# Patient Record
Sex: MALE | Race: WHITE | NOT HISPANIC OR LATINO
[De-identification: names, ages, dates, MRNs, and addresses within clinical notes are randomized per-mention and may not be internally consistent; named-entity substitution may affect disease eponyms.]

---

## 2018-01-01 ENCOUNTER — APPOINTMENT (OUTPATIENT)
Dept: INTERNAL MEDICINE | Facility: CLINIC | Age: 68
End: 2018-01-01
Payer: MEDICARE

## 2018-01-01 VITALS
SYSTOLIC BLOOD PRESSURE: 100 MMHG | HEIGHT: 76 IN | BODY MASS INDEX: 24.36 KG/M2 | DIASTOLIC BLOOD PRESSURE: 68 MMHG | WEIGHT: 200 LBS

## 2018-01-01 DIAGNOSIS — Z86.14 PERSONAL HISTORY OF METHICILLIN RESISTANT STAPHYLOCOCCUS AUREUS INFECTION: ICD-10-CM

## 2018-01-01 PROCEDURE — 96365 THER/PROPH/DIAG IV INF INIT: CPT

## 2018-01-01 PROCEDURE — 93005 ELECTROCARDIOGRAM TRACING: CPT

## 2018-01-01 PROCEDURE — 93325 DOPPLER ECHO COLOR FLOW MAPG: CPT

## 2018-01-01 PROCEDURE — 87150 DNA/RNA AMPLIFIED PROBE: CPT

## 2018-01-01 PROCEDURE — 81001 URINALYSIS AUTO W/SCOPE: CPT

## 2018-01-01 PROCEDURE — 73660 X-RAY EXAM OF TOE(S): CPT

## 2018-01-01 PROCEDURE — 84100 ASSAY OF PHOSPHORUS: CPT

## 2018-01-01 PROCEDURE — 80048 BASIC METABOLIC PNL TOTAL CA: CPT

## 2018-01-01 PROCEDURE — 85730 THROMBOPLASTIN TIME PARTIAL: CPT

## 2018-01-01 PROCEDURE — 99213 OFFICE O/P EST LOW 20 MIN: CPT

## 2018-01-01 PROCEDURE — 87633 RESP VIRUS 12-25 TARGETS: CPT

## 2018-01-01 PROCEDURE — 86850 RBC ANTIBODY SCREEN: CPT

## 2018-01-01 PROCEDURE — 74176 CT ABD & PELVIS W/O CONTRAST: CPT

## 2018-01-01 PROCEDURE — 82010 KETONE BODYS QUAN: CPT

## 2018-01-01 PROCEDURE — 93320 DOPPLER ECHO COMPLETE: CPT

## 2018-01-01 PROCEDURE — 87040 BLOOD CULTURE FOR BACTERIA: CPT

## 2018-01-01 PROCEDURE — 87070 CULTURE OTHR SPECIMN AEROBIC: CPT

## 2018-01-01 PROCEDURE — 99285 EMERGENCY DEPT VISIT HI MDM: CPT | Mod: 25

## 2018-01-01 PROCEDURE — 83605 ASSAY OF LACTIC ACID: CPT

## 2018-01-01 PROCEDURE — 96361 HYDRATE IV INFUSION ADD-ON: CPT

## 2018-01-01 PROCEDURE — 86901 BLOOD TYPING SEROLOGIC RH(D): CPT

## 2018-01-01 PROCEDURE — 92526 ORAL FUNCTION THERAPY: CPT

## 2018-01-01 PROCEDURE — 83735 ASSAY OF MAGNESIUM: CPT

## 2018-01-01 PROCEDURE — 80202 ASSAY OF VANCOMYCIN: CPT

## 2018-01-01 PROCEDURE — 87581 M.PNEUMON DNA AMP PROBE: CPT

## 2018-01-01 PROCEDURE — 73630 X-RAY EXAM OF FOOT: CPT

## 2018-01-01 PROCEDURE — 71045 X-RAY EXAM CHEST 1 VIEW: CPT

## 2018-01-01 PROCEDURE — 97116 GAIT TRAINING THERAPY: CPT

## 2018-01-01 PROCEDURE — 87798 DETECT AGENT NOS DNA AMP: CPT

## 2018-01-01 PROCEDURE — 85027 COMPLETE CBC AUTOMATED: CPT

## 2018-01-01 PROCEDURE — 85610 PROTHROMBIN TIME: CPT

## 2018-01-01 PROCEDURE — 82550 ASSAY OF CK (CPK): CPT

## 2018-01-01 PROCEDURE — 36415 COLL VENOUS BLD VENIPUNCTURE: CPT

## 2018-01-01 PROCEDURE — 92610 EVALUATE SWALLOWING FUNCTION: CPT

## 2018-01-01 PROCEDURE — 96367 TX/PROPH/DG ADDL SEQ IV INF: CPT

## 2018-01-01 PROCEDURE — 82962 GLUCOSE BLOOD TEST: CPT

## 2018-01-01 PROCEDURE — 87186 SC STD MICRODIL/AGAR DIL: CPT

## 2018-01-01 PROCEDURE — 80053 COMPREHEN METABOLIC PANEL: CPT

## 2018-01-01 PROCEDURE — 87641 MR-STAPH DNA AMP PROBE: CPT

## 2018-01-01 PROCEDURE — 70450 CT HEAD/BRAIN W/O DYE: CPT

## 2018-01-01 PROCEDURE — 87486 CHLMYD PNEUM DNA AMP PROBE: CPT

## 2018-01-01 PROCEDURE — 87640 STAPH A DNA AMP PROBE: CPT

## 2018-01-01 PROCEDURE — 93312 ECHO TRANSESOPHAGEAL: CPT

## 2018-01-01 PROCEDURE — 82803 BLOOD GASES ANY COMBINATION: CPT

## 2018-01-01 PROCEDURE — 86900 BLOOD TYPING SEROLOGIC ABO: CPT

## 2018-01-01 PROCEDURE — 87086 URINE CULTURE/COLONY COUNT: CPT

## 2018-01-20 ENCOUNTER — INPATIENT (INPATIENT)
Facility: HOSPITAL | Age: 68
LOS: 3 days | Discharge: HOME | DRG: 64 | End: 2018-01-24
Attending: INTERNAL MEDICINE | Admitting: INTERNAL MEDICINE
Payer: MEDICARE

## 2018-01-20 VITALS
TEMPERATURE: 98 F | SYSTOLIC BLOOD PRESSURE: 153 MMHG | RESPIRATION RATE: 18 BRPM | DIASTOLIC BLOOD PRESSURE: 82 MMHG | OXYGEN SATURATION: 99 % | HEIGHT: 75 IN | HEART RATE: 93 BPM | WEIGHT: 199.96 LBS

## 2018-01-20 DIAGNOSIS — E86.0 DEHYDRATION: ICD-10-CM

## 2018-01-20 DIAGNOSIS — Z95.0 PRESENCE OF CARDIAC PACEMAKER: Chronic | ICD-10-CM

## 2018-01-20 LAB
APPEARANCE UR: CLEAR — SIGNIFICANT CHANGE UP
BACTERIA # UR AUTO: ABNORMAL
BASOPHILS # BLD AUTO: 0 K/UL — SIGNIFICANT CHANGE UP (ref 0–0.2)
BASOPHILS NFR BLD AUTO: 0.2 % — SIGNIFICANT CHANGE UP (ref 0–2)
BILIRUB UR-MCNC: NEGATIVE — SIGNIFICANT CHANGE UP
COLOR SPEC: YELLOW — SIGNIFICANT CHANGE UP
DIFF PNL FLD: ABNORMAL
EOSINOPHIL # BLD AUTO: 0 K/UL — SIGNIFICANT CHANGE UP (ref 0–0.5)
EOSINOPHIL NFR BLD AUTO: 0.2 % — SIGNIFICANT CHANGE UP (ref 0–5)
EPI CELLS # UR: SIGNIFICANT CHANGE UP
GLUCOSE BLDC GLUCOMTR-MCNC: 253 MG/DL — HIGH (ref 70–99)
GLUCOSE BLDC GLUCOMTR-MCNC: 336 MG/DL — HIGH (ref 70–99)
GLUCOSE UR QL: 1000 MG/DL
HCT VFR BLD CALC: 41.2 % — LOW (ref 42–52)
HGB BLD-MCNC: 13.7 G/DL — LOW (ref 14–18)
KETONES UR-MCNC: ABNORMAL
LEUKOCYTE ESTERASE UR-ACNC: NEGATIVE — SIGNIFICANT CHANGE UP
LIDOCAIN IGE QN: 48 U/L — SIGNIFICANT CHANGE UP (ref 22–51)
LYMPHOCYTES # BLD AUTO: 1.2 K/UL — SIGNIFICANT CHANGE UP (ref 1–4.8)
LYMPHOCYTES # BLD AUTO: 9 % — LOW (ref 20–55)
MCHC RBC-ENTMCNC: 29.1 PG — SIGNIFICANT CHANGE UP (ref 27–31)
MCHC RBC-ENTMCNC: 33.3 G/DL — SIGNIFICANT CHANGE UP (ref 32–36)
MCV RBC AUTO: 87.7 FL — SIGNIFICANT CHANGE UP (ref 80–94)
MONOCYTES # BLD AUTO: 0.8 K/UL — SIGNIFICANT CHANGE UP (ref 0–0.8)
MONOCYTES NFR BLD AUTO: 6.1 % — SIGNIFICANT CHANGE UP (ref 3–10)
NEUTROPHILS # BLD AUTO: 11 K/UL — HIGH (ref 1.8–8)
NEUTROPHILS NFR BLD AUTO: 84.3 % — HIGH (ref 37–73)
NITRITE UR-MCNC: NEGATIVE — SIGNIFICANT CHANGE UP
PH UR: 6 — SIGNIFICANT CHANGE UP (ref 5–8)
PLATELET # BLD AUTO: 247 K/UL — SIGNIFICANT CHANGE UP (ref 150–400)
PROT UR-MCNC: 100 MG/DL
RBC # BLD: 4.7 M/UL — SIGNIFICANT CHANGE UP (ref 4.6–6.2)
RBC # FLD: 12.7 % — SIGNIFICANT CHANGE UP (ref 11–15.6)
RBC CASTS # UR COMP ASSIST: SIGNIFICANT CHANGE UP /HPF (ref 0–4)
SP GR SPEC: 1.01 — SIGNIFICANT CHANGE UP (ref 1.01–1.02)
UROBILINOGEN FLD QL: NEGATIVE MG/DL — SIGNIFICANT CHANGE UP
WBC # BLD: 13.1 K/UL — HIGH (ref 4.8–10.8)
WBC # FLD AUTO: 13.1 K/UL — HIGH (ref 4.8–10.8)
WBC UR QL: SIGNIFICANT CHANGE UP

## 2018-01-20 PROCEDURE — 71046 X-RAY EXAM CHEST 2 VIEWS: CPT | Mod: 26

## 2018-01-20 PROCEDURE — 99223 1ST HOSP IP/OBS HIGH 75: CPT | Mod: AI

## 2018-01-20 PROCEDURE — 70450 CT HEAD/BRAIN W/O DYE: CPT | Mod: 26

## 2018-01-20 PROCEDURE — 72110 X-RAY EXAM L-2 SPINE 4/>VWS: CPT | Mod: 26

## 2018-01-20 PROCEDURE — 99285 EMERGENCY DEPT VISIT HI MDM: CPT

## 2018-01-20 PROCEDURE — 72070 X-RAY EXAM THORAC SPINE 2VWS: CPT | Mod: 26

## 2018-01-20 PROCEDURE — 72170 X-RAY EXAM OF PELVIS: CPT | Mod: 26

## 2018-01-20 RX ORDER — INSULIN GLARGINE 100 [IU]/ML
40 INJECTION, SOLUTION SUBCUTANEOUS AT BEDTIME
Qty: 0 | Refills: 0 | Status: DISCONTINUED | OUTPATIENT
Start: 2018-01-20 | End: 2018-01-23

## 2018-01-20 RX ORDER — DEXTROSE 50 % IN WATER 50 %
25 SYRINGE (ML) INTRAVENOUS ONCE
Qty: 0 | Refills: 0 | Status: DISCONTINUED | OUTPATIENT
Start: 2018-01-20 | End: 2018-01-24

## 2018-01-20 RX ORDER — DEXTROSE 50 % IN WATER 50 %
12.5 SYRINGE (ML) INTRAVENOUS ONCE
Qty: 0 | Refills: 0 | Status: DISCONTINUED | OUTPATIENT
Start: 2018-01-20 | End: 2018-01-24

## 2018-01-20 RX ORDER — ATORVASTATIN CALCIUM 80 MG/1
20 TABLET, FILM COATED ORAL AT BEDTIME
Qty: 0 | Refills: 0 | Status: DISCONTINUED | OUTPATIENT
Start: 2018-01-20 | End: 2018-01-24

## 2018-01-20 RX ORDER — SODIUM CHLORIDE 9 MG/ML
1000 INJECTION INTRAMUSCULAR; INTRAVENOUS; SUBCUTANEOUS ONCE
Qty: 0 | Refills: 0 | Status: COMPLETED | OUTPATIENT
Start: 2018-01-20 | End: 2018-01-20

## 2018-01-20 RX ORDER — TETANUS TOXOID, REDUCED DIPHTHERIA TOXOID AND ACELLULAR PERTUSSIS VACCINE, ADSORBED 5; 2.5; 8; 8; 2.5 [IU]/.5ML; [IU]/.5ML; UG/.5ML; UG/.5ML; UG/.5ML
0.5 SUSPENSION INTRAMUSCULAR ONCE
Qty: 0 | Refills: 0 | Status: COMPLETED | OUTPATIENT
Start: 2018-01-20 | End: 2018-01-20

## 2018-01-20 RX ORDER — SODIUM CHLORIDE 9 MG/ML
1000 INJECTION, SOLUTION INTRAVENOUS
Qty: 0 | Refills: 0 | Status: DISCONTINUED | OUTPATIENT
Start: 2018-01-20 | End: 2018-01-24

## 2018-01-20 RX ORDER — AMLODIPINE BESYLATE 2.5 MG/1
1 TABLET ORAL
Qty: 0 | Refills: 0 | COMMUNITY

## 2018-01-20 RX ORDER — ASPIRIN/CALCIUM CARB/MAGNESIUM 324 MG
81 TABLET ORAL DAILY
Qty: 0 | Refills: 0 | Status: DISCONTINUED | OUTPATIENT
Start: 2018-01-20 | End: 2018-01-24

## 2018-01-20 RX ORDER — SODIUM CHLORIDE 9 MG/ML
500 INJECTION INTRAMUSCULAR; INTRAVENOUS; SUBCUTANEOUS ONCE
Qty: 0 | Refills: 0 | Status: COMPLETED | OUTPATIENT
Start: 2018-01-20 | End: 2018-01-20

## 2018-01-20 RX ORDER — HEPARIN SODIUM 5000 [USP'U]/ML
5000 INJECTION INTRAVENOUS; SUBCUTANEOUS EVERY 12 HOURS
Qty: 0 | Refills: 0 | Status: DISCONTINUED | OUTPATIENT
Start: 2018-01-20 | End: 2018-01-24

## 2018-01-20 RX ORDER — DEXTROSE 50 % IN WATER 50 %
1 SYRINGE (ML) INTRAVENOUS ONCE
Qty: 0 | Refills: 0 | Status: DISCONTINUED | OUTPATIENT
Start: 2018-01-20 | End: 2018-01-24

## 2018-01-20 RX ORDER — INSULIN HUMAN 100 [IU]/ML
5 INJECTION, SOLUTION SUBCUTANEOUS ONCE
Qty: 0 | Refills: 0 | Status: COMPLETED | OUTPATIENT
Start: 2018-01-20 | End: 2018-01-20

## 2018-01-20 RX ORDER — AMLODIPINE BESYLATE 2.5 MG/1
5 TABLET ORAL DAILY
Qty: 0 | Refills: 0 | Status: DISCONTINUED | OUTPATIENT
Start: 2018-01-20 | End: 2018-01-23

## 2018-01-20 RX ORDER — GLUCAGON INJECTION, SOLUTION 0.5 MG/.1ML
1 INJECTION, SOLUTION SUBCUTANEOUS ONCE
Qty: 0 | Refills: 0 | Status: DISCONTINUED | OUTPATIENT
Start: 2018-01-20 | End: 2018-01-24

## 2018-01-20 RX ORDER — INSULIN LISPRO 100/ML
VIAL (ML) SUBCUTANEOUS
Qty: 0 | Refills: 0 | Status: DISCONTINUED | OUTPATIENT
Start: 2018-01-20 | End: 2018-01-24

## 2018-01-20 RX ORDER — SERTRALINE 25 MG/1
100 TABLET, FILM COATED ORAL DAILY
Qty: 0 | Refills: 0 | Status: DISCONTINUED | OUTPATIENT
Start: 2018-01-20 | End: 2018-01-24

## 2018-01-20 RX ORDER — CLOPIDOGREL BISULFATE 75 MG/1
75 TABLET, FILM COATED ORAL DAILY
Qty: 0 | Refills: 0 | Status: DISCONTINUED | OUTPATIENT
Start: 2018-01-20 | End: 2018-01-24

## 2018-01-20 RX ADMIN — SODIUM CHLORIDE 500 MILLILITER(S): 9 INJECTION INTRAMUSCULAR; INTRAVENOUS; SUBCUTANEOUS at 12:02

## 2018-01-20 RX ADMIN — ATORVASTATIN CALCIUM 20 MILLIGRAM(S): 80 TABLET, FILM COATED ORAL at 21:31

## 2018-01-20 RX ADMIN — INSULIN HUMAN 5 UNIT(S): 100 INJECTION, SOLUTION SUBCUTANEOUS at 13:01

## 2018-01-20 RX ADMIN — Medication 3: at 19:52

## 2018-01-20 RX ADMIN — HEPARIN SODIUM 5000 UNIT(S): 5000 INJECTION INTRAVENOUS; SUBCUTANEOUS at 19:50

## 2018-01-20 RX ADMIN — AMLODIPINE BESYLATE 5 MILLIGRAM(S): 2.5 TABLET ORAL at 21:31

## 2018-01-20 RX ADMIN — INSULIN GLARGINE 40 UNIT(S): 100 INJECTION, SOLUTION SUBCUTANEOUS at 21:31

## 2018-01-20 RX ADMIN — TETANUS TOXOID, REDUCED DIPHTHERIA TOXOID AND ACELLULAR PERTUSSIS VACCINE, ADSORBED 0.5 MILLILITER(S): 5; 2.5; 8; 8; 2.5 SUSPENSION INTRAMUSCULAR at 12:50

## 2018-01-20 RX ADMIN — SODIUM CHLORIDE 1000 MILLILITER(S): 9 INJECTION INTRAMUSCULAR; INTRAVENOUS; SUBCUTANEOUS at 10:09

## 2018-01-20 RX ADMIN — SODIUM CHLORIDE 500 MILLILITER(S): 9 INJECTION INTRAMUSCULAR; INTRAVENOUS; SUBCUTANEOUS at 10:59

## 2018-01-20 NOTE — PROVIDER CONTACT NOTE (OTHER) - SITUATION
PT orders received. Chart reviewed, contents noted. Pt seen for PT evaluation in ED, see consult for details. Pt denies pain pre/post session. Spouse present t/o session. Pt left on stretcher,

## 2018-01-20 NOTE — PHYSICAL THERAPY INITIAL EVALUATION ADULT - GENERAL OBSERVATIONS, REHAB EVAL
Pt received lying on stretcher in ED, (+) IV lock right UE, NAD. Agreeable to PT. Spouse present t/o session.

## 2018-01-20 NOTE — PHYSICAL THERAPY INITIAL EVALUATION ADULT - ADDITIONAL COMMENTS
Pt/spouse report living in a private house with 3 steps to enter (+) rail. None indoors. Independent at baseline without device in home and use of rollator when going to appointments. Spouse reports that she is available to assist as needed t/o the day, also appears overwhelmed.

## 2018-01-20 NOTE — ED ADULT NURSE NOTE - OBJECTIVE STATEMENT
patient brought in by wife for fall 11pm in the bathroom and was unable to get patient up. patient stay on floor until this morning. patient had diarrhea x 3 day, vomiting for the past 3 days which stop yesterday as per patient wife. patient denies nausea and abdominal pain. patient is noted with dry abrasion to left lower shin and right side of lower back.

## 2018-01-20 NOTE — ED PROVIDER NOTE - CARE PLAN
Principal Discharge DX:	Dehydration  Secondary Diagnosis:	Diarrhea, unspecified type  Secondary Diagnosis:	Back contusion, unspecified laterality, initial encounter

## 2018-01-20 NOTE — ED ADULT TRIAGE NOTE - CHIEF COMPLAINT QUOTE
Patient reports diarrhea x 3 days. Patient fell to knees in bathroom. no loc no blood thinners. Patient reports general malaise. PMH DM. EMS reports hyperglycemia. Patient reports diarrhea x 3 days. Patient fell to knees in bathroom. no loc no blood thinners. Patient reports general malaise. PMH DM.  in triage

## 2018-01-20 NOTE — PROVIDER CONTACT NOTE (OTHER) - RECOMMENDATIONS
Goals: Within 1 week, pt will be:  Independent with bed mobility.  modified independent with transfers and gait with least restrictive device  modified independent negotiating 3 steps with rail.

## 2018-01-20 NOTE — H&P ADULT - HISTORY OF PRESENT ILLNESS
The patient is a 67 year old male with a history of multiple CVAs in the past with vascular dementia, hypertension, cad and diabetes mellitus type 2 who was brought to the ER by EMS after a fall. The patient is confused at baseline therefore history was obtained by his wife at the bedside. According to her, for the past 3 days he has had multiple episodes of loose stool. He normally ambulates with a walker but at home he does not use it. He managed to get to the bathroom last night on his own and she suddenly heard a loud sound. She found him on the bathroom floor awake and conscious a few seconds later. She was unable to get him off the floor and therefore let him sleep on the floor. In the morning he had dragged himself to the front door and thats when she called EMS. In the ER he is awake, xrays of the t and l spine were negative for a fracture. Patient appears comfortable and denies pain.

## 2018-01-20 NOTE — ED BEHAVIORAL HEALTH NOTE - BEHAVIORAL HEALTH NOTE
GILBERT note: Spoke with pt. and pt. wife who is HCP.  Pt. lives with wife who stated that she can no longer care for him on her own.  Pt. has dementia and is unable to care for him self and ambulate independently.  SW informed pt. and wife that a PT eval needs to be done to asses the pt's needs.  Wife was accepting of the idea of a MIKE placement and was given list.  She was informed that pt. will need a 3 day stay for MIKE.  DR. Skaggs was informed of the plan and put in for PT consult.  SW to follow.

## 2018-01-20 NOTE — PHYSICAL THERAPY INITIAL EVALUATION ADULT - PERTINENT HX OF CURRENT PROBLEM, REHAB EVAL
66 y/o male presents to ED with 3 day h/o diarrhea and s/p fall in bathroom. Spouse reports an additional fall within past 3 days, down 3 steps. Pt slept on floor the evening prior to admission due to inability to get up after fall.

## 2018-01-20 NOTE — ED ADULT NURSE NOTE - CHIEF COMPLAINT QUOTE
Patient reports diarrhea x 3 days. Patient fell to knees in bathroom. no loc no blood thinners. Patient reports general malaise. PMH DM.  in triage

## 2018-01-20 NOTE — PROVIDER CONTACT NOTE (OTHER) - ACTION/TREATMENT ORDERED:
Pt/spouse educated regarding: Role of Physical Therapist, plan of care, and discharge plan. Verbalized good understanding.

## 2018-01-20 NOTE — H&P ADULT - ASSESSMENT
The patient is a 67 year old male with a history of multiple CVAs in the past with vascular dementia, hypertension, cad and diabetes mellitus type 2 who was brought to the ER by EMS after a fall.     Assessment/Plan:    1. Fall with gait instability: Rule out new/subacute CVA, CT head ordered, Fall precuations  PT evaluation- social work on consult as wife in unable to take care of patient at home any longer    2. CKD stage 4 with tabitha? IV hydration, monitor bmp; hold acei for now    3. History of cad with ICM status post AICD: continue aspirin/plavix/statin therapy    4. History of multiple cvas in the past    5. DM type 2: Continue lantus and hss monitor bsl    6. Vascular dementia?    VTE_ heparin subcut

## 2018-01-20 NOTE — ED PROVIDER NOTE - PMH
AICD (automatic cardioverter/defibrillator) present    CVA (cerebral vascular accident)    Diabetic neuropathy    DM (diabetes mellitus)    HTN (hypertension)    Hyperlipidemia    Pacemaker    Stented coronary artery

## 2018-01-20 NOTE — ED PROVIDER NOTE - OBJECTIVE STATEMENT
67 year old male with multiple medical problems presents with diarrhea. pt denies any abdominal pain and states that today he tripped and almost fell

## 2018-01-20 NOTE — ED PROVIDER NOTE - PROGRESS NOTE DETAILS
pt's wife came to the ed and states that pt fell down yesterday and hurt his back and was unable to  get up so he slept on the floor all night pt's wife states that she can no longer take care of him and social work was called. he will be seen by pt and was accepted for subacute rehab and will be admitted to medicine

## 2018-01-21 DIAGNOSIS — I63.9 CEREBRAL INFARCTION, UNSPECIFIED: ICD-10-CM

## 2018-01-21 LAB
ANION GAP SERPL CALC-SCNC: 13 MMOL/L — SIGNIFICANT CHANGE UP (ref 5–17)
BUN SERPL-MCNC: 35 MG/DL — HIGH (ref 8–20)
CALCIUM SERPL-MCNC: 9.2 MG/DL — SIGNIFICANT CHANGE UP (ref 8.6–10.2)
CHLORIDE SERPL-SCNC: 106 MMOL/L — SIGNIFICANT CHANGE UP (ref 98–107)
CO2 SERPL-SCNC: 24 MMOL/L — SIGNIFICANT CHANGE UP (ref 22–29)
CREAT SERPL-MCNC: 2.25 MG/DL — HIGH (ref 0.5–1.3)
CULTURE RESULTS: SIGNIFICANT CHANGE UP
GLUCOSE BLDC GLUCOMTR-MCNC: 101 MG/DL — HIGH (ref 70–99)
GLUCOSE BLDC GLUCOMTR-MCNC: 157 MG/DL — HIGH (ref 70–99)
GLUCOSE BLDC GLUCOMTR-MCNC: 220 MG/DL — HIGH (ref 70–99)
GLUCOSE BLDC GLUCOMTR-MCNC: 236 MG/DL — HIGH (ref 70–99)
GLUCOSE SERPL-MCNC: 188 MG/DL — HIGH (ref 70–115)
HBA1C BLD-MCNC: 8.6 % — HIGH (ref 4–5.6)
HCT VFR BLD CALC: 37.6 % — LOW (ref 42–52)
HGB BLD-MCNC: 12.5 G/DL — LOW (ref 14–18)
MCHC RBC-ENTMCNC: 29.1 PG — SIGNIFICANT CHANGE UP (ref 27–31)
MCHC RBC-ENTMCNC: 33.2 G/DL — SIGNIFICANT CHANGE UP (ref 32–36)
MCV RBC AUTO: 87.6 FL — SIGNIFICANT CHANGE UP (ref 80–94)
PLATELET # BLD AUTO: 240 K/UL — SIGNIFICANT CHANGE UP (ref 150–400)
POTASSIUM SERPL-MCNC: 4.8 MMOL/L — SIGNIFICANT CHANGE UP (ref 3.5–5.3)
POTASSIUM SERPL-SCNC: 4.8 MMOL/L — SIGNIFICANT CHANGE UP (ref 3.5–5.3)
RBC # BLD: 4.29 M/UL — LOW (ref 4.6–6.2)
RBC # FLD: 12.9 % — SIGNIFICANT CHANGE UP (ref 11–15.6)
SODIUM SERPL-SCNC: 143 MMOL/L — SIGNIFICANT CHANGE UP (ref 135–145)
SPECIMEN SOURCE: SIGNIFICANT CHANGE UP
WBC # BLD: 9.3 K/UL — SIGNIFICANT CHANGE UP (ref 4.8–10.8)
WBC # FLD AUTO: 9.3 K/UL — SIGNIFICANT CHANGE UP (ref 4.8–10.8)

## 2018-01-21 PROCEDURE — 99233 SBSQ HOSP IP/OBS HIGH 50: CPT

## 2018-01-21 PROCEDURE — 93880 EXTRACRANIAL BILAT STUDY: CPT | Mod: 26

## 2018-01-21 RX ORDER — HYDRALAZINE HCL 50 MG
10 TABLET ORAL ONCE
Qty: 0 | Refills: 0 | Status: COMPLETED | OUTPATIENT
Start: 2018-01-21 | End: 2018-01-21

## 2018-01-21 RX ORDER — LISINOPRIL 2.5 MG/1
20 TABLET ORAL DAILY
Qty: 0 | Refills: 0 | Status: DISCONTINUED | OUTPATIENT
Start: 2018-01-21 | End: 2018-01-24

## 2018-01-21 RX ADMIN — SERTRALINE 100 MILLIGRAM(S): 25 TABLET, FILM COATED ORAL at 12:26

## 2018-01-21 RX ADMIN — Medication 10 MILLIGRAM(S): at 00:32

## 2018-01-21 RX ADMIN — HEPARIN SODIUM 5000 UNIT(S): 5000 INJECTION INTRAVENOUS; SUBCUTANEOUS at 05:06

## 2018-01-21 RX ADMIN — CLOPIDOGREL BISULFATE 75 MILLIGRAM(S): 75 TABLET, FILM COATED ORAL at 12:26

## 2018-01-21 RX ADMIN — Medication 2: at 17:43

## 2018-01-21 RX ADMIN — INSULIN GLARGINE 40 UNIT(S): 100 INJECTION, SOLUTION SUBCUTANEOUS at 21:48

## 2018-01-21 RX ADMIN — ATORVASTATIN CALCIUM 20 MILLIGRAM(S): 80 TABLET, FILM COATED ORAL at 21:48

## 2018-01-21 RX ADMIN — LISINOPRIL 20 MILLIGRAM(S): 2.5 TABLET ORAL at 12:26

## 2018-01-21 RX ADMIN — Medication 81 MILLIGRAM(S): at 12:25

## 2018-01-21 RX ADMIN — HEPARIN SODIUM 5000 UNIT(S): 5000 INJECTION INTRAVENOUS; SUBCUTANEOUS at 17:44

## 2018-01-21 RX ADMIN — Medication 1: at 08:54

## 2018-01-21 RX ADMIN — AMLODIPINE BESYLATE 5 MILLIGRAM(S): 2.5 TABLET ORAL at 05:06

## 2018-01-21 NOTE — PROGRESS NOTE ADULT - SUBJECTIVE AND OBJECTIVE BOX
CC: Fall    INTERVAL HPI/OVERNIGHT EVENTS: Patient seen and examined, responds yes or no to questions with limited history given mentation which appears to be his baseline.       Vital Signs Last 24 Hrs  T(C): 36.3 (2018 07:18), Max: 37 (2018 21:50)  T(F): 97.3 (2018 07:18), Max: 98.6 (2018 21:50)  HR: 115 (2018 07:18) (77 - 115)  BP: 148/82 (2018 07:18) (141/71 - 188/88)  BP(mean): --  RR: 18 (2018 07:18) (18 - 18)  SpO2: 98% (2018 07:18) (96% - 99%)    PHYSICAL EXAM:    GENERAL: NAD  ENMT: Moist mucous membranes  NECK: Supple, No JVD  NERVOUS SYSTEM:  Alert & Oriented X1, Motor Strength 4-5/5 B/L upper and lower extremities  CHEST/LUNG: Clear to auscultation bilaterally; No rales, rhonchi, wheezing, or rubs  HEART: Regular rate and rhythm; No murmurs, rubs, or gallops  ABDOMEN: Soft, Nontender, Nondistended; Bowel sounds present  EXTREMITIES:  2+ Peripheral Pulses, No clubbing, cyanosis, or edema        MEDICATIONS  (STANDING):  amLODIPine   Tablet 5 milliGRAM(s) Oral daily  aspirin enteric coated 81 milliGRAM(s) Oral daily  atorvastatin 20 milliGRAM(s) Oral at bedtime  clopidogrel Tablet 75 milliGRAM(s) Oral daily  dextrose 5%. 1000 milliLiter(s) (50 mL/Hr) IV Continuous <Continuous>  dextrose 50% Injectable 12.5 Gram(s) IV Push once  dextrose 50% Injectable 25 Gram(s) IV Push once  dextrose 50% Injectable 25 Gram(s) IV Push once  heparin  Injectable 5000 Unit(s) SubCutaneous every 12 hours  insulin glargine Injectable (LANTUS) 40 Unit(s) SubCutaneous at bedtime  insulin lispro (HumaLOG) corrective regimen sliding scale   SubCutaneous three times a day before meals  sertraline 100 milliGRAM(s) Oral daily    MEDICATIONS  (PRN):  dextrose Gel 1 Dose(s) Oral once PRN Blood Glucose LESS THAN 70 milliGRAM(s)/deciliter  glucagon  Injectable 1 milliGRAM(s) IntraMuscular once PRN Glucose LESS THAN 70 milligrams/deciliter      Allergies    No Known Allergies    Intolerances          LABS:                          12.5   9.3   )-----------( 240      ( 2018 05:47 )             37.6     -    143  |  106  |  35.0<H>  ----------------------------<  188<H>  4.8   |  24.0  |  2.25<H>    Ca    9.2      2018 05:47    TPro  6.9  /  Alb  3.7  /  TBili  1.0  /  DBili  x   /  AST  38  /  ALT  24  /  AlkPhos  92        Urinalysis Basic - ( 2018 12:12 )    Color: Yellow / Appearance: Clear / S.015 / pH: x  Gluc: x / Ketone: Trace  / Bili: Negative / Urobili: Negative mg/dL   Blood: x / Protein: 100 mg/dL / Nitrite: Negative   Leuk Esterase: Negative / RBC: 0-2 /HPF / WBC 0-2   Sq Epi: x / Non Sq Epi: Occasional / Bacteria: Few        RADIOLOGY & ADDITIONAL TESTS:

## 2018-01-21 NOTE — CONSULT NOTE ADULT - ASSESSMENT
67 year old man with a history of multiple strokes, likely vascular dementia with recent worsening walking and a possible acute/subacute left cerebellar infarct.

## 2018-01-21 NOTE — PROGRESS NOTE ADULT - ASSESSMENT
The patient is a 67 year old male with a history of multiple CVAs in the past with vascular dementia, hypertension, cad and diabetes mellitus type 2 who was brought to the ER by EMS after a fall.  Xray of the lumbar/thoracic spines were negative for acute fracture. CT head consistent with acute/subacute cerebellar infarct.     Assessment/Plan:    1. Fall with gait instability likely secondary to cerebellar infarct: Patient's symptoms began a few weeks ago according to his wife and it appears to be more of a subacute stroke- on aspirin/plavix and statin therapy. Evaluated by PT- recommend MIKE. PMNR consulted for possible acute rehab. Neuro consult. Lipid panel/hba1c. ordered.     2. CKD stage 4 ( creatinine in 2016 2-2.2) : stable. monitor bmp. Resume Acei.     3. History of cad with ICM status post AICD: continue aspirin/plavix/statin therapy    4. History of multiple cvas in the past    5. DM type 2: Continue lantus and hss monitor bsl    6. Vascular dementia?    VTE_ heparin subcut

## 2018-01-21 NOTE — CONSULT NOTE ADULT - PROBLEM SELECTOR RECOMMENDATION 9
Possible new stroke seen on CT may be reason for worsening of gait with multiple falls. However, imaging shows multiple old strokes which may also be cause.  Continue aspirin and plavix.  The patient cannot have MRI/MRA due to pacemaker/AICD.  Cannot due CTA of neck at this time due to renal insufficiency.  Will get echocardiogram and carotid ultrasound to look for any other treatable conditions leading to repeated strokes.  Physical therapy. Patient will likely need subacute rehab.

## 2018-01-21 NOTE — CONSULT NOTE ADULT - SUBJECTIVE AND OBJECTIVE BOX
CHIEF COMPLAINT: falls    HPI: 67y man with a history of multiple strokes and dementia who fell at night on 1/19/18. His wife was unable to lift him from the floor so she let him sleep on the floor. When she still could not get him up the next day she called the paramedics.  He follows with neurology at [place] and had a visit last week.  He has been having increased difficulty walking over the last few weeks. He has also had diarrhea over the last few days.    REVIEW OF SYSTEMS: Pertinent symptoms negative other than listed in HPI and PMH.    PAST MEDICAL & SURGICAL HISTORY:  CVA (cerebral vascular accident)  Diabetic neuropathy  DM (diabetes mellitus)  Hyperlipidemia  AICD (automatic cardioverter/defibrillator) present  Pacemaker  Stented coronary artery  HTN (hypertension)  Cardiac pacemaker    MEDICATIONS  (STANDING):  amLODIPine   Tablet 5 milliGRAM(s) Oral daily  aspirin enteric coated 81 milliGRAM(s) Oral daily  atorvastatin 20 milliGRAM(s) Oral at bedtime  clopidogrel Tablet 75 milliGRAM(s) Oral daily  dextrose 5%. 1000 milliLiter(s) (50 mL/Hr) IV Continuous <Continuous>  dextrose 50% Injectable 12.5 Gram(s) IV Push once  dextrose 50% Injectable 25 Gram(s) IV Push once  dextrose 50% Injectable 25 Gram(s) IV Push once  heparin  Injectable 5000 Unit(s) SubCutaneous every 12 hours  insulin glargine Injectable (LANTUS) 40 Unit(s) SubCutaneous at bedtime  insulin lispro (HumaLOG) corrective regimen sliding scale   SubCutaneous three times a day before meals  lisinopril 20 milliGRAM(s) Oral daily  sertraline 100 milliGRAM(s) Oral daily    MEDICATIONS  (PRN):  dextrose Gel 1 Dose(s) Oral once PRN Blood Glucose LESS THAN 70 milliGRAM(s)/deciliter  glucagon  Injectable 1 milliGRAM(s) IntraMuscular once PRN Glucose LESS THAN 70 milligrams/deciliter    Allergies    No Known Allergies    Intolerances        FAMILY HISTORY:  No pertinent family history in first degree relatives          SOCIAL HISTORY:    He lives with his wife.  He denies any history of tobacco, alcohol or illicit drug use.        VITAL SIGNS:  Vital Signs Last 24 Hrs  T(C): 36.3 (21 Jan 2018 07:18), Max: 37 (20 Jan 2018 21:50)  T(F): 97.3 (21 Jan 2018 07:18), Max: 98.6 (20 Jan 2018 21:50)  HR: 78 (21 Jan 2018 12:27) (77 - 115)  BP: 161/85 (21 Jan 2018 12:27) (141/71 - 188/88)  BP(mean): --  RR: 18 (21 Jan 2018 07:18) (18 - 18)  SpO2: 98% (21 Jan 2018 07:18) (96% - 98%)    PHYSICAL EXAMINATION:  General: Well-developed, well nourished, in no acute distress.  Eyes: Conjunctiva and sclera clear.  Neurologic:  - Mental Status:  Alert, awake, oriented to person, place, and time   Speech is fluent with intact naming, repetition, and comprehension  Cranial Nerves II-XII:    Pupils are equally round and reactive to light. Extraocular movements are intact. No facial weakness, tongue protrudes in the midline  - Motor:  Strength is 5/5 throughout.  There is no pronator drift.  Normal muscle bulk and tone throughout.  - Reflexes:  Symetric,  Plantar responses flexor.  - Sensory:  Intact to light touch,  - Coordination:  Finger-nose-finger without dysmetria.   - Gait:   deferred    LABS:                          12.5   9.3   )-----------( 240      ( 21 Jan 2018 05:47 )             37.6     21 Jan 2018 05:47    143    |  106    |  35.0   ----------------------------<  188    4.8     |  24.0   |  2.25     Ca    9.2        21 Jan 2018 05:47    TPro  6.9    /  Alb  3.7    /  TBili  1.0    /  DBili  x      /  AST  38     /  ALT  24     /  AlkPhos  92     20 Jan 2018 10:21    LIVER FUNCTIONS - ( 20 Jan 2018 10:21 )  Alb: 3.7 g/dL / Pro: 6.9 g/dL / ALK PHOS: 92 U/L / ALT: 24 U/L / AST: 38 U/L / GGT: x                 RADIOLOGY & ADDITIONAL STUDIES:    CT brain 1/20/18: cerebral volume loss. Multiple chronic infarcts including in the cerebellum.  Age indeterminant infarct in the superior left cerebellar hemisphere could be acute or subacute.

## 2018-01-22 ENCOUNTER — TRANSCRIPTION ENCOUNTER (OUTPATIENT)
Age: 68
End: 2018-01-22

## 2018-01-22 LAB
ANION GAP SERPL CALC-SCNC: 13 MMOL/L — SIGNIFICANT CHANGE UP (ref 5–17)
BUN SERPL-MCNC: 28 MG/DL — HIGH (ref 8–20)
CALCIUM SERPL-MCNC: 9 MG/DL — SIGNIFICANT CHANGE UP (ref 8.6–10.2)
CHLORIDE SERPL-SCNC: 109 MMOL/L — HIGH (ref 98–107)
CHOLEST SERPL-MCNC: 149 MG/DL — SIGNIFICANT CHANGE UP (ref 110–199)
CK SERPL-CCNC: 338 U/L — HIGH (ref 30–200)
CO2 SERPL-SCNC: 23 MMOL/L — SIGNIFICANT CHANGE UP (ref 22–29)
CREAT SERPL-MCNC: 1.91 MG/DL — HIGH (ref 0.5–1.3)
GLUCOSE BLDC GLUCOMTR-MCNC: 150 MG/DL — HIGH (ref 70–99)
GLUCOSE BLDC GLUCOMTR-MCNC: 151 MG/DL — HIGH (ref 70–99)
GLUCOSE BLDC GLUCOMTR-MCNC: 158 MG/DL — HIGH (ref 70–99)
GLUCOSE BLDC GLUCOMTR-MCNC: 91 MG/DL — SIGNIFICANT CHANGE UP (ref 70–99)
GLUCOSE SERPL-MCNC: 115 MG/DL — SIGNIFICANT CHANGE UP (ref 70–115)
HDLC SERPL-MCNC: 36 MG/DL — LOW
LIPID PNL WITH DIRECT LDL SERPL: 77 MG/DL — SIGNIFICANT CHANGE UP
POTASSIUM SERPL-MCNC: 3.6 MMOL/L — SIGNIFICANT CHANGE UP (ref 3.5–5.3)
POTASSIUM SERPL-SCNC: 3.6 MMOL/L — SIGNIFICANT CHANGE UP (ref 3.5–5.3)
SODIUM SERPL-SCNC: 145 MMOL/L — SIGNIFICANT CHANGE UP (ref 135–145)
TOTAL CHOLESTEROL/HDL RATIO MEASUREMENT: 4 RATIO — SIGNIFICANT CHANGE UP (ref 3.4–9.6)
TRIGL SERPL-MCNC: 180 MG/DL — SIGNIFICANT CHANGE UP (ref 10–200)

## 2018-01-22 PROCEDURE — 99233 SBSQ HOSP IP/OBS HIGH 50: CPT

## 2018-01-22 PROCEDURE — 99222 1ST HOSP IP/OBS MODERATE 55: CPT

## 2018-01-22 RX ORDER — SODIUM CHLORIDE 9 MG/ML
1000 INJECTION INTRAMUSCULAR; INTRAVENOUS; SUBCUTANEOUS
Qty: 0 | Refills: 0 | Status: COMPLETED | OUTPATIENT
Start: 2018-01-22 | End: 2018-01-22

## 2018-01-22 RX ORDER — LOPERAMIDE HCL 2 MG
2 TABLET ORAL EVERY 4 HOURS
Qty: 0 | Refills: 0 | Status: DISCONTINUED | OUTPATIENT
Start: 2018-01-22 | End: 2018-01-24

## 2018-01-22 RX ORDER — LOPERAMIDE HCL 2 MG
1 TABLET ORAL
Qty: 0 | Refills: 0 | COMMUNITY
Start: 2018-01-22

## 2018-01-22 RX ORDER — AMANTADINE HCL 100 MG
100 CAPSULE ORAL DAILY
Qty: 0 | Refills: 0 | Status: DISCONTINUED | OUTPATIENT
Start: 2018-01-22 | End: 2018-01-24

## 2018-01-22 RX ORDER — AMANTADINE HCL 100 MG
1 CAPSULE ORAL
Qty: 0 | Refills: 0 | COMMUNITY
Start: 2018-01-22

## 2018-01-22 RX ADMIN — SODIUM CHLORIDE 60 MILLILITER(S): 9 INJECTION INTRAMUSCULAR; INTRAVENOUS; SUBCUTANEOUS at 12:35

## 2018-01-22 RX ADMIN — INSULIN GLARGINE 40 UNIT(S): 100 INJECTION, SOLUTION SUBCUTANEOUS at 21:10

## 2018-01-22 RX ADMIN — SERTRALINE 100 MILLIGRAM(S): 25 TABLET, FILM COATED ORAL at 12:31

## 2018-01-22 RX ADMIN — Medication 81 MILLIGRAM(S): at 12:31

## 2018-01-22 RX ADMIN — LISINOPRIL 20 MILLIGRAM(S): 2.5 TABLET ORAL at 05:13

## 2018-01-22 RX ADMIN — Medication: at 12:29

## 2018-01-22 RX ADMIN — HEPARIN SODIUM 5000 UNIT(S): 5000 INJECTION INTRAVENOUS; SUBCUTANEOUS at 17:00

## 2018-01-22 RX ADMIN — HEPARIN SODIUM 5000 UNIT(S): 5000 INJECTION INTRAVENOUS; SUBCUTANEOUS at 05:13

## 2018-01-22 RX ADMIN — ATORVASTATIN CALCIUM 20 MILLIGRAM(S): 80 TABLET, FILM COATED ORAL at 21:10

## 2018-01-22 RX ADMIN — Medication 100 MILLIGRAM(S): at 12:30

## 2018-01-22 RX ADMIN — Medication 2 MILLIGRAM(S): at 12:39

## 2018-01-22 RX ADMIN — AMLODIPINE BESYLATE 5 MILLIGRAM(S): 2.5 TABLET ORAL at 05:13

## 2018-01-22 RX ADMIN — CLOPIDOGREL BISULFATE 75 MILLIGRAM(S): 75 TABLET, FILM COATED ORAL at 12:31

## 2018-01-22 NOTE — DISCHARGE NOTE ADULT - SECONDARY DIAGNOSIS.
Diarrhea, unspecified type Type 2 diabetes mellitus with other circulatory complication, unspecified long term insulin use status Essential hypertension Dysphagia, unspecified type

## 2018-01-22 NOTE — PROGRESS NOTE ADULT - SUBJECTIVE AND OBJECTIVE BOX
INTERVAL HISTORY:  =inteval changes. Family in attendence      VITAL SIGNS:  Vital Signs Last 24 Hrs  T(C): 36.2 (22 Jan 2018 05:09), Max: 36.8 (21 Jan 2018 19:59)  T(F): 97.2 (22 Jan 2018 05:09), Max: 98.2 (21 Jan 2018 19:59)  HR: 82 (22 Jan 2018 05:09) (78 - 88)  BP: 165/94 (22 Jan 2018 05:09) (161/85 - 167/91)  BP(mean): --  RR: 18 (22 Jan 2018 05:09) (17 - 18)  SpO2: 95% (22 Jan 2018 05:09) (95% - 96%)    PHYSICAL EXAMINATION:    Mentation:  awake and cooperatibve. oriented to name and knows he is in [facility]  Language/Speech: nl  CN: nl  Visual Fields: full  Motor: no gross weaknes.. Kiersten intact FtoN- nl, gait- not assessed  Sensory:  DTR:  Babinski:      MEDS:  MEDICATIONS  (STANDING):  amantadine 100 milliGRAM(s) Oral daily  amLODIPine   Tablet 5 milliGRAM(s) Oral daily  aspirin enteric coated 81 milliGRAM(s) Oral daily  atorvastatin 20 milliGRAM(s) Oral at bedtime  clopidogrel Tablet 75 milliGRAM(s) Oral daily  dextrose 5%. 1000 milliLiter(s) (50 mL/Hr) IV Continuous <Continuous>  dextrose 50% Injectable 12.5 Gram(s) IV Push once  dextrose 50% Injectable 25 Gram(s) IV Push once  dextrose 50% Injectable 25 Gram(s) IV Push once  heparin  Injectable 5000 Unit(s) SubCutaneous every 12 hours  insulin glargine Injectable (LANTUS) 40 Unit(s) SubCutaneous at bedtime  insulin lispro (HumaLOG) corrective regimen sliding scale   SubCutaneous three times a day before meals  lisinopril 20 milliGRAM(s) Oral daily  sertraline 100 milliGRAM(s) Oral daily  sodium chloride 0.9%. 1000 milliLiter(s) (60 mL/Hr) IV Continuous <Continuous>    MEDICATIONS  (PRN):  dextrose Gel 1 Dose(s) Oral once PRN Blood Glucose LESS THAN 70 milliGRAM(s)/deciliter  glucagon  Injectable 1 milliGRAM(s) IntraMuscular once PRN Glucose LESS THAN 70 milligrams/deciliter  loperamide 2 milliGRAM(s) Oral every 4 hours PRN Diarrhea      LABS:                          12.5   9.3   )-----------( 240      ( 21 Jan 2018 05:47 )             37.6     01-22    145  |  109<H>  |  28.0<H>  ----------------------------<  115  3.6   |  23.0  |  1.91<H>    Ca    9.0      22 Jan 2018 07:17            RADIOLOGY & ADDITIONAL STUDIES:      < from: US Duplex Carotid Arteries Complete, Bilateral (01.21.18 @ 18:43) >  IMPRESSION: Moderate to severe plaque without a hemodynamic abnormality   involving the common carotid arteries or internal carotid arteries.   Elevated velocity in the right vertebral artery.     < end of copied text >    IMPRESSION & PLAN:    1. Vascular dementia  2. Suspected cerebellar infarct  3. Metabolic encephalopathy    Rec:  Conservative vascular management  Antiplatelet therapy as prescribed.  Cerebrovascular risk factor assessment and management  Medical and Cardiac evaluation and treatment as indicated  Will not actively follow.   Neurologically cleared for discharge/disposition.  Please recontact as needed. INTERVAL HISTORY:  =inteval changes. Family in attendence      VITAL SIGNS:  Vital Signs Last 24 Hrs  T(C): 36.2 (22 Jan 2018 05:09), Max: 36.8 (21 Jan 2018 19:59)  T(F): 97.2 (22 Jan 2018 05:09), Max: 98.2 (21 Jan 2018 19:59)  HR: 82 (22 Jan 2018 05:09) (78 - 88)  BP: 165/94 (22 Jan 2018 05:09) (161/85 - 167/91)  BP(mean): --  RR: 18 (22 Jan 2018 05:09) (17 - 18)  SpO2: 95% (22 Jan 2018 05:09) (95% - 96%)    PHYSICAL EXAMINATION:    Mentation:  awake and cooperatibve. oriented to name and knows he is in [facility]  Language/Speech: nl  CN: nl  Visual Fields: full  Motor: no gross weaknes.. Kiersten intact FtoN- nl, gait- not assessed  Sensory:  DTR:  Babinski:      MEDS:  MEDICATIONS  (STANDING):  amantadine 100 milliGRAM(s) Oral daily  amLODIPine   Tablet 5 milliGRAM(s) Oral daily  aspirin enteric coated 81 milliGRAM(s) Oral daily  atorvastatin 20 milliGRAM(s) Oral at bedtime  clopidogrel Tablet 75 milliGRAM(s) Oral daily  dextrose 5%. 1000 milliLiter(s) (50 mL/Hr) IV Continuous <Continuous>  dextrose 50% Injectable 12.5 Gram(s) IV Push once  dextrose 50% Injectable 25 Gram(s) IV Push once  dextrose 50% Injectable 25 Gram(s) IV Push once  heparin  Injectable 5000 Unit(s) SubCutaneous every 12 hours  insulin glargine Injectable (LANTUS) 40 Unit(s) SubCutaneous at bedtime  insulin lispro (HumaLOG) corrective regimen sliding scale   SubCutaneous three times a day before meals  lisinopril 20 milliGRAM(s) Oral daily  sertraline 100 milliGRAM(s) Oral daily  sodium chloride 0.9%. 1000 milliLiter(s) (60 mL/Hr) IV Continuous <Continuous>    MEDICATIONS  (PRN):  dextrose Gel 1 Dose(s) Oral once PRN Blood Glucose LESS THAN 70 milliGRAM(s)/deciliter  glucagon  Injectable 1 milliGRAM(s) IntraMuscular once PRN Glucose LESS THAN 70 milligrams/deciliter  loperamide 2 milliGRAM(s) Oral every 4 hours PRN Diarrhea      LABS:                          12.5   9.3   )-----------( 240      ( 21 Jan 2018 05:47 )             37.6     01-22    145  |  109<H>  |  28.0<H>  ----------------------------<  115  3.6   |  23.0  |  1.91<H>    Ca    9.0      22 Jan 2018 07:17            RADIOLOGY & ADDITIONAL STUDIES:      < from: US Duplex Carotid Arteries Complete, Bilateral (01.21.18 @ 18:43) >  IMPRESSION: Moderate to severe plaque without a hemodynamic abnormality   involving the common carotid arteries or internal carotid arteries.   Elevated velocity in the right vertebral artery.     < end of copied text >    IMPRESSION & PLAN:    1. Vascular dementia  2. Suspected cerebellar infarct  3. Metabolic encephalopathy    Rec:  Conservative vascular management  Antiplatelet therapy as prescribed.  Cerebrovascular risk factor assessment and management  Medical and Cardiac evaluation and treatment as indicated. TTE pending  Will not actively follow.   Neurologically cleared for discharge/disposition.  Please recontact as needed.

## 2018-01-22 NOTE — SWALLOW BEDSIDE ASSESSMENT ADULT - COMMENTS
67 year old male with a history of multiple CVAs in the past with vascular dementia, hypertension, cad and diabetes mellitus type 2 who was brought to the ER by EMS after a fall.  Xray of the lumbar/thoracic spines were negative for acute fracture. CT head consistent with acute/subacute cerebellar infarct.

## 2018-01-22 NOTE — PROGRESS NOTE ADULT - SUBJECTIVE AND OBJECTIVE BOX
CC: Diarrhea     INTERVAL HPI/OVERNIGHT EVENTS: Patient seen and examined, still having multiple episodes of loose stool. Had some nausea and vomiting prior to admission but now tolerating diet. Afebrile. Denies abdominal pain. Per his wife at the bedside at home he is sleepy and in bed all the time.       Vital Signs Last 24 Hrs  T(C): 36.2 (2018 05:09), Max: 36.8 (2018 19:59)  T(F): 97.2 (2018 05:09), Max: 98.2 (2018 19:59)  HR: 82 (2018 05:09) (78 - 88)  BP: 165/94 (2018 05:09) (161/85 - 167/91)  BP(mean): --  RR: 18 (2018 05:09) (17 - 18)  SpO2: 95% (2018 05:09) (95% - 96%)    PHYSICAL EXAM:    GENERAL: NAD, AOX2  ENMT: Moist mucous membranes  CHEST/LUNG: Clear to auscultation bilaterally; No rales, rhonchi, wheezing, or rubs  HEART: Regular rate and rhythm; No murmurs, rubs, or gallops  ABDOMEN: Soft, Nontender, Nondistended; Bowel sounds present  EXTREMITIES:  2+ Peripheral Pulses, No clubbing, cyanosis, or edema        MEDICATIONS  (STANDING):  amantadine 100 milliGRAM(s) Oral daily  amLODIPine   Tablet 5 milliGRAM(s) Oral daily  aspirin enteric coated 81 milliGRAM(s) Oral daily  atorvastatin 20 milliGRAM(s) Oral at bedtime  clopidogrel Tablet 75 milliGRAM(s) Oral daily  dextrose 5%. 1000 milliLiter(s) (50 mL/Hr) IV Continuous <Continuous>  dextrose 50% Injectable 12.5 Gram(s) IV Push once  dextrose 50% Injectable 25 Gram(s) IV Push once  dextrose 50% Injectable 25 Gram(s) IV Push once  heparin  Injectable 5000 Unit(s) SubCutaneous every 12 hours  insulin glargine Injectable (LANTUS) 40 Unit(s) SubCutaneous at bedtime  insulin lispro (HumaLOG) corrective regimen sliding scale   SubCutaneous three times a day before meals  lisinopril 20 milliGRAM(s) Oral daily  sertraline 100 milliGRAM(s) Oral daily  sodium chloride 0.9%. 1000 milliLiter(s) (60 mL/Hr) IV Continuous <Continuous>    MEDICATIONS  (PRN):  dextrose Gel 1 Dose(s) Oral once PRN Blood Glucose LESS THAN 70 milliGRAM(s)/deciliter  glucagon  Injectable 1 milliGRAM(s) IntraMuscular once PRN Glucose LESS THAN 70 milligrams/deciliter  loperamide 2 milliGRAM(s) Oral every 4 hours PRN Diarrhea      Allergies    No Known Allergies    Intolerances          LABS:                          12.5   9.3   )-----------( 240      ( 2018 05:47 )             37.6     01-22    145  |  109<H>  |  28.0<H>  ----------------------------<  115  3.6   |  23.0  |  1.91<H>    Ca    9.0      2018 07:17        Urinalysis Basic - ( 2018 12:12 )    Color: Yellow / Appearance: Clear / S.015 / pH: x  Gluc: x / Ketone: Trace  / Bili: Negative / Urobili: Negative mg/dL   Blood: x / Protein: 100 mg/dL / Nitrite: Negative   Leuk Esterase: Negative / RBC: 0-2 /HPF / WBC 0-2   Sq Epi: x / Non Sq Epi: Occasional / Bacteria: Few        RADIOLOGY & ADDITIONAL TESTS:

## 2018-01-22 NOTE — DISCHARGE NOTE ADULT - HOSPITAL COURSE
The patient is a 67 year old male with a history of multiple CVAs in the past with vascular dementia, hypertension, cad and diabetes mellitus type 2 who was brought to the ER by EMS after a fall.  Xray of the lumbar/thoracic spines were negative for acute fracture. CT head consistent with acute/subacute cerebellar infarct.   Fall with gait instability likely secondary to cerebellar infarct on CT head;  Likely subacute infarct given patient's onset of symptoms.   Spoke with neurology- To continue aspirin/plavix/statin therapy  Spoke with PMNR patient suitable for [facility] at this time  Carotid duplex shows Moderate to severe plaque without a hemodynamic abnormality involving the common carotid arteries or internal carotid arteries.   Elevated velocity in the right vertebral artery however CTA or MRA cannot be done given renal insufficiency and AICD.   Vascular dementia- start amantadine in the morning    Diarrhea: Likely viral gastroenteritis: start imodium po; no history of antibiotic use in the past 3 months per the wife or hospitalizations'    Mild Rhabdo likely traumatic from recent fall The patient is a 67 year old male with a history of multiple CVAs in the past with vascular dementia, hypertension, cad and diabetes mellitus type 2 who was brought to the ER by EMS after a fall.  Xray of the lumbar/thoracic spines were negative for acute fracture. CT head consistent with acute/subacute cerebellar infarct.   Fall with gait instability likely secondary to cerebellar infarct on CT head;  Likely subacute infarct given patient's onset of symptoms.   Spoke with neurology- To continue aspirin/plavix/statin therapy  Spoke with PMNR patient suitable for [facility] at this time  Carotid duplex shows Moderate to severe plaque without a hemodynamic abnormality involving the common carotid arteries or internal carotid arteries.   Elevated velocity in the right vertebral artery however CTA or MRA cannot be done given renal insufficiency and AICD. Echocardiogram reviewed.   Vascular dementia- start amantadine in the morning    Diarrhea: Likely viral gastroenteritis: start imodium po; no history of antibiotic use in the past 3 months per the wife or hospitalizations'    Mild Rhabdo likely traumatic from recent fall improved with iv hydration    Seen by speech therapy, recommended Soft diet with honey thick fluids.     Discharge to [facility] in stable condition; 40 mins spent coordinating care and discharge. The patient is a 67 year old male with a history of multiple CVAs in the past with vascular dementia, hypertension, cad and diabetes mellitus type 2 who was brought to the ER by EMS after a fall.  Xray of the lumbar/thoracic spines were negative for acute fracture. CT head consistent with acute/subacute cerebellar infarct.   Fall with gait instability likely secondary to cerebellar infarct on CT head;  Likely subacute infarct given patient's onset of symptoms.   Spoke with neurology- To continue aspirin/plavix/statin therapy  Spoke with PMNR patient suitable for [facility] at this time  Carotid duplex shows Moderate to severe plaque without a hemodynamic abnormality involving the common carotid arteries or internal carotid arteries.   Elevated velocity in the right vertebral artery however CTA or MRA cannot be done given renal insufficiency and AICD. Echocardiogram reviewed.   Vascular dementia- start amantadine in the morning    Diarrhea: Likely viral gastroenteritis: start imodium po; no history of antibiotic use in the past 3 months per the wife or hospitalizations'    Mild Rhabdo likely traumatic from recent fall improved with iv hydration    Seen by speech therapy, recommended Soft diet with honey thick fluids.       Noted to have episodes of hypoglycemia. Lantus dose was reduced from 50 to 25 units at bedtime.   To conitnue humalog sliding scale for now. Doses of insulin to be adjusted for blood glucose levels.     Discharge to [facility] in stable condition; 40 mins spent coordinating care and discharge.

## 2018-01-22 NOTE — DISCHARGE NOTE ADULT - MEDICATION SUMMARY - MEDICATIONS TO CHANGE
I will SWITCH the dose or number of times a day I take the medications listed below when I get home from the hospital:  None I will SWITCH the dose or number of times a day I take the medications listed below when I get home from the hospital:    amLODIPine 5 mg oral tablet  -- 1 tab(s) by mouth once a day I will SWITCH the dose or number of times a day I take the medications listed below when I get home from the hospital:    amLODIPine 5 mg oral tablet  -- 1 tab(s) by mouth once a day    HumaLOG KwikPen 100 units/mL subcutaneous solution  -- 6 unit(s) subcutaneous 3 times a day    Levemir 100 units/mL subcutaneous solution  -- 50 unit(s) subcutaneous once a day (at bedtime)

## 2018-01-22 NOTE — DISCHARGE NOTE ADULT - CARE PLAN
Principal Discharge DX:	Cerebrovascular accident (CVA), unspecified mechanism  Goal:	Medical management  Assessment and plan of treatment:	Continue aspirin/plavix and statin therapy  Pt/ot/St  FOllow up with neurology as outpatient  Secondary Diagnosis:	Diarrhea, unspecified type  Assessment and plan of treatment:	Imodium as needed  Secondary Diagnosis:	Type 2 diabetes mellitus with other circulatory complication, unspecified long term insulin use status Principal Discharge DX:	Cerebrovascular accident (CVA), unspecified mechanism  Goal:	Medical management  Assessment and plan of treatment:	Continue aspirin/plavix and statin therapy  Pt/ot/St  FOllow up with neurology as outpatient  Secondary Diagnosis:	Diarrhea, unspecified type  Assessment and plan of treatment:	Imodium as needed  Secondary Diagnosis:	Type 2 diabetes mellitus with other circulatory complication, unspecified long term insulin use status  Secondary Diagnosis:	Essential hypertension  Assessment and plan of treatment:	Increase norvasc to 10 mg PO OD   Monitor bp  Secondary Diagnosis:	Dysphagia, unspecified type  Assessment and plan of treatment:	Soft diet with honey thick fluids Principal Discharge DX:	Cerebrovascular accident (CVA), unspecified mechanism  Goal:	Medical management  Assessment and plan of treatment:	Continue aspirin/plavix and statin therapy  Pt/ot/St  FOllow up with neurology as outpatient  Secondary Diagnosis:	Diarrhea, unspecified type  Assessment and plan of treatment:	Imodium as needed  Secondary Diagnosis:	Type 2 diabetes mellitus with other circulatory complication, unspecified long term insulin use status  Assessment and plan of treatment:	Noted to have episodes of hypoglycemia. Lantus dose was reduced from 50 to 25 units at bedtime.   To conitnue humalog sliding scale for now. Doses of insulin to be adjusted for blood glucose levels.  Secondary Diagnosis:	Essential hypertension  Assessment and plan of treatment:	Increase norvasc to 10 mg PO OD   Monitor bp  Secondary Diagnosis:	Dysphagia, unspecified type  Assessment and plan of treatment:	Soft diet with honey thick fluids

## 2018-01-22 NOTE — SWALLOW BEDSIDE ASSESSMENT ADULT - ORAL PHASE
Delayed oral transit time/oral holding due to reduced cognition oral holding due to reduced cognition/Delayed oral transit time Delayed oral transit time

## 2018-01-22 NOTE — DISCHARGE NOTE ADULT - PLAN OF CARE
Medical management Continue aspirin/plavix and statin therapy  Pt/ot/St  FOllow up with neurology as outpatient Imodium as needed Increase norvasc to 10 mg PO OD   Monitor bp Soft diet with honey thick fluids Noted to have episodes of hypoglycemia. Lantus dose was reduced from 50 to 25 units at bedtime.   To conitnue humalog sliding scale for now. Doses of insulin to be adjusted for blood glucose levels.

## 2018-01-22 NOTE — PROGRESS NOTE ADULT - ASSESSMENT
The patient is a 67 year old male with a history of multiple CVAs in the past with vascular dementia, hypertension, cad and diabetes mellitus type 2 who was brought to the ER by EMS after a fall.  Xray of the lumbar/thoracic spines were negative for acute fracture. CT head consistent with acute/subacute cerebellar infarct.     Assessment/Plan:    1. Fall with gait instability likely secondary to cerebellar infarct:  Likely subacute infarct given patient's onset of symptoms.   Spoke with neurology- To continue aspirin/plavix/statin therapy  Spoke with PMNR patient suitable for [facility] at this time  Carotid duplex shows Moderate to severe plaque without a hemodynamic abnormality involving the common carotid arteries or internal carotid arteries.   Elevated velocity in the right vertebral artery however CTA or MRA cannot be done given renal insufficiency and AICD.     Echocardiogram pending    2. CKD stage 4 ( creatinine in 2016 2-2.2) : stable. monitor bmp. On ACEi    3. History of cad with ICM status post AICD: continue aspirin/plavix/statin therapy    4. History of multiple cvas in the past    5. DM type 2: Continue lantus and hss monitor bsl    6. Vascular dementia- start amantadine in the morning    7. Diarrhea: Likely viral gastroenteritis: start imodium po; no history of antibiotic use in the past 3 months per the wife or hospitalizations    .8 Mild Rhabdo likely traumatic from recent fall: Gentle iv hydration today. Follow up CK in am         VTE_ heparin subcut

## 2018-01-22 NOTE — SWALLOW BEDSIDE ASSESSMENT ADULT - SWALLOW EVAL: DIAGNOSIS
Orpharyngeal dysphagia impacted by cognition, with +cough post intake for thin & nectar thick fluids

## 2018-01-22 NOTE — SWALLOW BEDSIDE ASSESSMENT ADULT - ASR SWALLOW ASPIRATION MONITOR
cough/oral hygiene/pneumonia/gurgly voice/upper respiratory infection/change of breathing pattern/position upright (90Y)/fever/throat clearing

## 2018-01-22 NOTE — CONSULT NOTE ADULT - SUBJECTIVE AND OBJECTIVE BOX
Rehab evaluation :Patient is a 67y old  Male who presents with a chief complaint of Fall    HPI:  The patient is a 67 year old male with a history of multiple CVAs in the past with vascular dementia, hypertension, cad and diabetes mellitus type 2 who was brought to the ER by EMS after a fall. The patient is confused at baseline.  Per wife, for the past 3 days he has had multiple episodes of loose stool.. He managed to get to the bathroom the previous night  on his own and she suddenly heard a loud sound. She found him on the bathroom floor awake and conscious a few seconds later. She was unable to get him off the floor and therefore let him sleep on the floor. In the morning he had dragged himself to the front door and she called EMS.  xrays of the t and l spine were negative for a fracture. w/u showed that he had acute/subacute cerebellar stroke. Unable to get MRI due to AICD.       PAST MEDICAL & SURGICAL HISTORY:  CVA (cerebral vascular accident)  Diabetic neuropathy  DM (diabetes mellitus)  Hyperlipidemia  AICD (automatic cardioverter/defibrillator) present  Pacemaker  Stented coronary artery  HTN (hypertension)  Cardiac pacemaker      FAMILY HISTORY:  No pertinent family history in first degree relatives      SOCIAL HISTORY:  TOBACCO: denies history  ALCOHOL: denies abuse  IVDA: denies history    FUNCTIONAL, ENVIRONMENTAL HISTORY:  Lives with spouse in a private house 3 steps to enter    FUNCTIONAL HISTORY: independent at baseline and ambulates without AD. Has rollator    Allergies    No Known Allergies    Intolerances      MEDICATIONS  (STANDING):  amLODIPine   Tablet 5 milliGRAM(s) Oral daily  aspirin enteric coated 81 milliGRAM(s) Oral daily  atorvastatin 20 milliGRAM(s) Oral at bedtime  clopidogrel Tablet 75 milliGRAM(s) Oral daily  dextrose 5%. 1000 milliLiter(s) (50 mL/Hr) IV Continuous <Continuous>  dextrose 50% Injectable 12.5 Gram(s) IV Push once  dextrose 50% Injectable 25 Gram(s) IV Push once  dextrose 50% Injectable 25 Gram(s) IV Push once  heparin  Injectable 5000 Unit(s) SubCutaneous every 12 hours  insulin glargine Injectable (LANTUS) 40 Unit(s) SubCutaneous at bedtime  insulin lispro (HumaLOG) corrective regimen sliding scale   SubCutaneous three times a day before meals  lisinopril 20 milliGRAM(s) Oral daily  sertraline 100 milliGRAM(s) Oral daily    MEDICATIONS  (PRN):  dextrose Gel 1 Dose(s) Oral once PRN Blood Glucose LESS THAN 70 milliGRAM(s)/deciliter  glucagon  Injectable 1 milliGRAM(s) IntraMuscular once PRN Glucose LESS THAN 70 milligrams/deciliter      REVIEW OF SYSTEMS:    CONSTITUTIONAL: No fever,   EYES: No eye pain,   RESPIRATORY: No cough,   CARDIOVASCULAR: No chest pain,   GASTROINTESTINAL: No abdominal or epigastric pain.  GENITOURINARY: No dysuria,   NEUROLOGICAL: No headaches,  SKIN:No rashes  ENDOCRINE: No heat or cold intolerance; No hair loss  MUSCULOSKELETAL: No joint pain   PSYCHIATRIC: No depression, anxiety, mood swings, or difficulty sleeping    Vital Signs Last 24 Hrs  T(C): 36.2 (2018 05:09), Max: 36.8 (2018 19:59)  T(F): 97.2 (2018 05:09), Max: 98.2 (2018 19:59)  HR: 82 (2018 05:09) (78 - 115)  BP: 165/94 (2018 05:09) (148/82 - 167/91)  BP(mean): --  RR: 18 (2018 05:09) (17 - 18)  SpO2: 95% (2018 05:09) (95% - 98%)    PHYSICAL EXAM:    GENERAL: NAD,   HEAD:  Atraumatic, Normocephalic  HEART: S1S2+  CHEST/LUNG: Clear   ABDOMEN: Soft,   EXTREMITIES:   NERVOUS SYSTEM:  Alert & Oriented X3, Good concentration    FUNCTIONAL EXAM: min assist with PT for transfers and gait    LABS:                        12.5   9.3   )-----------( 240      ( 2018 05:47 )             37.6     -    143  |  106  |  35.0<H>  ----------------------------<  188<H>  4.8   |  24.0  |  2.25<H>    Ca    9.2      2018 05:47    TPro  6.9  /  Alb  3.7  /  TBili  1.0  /  DBili  x   /  AST  38  /  ALT  24  /  AlkPhos  92  -20      Urinalysis Basic - ( 2018 12:12 )    Color: Yellow / Appearance: Clear / S.015 / pH: x  Gluc: x / Ketone: Trace  / Bili: Negative / Urobili: Negative mg/dL   Blood: x / Protein: 100 mg/dL / Nitrite: Negative   Leuk Esterase: Negative / RBC: 0-2 /HPF / WBC 0-2   Sq Epi: x / Non Sq Epi: Occasional / Bacteria: Few      RADIOLOGY & ADDITIONAL STUDIES:    < from: CT Head No Cont (18 @ 19:02) >  NTERPRETATION:  EXAM: CT HEAD WITHOUT INTRAVENOUS CONTRAST    CLINICAL INFORMATION: Status post fall.  Rule out cerebral loss or   accident.    TECHNIQUE:  Routine, noncontrast CT scan of the head. Axial images were   acquired from the skullbase through the vertex.  Two-dimensional sagittal   and coronal reformats were generated.     COMPARISON STUDY: 2016.    FINDINGS:   There is no CT evidence of acute intracranial hemorrhage, extra-axial   collection, vasogenic edema, mass effect, midline shift, central   herniation, hydrocephalus or acute territorial infarct.  There is a small   to moderate age-indeterminate infarct in the superior left cerebellar   hemisphere.    There is moderate generalized cerebral volume loss and mild   periventricular white matter hypoattenuation compatible chronic   microvascular ischemic disease.  There are multiple small chronic   infarcts involving the basal ganglia bilaterally, anterior limbs of the   internal capsules bilaterally, thalami bilaterally and corona radiata   bilaterally and cerebellum.    There is mild mucosal thickening in the left maxillary sinus and small   mucous retention cysts in the sphenoid sinuses bilaterally.    The mastoid air cells and middle ear cavities are grossly clear.    The calvarium, skull base, visualized facial bones and regional soft   tissues are grossly intact.    IMPRESSION:   No CT evidence of acute intracranial hemorrhage, acute territorial   infarct or intracranial trauma.    Age indeterminant infarct in the superior left cerebellar hemisphere   could be acute or subacute.  MRI characterization can be performed as   clinically warranted.    Cerebral volume loss and chronic ischemic changes as discussed above.    < from: Xray Pelvis AP only (18 @ 13:27) >  AM:  PELVIS                          PROCEDURE DATE:  2018          INTERPRETATION:  Radiograph of the pelvis         CLINICAL INFORMATION: Injury with Pain.    TECHNIQUE:  A single frontal view of the pelvis was obtained.    FINDINGS:   No prior similar studies are available for review.    Pelvic bones appear intact.  No fracture is recognized.  The hips are   located.  The sacroiliac joints and pubic symphysis remain intact.  No   pathologic calcifications are seen.  Soft tissues appear intact.    Bowel overlies and obscures portions of the sacrum and iliac bone.    IMPRESSION:   No acute radiographic osseous pathology..           If pain persist despite conservative therapy and patient is unable to   walk a follow-up CT/MRI scanrecommended to exclude occult fractures or   soft tissue injuries not evident on plain film radiography.    < end of copied text >    < from: Xray Lumbosacral Spine (18 @ 13:26) >  XAM:  LUMBO-SACRAL SPINE                          PROCEDURE DATE:  2018          INTERPRETATION:  Radiographs of the lumbar spine        CLINICAL INFORMATION:  Injury with  Pain.    TECHNIQUE:  Frontal, lateral  and lateral coned-down view of the   lumbosacral junction were obtained.    FINDINGS:  No previous examinations are available for review.    Lumbar vertebral alignment is maintained.  Lumbar vertebral body heights   are preserved.  Pedicles are intact.  No fracture or destructive bone   lesion is seen.    There is degenerative narrowing of the L5-S1 disc space with anterior   bridging spur formation between L4-5 and 5 S1 the vertebra..       The sacroiliac joints appear intact.    IMPRESSION:   Degenerative narrowing of the L5-S1 disc space with L4-S1   degenerative endplate osteophytes. A  If pain persist despite conservative therapy and occult fracture or disc   herniation causing central canal or foraminal nerve root compression   clinically suspected further assessment with CT or MRI recommended.           < end of copied text >      A/P:    67 year old male with prior h/o CVA, dementia admitted  after fall with w/u showing acute/subacute cerebellar stroke  Patient with gait impairment and Rehab evaluation :Patient is a 67y old  Male who presents with a chief complaint of Fall.     HPI:  The patient is a 67 year old male with a history of multiple CVAs in the past with vascular dementia, hypertension, cad and diabetes mellitus type 2 who was brought to the ER by EMS after a fall. The patient is confused at baseline.  Per wife, for the past 3 days he has had multiple episodes of loose stool.. He managed to get to the bathroom the previous night  on his own and she suddenly heard a loud sound. She found him on the bathroom floor awake and conscious a few seconds later. She was unable to get him off the floor and therefore let him sleep on the floor. In the morning he had dragged himself to the front door and she called EMS.  xrays of the thoracic  and lumbar spine were negative for a fracture. w/u showed that he had acute/subacute cerebellar stroke. Unable to get MRI due to AICD. w/u ongoing. Rehab evaluation requested due to finding of stroke    Patient seen at bedside. Able to tell me that he is in the hospital after a fall, but unable to state past history except with cues.       PAST MEDICAL & SURGICAL HISTORY:  CVA (cerebral vascular accident)  Diabetic neuropathy  DM (diabetes mellitus)  Hyperlipidemia  AICD (automatic cardioverter/defibrillator) present  Pacemaker  Stented coronary artery  HTN (hypertension)  Cardiac pacemaker      FAMILY HISTORY:  No pertinent family history in first degree relatives      SOCIAL HISTORY:  TOBACCO: denies history  ALCOHOL: denies abuse  IVDA: denies history    FUNCTIONAL, ENVIRONMENTAL HISTORY:  Lives with spouse in a private house 3 steps to enter    FUNCTIONAL HISTORY:states that he independent at baseline and does not need a cane or walker . Has rollator    Allergies    No Known Allergies    Intolerances      MEDICATIONS  (STANDING):  amLODIPine   Tablet 5 milliGRAM(s) Oral daily  aspirin enteric coated 81 milliGRAM(s) Oral daily  atorvastatin 20 milliGRAM(s) Oral at bedtime  clopidogrel Tablet 75 milliGRAM(s) Oral daily  dextrose 5%. 1000 milliLiter(s) (50 mL/Hr) IV Continuous <Continuous>  dextrose 50% Injectable 12.5 Gram(s) IV Push once  dextrose 50% Injectable 25 Gram(s) IV Push once  dextrose 50% Injectable 25 Gram(s) IV Push once  heparin  Injectable 5000 Unit(s) SubCutaneous every 12 hours  insulin glargine Injectable (LANTUS) 40 Unit(s) SubCutaneous at bedtime  insulin lispro (HumaLOG) corrective regimen sliding scale   SubCutaneous three times a day before meals  lisinopril 20 milliGRAM(s) Oral daily  sertraline 100 milliGRAM(s) Oral daily    MEDICATIONS  (PRN):  dextrose Gel 1 Dose(s) Oral once PRN Blood Glucose LESS THAN 70 milliGRAM(s)/deciliter  glucagon  Injectable 1 milliGRAM(s) IntraMuscular once PRN Glucose LESS THAN 70 milligrams/deciliter      REVIEW OF SYSTEMS:    CONSTITUTIONAL: No fever,   EYES: No eye pain,   RESPIRATORY: No cough,   CARDIOVASCULAR: No chest pain,   GASTROINTESTINAL: No abdominal or epigastric pain.  GENITOURINARY: No dysuria,   NEUROLOGICAL: No headaches,  SKIN:No rashes  MUSCULOSKELETAL: No joint pain   PSYCHIATRIC: No depression,      Vital Signs Last 24 Hrs  T(C): 36.2 (2018 05:09), Max: 36.8 (2018 19:59)  T(F): 97.2 (2018 05:09), Max: 98.2 (2018 19:59)  HR: 82 (2018 05:09) (78 - 115)  BP: 165/94 (2018 05:09) (148/82 - 167/91)  BP(mean): --  RR: 18 (2018 05:09) (17 - 18)  SpO2: 95% (2018 05:09) (95% - 98%)    PHYSICAL EXAM:    GENERAL: NAD, Patient is sleeping, but easily arousable  HEAD:  Atraumatic, Normocephalic  HEART: S1S2+  CHEST/LUNG: Clear   ABDOMEN: Soft,   EXTREMITIES:   NERVOUS SYSTEM:  Alert & Oriented X3, needed minimal cues for year. Able to name President. Follows commands.   Immediate recall 3/3, in 3 min 3/3. No aphasia or dysarthria  No facial palsy, tongue midline  Motor 5/5. Sensory intact to light touch. No dysmetria on FNF     FUNCTIONAL EXAM: min assist with PT for transfers and gait    LABS:                        12.5   9.3   )-----------( 240      ( 2018 05:47 )             37.6     01-21    143  |  106  |  35.0<H>  ----------------------------<  188<H>  4.8   |  24.0  |  2.25<H>    Ca    9.2      2018 05:47    TPro  6.9  /  Alb  3.7  /  TBili  1.0  /  DBili  x   /  AST  38  /  ALT  24  /  AlkPhos  92        Urinalysis Basic - ( 2018 12:12 )    Color: Yellow / Appearance: Clear / S.015 / pH: x  Gluc: x / Ketone: Trace  / Bili: Negative / Urobili: Negative mg/dL   Blood: x / Protein: 100 mg/dL / Nitrite: Negative   Leuk Esterase: Negative / RBC: 0-2 /HPF / WBC 0-2   Sq Epi: x / Non Sq Epi: Occasional / Bacteria: Few      RADIOLOGY & ADDITIONAL STUDIES:    < from: CT Head No Cont (18 @ 19:02) >  NTERPRETATION:  EXAM: CT HEAD WITHOUT INTRAVENOUS CONTRAST    CLINICAL INFORMATION: Status post fall.  Rule out cerebral loss or   accident.    TECHNIQUE:  Routine, noncontrast CT scan of the head. Axial images were   acquired from the skullbase through the vertex.  Two-dimensional sagittal   and coronal reformats were generated.     COMPARISON STUDY: 2016.    FINDINGS:   There is no CT evidence of acute intracranial hemorrhage, extra-axial   collection, vasogenic edema, mass effect, midline shift, central   herniation, hydrocephalus or acute territorial infarct.  There is a small   to moderate age-indeterminate infarct in the superior left cerebellar   hemisphere.    There is moderate generalized cerebral volume loss and mild   periventricular white matter hypoattenuation compatible chronic   microvascular ischemic disease.  There are multiple small chronic   infarcts involving the basal ganglia bilaterally, anterior limbs of the   internal capsules bilaterally, thalami bilaterally and corona radiata   bilaterally and cerebellum.    There is mild mucosal thickening in the left maxillary sinus and small   mucous retention cysts in the sphenoid sinuses bilaterally.    The mastoid air cells and middle ear cavities are grossly clear.    The calvarium, skull base, visualized facial bones and regional soft   tissues are grossly intact.    IMPRESSION:   No CT evidence of acute intracranial hemorrhage, acute territorial   infarct or intracranial trauma.    Age indeterminant infarct in the superior left cerebellar hemisphere   could be acute or subacute.  MRI characterization can be performed as   clinically warranted.    Cerebral volume loss and chronic ischemic changes as discussed above.    < from: Xray Pelvis AP only (18 @ 13:27) >  AM:  PELVIS                          PROCEDURE DATE:  2018          INTERPRETATION:  Radiograph of the pelvis         CLINICAL INFORMATION: Injury with Pain.    TECHNIQUE:  A single frontal view of the pelvis was obtained.    FINDINGS:   No prior similar studies are available for review.    Pelvic bones appear intact.  No fracture is recognized.  The hips are   located.  The sacroiliac joints and pubic symphysis remain intact.  No   pathologic calcifications are seen.  Soft tissues appear intact.    Bowel overlies and obscures portions of the sacrum and iliac bone.    IMPRESSION:   No acute radiographic osseous pathology..           If pain persist despite conservative therapy and patient is unable to   walk a follow-up CT/MRI scan recommended to exclude occult fractures or   soft tissue injuries not evident on plain film radiography.    < end of copied text >    < from: Xray Lumbosacral Spine (18 @ 13:26) >  XAM:  LUMBO-SACRAL SPINE                          PROCEDURE DATE:  2018          INTERPRETATION:  Radiographs of the lumbar spine        CLINICAL INFORMATION:  Injury with  Pain.    TECHNIQUE:  Frontal, lateral  and lateral coned-down view of the   lumbosacral junction were obtained.    FINDINGS:  No previous examinations are available for review.    Lumbar vertebral alignment is maintained.  Lumbar vertebral body heights   are preserved.  Pedicles are intact.  No fracture or destructive bone   lesion is seen.    There is degenerative narrowing of the L5-S1 disc space with anterior   bridging spur formation between L4-5 and 5 S1 the vertebra..       The sacroiliac joints appear intact.    IMPRESSION:   Degenerative narrowing of the L5-S1 disc space with L4-S1   degenerative endplate osteophytes. A  If pain persist despite conservative therapy and occult fracture or disc   herniation causing central canal or foraminal nerve root compression   clinically suspected further assessment with CT or MRI recommended.           < end of copied text >      A/P:    67 year old male with prior h/o CVA, dementia admitted  after fall with w/u showing age indeterminate acute/subacute cerebellar stroke  Patient with gait impairment.  Per chart review, patient's wife unable to take care of him at home  Recommend subacute rehab at this time    Continue bedside therapy at this time as tolerated.     Thank you

## 2018-01-22 NOTE — SWALLOW BEDSIDE ASSESSMENT ADULT - SLP PERTINENT HISTORY OF CURRENT PROBLEM
As per RN Rosa: coughing observed with thin fluids when taking meds. Family subsequently reported to RN history of coughing with thin fluid intake PTA

## 2018-01-22 NOTE — DISCHARGE NOTE ADULT - NS AS DC STROKE ED MATERIALS
Risk Factors for Stroke/Prescribed Medications/Call 911 for Stroke/Stroke Education Booklet/Stroke Warning Signs and Symptoms/Need for Followup After Discharge

## 2018-01-22 NOTE — DISCHARGE NOTE ADULT - MEDICATION SUMMARY - MEDICATIONS TO TAKE
I will START or STAY ON the medications listed below when I get home from the hospital:    aspirin 81 mg oral tablet  -- 1 tab(s) by mouth once a day  -- Indication: For Cad     lisinopril 20 mg oral tablet  -- 1 tab(s) by mouth once a day  -- Indication: For hypertension    sertraline 100 mg oral tablet  -- 1 tab(s) by mouth once a day  -- Indication: For Depression    Levemir 100 units/mL subcutaneous solution  -- 50 unit(s) subcutaneous once a day (at bedtime)  -- Indication: For Diabetes    HumaLOG KwikPen 100 units/mL subcutaneous solution  -- 6 unit(s) subcutaneous 3 times a day  -- Indication: For Diabetes    loperamide 2 mg oral capsule  -- 1 cap(s) by mouth every 4 hours, As needed, Diarrhea  -- Indication: For Dirrhea    atorvastatin 20 mg oral tablet  -- 1 tab(s) by mouth once a day (at bedtime)  -- Indication: For Cva    Plavix 75 mg oral tablet  -- 1 tab(s) by mouth once a day  -- Indication: For Cad    amantadine 100 mg oral capsule  -- 1 cap(s) by mouth once a day  -- Indication: For Cva    amLODIPine 5 mg oral tablet  -- 1 tab(s) by mouth once a day  -- Indication: For hypertension I will START or STAY ON the medications listed below when I get home from the hospital:    aspirin 81 mg oral tablet  -- 1 tab(s) by mouth once a day  -- Indication: For Cad     lisinopril 20 mg oral tablet  -- 1 tab(s) by mouth once a day  -- Indication: For hypertension    sertraline 100 mg oral tablet  -- 1 tab(s) by mouth once a day  -- Indication: For Depression    Levemir 100 units/mL subcutaneous solution  -- 50 unit(s) subcutaneous once a day (at bedtime)  -- Indication: For Diabetes    HumaLOG KwikPen 100 units/mL subcutaneous solution  -- 6 unit(s) subcutaneous 3 times a day  -- Indication: For Diabetes    loperamide 2 mg oral capsule  -- 1 cap(s) by mouth every 4 hours, As needed, Diarrhea  -- Indication: For Dirrhea    atorvastatin 20 mg oral tablet  -- 1 tab(s) by mouth once a day (at bedtime)  -- Indication: For Cva    Plavix 75 mg oral tablet  -- 1 tab(s) by mouth once a day  -- Indication: For Cad    amantadine 100 mg oral capsule  -- 1 cap(s) by mouth once a day  -- Indication: For Cva    amLODIPine 10 mg oral tablet  -- 1 tab(s) by mouth once a day  -- Indication: For hypertension I will START or STAY ON the medications listed below when I get home from the hospital:    aspirin 81 mg oral tablet  -- 1 tab(s) by mouth once a day  -- Indication: For Cad     lisinopril 20 mg oral tablet  -- 1 tab(s) by mouth once a day  -- Indication: For hypertension    sertraline 100 mg oral tablet  -- 1 tab(s) by mouth once a day  -- Indication: For Depression    Levemir 100 units/mL subcutaneous solution  -- 25 unit(s) subcutaneous once a day (at bedtime)  -- Indication: For Diabetes    insulin lispro 100 units/mL subcutaneous solution  --  subcutaneous 3 times a day (before meals); 1 Unit(s) if Glucose 151 - 200  2 Unit(s) if Glucose 201 - 250  3 Unit(s) if Glucose 251 - 300  4 Unit(s) if Glucose 301 - 350  5 Unit(s) if Glucose 351 - 400  6 Unit(s) if Glucose Greater Than 400  -- Indication: For Diabetes    loperamide 2 mg oral capsule  -- 1 cap(s) by mouth every 4 hours, As needed, Diarrhea  -- Indication: For Dirrhea    atorvastatin 20 mg oral tablet  -- 1 tab(s) by mouth once a day (at bedtime)  -- Indication: For Cva    Plavix 75 mg oral tablet  -- 1 tab(s) by mouth once a day  -- Indication: For Cad    amantadine 100 mg oral capsule  -- 1 cap(s) by mouth once a day  -- Indication: For Cva    amLODIPine 10 mg oral tablet  -- 1 tab(s) by mouth once a day  -- Indication: For hypertension

## 2018-01-23 LAB
ANION GAP SERPL CALC-SCNC: 15 MMOL/L — SIGNIFICANT CHANGE UP (ref 5–17)
BUN SERPL-MCNC: 26 MG/DL — HIGH (ref 8–20)
CALCIUM SERPL-MCNC: 8.9 MG/DL — SIGNIFICANT CHANGE UP (ref 8.6–10.2)
CHLORIDE SERPL-SCNC: 107 MMOL/L — SIGNIFICANT CHANGE UP (ref 98–107)
CK SERPL-CCNC: 229 U/L — HIGH (ref 30–200)
CO2 SERPL-SCNC: 21 MMOL/L — LOW (ref 22–29)
CREAT SERPL-MCNC: 1.91 MG/DL — HIGH (ref 0.5–1.3)
GLUCOSE BLDC GLUCOMTR-MCNC: 105 MG/DL — HIGH (ref 70–99)
GLUCOSE BLDC GLUCOMTR-MCNC: 147 MG/DL — HIGH (ref 70–99)
GLUCOSE BLDC GLUCOMTR-MCNC: 194 MG/DL — HIGH (ref 70–99)
GLUCOSE BLDC GLUCOMTR-MCNC: 45 MG/DL — CRITICAL LOW (ref 70–99)
GLUCOSE BLDC GLUCOMTR-MCNC: 51 MG/DL — LOW (ref 70–99)
GLUCOSE BLDC GLUCOMTR-MCNC: 98 MG/DL — SIGNIFICANT CHANGE UP (ref 70–99)
GLUCOSE SERPL-MCNC: 43 MG/DL — CRITICAL LOW (ref 70–115)
POTASSIUM SERPL-MCNC: 3.5 MMOL/L — SIGNIFICANT CHANGE UP (ref 3.5–5.3)
POTASSIUM SERPL-SCNC: 3.5 MMOL/L — SIGNIFICANT CHANGE UP (ref 3.5–5.3)
SODIUM SERPL-SCNC: 143 MMOL/L — SIGNIFICANT CHANGE UP (ref 135–145)

## 2018-01-23 PROCEDURE — 99239 HOSP IP/OBS DSCHRG MGMT >30: CPT

## 2018-01-23 PROCEDURE — 99232 SBSQ HOSP IP/OBS MODERATE 35: CPT

## 2018-01-23 RX ORDER — AMLODIPINE BESYLATE 2.5 MG/1
10 TABLET ORAL DAILY
Qty: 0 | Refills: 0 | Status: DISCONTINUED | OUTPATIENT
Start: 2018-01-23 | End: 2018-01-24

## 2018-01-23 RX ORDER — INSULIN GLARGINE 100 [IU]/ML
25 INJECTION, SOLUTION SUBCUTANEOUS AT BEDTIME
Qty: 0 | Refills: 0 | Status: DISCONTINUED | OUTPATIENT
Start: 2018-01-23 | End: 2018-01-24

## 2018-01-23 RX ORDER — DEXTROSE 50 % IN WATER 50 %
1 SYRINGE (ML) INTRAVENOUS ONCE
Qty: 0 | Refills: 0 | Status: DISCONTINUED | OUTPATIENT
Start: 2018-01-23 | End: 2018-01-24

## 2018-01-23 RX ORDER — AMLODIPINE BESYLATE 2.5 MG/1
1 TABLET ORAL
Qty: 0 | Refills: 0 | COMMUNITY
Start: 2018-01-23

## 2018-01-23 RX ORDER — INSULIN LISPRO 100/ML
0 VIAL (ML) SUBCUTANEOUS
Qty: 0 | Refills: 0 | COMMUNITY
Start: 2018-01-23

## 2018-01-23 RX ORDER — AMLODIPINE BESYLATE 2.5 MG/1
10 TABLET ORAL ONCE
Qty: 0 | Refills: 0 | Status: COMPLETED | OUTPATIENT
Start: 2018-01-23 | End: 2018-01-23

## 2018-01-23 RX ADMIN — HEPARIN SODIUM 5000 UNIT(S): 5000 INJECTION INTRAVENOUS; SUBCUTANEOUS at 05:18

## 2018-01-23 RX ADMIN — ATORVASTATIN CALCIUM 20 MILLIGRAM(S): 80 TABLET, FILM COATED ORAL at 21:33

## 2018-01-23 RX ADMIN — HEPARIN SODIUM 5000 UNIT(S): 5000 INJECTION INTRAVENOUS; SUBCUTANEOUS at 17:43

## 2018-01-23 RX ADMIN — Medication 100 MILLIGRAM(S): at 12:45

## 2018-01-23 RX ADMIN — Medication 1: at 16:50

## 2018-01-23 RX ADMIN — Medication 81 MILLIGRAM(S): at 12:45

## 2018-01-23 RX ADMIN — AMLODIPINE BESYLATE 5 MILLIGRAM(S): 2.5 TABLET ORAL at 05:18

## 2018-01-23 RX ADMIN — LISINOPRIL 20 MILLIGRAM(S): 2.5 TABLET ORAL at 05:18

## 2018-01-23 RX ADMIN — AMLODIPINE BESYLATE 10 MILLIGRAM(S): 2.5 TABLET ORAL at 17:43

## 2018-01-23 RX ADMIN — CLOPIDOGREL BISULFATE 75 MILLIGRAM(S): 75 TABLET, FILM COATED ORAL at 12:45

## 2018-01-23 RX ADMIN — SERTRALINE 100 MILLIGRAM(S): 25 TABLET, FILM COATED ORAL at 12:45

## 2018-01-23 RX ADMIN — INSULIN GLARGINE 25 UNIT(S): 100 INJECTION, SOLUTION SUBCUTANEOUS at 21:33

## 2018-01-23 NOTE — PROGRESS NOTE ADULT - SUBJECTIVE AND OBJECTIVE BOX
CC: Fall     INTERVAL HPI/OVERNIGHT EVENTS: Patient seen and examined, denies chest pain, palpitations or shortness of breath. Tolerating dysphagia diet.   Diarrhea improving.       Vital Signs Last 24 Hrs  T(C): 36.4 (23 Jan 2018 07:51), Max: 36.9 (22 Jan 2018 18:48)  T(F): 97.6 (23 Jan 2018 07:51), Max: 98.5 (22 Jan 2018 18:48)  HR: 60 (23 Jan 2018 07:51) (60 - 82)  BP: 177/91 (23 Jan 2018 07:51) (150/78 - 177/91)  BP(mean): --  RR: 18 (23 Jan 2018 07:51) (16 - 18)  SpO2: 96% (23 Jan 2018 07:51) (95% - 97%)    PHYSICAL EXAM:    GENERAL: NAD, AOX2  HEAD:  Atraumatic, Normocephalic  EYES: EOMI, PERRLA, conjunctiva and sclera clear  CHEST/LUNG: Clear to auscultation bilaterally; No rales, rhonchi, wheezing, or rubs  HEART: Regular rate and rhythm; No murmurs, rubs, or gallops  ABDOMEN: Soft, Nontender, Nondistended; Bowel sounds present  EXTREMITIES:  2+ Peripheral Pulses, No clubbing, cyanosis, or edema        MEDICATIONS  (STANDING):  amantadine 100 milliGRAM(s) Oral daily  amLODIPine   Tablet 10 milliGRAM(s) Oral daily  aspirin enteric coated 81 milliGRAM(s) Oral daily  atorvastatin 20 milliGRAM(s) Oral at bedtime  clopidogrel Tablet 75 milliGRAM(s) Oral daily  dextrose 5%. 1000 milliLiter(s) (50 mL/Hr) IV Continuous <Continuous>  dextrose 50% Injectable 12.5 Gram(s) IV Push once  dextrose 50% Injectable 25 Gram(s) IV Push once  dextrose 50% Injectable 25 Gram(s) IV Push once  heparin  Injectable 5000 Unit(s) SubCutaneous every 12 hours  insulin glargine Injectable (LANTUS) 40 Unit(s) SubCutaneous at bedtime  insulin lispro (HumaLOG) corrective regimen sliding scale   SubCutaneous three times a day before meals  lisinopril 20 milliGRAM(s) Oral daily  sertraline 100 milliGRAM(s) Oral daily    MEDICATIONS  (PRN):  dextrose Gel 1 Dose(s) Oral once PRN Blood Glucose LESS THAN 70 milliGRAM(s)/deciliter  dextrose Gel 1 Dose(s) Oral once PRN Blood Glucose LESS THAN 70 milliGRAM(s)/deciliter  glucagon  Injectable 1 milliGRAM(s) IntraMuscular once PRN Glucose LESS THAN 70 milligrams/deciliter  loperamide 2 milliGRAM(s) Oral every 4 hours PRN Diarrhea      Allergies    No Known Allergies    Intolerances          LABS:      01-22    145  |  109<H>  |  28.0<H>  ----------------------------<  115  3.6   |  23.0  |  1.91<H>    Ca    9.0      22 Jan 2018 07:17            RADIOLOGY & ADDITIONAL TESTS:

## 2018-01-23 NOTE — OCCUPATIONAL THERAPY INITIAL EVALUATION ADULT - ADDITIONAL COMMENTS
Pt has a shower stall with doors  Pt is right handed  Pt owns a rollator, grab bars, and raised toilet seat

## 2018-01-23 NOTE — OCCUPATIONAL THERAPY INITIAL EVALUATION ADULT - PLANNED THERAPY INTERVENTIONS, OT EVAL
balance training/neuromuscular re-education/strengthening/ADL retraining/motor coordination training/transfer training/bed mobility training/cognitive, visual perceptual

## 2018-01-23 NOTE — PROGRESS NOTE ADULT - ASSESSMENT
The patient is a 67 year old male with a history of multiple CVAs in the past with vascular dementia, hypertension, cad and diabetes mellitus type 2 who was brought to the ER by EMS after a fall.  Xray of the lumbar/thoracic spines were negative for acute fracture. CT head consistent with acute/subacute cerebellar infarct.     Assessment/Plan:    1. Fall with gait instability likely secondary to cerebellar infarct:  Likely subacute infarct given patient's onset of symptoms.   Spoke with neurology- To continue aspirin/plavix/statin therapy  Spoke with PMNR patient suitable for [facility] at this time  Carotid duplex shows Moderate to severe plaque without a hemodynamic abnormality involving the common carotid arteries or internal carotid arteries.   Elevated velocity in the right vertebral artery however CTA or MRA cannot be done given renal insufficiency and AICD.   Echocardiogram reviewed    2. CKD stage 4 ( creatinine in 2016 2-2.2) : stable. monitor bmp. On ACEi    3. History of cad with ICM status post AICD: continue aspirin/plavix/statin therapy    4. History of multiple cvas in the past    5. DM type 2: Continue lantus and hss monitor bsl    6. Vascular dementia- start amantadine in the morning    7. Diarrhea: Likely viral gastroenteritis: start imodium po; no history of antibiotic use in the past 3 months per the wife or hospitalizations    .8 Mild Rhabdo likely traumatic from recent fall: Labs pending    9. Dysphagia: Soft diet with honey liquids    Discharge to [facility] pending repeat labs. Discussed with wife at the bedside and social work.          VTE_ heparin subcut The patient is a 67 year old male with a history of multiple CVAs in the past with vascular dementia, hypertension, cad and diabetes mellitus type 2 who was brought to the ER by EMS after a fall.  Xray of the lumbar/thoracic spines were negative for acute fracture. CT head consistent with acute/subacute cerebellar infarct.     Assessment/Plan:    1. Fall with gait instability likely secondary to cerebellar infarct:  Likely subacute infarct given patient's onset of symptoms.   Spoke with neurology- To continue aspirin/plavix/statin therapy  Spoke with PMNR patient suitable for [facility] at this time  Carotid duplex shows Moderate to severe plaque without a hemodynamic abnormality involving the common carotid arteries or internal carotid arteries.   Elevated velocity in the right vertebral artery however CTA or MRA cannot be done given renal insufficiency and AICD.   Echocardiogram reviewed    2. CKD stage 4 ( creatinine in 2016 2-2.2) : stable. monitor bmp. On ACEi    3. History of cad with ICM status post AICD: continue aspirin/plavix/statin therapy    4. History of multiple cvas in the past    5. DM type 2: Episodes of hypoglycemia this morning, improved with breakfast and orange juice. Patient was not symptomatic per wife at the bedside.   Will reduce dose of lantus form 50 units at bedtime at home to 25 units at bedtime to continue humalog sliding scale for now and blood glucose levels to be monitored closely.     6. Vascular dementia- start amantadine in the morning    7. Diarrhea: Likely viral gastroenteritis: start imodium po; no history of antibiotic use in the past 3 months per the wife or hospitalizations    .8 Mild Rhabdo likely traumatic from recent fall: CK improving with iv hydration. Encourage oral fluid intake. Now drinking more fluids per his wife.     9. Dysphagia: Soft diet with honey liquids    Discharge to [facility]. Discussed with wife at the bedside and social work.        VTE_ heparin subcut

## 2018-01-23 NOTE — PROGRESS NOTE ADULT - SUBJECTIVE AND OBJECTIVE BOX
INTERVAL SUBJECTIVE & REVIEW OF SYMPTOMS: Chart reviewed. Patient seen at bedside. Wife present. Patient also being seen by OT.    ROS: Denies any pain/ HA/nausea  Poor appetite    VITAL SIGNS  Vital Signs Last 24 Hrs  T(C): 36.4 (23 Jan 2018 07:51), Max: 36.9 (22 Jan 2018 18:48)  T(F): 97.6 (23 Jan 2018 07:51), Max: 98.5 (22 Jan 2018 18:48)  HR: 60 (23 Jan 2018 07:51) (60 - 82)  BP: 177/91 (23 Jan 2018 07:51) (150/78 - 177/91)  BP(mean): --  RR: 18 (23 Jan 2018 07:51) (16 - 18)  SpO2: 96% (23 Jan 2018 07:51) (95% - 97%)    PHYSICAL EXAM  General: NAD  Cardio: s1s2+  Resp: clear  Neuro: aaox3, but unaware of date, follows commands. Strength symmetrical   Extrem: no edema    Functional Exam: min assist with transfers      MEDICATIONS  (STANDING):  amantadine 100 milliGRAM(s) Oral daily  amLODIPine   Tablet 10 milliGRAM(s) Oral daily  aspirin enteric coated 81 milliGRAM(s) Oral daily  atorvastatin 20 milliGRAM(s) Oral at bedtime  clopidogrel Tablet 75 milliGRAM(s) Oral daily  dextrose 5%. 1000 milliLiter(s) (50 mL/Hr) IV Continuous <Continuous>  dextrose 50% Injectable 12.5 Gram(s) IV Push once  dextrose 50% Injectable 25 Gram(s) IV Push once  dextrose 50% Injectable 25 Gram(s) IV Push once  heparin  Injectable 5000 Unit(s) SubCutaneous every 12 hours  insulin lispro (HumaLOG) corrective regimen sliding scale   SubCutaneous three times a day before meals  lisinopril 20 milliGRAM(s) Oral daily  sertraline 100 milliGRAM(s) Oral daily    MEDICATIONS  (PRN):  dextrose Gel 1 Dose(s) Oral once PRN Blood Glucose LESS THAN 70 milliGRAM(s)/deciliter  dextrose Gel 1 Dose(s) Oral once PRN Blood Glucose LESS THAN 70 milliGRAM(s)/deciliter  glucagon  Injectable 1 milliGRAM(s) IntraMuscular once PRN Glucose LESS THAN 70 milligrams/deciliter  loperamide 2 milliGRAM(s) Oral every 4 hours PRN Diarrhea      RECENT LABS:    01-23    143  |  107  |  26.0<H>  ----------------------------<  43<LL>  3.5   |  21.0<L>  |  1.91<H>    Ca    8.9      23 Jan 2018 08:38    RADIOLOGY/OTHER RESULTS:    A/P:    67 year old male with multiple medical comorbidities, admitted after a fall and found to have age indeterminate acute/subacute cerebellar infarct    Had an extensive discussion with wife at bedside. Patient being seen by [facility] at [facility] for dementia, DM.  Patient has had trials of PT/ST as outpatient without any significant improvement. Patient does not like to use a walker and also has poor appetite  Per wife PCP aware of weight loss and poor appetite.     Continue to recommend subacute rehab at this time. d/w wife and SW  Thank you

## 2018-01-24 VITALS
TEMPERATURE: 98 F | DIASTOLIC BLOOD PRESSURE: 74 MMHG | OXYGEN SATURATION: 98 % | SYSTOLIC BLOOD PRESSURE: 124 MMHG | HEART RATE: 83 BPM | RESPIRATION RATE: 18 BRPM

## 2018-01-24 LAB
GLUCOSE BLDC GLUCOMTR-MCNC: 117 MG/DL — HIGH (ref 70–99)
GLUCOSE BLDC GLUCOMTR-MCNC: 131 MG/DL — HIGH (ref 70–99)
GLUCOSE BLDC GLUCOMTR-MCNC: 68 MG/DL — LOW (ref 70–99)
GLUCOSE BLDC GLUCOMTR-MCNC: 70 MG/DL — SIGNIFICANT CHANGE UP (ref 70–99)

## 2018-01-24 PROCEDURE — 80061 LIPID PANEL: CPT

## 2018-01-24 PROCEDURE — 85027 COMPLETE CBC AUTOMATED: CPT

## 2018-01-24 PROCEDURE — 90715 TDAP VACCINE 7 YRS/> IM: CPT

## 2018-01-24 PROCEDURE — 83690 ASSAY OF LIPASE: CPT

## 2018-01-24 PROCEDURE — 90471 IMMUNIZATION ADMIN: CPT

## 2018-01-24 PROCEDURE — 36415 COLL VENOUS BLD VENIPUNCTURE: CPT

## 2018-01-24 PROCEDURE — 70450 CT HEAD/BRAIN W/O DYE: CPT

## 2018-01-24 PROCEDURE — 82553 CREATINE MB FRACTION: CPT

## 2018-01-24 PROCEDURE — 97116 GAIT TRAINING THERAPY: CPT

## 2018-01-24 PROCEDURE — 99232 SBSQ HOSP IP/OBS MODERATE 35: CPT

## 2018-01-24 PROCEDURE — 87086 URINE CULTURE/COLONY COUNT: CPT

## 2018-01-24 PROCEDURE — 80048 BASIC METABOLIC PNL TOTAL CA: CPT

## 2018-01-24 PROCEDURE — 82962 GLUCOSE BLOOD TEST: CPT

## 2018-01-24 PROCEDURE — 93306 TTE W/DOPPLER COMPLETE: CPT

## 2018-01-24 PROCEDURE — 82550 ASSAY OF CK (CPK): CPT

## 2018-01-24 PROCEDURE — 72170 X-RAY EXAM OF PELVIS: CPT

## 2018-01-24 PROCEDURE — 97530 THERAPEUTIC ACTIVITIES: CPT

## 2018-01-24 PROCEDURE — 83036 HEMOGLOBIN GLYCOSYLATED A1C: CPT

## 2018-01-24 PROCEDURE — 93880 EXTRACRANIAL BILAT STUDY: CPT

## 2018-01-24 PROCEDURE — 80053 COMPREHEN METABOLIC PANEL: CPT

## 2018-01-24 PROCEDURE — 81001 URINALYSIS AUTO W/SCOPE: CPT

## 2018-01-24 PROCEDURE — 92610 EVALUATE SWALLOWING FUNCTION: CPT

## 2018-01-24 PROCEDURE — 72110 X-RAY EXAM L-2 SPINE 4/>VWS: CPT

## 2018-01-24 PROCEDURE — 71046 X-RAY EXAM CHEST 2 VIEWS: CPT

## 2018-01-24 PROCEDURE — 72070 X-RAY EXAM THORAC SPINE 2VWS: CPT

## 2018-01-24 PROCEDURE — 99285 EMERGENCY DEPT VISIT HI MDM: CPT | Mod: 25

## 2018-01-24 PROCEDURE — 96374 THER/PROPH/DIAG INJ IV PUSH: CPT

## 2018-01-24 RX ADMIN — Medication 81 MILLIGRAM(S): at 11:35

## 2018-01-24 RX ADMIN — LISINOPRIL 20 MILLIGRAM(S): 2.5 TABLET ORAL at 05:17

## 2018-01-24 RX ADMIN — Medication 100 MILLIGRAM(S): at 11:35

## 2018-01-24 RX ADMIN — SERTRALINE 100 MILLIGRAM(S): 25 TABLET, FILM COATED ORAL at 11:35

## 2018-01-24 RX ADMIN — CLOPIDOGREL BISULFATE 75 MILLIGRAM(S): 75 TABLET, FILM COATED ORAL at 11:35

## 2018-01-24 RX ADMIN — AMLODIPINE BESYLATE 10 MILLIGRAM(S): 2.5 TABLET ORAL at 05:17

## 2018-01-24 RX ADMIN — HEPARIN SODIUM 5000 UNIT(S): 5000 INJECTION INTRAVENOUS; SUBCUTANEOUS at 05:17

## 2018-01-24 NOTE — PROGRESS NOTE ADULT - ASSESSMENT
The patient is a 67 year old male with a history of multiple CVAs in the past with vascular dementia, hypertension, cad and diabetes mellitus type 2 who was brought to the ER by EMS after a fall.  Xray of the lumbar/thoracic spines were negative for acute fracture. CT head consistent with acute/subacute cerebellar infarct.     Assessment/Plan:    1. Fall with gait instability likely secondary to cerebellar infarct:  Likely subacute infarct   Stable for dischare to [facility]    2. CKD stage 4 ( creatinine in 2016 2-2.2) : stable. monitor bmp. On ACEi    3. History of cad with ICM status post AICD: continue aspirin/plavix/statin therapy    4. Hypertension: Now improved on increased dose of norvasc    5. DM type 2:    6. Vascular dementia-    7. Diarrhea: Likely viral gastroenteritis: improved    .8 Mild Rhabdo likely traumatic from recent fall    9. Dysphagia: Soft diet with honey liquids    Discharge to [facility]. Discussed with social work     VTE_ heparin subcut

## 2018-01-24 NOTE — PROGRESS NOTE ADULT - SUBJECTIVE AND OBJECTIVE BOX
CC: FAll    INTERVAL HPI/OVERNIGHT EVENTS: Patient seen and examined, last night discharge was held by RN for sbp of 170 then improved to 15 with oral medications. No acute issues this morning.       Vital Signs Last 24 Hrs  T(C): 36.2 (24 Jan 2018 10:14), Max: 36.9 (23 Jan 2018 19:49)  T(F): 97.2 (24 Jan 2018 10:14), Max: 98.5 (23 Jan 2018 19:49)  HR: 81 (24 Jan 2018 10:14) (76 - 82)  BP: 164/84 (24 Jan 2018 10:14) (107/71 - 187/97)  BP(mean): --  RR: 19 (24 Jan 2018 10:14) (18 - 19)  SpO2: 97% (24 Jan 2018 10:14) (95% - 97%)    PHYSICAL EXAM:    GENERAL: NAD AOx2  CHEST/LUNG: Clear to auscultation bilaterally; No rales, rhonchi, wheezing, or rubs  HEART: Regular rate and rhythm; No murmurs, rubs, or gallops  ABDOMEN: Soft, Nontender, Nondistended; Bowel sounds present  EXTREMITIES:  2+ Peripheral Pulses, No clubbing, cyanosis, or edema        MEDICATIONS  (STANDING):  amantadine 100 milliGRAM(s) Oral daily  amLODIPine   Tablet 10 milliGRAM(s) Oral daily  aspirin enteric coated 81 milliGRAM(s) Oral daily  atorvastatin 20 milliGRAM(s) Oral at bedtime  clopidogrel Tablet 75 milliGRAM(s) Oral daily  dextrose 5%. 1000 milliLiter(s) (50 mL/Hr) IV Continuous <Continuous>  dextrose 50% Injectable 12.5 Gram(s) IV Push once  dextrose 50% Injectable 25 Gram(s) IV Push once  dextrose 50% Injectable 25 Gram(s) IV Push once  heparin  Injectable 5000 Unit(s) SubCutaneous every 12 hours  insulin glargine Injectable (LANTUS) 25 Unit(s) SubCutaneous at bedtime  insulin lispro (HumaLOG) corrective regimen sliding scale   SubCutaneous three times a day before meals  lisinopril 20 milliGRAM(s) Oral daily  sertraline 100 milliGRAM(s) Oral daily    MEDICATIONS  (PRN):  dextrose Gel 1 Dose(s) Oral once PRN Blood Glucose LESS THAN 70 milliGRAM(s)/deciliter  dextrose Gel 1 Dose(s) Oral once PRN Blood Glucose LESS THAN 70 milliGRAM(s)/deciliter  glucagon  Injectable 1 milliGRAM(s) IntraMuscular once PRN Glucose LESS THAN 70 milligrams/deciliter  loperamide 2 milliGRAM(s) Oral every 4 hours PRN Diarrhea      Allergies    No Known Allergies    Intolerances          LABS:      01-23    143  |  107  |  26.0<H>  ----------------------------<  43<LL>  3.5   |  21.0<L>  |  1.91<H>    Ca    8.9      23 Jan 2018 08:38            RADIOLOGY & ADDITIONAL TESTS:

## 2018-08-28 ENCOUNTER — INPATIENT (INPATIENT)
Facility: HOSPITAL | Age: 68
LOS: 2 days | Discharge: HOME | DRG: 683 | End: 2018-08-31
Attending: INTERNAL MEDICINE | Admitting: INTERNAL MEDICINE
Payer: MEDICARE

## 2018-08-28 VITALS
SYSTOLIC BLOOD PRESSURE: 192 MMHG | WEIGHT: 164.91 LBS | HEIGHT: 74 IN | DIASTOLIC BLOOD PRESSURE: 73 MMHG | TEMPERATURE: 98 F | OXYGEN SATURATION: 99 % | RESPIRATION RATE: 20 BRPM | HEART RATE: 89 BPM

## 2018-08-28 DIAGNOSIS — N17.9 ACUTE KIDNEY FAILURE, UNSPECIFIED: ICD-10-CM

## 2018-08-28 DIAGNOSIS — E11.65 TYPE 2 DIABETES MELLITUS WITH HYPERGLYCEMIA: ICD-10-CM

## 2018-08-28 DIAGNOSIS — R13.10 DYSPHAGIA, UNSPECIFIED: ICD-10-CM

## 2018-08-28 DIAGNOSIS — Z95.0 PRESENCE OF CARDIAC PACEMAKER: Chronic | ICD-10-CM

## 2018-08-28 DIAGNOSIS — R29.6 REPEATED FALLS: ICD-10-CM

## 2018-08-28 DIAGNOSIS — E87.5 HYPERKALEMIA: ICD-10-CM

## 2018-08-28 DIAGNOSIS — I10 ESSENTIAL (PRIMARY) HYPERTENSION: ICD-10-CM

## 2018-08-28 LAB
ALBUMIN SERPL ELPH-MCNC: 3.8 G/DL — SIGNIFICANT CHANGE UP (ref 3.3–5.2)
ALP SERPL-CCNC: 98 U/L — SIGNIFICANT CHANGE UP (ref 40–120)
ALT FLD-CCNC: 14 U/L — SIGNIFICANT CHANGE UP
ANION GAP SERPL CALC-SCNC: 17 MMOL/L — SIGNIFICANT CHANGE UP (ref 5–17)
APTT BLD: 29.3 SEC — SIGNIFICANT CHANGE UP (ref 27.5–37.4)
AST SERPL-CCNC: 22 U/L — SIGNIFICANT CHANGE UP
BASOPHILS # BLD AUTO: 0 K/UL — SIGNIFICANT CHANGE UP (ref 0–0.2)
BASOPHILS NFR BLD AUTO: 0.1 % — SIGNIFICANT CHANGE UP (ref 0–2)
BILIRUB SERPL-MCNC: 0.9 MG/DL — SIGNIFICANT CHANGE UP (ref 0.4–2)
BUN SERPL-MCNC: 50 MG/DL — HIGH (ref 8–20)
CALCIUM SERPL-MCNC: 9.8 MG/DL — SIGNIFICANT CHANGE UP (ref 8.6–10.2)
CHLORIDE SERPL-SCNC: 102 MMOL/L — SIGNIFICANT CHANGE UP (ref 98–107)
CK MB CFR SERPL CALC: 4.4 NG/ML — SIGNIFICANT CHANGE UP (ref 0–6.7)
CK SERPL-CCNC: 305 U/L — HIGH (ref 30–200)
CO2 SERPL-SCNC: 21 MMOL/L — LOW (ref 22–29)
CREAT SERPL-MCNC: 2.48 MG/DL — HIGH (ref 0.5–1.3)
EOSINOPHIL # BLD AUTO: 0 K/UL — SIGNIFICANT CHANGE UP (ref 0–0.5)
EOSINOPHIL NFR BLD AUTO: 0.1 % — SIGNIFICANT CHANGE UP (ref 0–5)
GLUCOSE BLDC GLUCOMTR-MCNC: 459 MG/DL — CRITICAL HIGH (ref 70–99)
GLUCOSE SERPL-MCNC: 401 MG/DL — HIGH (ref 70–115)
HCT VFR BLD CALC: 42.7 % — SIGNIFICANT CHANGE UP (ref 42–52)
HGB BLD-MCNC: 14.3 G/DL — SIGNIFICANT CHANGE UP (ref 14–18)
INR BLD: 1.02 RATIO — SIGNIFICANT CHANGE UP (ref 0.88–1.16)
LYMPHOCYTES # BLD AUTO: 0.9 K/UL — LOW (ref 1–4.8)
LYMPHOCYTES # BLD AUTO: 9 % — LOW (ref 20–55)
MAGNESIUM SERPL-MCNC: 2.3 MG/DL — SIGNIFICANT CHANGE UP (ref 1.6–2.6)
MCHC RBC-ENTMCNC: 29.3 PG — SIGNIFICANT CHANGE UP (ref 27–31)
MCHC RBC-ENTMCNC: 33.5 G/DL — SIGNIFICANT CHANGE UP (ref 32–36)
MCV RBC AUTO: 87.5 FL — SIGNIFICANT CHANGE UP (ref 80–94)
MONOCYTES # BLD AUTO: 0.6 K/UL — SIGNIFICANT CHANGE UP (ref 0–0.8)
MONOCYTES NFR BLD AUTO: 6.6 % — SIGNIFICANT CHANGE UP (ref 3–10)
NEUTROPHILS # BLD AUTO: 8.2 K/UL — HIGH (ref 1.8–8)
NEUTROPHILS NFR BLD AUTO: 84 % — HIGH (ref 37–73)
PLATELET # BLD AUTO: 265 K/UL — SIGNIFICANT CHANGE UP (ref 150–400)
POTASSIUM SERPL-MCNC: 5.6 MMOL/L — HIGH (ref 3.5–5.3)
POTASSIUM SERPL-SCNC: 5.6 MMOL/L — HIGH (ref 3.5–5.3)
PROT SERPL-MCNC: 7.4 G/DL — SIGNIFICANT CHANGE UP (ref 6.6–8.7)
PROTHROM AB SERPL-ACNC: 11.2 SEC — SIGNIFICANT CHANGE UP (ref 9.8–12.7)
RBC # BLD: 4.88 M/UL — SIGNIFICANT CHANGE UP (ref 4.6–6.2)
RBC # FLD: 12.9 % — SIGNIFICANT CHANGE UP (ref 11–15.6)
SODIUM SERPL-SCNC: 140 MMOL/L — SIGNIFICANT CHANGE UP (ref 135–145)
WBC # BLD: 9.8 K/UL — SIGNIFICANT CHANGE UP (ref 4.8–10.8)
WBC # FLD AUTO: 9.8 K/UL — SIGNIFICANT CHANGE UP (ref 4.8–10.8)

## 2018-08-28 PROCEDURE — 70450 CT HEAD/BRAIN W/O DYE: CPT | Mod: 26

## 2018-08-28 PROCEDURE — 93010 ELECTROCARDIOGRAM REPORT: CPT

## 2018-08-28 PROCEDURE — 72070 X-RAY EXAM THORAC SPINE 2VWS: CPT | Mod: 26

## 2018-08-28 PROCEDURE — 99285 EMERGENCY DEPT VISIT HI MDM: CPT

## 2018-08-28 RX ORDER — INSULIN LISPRO 100/ML
VIAL (ML) SUBCUTANEOUS AT BEDTIME
Qty: 0 | Refills: 0 | Status: DISCONTINUED | OUTPATIENT
Start: 2018-08-28 | End: 2018-08-31

## 2018-08-28 RX ORDER — SODIUM CHLORIDE 9 MG/ML
1000 INJECTION INTRAMUSCULAR; INTRAVENOUS; SUBCUTANEOUS
Qty: 0 | Refills: 0 | Status: DISCONTINUED | OUTPATIENT
Start: 2018-08-28 | End: 2018-08-29

## 2018-08-28 RX ORDER — DEXTROSE 50 % IN WATER 50 %
25 SYRINGE (ML) INTRAVENOUS ONCE
Qty: 0 | Refills: 0 | Status: DISCONTINUED | OUTPATIENT
Start: 2018-08-28 | End: 2018-08-31

## 2018-08-28 RX ORDER — ASPIRIN/CALCIUM CARB/MAGNESIUM 324 MG
81 TABLET ORAL DAILY
Qty: 0 | Refills: 0 | Status: DISCONTINUED | OUTPATIENT
Start: 2018-08-28 | End: 2018-08-31

## 2018-08-28 RX ORDER — ATORVASTATIN CALCIUM 80 MG/1
20 TABLET, FILM COATED ORAL AT BEDTIME
Qty: 0 | Refills: 0 | Status: DISCONTINUED | OUTPATIENT
Start: 2018-08-28 | End: 2018-08-31

## 2018-08-28 RX ORDER — DEXTROSE 50 % IN WATER 50 %
15 SYRINGE (ML) INTRAVENOUS ONCE
Qty: 0 | Refills: 0 | Status: DISCONTINUED | OUTPATIENT
Start: 2018-08-28 | End: 2018-08-31

## 2018-08-28 RX ORDER — HYDRALAZINE HCL 50 MG
25 TABLET ORAL EVERY 8 HOURS
Qty: 0 | Refills: 0 | Status: DISCONTINUED | OUTPATIENT
Start: 2018-08-28 | End: 2018-08-31

## 2018-08-28 RX ORDER — SODIUM CHLORIDE 9 MG/ML
2000 INJECTION INTRAMUSCULAR; INTRAVENOUS; SUBCUTANEOUS ONCE
Qty: 0 | Refills: 0 | Status: COMPLETED | OUTPATIENT
Start: 2018-08-28 | End: 2018-08-28

## 2018-08-28 RX ORDER — INSULIN DETEMIR 100/ML (3)
20 INSULIN PEN (ML) SUBCUTANEOUS AT BEDTIME
Qty: 0 | Refills: 0 | Status: DISCONTINUED | OUTPATIENT
Start: 2018-08-28 | End: 2018-08-31

## 2018-08-28 RX ORDER — METOPROLOL TARTRATE 50 MG
25 TABLET ORAL
Qty: 0 | Refills: 0 | Status: DISCONTINUED | OUTPATIENT
Start: 2018-08-28 | End: 2018-08-31

## 2018-08-28 RX ORDER — INSULIN LISPRO 100/ML
14 VIAL (ML) SUBCUTANEOUS ONCE
Qty: 0 | Refills: 0 | Status: COMPLETED | OUTPATIENT
Start: 2018-08-28 | End: 2018-08-28

## 2018-08-28 RX ORDER — INSULIN LISPRO 100/ML
10 VIAL (ML) SUBCUTANEOUS ONCE
Qty: 0 | Refills: 0 | Status: DISCONTINUED | OUTPATIENT
Start: 2018-08-28 | End: 2018-08-28

## 2018-08-28 RX ORDER — SODIUM CHLORIDE 9 MG/ML
1000 INJECTION, SOLUTION INTRAVENOUS
Qty: 0 | Refills: 0 | Status: DISCONTINUED | OUTPATIENT
Start: 2018-08-28 | End: 2018-08-31

## 2018-08-28 RX ORDER — SERTRALINE 25 MG/1
100 TABLET, FILM COATED ORAL DAILY
Qty: 0 | Refills: 0 | Status: DISCONTINUED | OUTPATIENT
Start: 2018-08-28 | End: 2018-08-31

## 2018-08-28 RX ORDER — GLUCAGON INJECTION, SOLUTION 0.5 MG/.1ML
1 INJECTION, SOLUTION SUBCUTANEOUS ONCE
Qty: 0 | Refills: 0 | Status: DISCONTINUED | OUTPATIENT
Start: 2018-08-28 | End: 2018-08-31

## 2018-08-28 RX ORDER — SODIUM BICARBONATE 1 MEQ/ML
50 SYRINGE (ML) INTRAVENOUS ONCE
Qty: 0 | Refills: 0 | Status: COMPLETED | OUTPATIENT
Start: 2018-08-28 | End: 2018-08-28

## 2018-08-28 RX ORDER — HEPARIN SODIUM 5000 [USP'U]/ML
5000 INJECTION INTRAVENOUS; SUBCUTANEOUS EVERY 12 HOURS
Qty: 0 | Refills: 0 | Status: DISCONTINUED | OUTPATIENT
Start: 2018-08-28 | End: 2018-08-31

## 2018-08-28 RX ORDER — CLOPIDOGREL BISULFATE 75 MG/1
75 TABLET, FILM COATED ORAL DAILY
Qty: 0 | Refills: 0 | Status: DISCONTINUED | OUTPATIENT
Start: 2018-08-28 | End: 2018-08-31

## 2018-08-28 RX ORDER — DEXTROSE 50 % IN WATER 50 %
12.5 SYRINGE (ML) INTRAVENOUS ONCE
Qty: 0 | Refills: 0 | Status: DISCONTINUED | OUTPATIENT
Start: 2018-08-28 | End: 2018-08-31

## 2018-08-28 RX ORDER — INSULIN LISPRO 100/ML
VIAL (ML) SUBCUTANEOUS
Qty: 0 | Refills: 0 | Status: DISCONTINUED | OUTPATIENT
Start: 2018-08-28 | End: 2018-08-31

## 2018-08-28 RX ORDER — AMLODIPINE BESYLATE 2.5 MG/1
10 TABLET ORAL DAILY
Qty: 0 | Refills: 0 | Status: DISCONTINUED | OUTPATIENT
Start: 2018-08-28 | End: 2018-08-28

## 2018-08-28 RX ADMIN — Medication 50 MILLIEQUIVALENT(S): at 23:20

## 2018-08-28 RX ADMIN — Medication 20 UNIT(S): at 23:20

## 2018-08-28 RX ADMIN — Medication 14 UNIT(S): at 23:28

## 2018-08-28 RX ADMIN — SODIUM CHLORIDE 1000 MILLILITER(S): 9 INJECTION INTRAMUSCULAR; INTRAVENOUS; SUBCUTANEOUS at 19:14

## 2018-08-28 NOTE — ED PROVIDER NOTE - SKIN COLOR
large, square region of blanching erythema to mid back - R mid back has a denuded blister, 3 x 2 cm and 2 small blisters, one above and one below the larger blister, appx 1x0.5 cm ; no purulence/ malodor/ crusting/ drainage.

## 2018-08-28 NOTE — H&P ADULT - PROBLEM SELECTOR PLAN 2
PT consult :  Home w 24/7 sup/A for all mobility, RW, Home PT for balance, to maintain strength & endurance. HHA for ADLs. Counseling to assist w adjusting to pt's limitations. PM&R, SLP and OT consults.   will call for SW consult. PT consult :  Home w 24/7 sup/A for all mobility, RW, Home PT for balance, to maintain strength & endurance. HHA for ADLs.   will call for SW  and OT consult. mechanical fall, no LOC.   PT consult :  Home w 24/7 sup/A for all mobility, RW, Home PT for balance, to maintain strength & endurance. HHA for ADLs.   will call for SW  and OT consult.

## 2018-08-28 NOTE — ED ADULT NURSE NOTE - NSIMPLEMENTINTERV_GEN_ALL_ED
Implemented All Fall Risk Interventions:  [place] to call system. Call bell, personal items and telephone within reach. Instruct patient to call for assistance. Room bathroom lighting operational. Non-slip footwear when patient is off stretcher. Physically safe environment: no spills, clutter or unnecessary equipment. Stretcher in lowest position, wheels locked, appropriate side rails in place. Provide visual cue, wrist band, yellow gown, etc. Monitor gait and stability. Monitor for mental status changes and reorient to person, place, and time. Review medications for side effects contributing to fall risk. Reinforce activity limits and safety measures with patient and family.

## 2018-08-28 NOTE — PHYSICAL THERAPY INITIAL EVALUATION ADULT - LEVEL OF INDEPENDENCE: STAIR NEGOTIATION, REHAB EVAL
Not assessed, pt declined, family states pt can handle steps at home. If admitted, stair assessment strongly recommended

## 2018-08-28 NOTE — ED PROVIDER NOTE - PROGRESS NOTE DETAILS
Wife notes that she applied the heating pad two days in row to his back and thinks that the marisol on his back was likely a burn from the heating pad. Wife notes that she applied the heating pad two days in row to his back and thinks that the marisol on his back was likely a burn from the heating pad. Blister cleansed with normal saline;  bacitracin/ xeroform and sterile gauze applied to area. Wife requested a regular dinner tray which I brought for patient. She cut chicken into small pieces and called me as patient appeared to be choking. I noted that pt was initially in no distress, but then face became red and he started gagging;  pt was able to cough and clear his throat - she states this has been happening every time he eats for the last 2 week and frightens her. She notes no recommendation of pureed food but states he is on a thickened diet and recently had a laryngeal injection to help him swallow, which has not helped.

## 2018-08-28 NOTE — ED PROVIDER NOTE - OBJECTIVE STATEMENT
68yoM with h/o multiple prior CVAs, some cerebellar, who has abnormal gait at baseline, reliant but noncompliant with walker and wheelchair use, presenting for evaluation after fall.  per wife, who is his primary caregiver, pt very stubborn and frequently refuses  use of assistive devices. She states that they had railing installed on the side of the bed but patient removed them.  She notes that patient typically requires help to use the bathroom but tends to refuse help.  On thursday morning pt got up from bed and tried to get to the bathroom himself but fell.  He states that he did not feel weaker than usual, but that he felt like his gait was normally unsteady causing him to fall. He denies any head trauma/ LOC.  Son was able to help him get up and he felt fine.  The following day patient noted some mid back pain.  Per wife patient normally would not be able to get up on his own.  Last night around 1 AM, patent said he had to go to the bathroom so his wife got the wheelchair and helped him into it. She pushed him to the bathroom in front of his walker, which he would normally use to hoist himself up but she states that he was having a hard time so she tilted the wheelchair forward causing patient to slide out of hit and slowly on the floor. Again, denies head trauma/ LOC. Wife was unable to help him get up and strained her neck so she gave up, giving him a pillow and a blanket so he could sleep there. SHe states that she checked on him multiple times throughout the night, but eventually called EMS to help him get up. 68yoM with h/o multiple prior CVAs, some cerebellar, who has abnormal gait at baseline, reliant but noncompliant with walker and wheelchair use, presenting for evaluation after fall.  per wife, who is his primary caregiver, pt very stubborn and frequently refuses  use of assistive devices. She states that they had railing installed on the side of the bed but patient removed them.  She notes that patient typically requires help to use the bathroom but tends to refuse help.  On Thursday morning pt got up from bed and tried to get to the bathroom himself but fell.  He states that he did not feel weaker than usual, but that he felt like his gait was normally unsteady causing him to fall (wife and son states he normally walks " like he is drunk." He denies any head trauma/ LOC.  Son was able to help him get up and he felt fine.  The following day patient noted some mid back pain.  Per wife patient normally would not be able to get up on his own.  Last night around 1 AM, patent said he had to go to the bathroom so his wife got the wheelchair and helped him into it. She pushed him to the bathroom in front of his walker, which he would normally use to hoist himself up but she states that he was having a hard time so she tilted the wheelchair forward thinking it would help him get up from the chair, but instead it caused patient to slide out of chair slowly on the floor. Again, denies head trauma/ LOC. Wife was unable to help him get up and strained her neck so she gave up, giving him a pillow and a blanket so he could sleep there. She states that she checked on him multiple times throughout the night, but eventually called EMS to help him get up.  Wife notes that she had applied a heating pad to his back the last two days to help his back pain. Pt denies any new dizziness, weakness, numbness, tingling, confusion. he denies any weakness/ dizziness/ palpitations/ CP/ SOB /near syncope prior to fall or contributing to fall.  He notes only mid back pain.

## 2018-08-28 NOTE — PHYSICAL THERAPY INITIAL EVALUATION ADULT - CRITERIA FOR SKILLED THERAPEUTIC INTERVENTIONS
anticipated discharge recommendation/impairments found/Home with 24/7 supervision, assist for all functional mobility, RW, Home PT for balance training and to maintain strength and endurance. Pt and spouse will benefit from a HHA to assist with ADLs and counseling to assist with adjusting to pt's limitations/prevent further caregiver burnout

## 2018-08-28 NOTE — PHYSICAL THERAPY INITIAL EVALUATION ADULT - IMPAIRED TRANSFERS: SIT/STAND, REHAB EVAL
pain/impaired postural control/impaired coordination/impaired motor control/impaired balance/cognition/ataxic

## 2018-08-28 NOTE — H&P ADULT - PROBLEM SELECTOR PLAN 3
pt. will be on levemir and humalog scale.   one dose of huamlog qo unit stat ordered in ER and repeat accuchk ordered.

## 2018-08-28 NOTE — H&P ADULT - PROBLEM SELECTOR PLAN 1
pt's DM, htn, lisinopril and poor po intake likely all contributing to worsening RF, will be on iv fluids, will get renal sono and nephrology consult dr. consuelo group.   d/c lisinopril.

## 2018-08-28 NOTE — H&P ADULT - PROBLEM SELECTOR PLAN 7
Spoke to radiologist in [facility], ct thoracic spine no acute fracture but has non displaced rt. rib 4 through 7 fractures. will request trauma team input.   8/29/18 3;05 am. dr campuzano. Spoke to radiologist in [facility], ct thoracic spine no acute fracture but has non displaced rt. rib 4 through 7 fractures. spoke to trauma team for consult.   8/29/18 3;05 am. dr campuzano.

## 2018-08-28 NOTE — H&P ADULT - NSHPPHYSICALEXAM_GEN_ALL_CORE
General:  male well developed lying in bed not in distress.   HEENT: AT, NC. PERRL. intact EOM. oral mucosa somewhat dry, no throat erythema or exudate.   Neck: supple. no JVD.   Chest: CTA bilaterally. no bruise over anterior or posterior CW noted.  Heart: normal S1,S2. RRR. no heart murmur.  Abdomen: soft. non-tender. non-distended. + BS.   Ext: no C/C/E. no calf tenderness.  Vascular : 2 + DP.   Neuro: pt. is oriented to place and knows about the president of [place] , not sure about the year. pt. follows commands . pt. has motor about 4 out of 5 in lower ext. upper ext motor is 5/5. appears to be at baseline ( spoke to pt's wife )   Skin: mild skin abrasion noted over rt. lower and posterior rib area. no bruising, no open wounds. no warmth. no cyanosis.

## 2018-08-28 NOTE — PROVIDER CONTACT NOTE (OTHER) - BACKGROUND
PT Ordered for pt s/p fall
h/o multiple CVA with residual impaired balance, swallowing difficulty and cognitive deficits

## 2018-08-28 NOTE — PROVIDER CONTACT NOTE (OTHER) - RECOMMENDATIONS
Home w 24/7 sup/A for all mobility, RW, Home PT for balance, to maintain strength & endurance. HHA for ADLs. Counseling to assist w adjusting to pt's limitations. PM&R, SLP and OT consults

## 2018-08-28 NOTE — ED PROVIDER NOTE - MUSCULOSKELETAL, MLM
Spine appears normal, range of motion is not limited, Full ROm shoulder, elbow, wrists, hips, knees, ankles; mild TTP at mid T spine

## 2018-08-28 NOTE — ED ADULT TRIAGE NOTE - CHIEF COMPLAINT QUOTE
trip and fall last pm unable to get up, found of floor by ems, c/o low back pain, difficulty speaking, normal per pt hx cva, takes plavix,

## 2018-08-28 NOTE — ED ADULT NURSE REASSESSMENT NOTE - NS ED NURSE REASSESS COMMENT FT1
, as per MD Rodríguez give 14 units humalog instead of 10 SQ, PO medications not given d/t pt failing dysphagia screen w/ apple sauce, pt coughed, new 18G IV placed into left forearm, pt and linens changed, pt covered in urine, pt felt warm rectal temp 100.1, placed into pamper and in position of comfort, EKG performed, awaiting room assignment, will continue to monitor.

## 2018-08-28 NOTE — H&P ADULT - NSHPLABSRESULTS_GEN_ALL_CORE
Spoke to radiologist in [facility], ct thoracic spine no acute fracture but has non displaced rt. rib 4 through 7 fractures. will request trauma team input. 8/29/18 3;05 am. dr campuzano. Spoke to radiologist in [facility], ct thoracic spine no acute fracture but has non displaced rt. rib 4 through 7 fractures. will request trauma team input. 8/29/18 3;05 am. dr campuzano.    ekg is nsr 80, rbbb old, lafb.

## 2018-08-28 NOTE — ED ADULT NURSE NOTE - OBJECTIVE STATEMENT
pt found in the floor by his wife, was able to get up with his wife and son, after that pt was c/o of back pain, pt has had multiple falls and is bed bound.

## 2018-08-28 NOTE — PHYSICAL THERAPY INITIAL EVALUATION ADULT - GENERAL OBSERVATIONS, REHAB EVAL
Pt received supine on stretcher in ED, Spouse at bedside. Pt minimally verbal 2*2 previous CVAs and recent throat surgery. C/o unrated thoracic spine pain, pt agreeable to PT

## 2018-08-28 NOTE — ED PROVIDER NOTE - CARE PLAN
Principal Discharge DX:	[facility] (acute kidney injury)  Secondary Diagnosis:	Frequent falls  Secondary Diagnosis:	Difficulty swallowing

## 2018-08-28 NOTE — PHYSICAL THERAPY INITIAL EVALUATION ADULT - PREDICTED DURATION OF THERAPY (DAYS/WKS), PT EVAL
Home with 24/7 supervision, assist for all functional mobility, RW, Home PT for balance training and to maintain strength and endurance. Pt and spouse will benefit from a HHA to assist with ADLs and counseling to assist with adjusting to pt's limitations/prevent further caregiver burnout

## 2018-08-28 NOTE — PHYSICAL THERAPY INITIAL EVALUATION ADULT - ADDITIONAL COMMENTS
Pt lives in a private home with his wife and son. 3 steps to enter with handrails, no steps inside. Per pt's wife Pt would ambulate using walls and furniture for support but was not safe doing so. Spouse assists with transfers as needed however, pt is resistant to assistance. Pt uses a w/c for longer distances. Pt has a RW, shower chair, raised toilet seat and w/c at home.

## 2018-08-28 NOTE — H&P ADULT - PROBLEM SELECTOR PLAN 6
k 5.6, albuterol neb,1 amp bicarb push, insulin ordered, follow repeat, likely related to RF and lisinopril. ekg ordered. k 5.6, albuterol neb,1 amp bicarb push, insulin ordered, follow repeat bmp.  likely related to RF and lisinopril. will avoid keyexalate at this point as his swallowing status is not clear . ekg ordered.  pt. could not get his humalog earlier and bicarb just given, will repeat bmp at 3 am and cancel 11 pm order. dr. campuzano 8/28/18 11:30 pm. k 5.6, albuterol neb,1 amp bicarb push, insulin ordered, follow repeat bmp.  likely related to RF and lisinopril. will avoid keyexalate at this point as his swallowing status is not clear . ekg ordered.    pt. could not get his humalog earlier and bicarb just given, will repeat bmp at 3 am and cancel 11 pm order. dr. campuzano 8/28/18 11:30 pm.

## 2018-08-28 NOTE — PHYSICAL THERAPY INITIAL EVALUATION ADULT - PERTINENT HX OF CURRENT PROBLEM, REHAB EVAL
Per ED provider note: 68yoM with h/o multiple prior CVAs, some cerebellar, who has abnormal gait at baseline, reliant but noncompliant with walker and wheelchair use, presenting for evaluation after fall. Spouse states that he was having a hard time so she tilted the wheelchair forward thinking it would help him get up from the chair, but instead it caused patient to slide out of chair slowly on the floor. Pt denied head trauma. Per ED provider note: 68yoM with h/o multiple prior CVAs, some cerebellar, who has abnormal gait at baseline, reliant but noncompliant with walker and wheelchair use, presenting for evaluation after fall. Spouse states that he was having a hard time so she tilted the wheelchair forward thinking it would help him get up from the chair, but instead it caused patient to slide out of chair slowly on the floor. Pt denied head trauma. CT thoracic spine performed, pending results, OK to see per MD

## 2018-08-28 NOTE — ED ADULT NURSE REASSESSMENT NOTE - NS ED NURSE REASSESS COMMENT FT1
pt resting comfortably denies pain at this time awaiting radiology results and PT eval pt aware of plan.

## 2018-08-28 NOTE — PHYSICAL THERAPY INITIAL EVALUATION ADULT - REFERRAL TO ANOTHER SERVICE NEEDED, PT EVAL
social work/occupational therapy/speech language pathology/SLP consult 2*2 prior to PT evaluation, pt's spouse called MD from desk to address pt choking on dinner. SW needed 2*2 Pt demonstrating frustration with his current situation and caregiver expressing burnout and need for assistance. Family dynamic appears strained. Pt and wife have a young son (13 or 13 y/o?)

## 2018-08-28 NOTE — ED ADULT NURSE REASSESSMENT NOTE - NS ED NURSE REASSESS COMMENT FT1
pt was sitting up in cart went to eat dinner and had a choking episode. pt coughed up small piece of chicken Dr Flores at the bedside color remains pink able to talk and denies pain. pt and family made aware he will be NPO until swallow evaluation is done.

## 2018-08-28 NOTE — PROVIDER CONTACT NOTE (OTHER) - ASSESSMENT
Pt currently at CT Scan Per Dr. Finney
Min A for ambulation 75 feet with RW, poor balance, Min A for bed mobility, contact guard for Sit to stand transfers.

## 2018-08-28 NOTE — H&P ADULT - HISTORY OF PRESENT ILLNESS
pt. is a 67 y/o M with h/o multiple prior CVAs,  who has abnormal gait at baseline, noncompliant with walker and wheelchair use, presenting for evaluation after fall.  per wife, who is his primary caregiver, pt very stubborn and frequently refuses  use of assistive devices. She states that they had railing installed on the side of the bed but patient removed them.  She notes that patient typically requires help to use the bathroom but tends to refuse help.  On Thursday morning pt got up from bed and tried to get to the bathroom himself but fell.  He states that he did not feel weaker than usual, but that he felt like his gait was normally unsteady causing him to fall (wife and son states he normally walks " like he is drunk." He denies any head trauma/ LOC.  Son was able to help him get up and he felt fine.  The following day patient has reported rt. lower rib area pain.  Again, denies head trauma/ LOC. it appears family cannot take care of him due to frequent fall and EMS was called and he was brought to ER.  Pt denies any new dizziness, weakness, numbness, tingling, confusion, CP/ SOB /near syncope prior to fall or contributing to fall. pt. reports that his rt. side is more weak than left. no new complaints at the time of admission. pt. is a 69 y/o M with h/o multiple prior CVAs,  who has abnormal gait at baseline, noncompliant with walker and wheelchair use, presenting for evaluation after fall.  per wife, who is his primary caregiver, pt very stubborn and frequently refuses  use of assistive devices. She states that they had railing installed on the side of the bed but patient removed them.  She notes that patient typically requires help to use the bathroom but tends to refuse help.  On Thursday morning pt got up from bed and tried to get to the bathroom himself but fell.  He states that he did not feel weaker than usual, but that he felt like his gait was normally unsteady causing him to fall (wife and son states he normally walks " like he is drunk." He denies any head trauma/ LOC.  Son was able to help him get up and he felt fine.  The following day patient has reported rt. lower rib area pain.  Again, denies head trauma/ LOC. it appears family cannot take care of him due to frequent fall and EMS was called and he was brought to ER. As per history no  dizziness, weakness, numbness, tingling, confusion, CP/ SOB /near syncope prior to fall or contributing to fall.  No change in baseline mental status as per history. pt. not a good historian.  pt. reports that his rt. side is more weak than left. no new complaints at the time of admission.   As per pt's wife all his doctors are at [facility] and he has trouble swallowing and about1 month ago had some throat surgery for his hoarseness and difficulty swallowing by his doctor at [facility] but did not help. pt's po intake has not been good. Pt's wife wants to speak to SW and wants to get help at home rather than rehab placement. pt. is a 67 y/o M with h/o multiple prior CVAs,  who has abnormal gait at baseline, noncompliant with walker and wheelchair use, presenting for evaluation after fall.  per wife, who is his primary caregiver, pt very stubborn and frequently refuses  use of assistive devices. She states that they had railing installed on the side of the bed but patient removed them.  She notes that patient typically requires help to use the bathroom but tends to refuse help.  On Thursday morning pt got up from bed and tried to get to the bathroom himself but fell.  He states that he did not feel weaker than usual, but that he felt like his gait was normally unsteady causing him to fall (wife and son states he normally walks " like he is drunk." He denies any head trauma/ LOC.  Son was able to help him get up and he felt fine.  The following day patient has reported rt. lower rib area pain.  Again, denies head trauma/ LOC. it appears family cannot take care of him due to frequent fall and EMS was called and he was brought to ER. As per history no  dizziness, no LOC, no weakness, numbness, tingling, confusion, CP/ SOB /near syncope prior to fall or contributing to fall.  No change in baseline mental status as per history. pt. not a good historian.  pt. reports that his rt. side is more weak than left. no new complaints at the time of admission.   As per pt's wife all his doctors are at [facility] and he has trouble swallowing and about1 month ago had some throat surgery for his hoarseness and difficulty swallowing by his doctor at [facility] but did not help. pt's po intake has not been good. Pt's wife wants to speak to SW and wants to get help at home rather than rehab placement.

## 2018-08-28 NOTE — H&P ADULT - ATTENDING COMMENTS
8/28/18 11: 35 pm nurse called and reported that pt. is noted to be choking with apple sauce when attempted to give him po meds.  will make npo during the night till swallow eval done. dr. campuzano 8/28/18 11: 35 pm nurse called and reported that pt. is noted to be choking with apple sauce when attempted to give him po meds.  will hold po meds during the night till swallow eval done. dr. campuzano

## 2018-08-28 NOTE — PHYSICAL THERAPY INITIAL EVALUATION ADULT - IMPAIRMENTS CONTRIBUTING TO GAIT DEVIATIONS, PT EVAL
impaired postural control/pain/impaired coordination/cognition/impaired balance/+ LOB to left side multiple times requiring Min A to recover/ataxic

## 2018-08-29 DIAGNOSIS — Z95.5 PRESENCE OF CORONARY ANGIOPLASTY IMPLANT AND GRAFT: ICD-10-CM

## 2018-08-29 DIAGNOSIS — Z29.9 ENCOUNTER FOR PROPHYLACTIC MEASURES, UNSPECIFIED: ICD-10-CM

## 2018-08-29 DIAGNOSIS — S22.39XA FRACTURE OF ONE RIB, UNSPECIFIED SIDE, INITIAL ENCOUNTER FOR CLOSED FRACTURE: ICD-10-CM

## 2018-08-29 LAB
ANION GAP SERPL CALC-SCNC: 12 MMOL/L — SIGNIFICANT CHANGE UP (ref 5–17)
ANION GAP SERPL CALC-SCNC: 15 MMOL/L — SIGNIFICANT CHANGE UP (ref 5–17)
APPEARANCE UR: CLEAR — SIGNIFICANT CHANGE UP
BACTERIA # UR AUTO: ABNORMAL
BASE EXCESS BLDA CALC-SCNC: 0.3 MMOL/L — SIGNIFICANT CHANGE UP (ref -2–2)
BILIRUB UR-MCNC: NEGATIVE — SIGNIFICANT CHANGE UP
BLOOD GAS COMMENTS ARTERIAL: SIGNIFICANT CHANGE UP
BUN SERPL-MCNC: 48 MG/DL — HIGH (ref 8–20)
BUN SERPL-MCNC: 49 MG/DL — HIGH (ref 8–20)
CALCIUM SERPL-MCNC: 9.1 MG/DL — SIGNIFICANT CHANGE UP (ref 8.6–10.2)
CALCIUM SERPL-MCNC: 9.2 MG/DL — SIGNIFICANT CHANGE UP (ref 8.6–10.2)
CHLORIDE SERPL-SCNC: 109 MMOL/L — HIGH (ref 98–107)
CHLORIDE SERPL-SCNC: 112 MMOL/L — HIGH (ref 98–107)
CO2 SERPL-SCNC: 22 MMOL/L — SIGNIFICANT CHANGE UP (ref 22–29)
CO2 SERPL-SCNC: 23 MMOL/L — SIGNIFICANT CHANGE UP (ref 22–29)
COLOR SPEC: YELLOW — SIGNIFICANT CHANGE UP
CREAT SERPL-MCNC: 2.16 MG/DL — HIGH (ref 0.5–1.3)
CREAT SERPL-MCNC: 2.39 MG/DL — HIGH (ref 0.5–1.3)
DIFF PNL FLD: ABNORMAL
EPI CELLS # UR: SIGNIFICANT CHANGE UP
GAS PNL BLDA: SIGNIFICANT CHANGE UP
GLUCOSE BLDC GLUCOMTR-MCNC: 148 MG/DL — HIGH (ref 70–99)
GLUCOSE BLDC GLUCOMTR-MCNC: 234 MG/DL — HIGH (ref 70–99)
GLUCOSE BLDC GLUCOMTR-MCNC: 307 MG/DL — HIGH (ref 70–99)
GLUCOSE BLDC GLUCOMTR-MCNC: 334 MG/DL — HIGH (ref 70–99)
GLUCOSE BLDC GLUCOMTR-MCNC: 424 MG/DL — HIGH (ref 70–99)
GLUCOSE SERPL-MCNC: 167 MG/DL — HIGH (ref 70–115)
GLUCOSE SERPL-MCNC: 224 MG/DL — HIGH (ref 70–115)
GLUCOSE UR QL: 250 MG/DL
HBA1C BLD-MCNC: 9.3 % — HIGH (ref 4–5.6)
HCO3 BLDA-SCNC: 25 MMOL/L — SIGNIFICANT CHANGE UP (ref 20–26)
HOROWITZ INDEX BLDA+IHG-RTO: 21 — SIGNIFICANT CHANGE UP
KETONES UR-MCNC: ABNORMAL
LEUKOCYTE ESTERASE UR-ACNC: NEGATIVE — SIGNIFICANT CHANGE UP
NITRITE UR-MCNC: NEGATIVE — SIGNIFICANT CHANGE UP
PCO2 BLDA: 41 MMHG — SIGNIFICANT CHANGE UP (ref 35–45)
PH BLDA: 7.4 — SIGNIFICANT CHANGE UP (ref 7.35–7.45)
PH UR: 6 — SIGNIFICANT CHANGE UP (ref 5–8)
PO2 BLDA: 74 MMHG — LOW (ref 83–108)
POTASSIUM SERPL-MCNC: 4.3 MMOL/L — SIGNIFICANT CHANGE UP (ref 3.5–5.3)
POTASSIUM SERPL-MCNC: 5.2 MMOL/L — SIGNIFICANT CHANGE UP (ref 3.5–5.3)
POTASSIUM SERPL-SCNC: 4.3 MMOL/L — SIGNIFICANT CHANGE UP (ref 3.5–5.3)
POTASSIUM SERPL-SCNC: 5.2 MMOL/L — SIGNIFICANT CHANGE UP (ref 3.5–5.3)
PROT UR-MCNC: 100 MG/DL
RBC CASTS # UR COMP ASSIST: ABNORMAL /HPF (ref 0–4)
SAO2 % BLDA: 96 % — SIGNIFICANT CHANGE UP (ref 95–99)
SODIUM SERPL-SCNC: 146 MMOL/L — HIGH (ref 135–145)
SODIUM SERPL-SCNC: 147 MMOL/L — HIGH (ref 135–145)
SP GR SPEC: 1.02 — SIGNIFICANT CHANGE UP (ref 1.01–1.02)
UROBILINOGEN FLD QL: NEGATIVE MG/DL — SIGNIFICANT CHANGE UP
WBC UR QL: SIGNIFICANT CHANGE UP

## 2018-08-29 PROCEDURE — 72128 CT CHEST SPINE W/O DYE: CPT | Mod: 26

## 2018-08-29 PROCEDURE — 99233 SBSQ HOSP IP/OBS HIGH 50: CPT

## 2018-08-29 PROCEDURE — 76775 US EXAM ABDO BACK WALL LIM: CPT | Mod: 26

## 2018-08-29 RX ORDER — OXYCODONE AND ACETAMINOPHEN 5; 325 MG/1; MG/1
1 TABLET ORAL EVERY 6 HOURS
Qty: 0 | Refills: 0 | Status: DISCONTINUED | OUTPATIENT
Start: 2018-08-29 | End: 2018-08-29

## 2018-08-29 RX ORDER — OXYCODONE HYDROCHLORIDE 5 MG/1
5 TABLET ORAL EVERY 6 HOURS
Qty: 0 | Refills: 0 | Status: DISCONTINUED | OUTPATIENT
Start: 2018-08-29 | End: 2018-08-29

## 2018-08-29 RX ORDER — HYDROMORPHONE HYDROCHLORIDE 2 MG/ML
0.5 INJECTION INTRAMUSCULAR; INTRAVENOUS; SUBCUTANEOUS EVERY 4 HOURS
Qty: 0 | Refills: 0 | Status: DISCONTINUED | OUTPATIENT
Start: 2018-08-29 | End: 2018-08-29

## 2018-08-29 RX ORDER — DOCUSATE SODIUM 100 MG
100 CAPSULE ORAL
Qty: 0 | Refills: 0 | Status: DISCONTINUED | OUTPATIENT
Start: 2018-08-29 | End: 2018-08-31

## 2018-08-29 RX ORDER — ACETAMINOPHEN 500 MG
650 TABLET ORAL EVERY 6 HOURS
Qty: 0 | Refills: 0 | Status: DISCONTINUED | OUTPATIENT
Start: 2018-08-29 | End: 2018-08-31

## 2018-08-29 RX ORDER — SODIUM CHLORIDE 9 MG/ML
500 INJECTION INTRAMUSCULAR; INTRAVENOUS; SUBCUTANEOUS ONCE
Qty: 0 | Refills: 0 | Status: COMPLETED | OUTPATIENT
Start: 2018-08-29 | End: 2018-08-29

## 2018-08-29 RX ORDER — INSULIN LISPRO 100/ML
4 VIAL (ML) SUBCUTANEOUS ONCE
Qty: 0 | Refills: 0 | Status: COMPLETED | OUTPATIENT
Start: 2018-08-29 | End: 2018-08-29

## 2018-08-29 RX ORDER — SODIUM CHLORIDE 9 MG/ML
1000 INJECTION INTRAMUSCULAR; INTRAVENOUS; SUBCUTANEOUS
Qty: 0 | Refills: 0 | Status: DISCONTINUED | OUTPATIENT
Start: 2018-08-29 | End: 2018-08-30

## 2018-08-29 RX ORDER — SENNA PLUS 8.6 MG/1
2 TABLET ORAL AT BEDTIME
Qty: 0 | Refills: 0 | Status: DISCONTINUED | OUTPATIENT
Start: 2018-08-29 | End: 2018-08-31

## 2018-08-29 RX ORDER — ACETAMINOPHEN 500 MG
650 TABLET ORAL EVERY 6 HOURS
Qty: 0 | Refills: 0 | Status: DISCONTINUED | OUTPATIENT
Start: 2018-08-29 | End: 2018-08-29

## 2018-08-29 RX ORDER — OXYCODONE HYDROCHLORIDE 5 MG/1
5 TABLET ORAL EVERY 4 HOURS
Qty: 0 | Refills: 0 | Status: DISCONTINUED | OUTPATIENT
Start: 2018-08-29 | End: 2018-08-29

## 2018-08-29 RX ORDER — LIDOCAINE 4 G/100G
1 CREAM TOPICAL DAILY
Qty: 0 | Refills: 0 | Status: DISCONTINUED | OUTPATIENT
Start: 2018-08-29 | End: 2018-08-31

## 2018-08-29 RX ORDER — GABAPENTIN 400 MG/1
300 CAPSULE ORAL THREE TIMES A DAY
Qty: 0 | Refills: 0 | Status: DISCONTINUED | OUTPATIENT
Start: 2018-08-29 | End: 2018-08-31

## 2018-08-29 RX ADMIN — LIDOCAINE 1 PATCH: 4 CREAM TOPICAL at 14:53

## 2018-08-29 RX ADMIN — Medication 650 MILLIGRAM(S): at 19:05

## 2018-08-29 RX ADMIN — Medication 4 UNIT(S): at 01:49

## 2018-08-29 RX ADMIN — Medication 650 MILLIGRAM(S): at 23:31

## 2018-08-29 RX ADMIN — Medication 4: at 21:50

## 2018-08-29 RX ADMIN — Medication 25 MILLIGRAM(S): at 21:50

## 2018-08-29 RX ADMIN — Medication 81 MILLIGRAM(S): at 11:58

## 2018-08-29 RX ADMIN — CLOPIDOGREL BISULFATE 75 MILLIGRAM(S): 75 TABLET, FILM COATED ORAL at 11:58

## 2018-08-29 RX ADMIN — Medication 25 MILLIGRAM(S): at 14:53

## 2018-08-29 RX ADMIN — GABAPENTIN 300 MILLIGRAM(S): 400 CAPSULE ORAL at 14:53

## 2018-08-29 RX ADMIN — Medication 25 MILLIGRAM(S): at 18:27

## 2018-08-29 RX ADMIN — Medication 20 UNIT(S): at 21:50

## 2018-08-29 RX ADMIN — Medication 100 MILLIGRAM(S): at 17:32

## 2018-08-29 RX ADMIN — SODIUM CHLORIDE 50 MILLILITER(S): 9 INJECTION INTRAMUSCULAR; INTRAVENOUS; SUBCUTANEOUS at 09:27

## 2018-08-29 RX ADMIN — Medication 4: at 11:58

## 2018-08-29 RX ADMIN — Medication 650 MILLIGRAM(S): at 18:27

## 2018-08-29 RX ADMIN — Medication 650 MILLIGRAM(S): at 11:58

## 2018-08-29 RX ADMIN — Medication 12: at 17:32

## 2018-08-29 RX ADMIN — SENNA PLUS 2 TABLET(S): 8.6 TABLET ORAL at 21:50

## 2018-08-29 RX ADMIN — ATORVASTATIN CALCIUM 20 MILLIGRAM(S): 80 TABLET, FILM COATED ORAL at 21:50

## 2018-08-29 RX ADMIN — GABAPENTIN 300 MILLIGRAM(S): 400 CAPSULE ORAL at 21:50

## 2018-08-29 RX ADMIN — HEPARIN SODIUM 5000 UNIT(S): 5000 INJECTION INTRAVENOUS; SUBCUTANEOUS at 17:32

## 2018-08-29 RX ADMIN — Medication 650 MILLIGRAM(S): at 12:32

## 2018-08-29 RX ADMIN — SODIUM CHLORIDE 500 MILLILITER(S): 9 INJECTION INTRAMUSCULAR; INTRAVENOUS; SUBCUTANEOUS at 04:22

## 2018-08-29 RX ADMIN — SERTRALINE 100 MILLIGRAM(S): 25 TABLET, FILM COATED ORAL at 14:53

## 2018-08-29 NOTE — PROVIDER CONTACT NOTE (OTHER) - SITUATION
pt diet orders are dysphagia 1 pureed nectar, report from night shift is that pt is to remain   NPO. asking doctor if pt can have anything to eat/drink

## 2018-08-29 NOTE — CONSULT NOTE ADULT - SUBJECTIVE AND OBJECTIVE BOX
TRAUMA SURGERY CONSULT    HPI:  69 y/o M w/ PMH multiple prior CVAs, abnormal gait (noncompliant with walker and wheelchair use) presenting for evaluation after fall 5 days ago. Per wife, fall appears to be mechanical with no associated syncopal episode. -LOC. Patient reported persistent rib pain after the fall, prompting the family to bring him to the ED.  Patient was admitted to the hospitalist service for monitoring and care.     A: Protected, patient conversing  B: CTAB. Symmetrical chest rise  C: 2+ central (femoral) & peripheral pulses (Radial, DP)  D: GCS 15, MAEO, interacting. No sadie disability noted  E: No gross deformities on primary exposure    Vitals:  Temp: 97.9 HR: 82 BP: 145/82 RR: 19 SpO2: 96%    CXR: Negative for evidence of hemo/pneumothorax    PAST MEDICAL HISTORY:  CVA (cerebral vascular accident)  Diabetic neuropathy  DM (diabetes mellitus)  Hyperlipidemia  AICD (automatic cardioverter/defibrillator) present  Pacemaker  Stented coronary artery  HTN (hypertension)    PAST SURGICAL HISTORY:  Cardiac pacemaker    ALLERGIES:  No Known Allergies    FAMILY HISTORY: Noncontributory    SOCIAL HISTORY:  Unknown    HOME MEDICATIONS:  amantadine 100 mg oral capsule: 1 cap(s) orally once a day (22 Jan 2018 13:51)  amLODIPine 10 mg oral tablet: 1 tab(s) orally once a day (23 Jan 2018 07:57)  aspirin 81 mg oral tablet: 1 tab(s) orally once a day (20 Jan 2018 17:16)  atorvastatin 20 mg oral tablet: 1 tab(s) orally once a day (at bedtime) (20 Jan 2018 16:29)  insulin lispro 100 units/mL subcutaneous solution:  subcutaneous 3 times a day (before meals); 1 Unit(s) if Glucose 151 - 200  2 Unit(s) if Glucose 201 - 250  3 Unit(s) if Glucose 251 - 300  4 Unit(s) if Glucose 301 - 350  5 Unit(s) if Glucose 351 - 400  6 Unit(s) if Glucose Greater Than 400 (23 Jan 2018 09:20)  Levemir 100 units/mL subcutaneous solution: 25 unit(s) subcutaneous once a day (at bedtime) (23 Jan 2018 09:20)  lisinopril 20 mg oral tablet: 1 tab(s) orally once a day (20 Jan 2018 16:29)  loperamide 2 mg oral capsule: 1 cap(s) orally every 4 hours, As needed, Diarrhea (22 Jan 2018 13:51)  Plavix 75 mg oral tablet: 1 tab(s) orally once a day (20 Jan 2018 17:16)  sertraline 100 mg oral tablet: 1 tab(s) orally once a day (20 Jan 2018 17:16)      MEDICATIONS  (STANDING):  acetaminophen   Tablet. 650 milliGRAM(s) Oral every 6 hours  aspirin enteric coated 81 milliGRAM(s) Oral daily  atorvastatin 20 milliGRAM(s) Oral at bedtime  clopidogrel Tablet 75 milliGRAM(s) Oral daily  dextrose 5%. 1000 milliLiter(s) (50 mL/Hr) IV Continuous <Continuous>  dextrose 50% Injectable 12.5 Gram(s) IV Push once  dextrose 50% Injectable 25 Gram(s) IV Push once  dextrose 50% Injectable 25 Gram(s) IV Push once  docusate sodium 100 milliGRAM(s) Oral two times a day  gabapentin 300 milliGRAM(s) Oral three times a day  heparin  Injectable 5000 Unit(s) SubCutaneous every 12 hours  hydrALAZINE 25 milliGRAM(s) Oral every 8 hours  insulin detemir injectable (LEVEMIR) 20 Unit(s) SubCutaneous at bedtime  insulin lispro (HumaLOG) corrective regimen sliding scale   SubCutaneous three times a day before meals  insulin lispro (HumaLOG) corrective regimen sliding scale   SubCutaneous at bedtime  lidocaine   Patch 1 Patch Transdermal daily  metoprolol tartrate 25 milliGRAM(s) Oral two times a day  senna 2 Tablet(s) Oral at bedtime  sertraline 100 milliGRAM(s) Oral daily  sodium chloride 0.9%. 1000 milliLiter(s) (100 mL/Hr) IV Continuous <Continuous>    MEDICATIONS  (PRN):  dextrose 40% Gel 15 Gram(s) Oral once PRN Blood Glucose LESS THAN 70 milliGRAM(s)/deciliter  glucagon  Injectable 1 milliGRAM(s) IntraMuscular once PRN Glucose LESS THAN 70 milligrams/deciliter  HYDROmorphone  Injectable 0.5 milliGRAM(s) IV Push every 4 hours PRN Breakthrough pain  oxyCODONE    IR 5 milliGRAM(s) Oral every 6 hours PRN Moderate Pain (4 - 6)  oxyCODONE    IR 5 milliGRAM(s) Oral every 4 hours PRN Severe Pain (7 - 10)      VITALS & I/Os:  Vital Signs Last 24 Hrs  T(C): 36.6 (29 Aug 2018 00:55), Max: 37.8 (29 Aug 2018 00:00)  T(F): 97.9 (29 Aug 2018 00:55), Max: 100.1 (29 Aug 2018 00:00)  HR: 82 (29 Aug 2018 00:55) (82 - 99)  BP: 145/53 (29 Aug 2018 00:55) (145/53 - 192/73)  BP(mean): --  RR: 19 (29 Aug 2018 00:55) (18 - 20)  SpO2: 96% (29 Aug 2018 00:55) (94% - 99%)  CAPILLARY BLOOD GLUCOSE      POCT Blood Glucose.: 334 mg/dL (29 Aug 2018 01:26)  POCT Blood Glucose.: 459 mg/dL (28 Aug 2018 23:15)      GENERAL: Alert, well developed, in no acute distress.  MENTAL STATUS: Following commands, nonverbal on interview. Appropriate affect.  HEENT: PERRLA. EOMI. MMM.  Trachea midline. No lymph node swelling or tenderness.  RESPIRATORY: TTP to the right chest wall. CTAB. No wheezing, rales or rhonchi.  CARDIOVASCULAR: RRR. No audible murmurs, rubs or gallops.   GASTROINTESTINAL: Abdomen soft, NT, ND, -R/-G.  No pulsatile mass, no flank tenderness or suprapubic tenderness. No hepatosplenomegaly.  NEUROLOGIC: Cranial nerves II-XII grossly intact. No focal neurological deficits. Moves all extremities spontaneously. Sensation intact bilaterally.  INTGUMENTARY: No overt rashes or lesions, petechia or purpura. Good turgor. No edema.  MUSCULOSKELETAL: No cyanosis or clubbing. No gross deformities.   LYMPHATIC: Palpation of neck reveals no swelling or tenderness of neck nodes. Palpation of groin reveals no swelling or tenderness of groin nodes.    LABS:                        14.3   9.8   )-----------( 265      ( 28 Aug 2018 14:28 )             42.7     08-29    147<H>  |  109<H>  |  49.0<H>  ----------------------------<  224<H>  4.3   |  23.0  |  2.39<H>    Ca    9.1      29 Aug 2018 03:20  Mg     2.3     08-28    TPro  7.4  /  Alb  3.8  /  TBili  0.9  /  DBili  x   /  AST  22  /  ALT  14  /  AlkPhos  98  08-28    Lactate: acetaminophen   Tablet. 650 milliGRAM(s) Oral every 6 hours  aspirin enteric coated 81 milliGRAM(s) Oral daily  atorvastatin 20 milliGRAM(s) Oral at bedtime  clopidogrel Tablet 75 milliGRAM(s) Oral daily  dextrose 40% Gel 15 Gram(s) Oral once PRN  dextrose 5%. 1000 milliLiter(s) IV Continuous <Continuous>  dextrose 50% Injectable 12.5 Gram(s) IV Push once  dextrose 50% Injectable 25 Gram(s) IV Push once  dextrose 50% Injectable 25 Gram(s) IV Push once  docusate sodium 100 milliGRAM(s) Oral two times a day  gabapentin 300 milliGRAM(s) Oral three times a day  glucagon  Injectable 1 milliGRAM(s) IntraMuscular once PRN  heparin  Injectable 5000 Unit(s) SubCutaneous every 12 hours  hydrALAZINE 25 milliGRAM(s) Oral every 8 hours  HYDROmorphone  Injectable 0.5 milliGRAM(s) IV Push every 4 hours PRN  insulin detemir injectable (LEVEMIR) 20 Unit(s) SubCutaneous at bedtime  insulin lispro (HumaLOG) corrective regimen sliding scale   SubCutaneous three times a day before meals  insulin lispro (HumaLOG) corrective regimen sliding scale   SubCutaneous at bedtime  lidocaine   Patch 1 Patch Transdermal daily  metoprolol tartrate 25 milliGRAM(s) Oral two times a day  oxyCODONE    IR 5 milliGRAM(s) Oral every 6 hours PRN  oxyCODONE    IR 5 milliGRAM(s) Oral every 4 hours PRN  senna 2 Tablet(s) Oral at bedtime  sertraline 100 milliGRAM(s) Oral daily  sodium chloride 0.9%. 1000 milliLiter(s) IV Continuous <Continuous>     PT/INR - ( 28 Aug 2018 14:28 )   PT: 11.2 sec;   INR: 1.02 ratio         PTT - ( 28 Aug 2018 14:28 )  PTT:29.3 sec    CARDIAC MARKERS ( 28 Aug 2018 14:28 )  x     / x     / 305 U/L / x     / 4.4 ng/mL            IMAGING:  Nondisplaced right sided 4-7th rib fractures

## 2018-08-29 NOTE — SWALLOW BEDSIDE ASSESSMENT ADULT - CONSISTENCIES ADMINISTERED
puree mech soft large bites taken cues to take smaller bites with good success/soft solid solid honey thick

## 2018-08-29 NOTE — SWALLOW BEDSIDE ASSESSMENT ADULT - SWALLOW EVAL: DIAGNOSIS
Oral & pharyngeal stage of swallow judged to be WFL for soft and honey (honey via teaspoon only) when pt takes larger cup sips and or has food in oral cavity + coughing is observed with honey liquids

## 2018-08-29 NOTE — PROGRESS NOTE ADULT - PROBLEM SELECTOR PLAN 1
- continue to hold lisinopril  - renal following, recs appreciated  - gentle hydration  - monitor levels  - renal u/s pending

## 2018-08-29 NOTE — PROGRESS NOTE ADULT - PROBLEM SELECTOR PLAN 2
mechanical fall, no LOC.   - PT consult rec home with assist /home PT  Home w 24/7 sup/A for all mobility, RW, Home PT for balance, to maintain strength & endurance. HHA for ADLs.   - OT and SW consulted

## 2018-08-29 NOTE — ED ADULT NURSE REASSESSMENT NOTE - NS ED NURSE REASSESS COMMENT FT1
Report given to receiving RN Lise, pt placed on portable monitor, awaiting transport to floor, pt aware of plan of care.

## 2018-08-29 NOTE — PROGRESS NOTE ADULT - ASSESSMENT
ASSESSMENT/PLAN:  68yMale with R 5-7 rib fractures. ABG within normal limits. pulling 1500 on iss. No acute concerns at present.  -Patient doing well from surgical standpoint  -encourage oob ambulation, continue ISS  -picc protocol  -continue lidoderm patch in hospital   recommend avoid narcotics  -will sign off, thank you for consult.

## 2018-08-29 NOTE — PROGRESS NOTE ADULT - ASSESSMENT
67 y/o M with h/o multiple prior CVAs,  who has abnormal gait at baseline, noncompliant with walker and wheelchair use, presenting for evaluation after fall.  per wife, who is his primary caregiver, pt very stubborn and frequently refuses  use of assistive devices. Presents with multiple falls

## 2018-08-29 NOTE — CONSULT NOTE ADULT - ASSESSMENT
67 y/o M w/ PMH multiple prior CVAs, abnormal gait (noncompliant with walker and wheelchair use) presenting for evaluation after fall 5 days ago w/ resulting right sided 4-7th rib fxs  -No acute trauma surgery intervention needed at this time  -Per primary request, Rib fracture protocol ordered  -Baseline ABG  -Continue to encourage OOB, IS use  -PRN pain control  -Continued care per primary

## 2018-08-29 NOTE — SWALLOW BEDSIDE ASSESSMENT ADULT - ADDITIONAL RECOMMENDATIONS
Will follow for po tolerance. If pt demonstrates difficulty with honey via teaspoon will then recommend an MBS.

## 2018-08-29 NOTE — PROGRESS NOTE ADULT - SUBJECTIVE AND OBJECTIVE BOX
INTERVAL HPI/OVERNIGHT EVENTS/SUBJECTIVE:   69 y/o M w/ PMH multiple prior CVAs, abnormal gait (noncompliant with walker and wheelchair use) presenting for evaluation after fall 5 days ago, found with R 5-7th rib fractures. Corie 1500 on ISS. Pain well controlled. No complaints of cp or sob.    ICU Vital Signs Last 24 Hrs  T(C): 36.7 (29 Aug 2018 14:48), Max: 37.8 (29 Aug 2018 00:00)  T(F): 98.1 (29 Aug 2018 14:48), Max: 100.1 (29 Aug 2018 00:00)  HR: 62 (29 Aug 2018 14:48) (62 - 99)  BP: 182/82 (29 Aug 2018 14:48) (145/53 - 182/82)  BP(mean): --  ABP: --  ABP(mean): --  RR: 18 (29 Aug 2018 14:48) (18 - 19)  SpO2: 96% (29 Aug 2018 00:55) (94% - 96%)      I&O's Detail    28 Aug 2018 07:01  -  29 Aug 2018 07:00  --------------------------------------------------------  IN:    sodium chloride 0.9%: 100 mL  Total IN: 100 mL    OUT:  Total OUT: 0 mL    Total NET: 100 mL      29 Aug 2018 07:01  -  29 Aug 2018 15:31  --------------------------------------------------------  IN:    sodium chloride 0.9%: 100 mL    sodium chloride 0.9%.: 50 mL  Total IN: 150 mL    OUT:  Total OUT: 0 mL    Total NET: 150 mL    ABG - ( 29 Aug 2018 04:52 )  pH, Arterial: 7.40  pH, Blood: x     /  pCO2: 41    /  pO2: 74    / HCO3: 25    / Base Excess: 0.3   /  SaO2: 96        MEDICATIONS  (STANDING):  acetaminophen   Tablet. 650 milliGRAM(s) Oral every 6 hours  aspirin enteric coated 81 milliGRAM(s) Oral daily  atorvastatin 20 milliGRAM(s) Oral at bedtime  clopidogrel Tablet 75 milliGRAM(s) Oral daily  dextrose 5%. 1000 milliLiter(s) (50 mL/Hr) IV Continuous <Continuous>  dextrose 50% Injectable 12.5 Gram(s) IV Push once  dextrose 50% Injectable 25 Gram(s) IV Push once  dextrose 50% Injectable 25 Gram(s) IV Push once  docusate sodium 100 milliGRAM(s) Oral two times a day  gabapentin 300 milliGRAM(s) Oral three times a day  heparin  Injectable 5000 Unit(s) SubCutaneous every 12 hours  hydrALAZINE 25 milliGRAM(s) Oral every 8 hours  insulin detemir injectable (LEVEMIR) 20 Unit(s) SubCutaneous at bedtime  insulin lispro (HumaLOG) corrective regimen sliding scale   SubCutaneous three times a day before meals  insulin lispro (HumaLOG) corrective regimen sliding scale   SubCutaneous at bedtime  lidocaine   Patch 1 Patch Transdermal daily  metoprolol tartrate 25 milliGRAM(s) Oral two times a day  senna 2 Tablet(s) Oral at bedtime  sertraline 100 milliGRAM(s) Oral daily  sodium chloride 0.9%. 1000 milliLiter(s) (50 mL/Hr) IV Continuous <Continuous>    MEDICATIONS  (PRN):  dextrose 40% Gel 15 Gram(s) Oral once PRN Blood Glucose LESS THAN 70 milliGRAM(s)/deciliter  glucagon  Injectable 1 milliGRAM(s) IntraMuscular once PRN Glucose LESS THAN 70 milligrams/deciliter        MISC:     PHYSICAL EXAM:  GENERAL: Alert, well developed, in no acute distress.  RESPIRATORY: TTP to the right chest wall. CTAB. No wheezing, rales or rhonchi.  CARDIOVASCULAR: RRR. No audible murmurs, rubs or gallops.   NEUROLOGIC: Cranial nerves II-XII grossly intact. No focal neurological deficits. Moves all extremities spontaneously. Sensation intact bilaterally.  MUSCULOSKELETAL: No cyanosis or clubbing. No gross deformities.     LABS:  CBC Full  -  ( 28 Aug 2018 14:28 )  WBC Count : 9.8 K/uL  Hemoglobin : 14.3 g/dL  Hematocrit : 42.7 %  Platelet Count - Automated : 265 K/uL  Mean Cell Volume : 87.5 fl  Mean Cell Hemoglobin : 29.3 pg  Mean Cell Hemoglobin Concentration : 33.5 g/dL  Auto Neutrophil # : 8.2 K/uL  Auto Lymphocyte # : 0.9 K/uL  Auto Monocyte # : 0.6 K/uL  Auto Eosinophil # : 0.0 K/uL  Auto Basophil # : 0.0 K/uL  Auto Neutrophil % : 84.0 %  Auto Lymphocyte % : 9.0 %  Auto Monocyte % : 6.6 %  Auto Eosinophil % : 0.1 %  Auto Basophil % : 0.1 %    08-29    146<H>  |  112<H>  |  48.0<H>  ----------------------------<  167<H>  5.2   |  22.0  |  2.16<H>    Ca    9.2      29 Aug 2018 09:30  Mg     2.3     08-28    TPro  7.4  /  Alb  3.8  /  TBili  0.9  /  DBili  x   /  AST  22  /  ALT  14  /  AlkPhos  98  08-28    PT/INR - ( 28 Aug 2018 14:28 )   PT: 11.2 sec;   INR: 1.02 ratio         PTT - ( 28 Aug 2018 14:28 )  PTT:29.3 sec    RECENT CULTURES:      LIVER FUNCTIONS - ( 28 Aug 2018 14:28 )  Alb: 3.8 g/dL / Pro: 7.4 g/dL / ALK PHOS: 98 U/L / ALT: 14 U/L / AST: 22 U/L / GGT: x           CARDIAC MARKERS ( 28 Aug 2018 14:28 )  x     / x     / 305 U/L / x     / 4.4 ng/mL

## 2018-08-29 NOTE — PROGRESS NOTE ADULT - SUBJECTIVE AND OBJECTIVE BOX
AVNI GREEN    [id number]    68y      Male    CC: Falls with ALLISON and CKD    INTERVAL HPI/OVERNIGHT EVENTS:  69 y/o M with h/o multiple prior CVAs,  who has abnormal gait at baseline, noncompliant with walker and wheelchair use, presenting for evaluation after fall.  per wife, who is his primary caregiver, pt very stubborn and frequently refuses  use of assistive devices. She states that they had railing installed on the side of the bed but patient removed them.  She notes that patient typically requires help to use the bathroom but tends to refuse help.  On Thursday morning pt got up from bed and tried to get to the bathroom himself but fell.  He states that he did not feel weaker than usual, but that he felt like his gait was normally unsteady causing him to fall (wife and son states he normally walks " like he is drunk." He denies any head trauma/ LOC.  Son was able to help him get up and he felt fine.  The following day patient has reported rt. lower rib area pain.  Again, denies head trauma/ LOC. it appears family cannot take care of him due to frequent fall and EMS was called and he was brought to ER. As per history no  dizziness, no LOC, no weakness, numbness, tingling, confusion, CP/ SOB /near syncope prior to fall or contributing to fall.  No change in baseline mental status as per history. pt. not a good historian.  pt. reports that his rt. side is more weak than left. no new complaints at the time of admission.   As per pt's wife all his doctors are at [facility] and he has trouble swallowing and about 1 month ago had some throat surgery for his hoarseness and difficulty swallowing by his doctor at [facility] but did not help. pt's po intake has not been good. Pt's wife wants to speak to SW and wants to get help at home rather than rehab placement.     today pt denies any sob n o chest pain no n/v or abd pain    REVIEW OF SYSTEMS:    CONSTITUTIONAL: No fever, weight loss, or fatigue  RESPIRATORY: No cough, wheezing, hemoptysis; No shortness of breath  CARDIOVASCULAR: No chest pain, palpitations  GASTROINTESTINAL: No abdominal or epigastric pain. No nausea, vomiting    Vital Signs Last 24 Hrs  T(C): 36.6 (29 Aug 2018 04:34), Max: 37.8 (29 Aug 2018 00:00)  T(F): 97.9 (29 Aug 2018 04:34), Max: 100.1 (29 Aug 2018 00:00)  HR: 68 (29 Aug 2018 04:34) (68 - 99)  BP: 148/77 (29 Aug 2018 04:34) (145/53 - 190/110)  BP(mean): --  RR: 19 (29 Aug 2018 00:55) (18 - 19)  SpO2: 96% (29 Aug 2018 00:55) (94% - 98%)    PHYSICAL EXAM:    GENERAL: NAD, well-groomed  HEENT: PERRL, +EOMI  CHEST/LUNG: Clear to auscultation bilaterally; No wheezing  HEART: S1S2+, Regular rate and rhythm; No murmurs  ABDOMEN: Soft, Nontender, Nondistended; Bowel sounds present  EXTREMITIES:  2+ Peripheral Pulses, No clubbing, cyanosis, or edema      LABS:                        14.3   9.8   )-----------( 265      ( 28 Aug 2018 14:28 )             42.7     08-29    146<H>  |  112<H>  |  48.0<H>  ----------------------------<  167<H>  5.2   |  22.0  |  2.16<H>    Ca    9.2      29 Aug 2018 09:30  Mg     2.3     08-28    TPro  7.4  /  Alb  3.8  /  TBili  0.9  /  DBili  x   /  AST  22  /  ALT  14  /  AlkPhos  98  08-28    PT/INR - ( 28 Aug 2018 14:28 )   PT: 11.2 sec;   INR: 1.02 ratio         PTT - ( 28 Aug 2018 14:28 )  PTT:29.3 sec        MEDICATIONS  (STANDING):  acetaminophen   Tablet. 650 milliGRAM(s) Oral every 6 hours  aspirin enteric coated 81 milliGRAM(s) Oral daily  atorvastatin 20 milliGRAM(s) Oral at bedtime  clopidogrel Tablet 75 milliGRAM(s) Oral daily  dextrose 5%. 1000 milliLiter(s) (50 mL/Hr) IV Continuous <Continuous>  dextrose 50% Injectable 12.5 Gram(s) IV Push once  dextrose 50% Injectable 25 Gram(s) IV Push once  dextrose 50% Injectable 25 Gram(s) IV Push once  docusate sodium 100 milliGRAM(s) Oral two times a day  gabapentin 300 milliGRAM(s) Oral three times a day  heparin  Injectable 5000 Unit(s) SubCutaneous every 12 hours  hydrALAZINE 25 milliGRAM(s) Oral every 8 hours  insulin detemir injectable (LEVEMIR) 20 Unit(s) SubCutaneous at bedtime  insulin lispro (HumaLOG) corrective regimen sliding scale   SubCutaneous three times a day before meals  insulin lispro (HumaLOG) corrective regimen sliding scale   SubCutaneous at bedtime  lidocaine   Patch 1 Patch Transdermal daily  metoprolol tartrate 25 milliGRAM(s) Oral two times a day  senna 2 Tablet(s) Oral at bedtime  sertraline 100 milliGRAM(s) Oral daily  sodium chloride 0.9%. 1000 milliLiter(s) (50 mL/Hr) IV Continuous <Continuous>    MEDICATIONS  (PRN):  dextrose 40% Gel 15 Gram(s) Oral once PRN Blood Glucose LESS THAN 70 milliGRAM(s)/deciliter  glucagon  Injectable 1 milliGRAM(s) IntraMuscular once PRN Glucose LESS THAN 70 milligrams/deciliter      RADIOLOGY & ADDITIONAL TESTS:  reviewed

## 2018-08-29 NOTE — SWALLOW BEDSIDE ASSESSMENT ADULT - COMMENTS
As per spouse, states pt had "throat surgery" at [facility], however unable to recall name of surgery but states it was to help "pt speak louder"

## 2018-08-29 NOTE — CONSULT NOTE ADULT - SUBJECTIVE AND OBJECTIVE BOX
Patient is a 68y old  Male who presents with a chief complaint of fall (28 Aug 2018 21:07)   Acute  renal failure.    HPI:  pt. is a 67 y/o M with h/o multiple prior CVAs,  who has abnormal gait at baseline, noncompliant with walker and wheelchair use, presenting for evaluation after fall.  per wife, who is his primary caregiver, pt very stubborn and frequently refuses  use of assistive devices. She states that they had railing installed on the side of the bed but patient removed them.  She notes that patient typically requires help to use the bathroom but tends to refuse help.  On Thursday morning pt got up from bed and tried to get to the bathroom himself but fell.  He states that he did not feel weaker than usual, but that he felt like his gait was normally unsteady causing him to fall (wife and son states he normally walks " like he is drunk." He denies any head trauma/ LOC.  Son was able to help him get up and he felt fine.  The following day patient has reported rt. lower rib area pain.  Again, denies head trauma/ LOC. it appears family cannot take care of him due to frequent fall and EMS was called and he was brought to ER. As per history no  dizziness, no LOC, no weakness, numbness, tingling, confusion, CP/ SOB /near syncope prior to fall or contributing to fall.  No change in baseline mental status as per history. pt. not a good historian.  pt. reports that his rt. side is more weak than left. no new complaints at the time of admission.   As per pt's wife all his doctors are at [facility] and he has trouble swallowing and about1 month ago had some throat surgery for his hoarseness and difficulty swallowing by his doctor at [facility] but did not help. pt's po intake has not been good. Pt's wife wants to speak to SW and wants to get help at home rather than rehab placement. (28 Aug 2018 21:07)   Hx renal stones x1 not aware of type, no other renal  problems; elevated creatinine on admission.    PAST MEDICAL & SURGICAL HISTORY:  CVA (cerebral vascular accident)  Diabetic neuropathy  DM (diabetes mellitus)  Hyperlipidemia  AICD (automatic cardioverter/defibrillator) present  Pacemaker  Stented coronary artery  HTN (hypertension)  Cardiac pacemaker      FAMILY HISTORY:  No pertinent family history in first degree relatives  [place]    Social History:Non smoker    MEDICATIONS  (STANDING):  acetaminophen   Tablet. 650 milliGRAM(s) Oral every 6 hours  aspirin enteric coated 81 milliGRAM(s) Oral daily  atorvastatin 20 milliGRAM(s) Oral at bedtime  clopidogrel Tablet 75 milliGRAM(s) Oral daily  dextrose 5%. 1000 milliLiter(s) (50 mL/Hr) IV Continuous <Continuous>  dextrose 50% Injectable 12.5 Gram(s) IV Push once  dextrose 50% Injectable 25 Gram(s) IV Push once  dextrose 50% Injectable 25 Gram(s) IV Push once  docusate sodium 100 milliGRAM(s) Oral two times a day  gabapentin 300 milliGRAM(s) Oral three times a day  heparin  Injectable 5000 Unit(s) SubCutaneous every 12 hours  hydrALAZINE 25 milliGRAM(s) Oral every 8 hours  insulin detemir injectable (LEVEMIR) 20 Unit(s) SubCutaneous at bedtime  insulin lispro (HumaLOG) corrective regimen sliding scale   SubCutaneous three times a day before meals  insulin lispro (HumaLOG) corrective regimen sliding scale   SubCutaneous at bedtime  lidocaine   Patch 1 Patch Transdermal daily  metoprolol tartrate 25 milliGRAM(s) Oral two times a day  senna 2 Tablet(s) Oral at bedtime  sertraline 100 milliGRAM(s) Oral daily  sodium chloride 0.9%. 1000 milliLiter(s) (100 mL/Hr) IV Continuous <Continuous>    MEDICATIONS  (PRN):  dextrose 40% Gel 15 Gram(s) Oral once PRN Blood Glucose LESS THAN 70 milliGRAM(s)/deciliter  glucagon  Injectable 1 milliGRAM(s) IntraMuscular once PRN Glucose LESS THAN 70 milligrams/deciliter  HYDROmorphone  Injectable 0.5 milliGRAM(s) IV Push every 4 hours PRN Breakthrough pain  oxyCODONE    IR 5 milliGRAM(s) Oral every 6 hours PRN Moderate Pain (4 - 6)  oxyCODONE    IR 5 milliGRAM(s) Oral every 4 hours PRN Severe Pain (7 - 10)   Meds reviewed    Allergies    No Known Allergies    Intolerances        REVIEW OF SYSTEMS:    CONSTITUTIONAL:   feels weak  EYES: No eye pain, visual disturbances, or discharge  ENMT:  No difficulty hearing, tinnitus, vertigo; No sinus or throat pain  NECK: No pain or stiffness  BREASTS: No pain, masses, or nipple discharge  RESPIRATORY: neg  CARDIOVASCULAR: No chest pain, palpitations, dizziness,   GASTROINTESTINAL: No abdominal or epigastric pain. No nausea, vomiting, or hematemesis; No diarrhea or constipation.   GENITOURINARY: No dysuria, frequency, hematuria, or incontinence  NEUROLOGICAL: Prior CVAs  SKIN: Multiple tattoos  LYMPH NODES: No enlarged glands  ENDOCRINE: No heat or cold intolerance; No hair loss  MUSCULOSKELETAL: Chronic muscle wasting  PSYCHIATRIC: mood swings, or difficulty sleeping  HEME/LYMPH: No easy bruising, or bleeding gums  ALLERY AND IMMUNOLOGIC: No hives or eczema      Vital Signs Last 24 Hrs  T(C): 36.6 (29 Aug 2018 04:34), Max: 37.8 (29 Aug 2018 00:00)  T(F): 97.9 (29 Aug 2018 04:34), Max: 100.1 (29 Aug 2018 00:00)  HR: 68 (29 Aug 2018 04:34) (68 - 99)  BP: 148/77 (29 Aug 2018 04:34) (145/53 - 192/73)  BP(mean): --  RR: 19 (29 Aug 2018 00:55) (18 - 20)  SpO2: 96% (29 Aug 2018 00:55) (94% - 99%)  Daily Height in cm: 187.96 (28 Aug 2018 11:22)    Daily     PHYSICAL EXAM:    GENERAL: appears chronically ill, oriented.  HEAD:  Atraumatic, Normocephalic  EYES: EOMI, PERRLA, conjunctiva and sclera clear  ENMT: No tonsillar erythema, exudates, or enlargement; Moist mucous membranes,  NECK: Supple, neck  veins flat  NERVOUS SYSTEM:  Alert & Oriented , verbal, poor gait  CHEST/LUNG: Clear to percussion bilaterally; No rales, rhonchi, wheezing, or rubs  HEART: Regular rate and rhythm; left upper chest with pacer  ABDOMEN: Soft, Nontender, Nondistended; Bowel sounds present,   EXTREMITIES:  no edema, has diffuse muscle wasting  LYMPH: No lymphadenopathy noted  SKIN: No rashes or lesions, pale   neg    LABS:                        14.3   9.8   )-----------( 265      ( 28 Aug 2018 14:28 )             42.7     08-29    147<H>  |  109<H>  |  49.0<H>  ----------------------------<  224<H>  4.3   |  23.0  |  2.39<H>    Ca    9.1      29 Aug 2018 03:20  Mg     2.3     08-28    TPro  7.4  /  Alb  3.8  /  TBili  0.9  /  DBili  x   /  AST  22  /  ALT  14  /  AlkPhos  98  08-28    PT/INR - ( 28 Aug 2018 14:28 )   PT: 11.2 sec;   INR: 1.02 ratio         PTT - ( 28 Aug 2018 14:28 )  PTT:29.3 sec    Magnesium, Serum: 2.3 mg/dL (08-28 @ 14:28)    ABG - ( 29 Aug 2018 04:52 )  pH, Arterial: 7.40  pH, Blood: x     /  pCO2: 41    /  pO2: 74    / HCO3: 25    / Base Excess: 0.3   /  SaO2: 96                    RADIOLOGY & ADDITIONAL TESTS:

## 2018-08-29 NOTE — SWALLOW BEDSIDE ASSESSMENT ADULT - PHARYNGEAL PHASE
Within functional limits Cough post oral intake/Delayed cough post oral intake/+cough via cup sips however no coughing via teaspoon

## 2018-08-29 NOTE — SWALLOW BEDSIDE ASSESSMENT ADULT - SLP PERTINENT HISTORY OF CURRENT PROBLEM
As per h&p:  Acute renal failure superimposed on stage 4 chronic kidney disease, unspecified acute renal failure type.  Plan: pt's DM, htn, lisinopril and poor po intake likely all contributing to worsening RF, will be on iv fluids, will get renal sono and nephrology consult dr. consuelo mendez. As per spouse and son, + coughing on honey liquids past 3 years. Spouse & son reports pt takes large bites/sips of all meals

## 2018-08-30 DIAGNOSIS — E87.0 HYPEROSMOLALITY AND HYPERNATREMIA: ICD-10-CM

## 2018-08-30 LAB
ALBUMIN SERPL ELPH-MCNC: 2.8 G/DL — LOW (ref 3.3–5.2)
ALP SERPL-CCNC: 80 U/L — SIGNIFICANT CHANGE UP (ref 40–120)
ALT FLD-CCNC: 8 U/L — SIGNIFICANT CHANGE UP
ANION GAP SERPL CALC-SCNC: 13 MMOL/L — SIGNIFICANT CHANGE UP (ref 5–17)
AST SERPL-CCNC: 13 U/L — SIGNIFICANT CHANGE UP
BILIRUB SERPL-MCNC: 0.5 MG/DL — SIGNIFICANT CHANGE UP (ref 0.4–2)
BUN SERPL-MCNC: 47 MG/DL — HIGH (ref 8–20)
CALCIUM SERPL-MCNC: 8.7 MG/DL — SIGNIFICANT CHANGE UP (ref 8.6–10.2)
CHLORIDE SERPL-SCNC: 115 MMOL/L — HIGH (ref 98–107)
CO2 SERPL-SCNC: 21 MMOL/L — LOW (ref 22–29)
CREAT SERPL-MCNC: 2.14 MG/DL — HIGH (ref 0.5–1.3)
GLUCOSE BLDC GLUCOMTR-MCNC: 153 MG/DL — HIGH (ref 70–99)
GLUCOSE BLDC GLUCOMTR-MCNC: 206 MG/DL — HIGH (ref 70–99)
GLUCOSE BLDC GLUCOMTR-MCNC: 220 MG/DL — HIGH (ref 70–99)
GLUCOSE BLDC GLUCOMTR-MCNC: 303 MG/DL — HIGH (ref 70–99)
GLUCOSE SERPL-MCNC: 215 MG/DL — HIGH (ref 70–115)
PHOSPHATE SERPL-MCNC: 3.6 MG/DL — SIGNIFICANT CHANGE UP (ref 2.4–4.7)
POTASSIUM SERPL-MCNC: 3.8 MMOL/L — SIGNIFICANT CHANGE UP (ref 3.5–5.3)
POTASSIUM SERPL-SCNC: 3.8 MMOL/L — SIGNIFICANT CHANGE UP (ref 3.5–5.3)
PROT SERPL-MCNC: 6 G/DL — LOW (ref 6.6–8.7)
PTH-INTACT FLD-MCNC: 77 PG/ML — HIGH (ref 15–65)
SODIUM SERPL-SCNC: 149 MMOL/L — HIGH (ref 135–145)

## 2018-08-30 PROCEDURE — 99232 SBSQ HOSP IP/OBS MODERATE 35: CPT

## 2018-08-30 RX ADMIN — Medication 650 MILLIGRAM(S): at 05:37

## 2018-08-30 RX ADMIN — Medication 25 MILLIGRAM(S): at 18:13

## 2018-08-30 RX ADMIN — CLOPIDOGREL BISULFATE 75 MILLIGRAM(S): 75 TABLET, FILM COATED ORAL at 14:02

## 2018-08-30 RX ADMIN — Medication 81 MILLIGRAM(S): at 14:02

## 2018-08-30 RX ADMIN — HEPARIN SODIUM 5000 UNIT(S): 5000 INJECTION INTRAVENOUS; SUBCUTANEOUS at 18:13

## 2018-08-30 RX ADMIN — ATORVASTATIN CALCIUM 20 MILLIGRAM(S): 80 TABLET, FILM COATED ORAL at 22:05

## 2018-08-30 RX ADMIN — SENNA PLUS 2 TABLET(S): 8.6 TABLET ORAL at 22:05

## 2018-08-30 RX ADMIN — Medication 4: at 08:26

## 2018-08-30 RX ADMIN — GABAPENTIN 300 MILLIGRAM(S): 400 CAPSULE ORAL at 22:05

## 2018-08-30 RX ADMIN — Medication 25 MILLIGRAM(S): at 14:02

## 2018-08-30 RX ADMIN — Medication 8: at 12:07

## 2018-08-30 RX ADMIN — GABAPENTIN 300 MILLIGRAM(S): 400 CAPSULE ORAL at 14:02

## 2018-08-30 RX ADMIN — Medication 100 MILLIGRAM(S): at 18:13

## 2018-08-30 RX ADMIN — Medication 4: at 17:00

## 2018-08-30 RX ADMIN — Medication 650 MILLIGRAM(S): at 14:44

## 2018-08-30 RX ADMIN — Medication 100 MILLIGRAM(S): at 05:37

## 2018-08-30 RX ADMIN — Medication 650 MILLIGRAM(S): at 23:02

## 2018-08-30 RX ADMIN — LIDOCAINE 1 PATCH: 4 CREAM TOPICAL at 02:01

## 2018-08-30 RX ADMIN — Medication 650 MILLIGRAM(S): at 01:15

## 2018-08-30 RX ADMIN — Medication 25 MILLIGRAM(S): at 22:05

## 2018-08-30 RX ADMIN — SERTRALINE 100 MILLIGRAM(S): 25 TABLET, FILM COATED ORAL at 14:02

## 2018-08-30 RX ADMIN — Medication 650 MILLIGRAM(S): at 23:55

## 2018-08-30 RX ADMIN — Medication 25 MILLIGRAM(S): at 05:37

## 2018-08-30 RX ADMIN — LIDOCAINE 1 PATCH: 4 CREAM TOPICAL at 14:01

## 2018-08-30 RX ADMIN — Medication 650 MILLIGRAM(S): at 06:38

## 2018-08-30 RX ADMIN — HEPARIN SODIUM 5000 UNIT(S): 5000 INJECTION INTRAVENOUS; SUBCUTANEOUS at 05:37

## 2018-08-30 RX ADMIN — Medication 20 UNIT(S): at 22:05

## 2018-08-30 RX ADMIN — Medication 650 MILLIGRAM(S): at 18:13

## 2018-08-30 RX ADMIN — GABAPENTIN 300 MILLIGRAM(S): 400 CAPSULE ORAL at 05:37

## 2018-08-30 RX ADMIN — Medication 650 MILLIGRAM(S): at 14:02

## 2018-08-30 RX ADMIN — SODIUM CHLORIDE 50 MILLILITER(S): 9 INJECTION INTRAMUSCULAR; INTRAVENOUS; SUBCUTANEOUS at 03:24

## 2018-08-30 NOTE — PROGRESS NOTE ADULT - SUBJECTIVE AND OBJECTIVE BOX
AVNI GREEN    [id number]    68y      Male    CC: Falls with ALLISON and CKD    INTERVAL HPI/OVERNIGHT EVENTS:  69 y/o M with h/o multiple prior CVAs,  who has abnormal gait at baseline, noncompliant with walker and wheelchair use, presenting for evaluation after fall.  per wife, who is his primary caregiver, pt very stubborn and frequently refuses  use of assistive devices. She states that they had railing installed on the side of the bed but patient removed them.  She notes that patient typically requires help to use the bathroom but tends to refuse help.  On Thursday morning pt got up from bed and tried to get to the bathroom himself but fell.  He states that he did not feel weaker than usual, but that he felt like his gait was normally unsteady causing him to fall (wife and son states he normally walks " like he is drunk." He denies any head trauma/ LOC.  Son was able to help him get up and he felt fine.  The following day patient has reported rt. lower rib area pain.  Again, denies head trauma/ LOC. it appears family cannot take care of him due to frequent fall and EMS was called and he was brought to ER. As per history no  dizziness, no LOC, no weakness, numbness, tingling, confusion, CP/ SOB /near syncope prior to fall or contributing to fall.  No change in baseline mental status as per history. pt. not a good historian.  pt. reports that his rt. side is more weak than left. no new complaints at the time of admission.   As per pt's wife all his doctors are at [facility] and he has trouble swallowing and about 1 month ago had some throat surgery for his hoarseness and difficulty swallowing by his doctor at [facility] but did not help. pt's po intake has not been good. Pt's wife wants to speak to SW and wants to get help at home rather than rehab placement.     today pt denies any sob no chest pain no n/v or abd pain    REVIEW OF SYSTEMS:    CONSTITUTIONAL: No fever, weight loss, or fatigue  RESPIRATORY: No cough, wheezing, hemoptysis; No shortness of breath  CARDIOVASCULAR: No chest pain, palpitations  GASTROINTESTINAL: No abdominal or epigastric pain. No nausea, vomiting        Vital Signs Last 24 Hrs  T(C): 36.6 (30 Aug 2018 11:48), Max: 37 (29 Aug 2018 20:22)  T(F): 97.8 (30 Aug 2018 11:48), Max: 98.6 (29 Aug 2018 20:22)  HR: 74 (30 Aug 2018 14:00) (60 - 79)  BP: 161/60 (30 Aug 2018 14:00) (104/64 - 172/72)  BP(mean): --  RR: 18 (30 Aug 2018 11:48) (18 - 18)  SpO2: 96% (30 Aug 2018 11:48) (96% - 96%)    PHYSICAL EXAM:  GENERAL: NAD, well-groomed  HEENT: PERRL, +EOMI  CHEST/LUNG: Clear to auscultation bilaterally; No wheezing  HEART: S1S2+, Regular rate and rhythm; No murmurs  ABDOMEN: Soft, Nontender, Nondistended; Bowel sounds present  EXTREMITIES:  2+ Peripheral Pulses, No clubbing, cyanosis, or edema        LABS:        149<H>  |  115<H>  |  47.0<H>  ----------------------------<  215<H>  3.8   |  21.0<L>  |  2.14<H>    Ca    8.7      30 Aug 2018 07:40  Phos  3.6         TPro  6.0<L>  /  Alb  2.8<L>  /  TBili  0.5  /  DBili  x   /  AST  13  /  ALT  8   /  AlkPhos  80        Urinalysis Basic - ( 29 Aug 2018 15:58 )    Color: Yellow / Appearance: Clear / S.020 / pH: x  Gluc: x / Ketone: Trace  / Bili: Negative / Urobili: Negative mg/dL   Blood: x / Protein: 100 mg/dL / Nitrite: Negative   Leuk Esterase: Negative / RBC: 6-10 /HPF / WBC 0-2   Sq Epi: x / Non Sq Epi: Occasional / Bacteria: Occasional          MEDICATIONS  (STANDING):  acetaminophen   Tablet. 650 milliGRAM(s) Oral every 6 hours  aspirin enteric coated 81 milliGRAM(s) Oral daily  atorvastatin 20 milliGRAM(s) Oral at bedtime  clopidogrel Tablet 75 milliGRAM(s) Oral daily  dextrose 5%. 1000 milliLiter(s) (50 mL/Hr) IV Continuous <Continuous>  dextrose 50% Injectable 12.5 Gram(s) IV Push once  dextrose 50% Injectable 25 Gram(s) IV Push once  dextrose 50% Injectable 25 Gram(s) IV Push once  docusate sodium 100 milliGRAM(s) Oral two times a day  gabapentin 300 milliGRAM(s) Oral three times a day  heparin  Injectable 5000 Unit(s) SubCutaneous every 12 hours  hydrALAZINE 25 milliGRAM(s) Oral every 8 hours  insulin detemir injectable (LEVEMIR) 20 Unit(s) SubCutaneous at bedtime  insulin lispro (HumaLOG) corrective regimen sliding scale   SubCutaneous three times a day before meals  insulin lispro (HumaLOG) corrective regimen sliding scale   SubCutaneous at bedtime  lidocaine   Patch 1 Patch Transdermal daily  metoprolol tartrate 25 milliGRAM(s) Oral two times a day  senna 2 Tablet(s) Oral at bedtime  sertraline 100 milliGRAM(s) Oral daily    MEDICATIONS  (PRN):  dextrose 40% Gel 15 Gram(s) Oral once PRN Blood Glucose LESS THAN 70 milliGRAM(s)/deciliter  glucagon  Injectable 1 milliGRAM(s) IntraMuscular once PRN Glucose LESS THAN 70 milligrams/deciliter      Renal U/S:  FINDINGS:    Right kidney:  11.3 cm. No hydronephrosis or calculi. 0.9 cm echogenic   lesion at the upper pole.    Left kidney:  12.3 cm. No renal mass, hydronephrosis or calculi.    IMPRESSION:     No hydronephrosis. 0.9 cm echogenic right renal lesion, possibly AML but   may be confirmed by nonemergent CT or MRI.

## 2018-08-30 NOTE — PROVIDER CONTACT NOTE (OTHER) - ACTION/TREATMENT ORDERED:
PT will reattempt as appropriate
follow protocol, admin 12 units insulin, verify medications for diabetes with family
monitor pt, leave cardiac monitor in place, pending cardio consult tomorrow AM
remain NPO until Dr. Lopez assesses pt., as pt is new to this md. and call speech and swallow to try and have eval done soon
PT will follow if admitted

## 2018-08-30 NOTE — PROGRESS NOTE ADULT - PROBLEM SELECTOR PLAN 1
Improved seems to be at baseline  - continue to hold lisinopril  - renal following, recs appreciated  - monitor levels  - renal u/s shows No hydronephrosis. 0.9 cm echogenic right renal lesion, possibly AML, repeat u/s in 3 months.

## 2018-08-30 NOTE — PROGRESS NOTE ADULT - ATTENDING COMMENTS
D/C IV fluid, Follow up sono in 3 mo for renal lesion, labs.
I called and left message for wife on her voicemail to discuss his care, waiting for call back.

## 2018-08-30 NOTE — PROGRESS NOTE ADULT - SUBJECTIVE AND OBJECTIVE BOX
NEPHROLOGY INTERVAL HPI/OVERNIGHT EVENTS: No new events.    MEDICATIONS  (STANDING):  acetaminophen   Tablet. 650 milliGRAM(s) Oral every 6 hours  aspirin enteric coated 81 milliGRAM(s) Oral daily  atorvastatin 20 milliGRAM(s) Oral at bedtime  clopidogrel Tablet 75 milliGRAM(s) Oral daily  dextrose 5%. 1000 milliLiter(s) (50 mL/Hr) IV Continuous <Continuous>  dextrose 50% Injectable 12.5 Gram(s) IV Push once  dextrose 50% Injectable 25 Gram(s) IV Push once  dextrose 50% Injectable 25 Gram(s) IV Push once  docusate sodium 100 milliGRAM(s) Oral two times a day  gabapentin 300 milliGRAM(s) Oral three times a day  heparin  Injectable 5000 Unit(s) SubCutaneous every 12 hours  hydrALAZINE 25 milliGRAM(s) Oral every 8 hours  insulin detemir injectable (LEVEMIR) 20 Unit(s) SubCutaneous at bedtime  insulin lispro (HumaLOG) corrective regimen sliding scale   SubCutaneous three times a day before meals  insulin lispro (HumaLOG) corrective regimen sliding scale   SubCutaneous at bedtime  lidocaine   Patch 1 Patch Transdermal daily  metoprolol tartrate 25 milliGRAM(s) Oral two times a day  senna 2 Tablet(s) Oral at bedtime  sertraline 100 milliGRAM(s) Oral daily    MEDICATIONS  (PRN):  dextrose 40% Gel 15 Gram(s) Oral once PRN Blood Glucose LESS THAN 70 milliGRAM(s)/deciliter  glucagon  Injectable 1 milliGRAM(s) IntraMuscular once PRN Glucose LESS THAN 70 milligrams/deciliter      Allergies    No Known Allergies    Intolerances        Vital Signs Last 24 Hrs  T(C): 36.6 (30 Aug 2018 11:48), Max: 37 (29 Aug 2018 20:22)  T(F): 97.8 (30 Aug 2018 11:48), Max: 98.6 (29 Aug 2018 20:22)  HR: 61 (30 Aug 2018 11:48) (60 - 79)  BP: 104/64 (30 Aug 2018 11:48) (104/64 - 182/82)  BP(mean): --  RR: 18 (30 Aug 2018 11:48) (18 - 18)  SpO2: 96% (30 Aug 2018 11:48) (96% - 96%)  Daily     Daily     PHYSICAL EXAM:    GENERAL: OOB in chair in no distress.  HEAD:  same  EYES:   ENMT:   NECK: veins flat upright  NERVOUS SYSTEM: awake, alert, no change   CHEST/LUNG: dressing  right posterior ,dry, no wheezes  HEART: no rub  ABDOMEN: not tender  EXTREMITIES :no edema, has diffuse muscle wasting    LYMPH:   SKIN: no rash   neg - sono report noted.    LABS:                        14.3   9.8   )-----------( 265      ( 28 Aug 2018 14:28 )             42.7         149<H>  |  115<H>  |  47.0<H>  ----------------------------<  215<H>  3.8   |  21.0<L>  |  2.14<H>    Ca    8.7      30 Aug 2018 07:40  Phos  3.6       Mg     2.3         TPro  6.0<L>  /  Alb  2.8<L>  /  TBili  0.5  /  DBili  x   /  AST  13  /  ALT  8   /  AlkPhos  80      PT/INR - ( 28 Aug 2018 14:28 )   PT: 11.2 sec;   INR: 1.02 ratio         PTT - ( 28 Aug 2018 14:28 )  PTT:29.3 sec  Urinalysis Basic - ( 29 Aug 2018 15:58 )    Color: Yellow / Appearance: Clear / S.020 / pH: x  Gluc: x / Ketone: Trace  / Bili: Negative / Urobili: Negative mg/dL   Blood: x / Protein: 100 mg/dL / Nitrite: Negative   Leuk Esterase: Negative / RBC: 6-10 /HPF / WBC 0-2   Sq Epi: x / Non Sq Epi: Occasional / Bacteria: Occasional      Phosphorus Level, Serum: 3.6 mg/dL ( @ 07:40)    ABG - ( 29 Aug 2018 04:52 )  pH, Arterial: 7.40  pH, Blood: x     /  pCO2: 41    /  pO2: 74    / HCO3: 25    / Base Excess: 0.3   /  SaO2: 96                    RADIOLOGY & ADDITIONAL TESTS:

## 2018-08-30 NOTE — PROGRESS NOTE ADULT - PROBLEM SELECTOR PLAN 2
mechanical fall, no LOC.   - PT consult rec home with assist /home PT  Home w 24/7 sup/A for all mobility, RW, Home PT for balance, to maintain strength & endurance. HHA for ADLs.  - OT maru still pending

## 2018-08-31 ENCOUNTER — TRANSCRIPTION ENCOUNTER (OUTPATIENT)
Age: 68
End: 2018-08-31

## 2018-08-31 VITALS
DIASTOLIC BLOOD PRESSURE: 70 MMHG | TEMPERATURE: 98 F | RESPIRATION RATE: 18 BRPM | SYSTOLIC BLOOD PRESSURE: 152 MMHG | HEART RATE: 68 BPM | OXYGEN SATURATION: 98 %

## 2018-08-31 LAB
24R-OH-CALCIDIOL SERPL-MCNC: 11.2 NG/ML — LOW (ref 30–80)
ANION GAP SERPL CALC-SCNC: 12 MMOL/L — SIGNIFICANT CHANGE UP (ref 5–17)
BUN SERPL-MCNC: 39 MG/DL — HIGH (ref 8–20)
CALCIUM SERPL-MCNC: 8.6 MG/DL — SIGNIFICANT CHANGE UP (ref 8.4–10.5)
CALCIUM SERPL-MCNC: 8.7 MG/DL — SIGNIFICANT CHANGE UP (ref 8.6–10.2)
CHLORIDE SERPL-SCNC: 112 MMOL/L — HIGH (ref 98–107)
CO2 SERPL-SCNC: 23 MMOL/L — SIGNIFICANT CHANGE UP (ref 22–29)
CREAT SERPL-MCNC: 1.87 MG/DL — HIGH (ref 0.5–1.3)
GLUCOSE BLDC GLUCOMTR-MCNC: 102 MG/DL — HIGH (ref 70–99)
GLUCOSE BLDC GLUCOMTR-MCNC: 270 MG/DL — HIGH (ref 70–99)
GLUCOSE SERPL-MCNC: 126 MG/DL — HIGH (ref 70–115)
HCT VFR BLD CALC: 41 % — LOW (ref 42–52)
HGB BLD-MCNC: 13.5 G/DL — LOW (ref 14–18)
MAGNESIUM SERPL-MCNC: 2.1 MG/DL — SIGNIFICANT CHANGE UP (ref 1.6–2.6)
MCHC RBC-ENTMCNC: 28.5 PG — SIGNIFICANT CHANGE UP (ref 27–31)
MCHC RBC-ENTMCNC: 32.9 G/DL — SIGNIFICANT CHANGE UP (ref 32–36)
MCV RBC AUTO: 86.7 FL — SIGNIFICANT CHANGE UP (ref 80–94)
PLATELET # BLD AUTO: 299 K/UL — SIGNIFICANT CHANGE UP (ref 150–400)
POTASSIUM SERPL-MCNC: 4 MMOL/L — SIGNIFICANT CHANGE UP (ref 3.5–5.3)
POTASSIUM SERPL-SCNC: 4 MMOL/L — SIGNIFICANT CHANGE UP (ref 3.5–5.3)
RBC # BLD: 4.73 M/UL — SIGNIFICANT CHANGE UP (ref 4.6–6.2)
RBC # FLD: 12.9 % — SIGNIFICANT CHANGE UP (ref 11–15.6)
SODIUM SERPL-SCNC: 147 MMOL/L — HIGH (ref 135–145)
WBC # BLD: 8.2 K/UL — SIGNIFICANT CHANGE UP (ref 4.8–10.8)
WBC # FLD AUTO: 8.2 K/UL — SIGNIFICANT CHANGE UP (ref 4.8–10.8)

## 2018-08-31 PROCEDURE — 82310 ASSAY OF CALCIUM: CPT

## 2018-08-31 PROCEDURE — 85730 THROMBOPLASTIN TIME PARTIAL: CPT

## 2018-08-31 PROCEDURE — 83036 HEMOGLOBIN GLYCOSYLATED A1C: CPT

## 2018-08-31 PROCEDURE — 97530 THERAPEUTIC ACTIVITIES: CPT

## 2018-08-31 PROCEDURE — 82550 ASSAY OF CK (CPK): CPT

## 2018-08-31 PROCEDURE — 99285 EMERGENCY DEPT VISIT HI MDM: CPT

## 2018-08-31 PROCEDURE — 83735 ASSAY OF MAGNESIUM: CPT

## 2018-08-31 PROCEDURE — 84100 ASSAY OF PHOSPHORUS: CPT

## 2018-08-31 PROCEDURE — 80053 COMPREHEN METABOLIC PANEL: CPT

## 2018-08-31 PROCEDURE — 72070 X-RAY EXAM THORAC SPINE 2VWS: CPT

## 2018-08-31 PROCEDURE — 82553 CREATINE MB FRACTION: CPT

## 2018-08-31 PROCEDURE — 92610 EVALUATE SWALLOWING FUNCTION: CPT

## 2018-08-31 PROCEDURE — 92526 ORAL FUNCTION THERAPY: CPT

## 2018-08-31 PROCEDURE — 36415 COLL VENOUS BLD VENIPUNCTURE: CPT

## 2018-08-31 PROCEDURE — 82962 GLUCOSE BLOOD TEST: CPT

## 2018-08-31 PROCEDURE — 99239 HOSP IP/OBS DSCHRG MGMT >30: CPT

## 2018-08-31 PROCEDURE — 97167 OT EVAL HIGH COMPLEX 60 MIN: CPT

## 2018-08-31 PROCEDURE — 72128 CT CHEST SPINE W/O DYE: CPT

## 2018-08-31 PROCEDURE — 97110 THERAPEUTIC EXERCISES: CPT

## 2018-08-31 PROCEDURE — 82306 VITAMIN D 25 HYDROXY: CPT

## 2018-08-31 PROCEDURE — 80048 BASIC METABOLIC PNL TOTAL CA: CPT

## 2018-08-31 PROCEDURE — 85610 PROTHROMBIN TIME: CPT

## 2018-08-31 PROCEDURE — 81001 URINALYSIS AUTO W/SCOPE: CPT

## 2018-08-31 PROCEDURE — 97163 PT EVAL HIGH COMPLEX 45 MIN: CPT

## 2018-08-31 PROCEDURE — 76775 US EXAM ABDO BACK WALL LIM: CPT

## 2018-08-31 PROCEDURE — 93005 ELECTROCARDIOGRAM TRACING: CPT

## 2018-08-31 PROCEDURE — 83970 ASSAY OF PARATHORMONE: CPT

## 2018-08-31 PROCEDURE — 70450 CT HEAD/BRAIN W/O DYE: CPT

## 2018-08-31 PROCEDURE — 82803 BLOOD GASES ANY COMBINATION: CPT

## 2018-08-31 PROCEDURE — 85027 COMPLETE CBC AUTOMATED: CPT

## 2018-08-31 PROCEDURE — 97116 GAIT TRAINING THERAPY: CPT

## 2018-08-31 RX ORDER — GABAPENTIN 400 MG/1
1 CAPSULE ORAL
Qty: 90 | Refills: 0 | OUTPATIENT
Start: 2018-08-31 | End: 2018-09-29

## 2018-08-31 RX ORDER — HYDRALAZINE HCL 50 MG
1 TABLET ORAL
Qty: 90 | Refills: 0 | OUTPATIENT
Start: 2018-08-31 | End: 2018-09-29

## 2018-08-31 RX ORDER — METOPROLOL TARTRATE 50 MG
1 TABLET ORAL
Qty: 60 | Refills: 0 | OUTPATIENT
Start: 2018-08-31 | End: 2018-09-29

## 2018-08-31 RX ADMIN — GABAPENTIN 300 MILLIGRAM(S): 400 CAPSULE ORAL at 16:36

## 2018-08-31 RX ADMIN — Medication 650 MILLIGRAM(S): at 05:22

## 2018-08-31 RX ADMIN — Medication 25 MILLIGRAM(S): at 04:51

## 2018-08-31 RX ADMIN — LIDOCAINE 1 PATCH: 4 CREAM TOPICAL at 02:13

## 2018-08-31 RX ADMIN — HEPARIN SODIUM 5000 UNIT(S): 5000 INJECTION INTRAVENOUS; SUBCUTANEOUS at 05:22

## 2018-08-31 RX ADMIN — Medication 81 MILLIGRAM(S): at 12:07

## 2018-08-31 RX ADMIN — Medication 25 MILLIGRAM(S): at 16:36

## 2018-08-31 RX ADMIN — Medication 650 MILLIGRAM(S): at 12:07

## 2018-08-31 RX ADMIN — Medication 6: at 12:04

## 2018-08-31 RX ADMIN — GABAPENTIN 300 MILLIGRAM(S): 400 CAPSULE ORAL at 05:22

## 2018-08-31 RX ADMIN — Medication 650 MILLIGRAM(S): at 06:32

## 2018-08-31 RX ADMIN — LIDOCAINE 1 PATCH: 4 CREAM TOPICAL at 12:07

## 2018-08-31 RX ADMIN — Medication 100 MILLIGRAM(S): at 05:22

## 2018-08-31 RX ADMIN — SERTRALINE 100 MILLIGRAM(S): 25 TABLET, FILM COATED ORAL at 12:07

## 2018-08-31 RX ADMIN — Medication 25 MILLIGRAM(S): at 04:52

## 2018-08-31 RX ADMIN — CLOPIDOGREL BISULFATE 75 MILLIGRAM(S): 75 TABLET, FILM COATED ORAL at 12:07

## 2018-08-31 RX ADMIN — Medication 650 MILLIGRAM(S): at 16:33

## 2018-08-31 NOTE — DISCHARGE NOTE ADULT - CARE PROVIDER_API CALL
Igor Reed), Internal Medicine; Nephrology  [address] [place] [address] [id number]  Phone: [phone]  Fax: [phone]    PCP,   Phone: (   )    -  Fax: (   )    -

## 2018-08-31 NOTE — DISCHARGE NOTE ADULT - MEDICATION SUMMARY - MEDICATIONS TO TAKE
I will START or STAY ON the medications listed below when I get home from the hospital:    aspirin 81 mg oral tablet  -- 1 tab(s) by mouth once a day  -- Indication: For cad    gabapentin 300 mg oral capsule  -- 1 cap(s) by mouth 3 times a day  -- Indication: For neuropathy    sertraline 100 mg oral tablet  -- 1 tab(s) by mouth once a day  -- Indication: For Depression    Levemir 100 units/mL subcutaneous solution  -- 25 unit(s) subcutaneous once a day (at bedtime)  -- Indication: For Diabetes    insulin lispro 100 units/mL subcutaneous solution  --  subcutaneous 3 times a day (before meals); 1 Unit(s) if Glucose 151 - 200  2 Unit(s) if Glucose 201 - 250  3 Unit(s) if Glucose 251 - 300  4 Unit(s) if Glucose 301 - 350  5 Unit(s) if Glucose 351 - 400  6 Unit(s) if Glucose Greater Than 400  -- Indication: For Diabetes    loperamide 2 mg oral capsule  -- 1 cap(s) by mouth every 4 hours, As needed, Diarrhea  -- Indication: For Diarhea if needed    atorvastatin 20 mg oral tablet  -- 1 tab(s) by mouth once a day (at bedtime)  -- Indication: For Hld    Plavix 75 mg oral tablet  -- 1 tab(s) by mouth once a day  -- Indication: For cad    metoprolol tartrate 25 mg oral tablet  -- 1 tab(s) by mouth 2 times a day  -- Indication: For Htn    hydrALAZINE 25 mg oral tablet  -- 1 tab(s) by mouth every 8 hours  -- Indication: For Htn I will START or STAY ON the medications listed below when I get home from the hospital:    aspirin 81 mg oral tablet  -- 1 tab(s) by mouth once a day  -- Indication: For Cad    gabapentin 300 mg oral capsule  -- 1 cap(s) by mouth 3 times a day  -- Indication: For neuropathy    sertraline 100 mg oral tablet  -- 1 tab(s) by mouth once a day  -- Indication: For Depression    Levemir 100 units/mL subcutaneous solution  -- 25 unit(s) subcutaneous once a day (at bedtime)  -- Indication: For Diabetes    insulin lispro 100 units/mL subcutaneous solution  --  subcutaneous 3 times a day (before meals); 1 Unit(s) if Glucose 151 - 200  2 Unit(s) if Glucose 201 - 250  3 Unit(s) if Glucose 251 - 300  4 Unit(s) if Glucose 301 - 350  5 Unit(s) if Glucose 351 - 400  6 Unit(s) if Glucose Greater Than 400  -- Indication: For Diabetes    loperamide 2 mg oral capsule  -- 1 cap(s) by mouth every 4 hours, As needed, Diarrhea  -- Indication: For gi    atorvastatin 20 mg oral tablet  -- 1 tab(s) by mouth once a day (at bedtime)  -- Indication: For Hld    Plavix 75 mg oral tablet  -- 1 tab(s) by mouth once a day  -- Indication: For Cad    metoprolol tartrate 25 mg oral tablet  -- 1 tab(s) by mouth 2 times a day  -- Indication: For Htn    hydrALAZINE 25 mg oral tablet  -- 1 tab(s) by mouth every 8 hours  -- Indication: For Htn

## 2018-08-31 NOTE — DISCHARGE NOTE ADULT - OTHER SIGNIFICANT FINDINGS
Renal U/S:  FINDINGS:    Right kidney:  11.3 cm. No hydronephrosis or calculi. 0.9 cm echogenic   lesion at the upper pole.    Left kidney:  12.3 cm. No renal mass, hydronephrosis or calculi.    IMPRESSION:     No hydronephrosis. 0.9 cm echogenic right renal lesion, possibly AML but   may be confirmed by nonemergent CT or MRI.    MICHELLE MANDUJANO M.D., ATTENDING RADIOLOGIST  This document has been electronically signed. Aug 29 2018  2:14PM

## 2018-08-31 NOTE — OCCUPATIONAL THERAPY INITIAL EVALUATION ADULT - PLANNED THERAPY INTERVENTIONS, OT EVAL
parent/caregiver training.../ADL retraining/bed mobility training/transfer training/toilet/balance training

## 2018-08-31 NOTE — OCCUPATIONAL THERAPY INITIAL EVALUATION ADULT - NS ASR FOLLOW COMMAND OT EVAL
able to follow single-step instructions/100% of the time 100% of the time/able to follow single-step instructions/aphasia/oral apraxia/pt requires increased time and repetition to process commands

## 2018-08-31 NOTE — DISCHARGE NOTE ADULT - HOSPITAL COURSE
69 y/o M with h/o multiple prior CVAs,  who has abnormal gait at baseline, noncompliant with walker and wheelchair use, presenting for evaluation after fall.  per wife, who is his primary caregiver, pt very stubborn and frequently refuses  use of assistive devices. Presents with multiple falls      Problem/Plan - 1:  ·  Problem: Acute renal failure superimposed on stage 4 chronic kidney disease, unspecified acute renal failure type.  Plan: Improved seems to be at baseline  - discontinued home lisinopril  - renal u/s shows No hydronephrosis. 0.9 cm echogenic right renal lesion, possibly AML, repeat u/s in 3 months. spoke to wife Any understands and agrees     Problem/Plan - 2:  ·  Problem: Frequent falls.  Plan: mechanical fall, no LOC.   - PT consult rec home with assist /home PT  Home w 24/7 sup/A for all mobility, RW, Home PT for balance, to maintain strength & endurance. HHA for ADLs.  - wife refuses MIKE or privste aid hiring     Problem/Plan - 3:  ·  Problem: Hypernatremia.  Plan: improved     Problem/Plan - 4:  ·  Problem: Type 2 diabetes mellitus with hyperglycemia, with long-term current use of insulin.  Plan: c/w insulin and CSI     Problem/Plan - 5:  ·  Problem: Essential hypertension.  Plan: - c/w lopressor and hydralazine     Problem/Plan - 6:  Problem: Dysphagia, unspecified type. Plan: - Dysphagia 3 Honey thick.     Problem/Plan - 7:  ·  Problem: Hyperkalemia.  Plan: resolved     Problem/Plan - 8:  ·  Problem: Rib fractures.  Plan: - trauma service recs appreciated  - pain med.      Problem/Plan - 9:  ·  Problem: Stented coronary artery.  Plan: - c/w statin, metoprolol, aspirin and plavix.       PHYSICAL EXAM:  Vital Signs Last 24 Hrs  T(C): 36.7 (31 Aug 2018 11:12), Max: 37 (30 Aug 2018 16:42)  T(F): 98.1 (31 Aug 2018 11:12), Max: 98.6 (30 Aug 2018 16:42)  HR: 64 (31 Aug 2018 11:12) (55 - 78)  BP: 120/58 (31 Aug 2018 11:12)  BP(mean): --  RR: 18 (31 Aug 2018 11:12) (18 - 18)  SpO2: 97% (30 Aug 2018 16:42) (96% - 97%)    GENERAL: NAD, well-groomed  HEENT: PERRL, +EOMI  CHEST/LUNG: Clear to auscultation bilaterally; No wheezing  HEART: S1S2+, Regular rate and rhythm; No murmurs  ABDOMEN: Soft, Nontender, Nondistended; Bowel sounds present  EXTREMITIES:  2+ Peripheral Pulses, No clubbing, cyanosis, or edema      Pt stable for discharge     total time spent for discharge: 33 minutes

## 2018-08-31 NOTE — DISCHARGE NOTE ADULT - MEDICATION SUMMARY - MEDICATIONS TO STOP TAKING
I will STOP taking the medications listed below when I get home from the hospital:    lisinopril 20 mg oral tablet  -- 1 tab(s) by mouth once a day    amantadine 100 mg oral capsule  -- 1 cap(s) by mouth once a day    amLODIPine 10 mg oral tablet  -- 1 tab(s) by mouth once a day I will STOP taking the medications listed below when I get home from the hospital:    lisinopril 20 mg oral tablet  -- 1 tab(s) by mouth once a day    amLODIPine 10 mg oral tablet  -- 1 tab(s) by mouth once a day

## 2018-08-31 NOTE — OCCUPATIONAL THERAPY INITIAL EVALUATION ADULT - LEVEL OF INDEPENDENCE:TOILET, OT EVAL
dependent (less than 25% patients effort)/pt with large episode of bowel incontinence during evaluation

## 2018-08-31 NOTE — OCCUPATIONAL THERAPY INITIAL EVALUATION ADULT - ADDITIONAL COMMENTS
Pt lives in house with 0 ALEXI and no steps inside; bedroom and bathroom on main level. Bathroom has bathtub with curtains. Pt owns RW, cane, wheelchair. Pt is right handed. Information should be verified by family as it was provided by pt.

## 2018-08-31 NOTE — OCCUPATIONAL THERAPY INITIAL EVALUATION ADULT - DIAGNOSIS, OT EVAL
s/p fall s/p fall; pt with history of multiple prior CVA and has abnormal gait at baseline; pt noncompliant with walker and wheelchair use and presents for evaluation after fall. Pt's wife is his primary caregiver and reports pt is very stubborn and frequently refuses use of assistive devices. Wife also notes that patient typically requires help to use the bathroom but tends to refuse help. Pt presents to ED s/p fall from getting up alone; denies head trauma or LOC.

## 2018-08-31 NOTE — DISCHARGE NOTE ADULT - PLAN OF CARE
back to baseline follow up with pcp and renal doctor in 1 week  Renal ultrasound shows 0.9 cm echogenic right renal lesion, repeat u/s in 3 months to confirm no changes

## 2018-08-31 NOTE — PROGRESS NOTE ADULT - SUBJECTIVE AND OBJECTIVE BOX
Patient seen and examined    Lying quietly, family present  no c/o CP SOB NV   No swelling feet    Vital Signs Last 24 Hrs  T(C): 36.6 (31 Aug 2018 16:00), Max: 36.7 (31 Aug 2018 04:29)  T(F): 97.9 (31 Aug 2018 16:00), Max: 98.1 (31 Aug 2018 11:12)  HR: 68 (31 Aug 2018 16:00) (62 - 68)  BP: 152/70 (31 Aug 2018 16:00) (97/61 - 180/82)  BP(mean): --  RR: 18 (31 Aug 2018 16:00) (18 - 18)  SpO2: 98% (31 Aug 2018 16:00) (98% - 100%)    PHYSICAL EXAM    GENERAL: NAD,   EYES:  conjunctiva and sclera clear  NECK: Supple, No JVD/Bruit  NERVOUS SYSTEM:  A/a but not following VCs; not much interactive   CHEST:  No rales, No rhonchi  HEART:  RRR, No murmur  ABDOMEN: Soft, NT/ND BS+  EXTREMITIES:  No Edema;  SKIN: No rashes    31 Aug 2018 07:54    147    |  112    |  39.0   ----------------------------<  126    4.0     |  23.0   |  1.87     Ca    8.7        31 Aug 2018 07:54  Phos  3.6       30 Aug 2018 07:40  Mg     2.1       31 Aug 2018 07:54    TPro  6.0    /  Alb  2.8    /  TBili  0.5    /  DBili  x      /  AST  13     /  ALT  8      /  AlkPhos  80     30 Aug 2018 07:40                          13.5   8.2   )-----------( 299      ( 31 Aug 2018 07:54 )             41.0       CKD/ALLISON - creat improving  Multiple CVAs  Hb stable; d/w family  likely d/c soon  Continue present treatment

## 2018-08-31 NOTE — OCCUPATIONAL THERAPY INITIAL EVALUATION ADULT - GENERAL OBSERVATIONS, REHAB EVAL
Received pt in semi-bishop position in bed, +IV lock, +telemetry, +bed alarm (2nd position); pt agrees to OT evaluation.

## 2018-08-31 NOTE — DISCHARGE NOTE ADULT - PATIENT PORTAL LINK FT
You can access the [facility] Patient Portal, offered by [facility], by registering with the following website: [email or web address]

## 2018-08-31 NOTE — DISCHARGE NOTE ADULT - CARE PLAN
Principal Discharge DX:	Acute renal failure superimposed on stage 4 chronic kidney disease, unspecified acute renal failure type  Goal:	back to baseline  Assessment and plan of treatment:	follow up with pcp and renal doctor in 1 week  Renal ultrasound shows 0.9 cm echogenic right renal lesion, repeat u/s in 3 months to confirm no changes  Secondary Diagnosis:	Diabetic neuropathy  Secondary Diagnosis:	CVA (cerebral vascular accident)  Secondary Diagnosis:	Essential hypertension  Secondary Diagnosis:	DM (diabetes mellitus)

## 2018-08-31 NOTE — OCCUPATIONAL THERAPY INITIAL EVALUATION ADULT - PERTINENT HX OF CURRENT PROBLEM, REHAB EVAL
Head CT with no parenchymal contusion, hemorrhage or extra-axial collection; no CT evidence of an acute territorial infarct; chronic small vessel ischemic changes; multiple old chronic lacunar infarcts. CT of thoracic spine reveals T7-T8 level is moderate central disc protrusion with calcified annular tear resulting in mild effacement of the thecal sac; there is associated discogenic degenerative changes at the level; no acute fracture or dislocation.

## 2018-09-06 ENCOUNTER — INPATIENT (INPATIENT)
Facility: HOSPITAL | Age: 68
LOS: 3 days | Discharge: HOME | DRG: 69 | End: 2018-09-10
Attending: INTERNAL MEDICINE | Admitting: FAMILY MEDICINE
Payer: MEDICARE

## 2018-09-06 VITALS
TEMPERATURE: 98 F | RESPIRATION RATE: 15 BRPM | OXYGEN SATURATION: 97 % | HEIGHT: 72 IN | DIASTOLIC BLOOD PRESSURE: 95 MMHG | WEIGHT: 199.96 LBS | HEART RATE: 75 BPM | SYSTOLIC BLOOD PRESSURE: 164 MMHG

## 2018-09-06 DIAGNOSIS — R74.8 ABNORMAL LEVELS OF OTHER SERUM ENZYMES: ICD-10-CM

## 2018-09-06 DIAGNOSIS — S22.39XA FRACTURE OF ONE RIB, UNSPECIFIED SIDE, INITIAL ENCOUNTER FOR CLOSED FRACTURE: ICD-10-CM

## 2018-09-06 DIAGNOSIS — Z95.0 PRESENCE OF CARDIAC PACEMAKER: Chronic | ICD-10-CM

## 2018-09-06 DIAGNOSIS — I10 ESSENTIAL (PRIMARY) HYPERTENSION: ICD-10-CM

## 2018-09-06 DIAGNOSIS — G45.9 TRANSIENT CEREBRAL ISCHEMIC ATTACK, UNSPECIFIED: ICD-10-CM

## 2018-09-06 DIAGNOSIS — N18.3 CHRONIC KIDNEY DISEASE, STAGE 3 (MODERATE): ICD-10-CM

## 2018-09-06 DIAGNOSIS — E11.65 TYPE 2 DIABETES MELLITUS WITH HYPERGLYCEMIA: ICD-10-CM

## 2018-09-06 LAB
ALBUMIN SERPL ELPH-MCNC: 3.7 G/DL — SIGNIFICANT CHANGE UP (ref 3.3–5.2)
ALP SERPL-CCNC: 149 U/L — HIGH (ref 40–120)
ALT FLD-CCNC: 17 U/L — SIGNIFICANT CHANGE UP
ANION GAP SERPL CALC-SCNC: 13 MMOL/L — SIGNIFICANT CHANGE UP (ref 5–17)
APTT BLD: 32 SEC — SIGNIFICANT CHANGE UP (ref 27.5–37.4)
AST SERPL-CCNC: 16 U/L — SIGNIFICANT CHANGE UP
BASOPHILS # BLD AUTO: 0 K/UL — SIGNIFICANT CHANGE UP (ref 0–0.2)
BASOPHILS NFR BLD AUTO: 0.1 % — SIGNIFICANT CHANGE UP (ref 0–2)
BILIRUB SERPL-MCNC: 0.7 MG/DL — SIGNIFICANT CHANGE UP (ref 0.4–2)
BUN SERPL-MCNC: 23 MG/DL — HIGH (ref 8–20)
CALCIUM SERPL-MCNC: 9.2 MG/DL — SIGNIFICANT CHANGE UP (ref 8.6–10.2)
CHLORIDE SERPL-SCNC: 100 MMOL/L — SIGNIFICANT CHANGE UP (ref 98–107)
CO2 SERPL-SCNC: 25 MMOL/L — SIGNIFICANT CHANGE UP (ref 22–29)
CREAT SERPL-MCNC: 2.15 MG/DL — HIGH (ref 0.5–1.3)
EOSINOPHIL # BLD AUTO: 0.1 K/UL — SIGNIFICANT CHANGE UP (ref 0–0.5)
EOSINOPHIL NFR BLD AUTO: 1.9 % — SIGNIFICANT CHANGE UP (ref 0–5)
GLUCOSE BLDC GLUCOMTR-MCNC: 283 MG/DL — HIGH (ref 70–99)
GLUCOSE SERPL-MCNC: 310 MG/DL — HIGH (ref 70–115)
HCT VFR BLD CALC: 41.5 % — LOW (ref 42–52)
HGB BLD-MCNC: 13.8 G/DL — LOW (ref 14–18)
INR BLD: 1.06 RATIO — SIGNIFICANT CHANGE UP (ref 0.88–1.16)
LYMPHOCYTES # BLD AUTO: 1.6 K/UL — SIGNIFICANT CHANGE UP (ref 1–4.8)
LYMPHOCYTES # BLD AUTO: 21.6 % — SIGNIFICANT CHANGE UP (ref 20–55)
MCHC RBC-ENTMCNC: 28.8 PG — SIGNIFICANT CHANGE UP (ref 27–31)
MCHC RBC-ENTMCNC: 33.3 G/DL — SIGNIFICANT CHANGE UP (ref 32–36)
MCV RBC AUTO: 86.5 FL — SIGNIFICANT CHANGE UP (ref 80–94)
MONOCYTES # BLD AUTO: 0.5 K/UL — SIGNIFICANT CHANGE UP (ref 0–0.8)
MONOCYTES NFR BLD AUTO: 7.5 % — SIGNIFICANT CHANGE UP (ref 3–10)
NEUTROPHILS # BLD AUTO: 5 K/UL — SIGNIFICANT CHANGE UP (ref 1.8–8)
NEUTROPHILS NFR BLD AUTO: 68.8 % — SIGNIFICANT CHANGE UP (ref 37–73)
PLATELET # BLD AUTO: 286 K/UL — SIGNIFICANT CHANGE UP (ref 150–400)
POTASSIUM SERPL-MCNC: 5.1 MMOL/L — SIGNIFICANT CHANGE UP (ref 3.5–5.3)
POTASSIUM SERPL-SCNC: 5.1 MMOL/L — SIGNIFICANT CHANGE UP (ref 3.5–5.3)
PROT SERPL-MCNC: 6.9 G/DL — SIGNIFICANT CHANGE UP (ref 6.6–8.7)
PROTHROM AB SERPL-ACNC: 11.7 SEC — SIGNIFICANT CHANGE UP (ref 9.8–12.7)
RBC # BLD: 4.8 M/UL — SIGNIFICANT CHANGE UP (ref 4.6–6.2)
RBC # FLD: 12.4 % — SIGNIFICANT CHANGE UP (ref 11–15.6)
SODIUM SERPL-SCNC: 138 MMOL/L — SIGNIFICANT CHANGE UP (ref 135–145)
TROPONIN T SERPL-MCNC: 0.15 NG/ML — HIGH (ref 0–0.06)
WBC # BLD: 7.2 K/UL — SIGNIFICANT CHANGE UP (ref 4.8–10.8)
WBC # FLD AUTO: 7.2 K/UL — SIGNIFICANT CHANGE UP (ref 4.8–10.8)

## 2018-09-06 PROCEDURE — 70450 CT HEAD/BRAIN W/O DYE: CPT | Mod: 26

## 2018-09-06 PROCEDURE — 93010 ELECTROCARDIOGRAM REPORT: CPT | Mod: 76

## 2018-09-06 PROCEDURE — 99285 EMERGENCY DEPT VISIT HI MDM: CPT

## 2018-09-06 PROCEDURE — 71045 X-RAY EXAM CHEST 1 VIEW: CPT | Mod: 26

## 2018-09-06 RX ORDER — ATORVASTATIN CALCIUM 80 MG/1
20 TABLET, FILM COATED ORAL AT BEDTIME
Qty: 0 | Refills: 0 | Status: DISCONTINUED | OUTPATIENT
Start: 2018-09-06 | End: 2018-09-10

## 2018-09-06 RX ORDER — CLOPIDOGREL BISULFATE 75 MG/1
75 TABLET, FILM COATED ORAL DAILY
Qty: 0 | Refills: 0 | Status: DISCONTINUED | OUTPATIENT
Start: 2018-09-07 | End: 2018-09-10

## 2018-09-06 RX ORDER — ASPIRIN/CALCIUM CARB/MAGNESIUM 324 MG
300 TABLET ORAL ONCE
Qty: 0 | Refills: 0 | Status: COMPLETED | OUTPATIENT
Start: 2018-09-06 | End: 2018-09-06

## 2018-09-06 RX ORDER — INSULIN DETEMIR 100/ML (3)
10 INSULIN PEN (ML) SUBCUTANEOUS AT BEDTIME
Qty: 0 | Refills: 0 | Status: DISCONTINUED | OUTPATIENT
Start: 2018-09-06 | End: 2018-09-07

## 2018-09-06 RX ORDER — METOPROLOL TARTRATE 50 MG
25 TABLET ORAL
Qty: 0 | Refills: 0 | Status: DISCONTINUED | OUTPATIENT
Start: 2018-09-06 | End: 2018-09-10

## 2018-09-06 RX ORDER — HEPARIN SODIUM 5000 [USP'U]/ML
5000 INJECTION INTRAVENOUS; SUBCUTANEOUS EVERY 12 HOURS
Qty: 0 | Refills: 0 | Status: DISCONTINUED | OUTPATIENT
Start: 2018-09-06 | End: 2018-09-10

## 2018-09-06 RX ORDER — SERTRALINE 25 MG/1
100 TABLET, FILM COATED ORAL DAILY
Qty: 0 | Refills: 0 | Status: DISCONTINUED | OUTPATIENT
Start: 2018-09-06 | End: 2018-09-10

## 2018-09-06 RX ORDER — HYDRALAZINE HCL 50 MG
25 TABLET ORAL EVERY 8 HOURS
Qty: 0 | Refills: 0 | Status: DISCONTINUED | OUTPATIENT
Start: 2018-09-06 | End: 2018-09-08

## 2018-09-06 RX ORDER — ASPIRIN/CALCIUM CARB/MAGNESIUM 324 MG
81 TABLET ORAL DAILY
Qty: 0 | Refills: 0 | Status: DISCONTINUED | OUTPATIENT
Start: 2018-09-07 | End: 2018-09-10

## 2018-09-06 RX ORDER — LIDOCAINE 4 G/100G
1 CREAM TOPICAL DAILY
Qty: 0 | Refills: 0 | Status: DISCONTINUED | OUTPATIENT
Start: 2018-09-06 | End: 2018-09-10

## 2018-09-06 RX ORDER — SODIUM CHLORIDE 9 MG/ML
500 INJECTION INTRAMUSCULAR; INTRAVENOUS; SUBCUTANEOUS
Qty: 0 | Refills: 0 | Status: COMPLETED | OUTPATIENT
Start: 2018-09-06 | End: 2018-09-06

## 2018-09-06 RX ORDER — GABAPENTIN 400 MG/1
300 CAPSULE ORAL THREE TIMES A DAY
Qty: 0 | Refills: 0 | Status: DISCONTINUED | OUTPATIENT
Start: 2018-09-06 | End: 2018-09-08

## 2018-09-06 RX ORDER — NITROGLYCERIN 6.5 MG
0.4 CAPSULE, EXTENDED RELEASE ORAL
Qty: 0 | Refills: 0 | Status: DISCONTINUED | OUTPATIENT
Start: 2018-09-06 | End: 2018-09-10

## 2018-09-06 RX ADMIN — SODIUM CHLORIDE 100 MILLILITER(S): 9 INJECTION INTRAMUSCULAR; INTRAVENOUS; SUBCUTANEOUS at 22:05

## 2018-09-06 RX ADMIN — Medication 10 UNIT(S): at 22:10

## 2018-09-06 NOTE — ED ADULT NURSE NOTE - CHIEF COMPLAINT QUOTE
pt arrive by ambulance from home with reports that home visiting RN noted R facial droop at 1pm today, as per EMS pt with 6 prior strokes, 12 cardiac stents. upon EMS arrival to home no deficits noted by EMS, wife at scene reported pt was at his baseline. . pt without any complaints at this time, as per EMS pt baseline with abnormal gait and expressive aphasia.

## 2018-09-06 NOTE — ED ADULT NURSE REASSESSMENT NOTE - NS ED NURSE REASSESS COMMENT FT1
Pt unable to pass dysphagia at this time. RN advised MD Rodríguez. pt is to remain NPO at this time. pt educated on plan of care, pt able to successfully teach back plan of care to RN, RN will continue to reeducate pt during hospital stay.

## 2018-09-06 NOTE — ED PROVIDER NOTE - CARE PLAN
Principal Discharge DX:	TIA (transient ischemic attack)  Secondary Diagnosis:	Troponin level elevated

## 2018-09-06 NOTE — ED PROVIDER NOTE - NEURO NEGATIVE STATEMENT, MLM
no loss of consciousness, no new gait abnormality, no headache, no new  sensory deficits, and no new  weakness.

## 2018-09-06 NOTE — ED PROVIDER NOTE - OBJECTIVE STATEMENT
69 y/o M extensive medical as per chart with hx of multiple cvas in the past home aid noticed right sided facial droop this am - last known well unclear per EMS upon their arrival facial droop resolved and no new deficits noticed per wife home aid noticed it about 1 pm he is non-verbal at baseline and has problems with gain and imbalance wife not here he denies any new concerns or symptoms says he feels ok given no other changes from baseline and no facial droop on exam not candidate for stroke alert - will w/u for possible TIA and re-eval d/w family plan of care as well

## 2018-09-06 NOTE — ED ADULT NURSE NOTE - ED STAT RN HANDOFF DETAILS
pt alert and orioented x4 with expresisve aphasia and garbled speech at baseline. Vss, afebrile. report given to 4t

## 2018-09-06 NOTE — ED ADULT NURSE REASSESSMENT NOTE - NS ED NURSE REASSESS COMMENT FT1
assuemd care if patient 1940, charting as noted. pt alert and oriented x4 with expressive aphasia. Vss, afebrile at this time. denies pain, in no distress. pt educated on plan of care, pt able to successfully teach back plan of care to RN, RN will continue to reeducate pt during hospital stay.

## 2018-09-06 NOTE — H&P ADULT - PROBLEM SELECTOR PLAN 1
pt. likely had TIA, now at baseline, will do neurochecks, echo, carotid doppler, cannot do MRI as he has pacemaker. will request neuro [facility] consult.

## 2018-09-06 NOTE — H&P ADULT - NSHPPHYSICALEXAM_GEN_ALL_CORE
General: Well developed  male lying in bed not in distress.   HEENT: AT, NC. PERRL. intact EOM. mucosa somewhat dry.  Neck: supple. no JVD.   Chest: CTA bilaterally  Heart: normal S1,S2. RRR. no heart murmur.  Abdomen: soft. non-tender. non-distended. + BS.   Ext: no C/C/E. no calf tenderness.  ROM of all joints intact.   Vascular : 2 + DP.  Neuro: AAA ox3. Pt. follows commands appropriately, motor strength 5/5 B/L. sensory intact. pt. has baseline slurred speech.  Skin: no warmth. no cyanosis. pt's rt. posterior lower rib area has skin abrasion about 3 inch long and about 1 inch wide, no surrounding erythema, no discharge, no warmth. no signs of infection. General: Well developed  male lying in bed not in distress.   HEENT: AT, NC. PERRL. intact EOM. mucosa somewhat dry.  Neck: supple. no JVD.   Chest: CTA bilaterally  Heart: normal S1,S2. RRR. no heart murmur.  Abdomen: soft. non-tender. non-distended. + BS.   Ext: no C/C/E. no calf tenderness.  ROM of all joints intact.   Vascular : 2 + DP.  Neuro: AAA ox3. Pt. follows commands appropriately, motor strength 5/5 B/L. sensory intact. pt. has baseline slurred speech.  Skin: no warmth. no cyanosis. pt's rt. posterior lower rib area has skin abrasion about 3 inch long and about 1 inch wide, no surrounding erythema, no discharge, no warmth. no signs of infection. covered with lidoderm patch.

## 2018-09-06 NOTE — ED ADULT NURSE NOTE - CHPI ED NUR SYMPTOMS NEG
no confusion/no blurred vision/no change in level of consciousness/no vomiting/no dizziness/no numbness/no nausea/no weakness/no loss of consciousness/no fever

## 2018-09-06 NOTE — ED ADULT NURSE NOTE - NSIMPLEMENTINTERV_GEN_ALL_ED
Implemented All Fall with Harm Risk Interventions:  [place] to call system. Call bell, personal items and telephone within reach. Instruct patient to call for assistance. Room bathroom lighting operational. Non-slip footwear when patient is off stretcher. Physically safe environment: no spills, clutter or unnecessary equipment. Stretcher in lowest position, wheels locked, appropriate side rails in place. Provide visual cue, wrist band, yellow gown, etc. Monitor gait and stability. Monitor for mental status changes and reorient to person, place, and time. Review medications for side effects contributing to fall risk. Reinforce activity limits and safety measures with patient and family. Provide visual clues: red socks.

## 2018-09-06 NOTE — ED PROVIDER NOTE - MEDICAL DECISION MAKING DETAILS
w.u for possible TIA given hx resolved facial droop now new obvious new neuro deficits denies any c/o

## 2018-09-06 NOTE — H&P ADULT - HISTORY OF PRESENT ILLNESS
69 y/o Male with multiple medical problems and cva in the past with baseline speech difficulty was brought in as his Aid noticed right sided facial droop around 1 pm on the day of ER visit as per history - last known well unclear . As per EMS upon their arrival facial droop resolved and no new deficits noticed per wife and home aid. Pt. not sure about any facial droop sympt. pt. reports that his speech is at his baseline. pt. has baseline gait disturbance. pt.denies any cp, no sob. no abd. pain. no fever. no n/v/d. pt. failed bedside swallow eval. 67 y/o Male with multiple medical problems and cva in the past with baseline speech difficulty was brought in as his Aid noticed right sided facial droop around 1 pm on the day of ER visit as per history - last known well unclear . As per EMS upon their arrival facial droop resolved and no new deficits noticed per wife and home aid. Pt. not sure about any facial droop sympt. pt. reports that his speech is at his baseline. pt. has baseline gait disturbance. pt.denies any cp, no sob. no abd. pain. no fever. no n/v/d. pt.'s po status was not know initially so rectal aspirin was ordered which he refused, choked on a small sip of water. pt's old record was reviewed and  diet recommendations from last admission were noted and ordered.

## 2018-09-06 NOTE — H&P ADULT - PROBLEM SELECTOR PLAN 3
trop mildly elvated, possible related to his renal function has CKD, no acute ekg changes, pt. has no cp. on B.blk, asa, plavia, lipitor. SL NTG PRN. [place] [facility] consult farrah be called.

## 2018-09-06 NOTE — ED ADULT TRIAGE NOTE - CHIEF COMPLAINT QUOTE
pt arrive by ambulance from home with reports that home visiting RN noted facial droop at 1pm today, as per EMS pt with 6 prior strokes, 12 cardiac stents. upon EMS arrival to home no deficits noted by EMS, wife at scene reported pt was at his baseline.  pt arrive by ambulance from home with reports that home visiting RN noted R facial droop at 1pm today, as per EMS pt with 6 prior strokes, 12 cardiac stents. upon EMS arrival to home no deficits noted by EMS, wife at scene reported pt was at his baseline. . pt without any complaints at this time, as per EMS pt baseline with abnormal gait and expressive aphasia.

## 2018-09-07 LAB
ANION GAP SERPL CALC-SCNC: 14 MMOL/L — SIGNIFICANT CHANGE UP (ref 5–17)
BASOPHILS # BLD AUTO: 0 K/UL — SIGNIFICANT CHANGE UP (ref 0–0.2)
BASOPHILS NFR BLD AUTO: 0.1 % — SIGNIFICANT CHANGE UP (ref 0–2)
BUN SERPL-MCNC: 23 MG/DL — HIGH (ref 8–20)
CALCIUM SERPL-MCNC: 9.3 MG/DL — SIGNIFICANT CHANGE UP (ref 8.6–10.2)
CHLORIDE SERPL-SCNC: 101 MMOL/L — SIGNIFICANT CHANGE UP (ref 98–107)
CHOLEST SERPL-MCNC: 137 MG/DL — SIGNIFICANT CHANGE UP (ref 110–199)
CK SERPL-CCNC: 85 U/L — SIGNIFICANT CHANGE UP (ref 30–200)
CO2 SERPL-SCNC: 23 MMOL/L — SIGNIFICANT CHANGE UP (ref 22–29)
CREAT SERPL-MCNC: 1.99 MG/DL — HIGH (ref 0.5–1.3)
EOSINOPHIL # BLD AUTO: 0.1 K/UL — SIGNIFICANT CHANGE UP (ref 0–0.5)
EOSINOPHIL NFR BLD AUTO: 1.9 % — SIGNIFICANT CHANGE UP (ref 0–5)
GLUCOSE BLDC GLUCOMTR-MCNC: 133 MG/DL — HIGH (ref 70–99)
GLUCOSE BLDC GLUCOMTR-MCNC: 208 MG/DL — HIGH (ref 70–99)
GLUCOSE BLDC GLUCOMTR-MCNC: 266 MG/DL — HIGH (ref 70–99)
GLUCOSE BLDC GLUCOMTR-MCNC: 296 MG/DL — HIGH (ref 70–99)
GLUCOSE SERPL-MCNC: 274 MG/DL — HIGH (ref 70–115)
HBA1C BLD-MCNC: 9.1 % — HIGH (ref 4–5.6)
HCT VFR BLD CALC: 41.1 % — LOW (ref 42–52)
HDLC SERPL-MCNC: 36 MG/DL — LOW
HGB BLD-MCNC: 13.5 G/DL — LOW (ref 14–18)
LIPID PNL WITH DIRECT LDL SERPL: 57 MG/DL — SIGNIFICANT CHANGE UP
LYMPHOCYTES # BLD AUTO: 1.6 K/UL — SIGNIFICANT CHANGE UP (ref 1–4.8)
LYMPHOCYTES # BLD AUTO: 21.8 % — SIGNIFICANT CHANGE UP (ref 20–55)
MCHC RBC-ENTMCNC: 28.5 PG — SIGNIFICANT CHANGE UP (ref 27–31)
MCHC RBC-ENTMCNC: 32.8 G/DL — SIGNIFICANT CHANGE UP (ref 32–36)
MCV RBC AUTO: 86.7 FL — SIGNIFICANT CHANGE UP (ref 80–94)
MONOCYTES # BLD AUTO: 0.4 K/UL — SIGNIFICANT CHANGE UP (ref 0–0.8)
MONOCYTES NFR BLD AUTO: 5.7 % — SIGNIFICANT CHANGE UP (ref 3–10)
NEUTROPHILS # BLD AUTO: 5.1 K/UL — SIGNIFICANT CHANGE UP (ref 1.8–8)
NEUTROPHILS NFR BLD AUTO: 70.4 % — SIGNIFICANT CHANGE UP (ref 37–73)
PLATELET # BLD AUTO: 302 K/UL — SIGNIFICANT CHANGE UP (ref 150–400)
POTASSIUM SERPL-MCNC: 4.3 MMOL/L — SIGNIFICANT CHANGE UP (ref 3.5–5.3)
POTASSIUM SERPL-SCNC: 4.3 MMOL/L — SIGNIFICANT CHANGE UP (ref 3.5–5.3)
RBC # BLD: 4.74 M/UL — SIGNIFICANT CHANGE UP (ref 4.6–6.2)
RBC # FLD: 12.5 % — SIGNIFICANT CHANGE UP (ref 11–15.6)
SODIUM SERPL-SCNC: 138 MMOL/L — SIGNIFICANT CHANGE UP (ref 135–145)
TOTAL CHOLESTEROL/HDL RATIO MEASUREMENT: 4 RATIO — SIGNIFICANT CHANGE UP (ref 3.4–9.6)
TRIGL SERPL-MCNC: 221 MG/DL — HIGH (ref 10–200)
TROPONIN T SERPL-MCNC: 0.12 NG/ML — HIGH (ref 0–0.06)
WBC # BLD: 7.2 K/UL — SIGNIFICANT CHANGE UP (ref 4.8–10.8)
WBC # FLD AUTO: 7.2 K/UL — SIGNIFICANT CHANGE UP (ref 4.8–10.8)

## 2018-09-07 PROCEDURE — 70450 CT HEAD/BRAIN W/O DYE: CPT | Mod: 26

## 2018-09-07 PROCEDURE — 93880 EXTRACRANIAL BILAT STUDY: CPT | Mod: 26

## 2018-09-07 PROCEDURE — 99233 SBSQ HOSP IP/OBS HIGH 50: CPT

## 2018-09-07 RX ORDER — DEXTROSE 50 % IN WATER 50 %
25 SYRINGE (ML) INTRAVENOUS ONCE
Qty: 0 | Refills: 0 | Status: DISCONTINUED | OUTPATIENT
Start: 2018-09-07 | End: 2018-09-10

## 2018-09-07 RX ORDER — INSULIN LISPRO 100/ML
VIAL (ML) SUBCUTANEOUS AT BEDTIME
Qty: 0 | Refills: 0 | Status: DISCONTINUED | OUTPATIENT
Start: 2018-09-07 | End: 2018-09-10

## 2018-09-07 RX ORDER — GLUCAGON INJECTION, SOLUTION 0.5 MG/.1ML
1 INJECTION, SOLUTION SUBCUTANEOUS ONCE
Qty: 0 | Refills: 0 | Status: DISCONTINUED | OUTPATIENT
Start: 2018-09-07 | End: 2018-09-10

## 2018-09-07 RX ORDER — DEXTROSE 50 % IN WATER 50 %
12.5 SYRINGE (ML) INTRAVENOUS ONCE
Qty: 0 | Refills: 0 | Status: DISCONTINUED | OUTPATIENT
Start: 2018-09-07 | End: 2018-09-10

## 2018-09-07 RX ORDER — INSULIN DETEMIR 100/ML (3)
14 INSULIN PEN (ML) SUBCUTANEOUS AT BEDTIME
Qty: 0 | Refills: 0 | Status: DISCONTINUED | OUTPATIENT
Start: 2018-09-07 | End: 2018-09-07

## 2018-09-07 RX ORDER — INSULIN LISPRO 100/ML
VIAL (ML) SUBCUTANEOUS
Qty: 0 | Refills: 0 | Status: DISCONTINUED | OUTPATIENT
Start: 2018-09-07 | End: 2018-09-10

## 2018-09-07 RX ORDER — SODIUM CHLORIDE 9 MG/ML
1000 INJECTION, SOLUTION INTRAVENOUS
Qty: 0 | Refills: 0 | Status: DISCONTINUED | OUTPATIENT
Start: 2018-09-07 | End: 2018-09-10

## 2018-09-07 RX ORDER — INSULIN DETEMIR 100/ML (3)
20 INSULIN PEN (ML) SUBCUTANEOUS AT BEDTIME
Qty: 0 | Refills: 0 | Status: DISCONTINUED | OUTPATIENT
Start: 2018-09-07 | End: 2018-09-10

## 2018-09-07 RX ORDER — DEXTROSE 50 % IN WATER 50 %
15 SYRINGE (ML) INTRAVENOUS ONCE
Qty: 0 | Refills: 0 | Status: DISCONTINUED | OUTPATIENT
Start: 2018-09-07 | End: 2018-09-10

## 2018-09-07 RX ADMIN — ATORVASTATIN CALCIUM 20 MILLIGRAM(S): 80 TABLET, FILM COATED ORAL at 21:48

## 2018-09-07 RX ADMIN — HEPARIN SODIUM 5000 UNIT(S): 5000 INJECTION INTRAVENOUS; SUBCUTANEOUS at 21:49

## 2018-09-07 RX ADMIN — Medication 1: at 21:49

## 2018-09-07 RX ADMIN — Medication 25 MILLIGRAM(S): at 13:00

## 2018-09-07 RX ADMIN — Medication 25 MILLIGRAM(S): at 06:59

## 2018-09-07 RX ADMIN — Medication 25 MILLIGRAM(S): at 17:26

## 2018-09-07 RX ADMIN — Medication 20 UNIT(S): at 22:22

## 2018-09-07 RX ADMIN — Medication 4: at 08:47

## 2018-09-07 RX ADMIN — Medication 25 MILLIGRAM(S): at 21:48

## 2018-09-07 RX ADMIN — Medication 6: at 12:55

## 2018-09-07 RX ADMIN — HEPARIN SODIUM 5000 UNIT(S): 5000 INJECTION INTRAVENOUS; SUBCUTANEOUS at 11:42

## 2018-09-07 RX ADMIN — Medication 81 MILLIGRAM(S): at 11:41

## 2018-09-07 RX ADMIN — CLOPIDOGREL BISULFATE 75 MILLIGRAM(S): 75 TABLET, FILM COATED ORAL at 11:41

## 2018-09-07 RX ADMIN — SERTRALINE 100 MILLIGRAM(S): 25 TABLET, FILM COATED ORAL at 11:41

## 2018-09-07 RX ADMIN — GABAPENTIN 300 MILLIGRAM(S): 400 CAPSULE ORAL at 13:00

## 2018-09-07 RX ADMIN — LIDOCAINE 1 PATCH: 4 CREAM TOPICAL at 22:50

## 2018-09-07 RX ADMIN — Medication 1 APPLICATION(S): at 16:56

## 2018-09-07 RX ADMIN — LIDOCAINE 1 PATCH: 4 CREAM TOPICAL at 11:42

## 2018-09-07 RX ADMIN — GABAPENTIN 300 MILLIGRAM(S): 400 CAPSULE ORAL at 21:48

## 2018-09-07 RX ADMIN — GABAPENTIN 300 MILLIGRAM(S): 400 CAPSULE ORAL at 07:32

## 2018-09-07 NOTE — CONSULT NOTE ADULT - PROBLEM SELECTOR RECOMMENDATION 9
Agree to TIA protocol/monitored bed.  Continue with baby asa qd accompanied with daily plavix regimen.  DVT prophylaxis recommended.  Risk factor management.  Await for repeat CT head and TTE.  D/w pt, wife, and Dr Bradley.  PT/OT/Speech/Swallow.  Will follow.

## 2018-09-07 NOTE — PROGRESS NOTE ADULT - ASSESSMENT
67 y/o M with h/o multiple prior CVAs,  who has abnormal gait at baseline, noncompliant with walker and wheelchair use, presenting for evaluation after fall.  per wife, who is his primary caregiver, pt very stubborn and frequently refuses  use of assistive devices. Presents with multiple falls This is 67 y/o man with h/o multiple CVA with residual significant dysarthria, unsteady gait   brought in as his Aid noticed right sided facial droop, As per EMS upon their arrival facial droop resolved and no new deficits noticed per wife and home aid. CT head no new stroke, cardiology and Neurology consulted    A/P    >TIA  H/o multiple old CVA   CT head no new stroke  c/w aspirin, plavix, statin  unable to perform MRI due to PPM, will repeat CT head today  carotid doppler, TTE   Neurology consult requested - Dr. Waldrop   Cardiology consult appreciated   neuro check and NIH   A1c 9.1. LDL 57  PT/OT/Speech eval    >Dysphagia - on dysphagia diet  speech eval    > Elevated troponin - Flat  appreciate cardiology Mild elevation of troponin (peak 0.15) is non specific in light of CRI (Cr=1.99).  Pt denies CP or symptoms to suggest ACS. Recent nuclear stress test with predominantly fixed defect with minimal marlen infarct ischemia and is consistent with pts hx of known occluded and collateralized left Cx. Would continue anti ischemic regimen with ASA, statin, metoprolol, and plavix.  Outpt ischemic evaluation may be pursued on DC, especially in light of presentation of possible new neurologic deficit.  f/u TTE     >CAD - appreciate cardiology input   c/w aspirin, plavix, BB, statin, hydralazine    >HTN - permissive htn for now  c/w metoprolol, hydralazine  monitor BP    >DM - A1c 9.1   FS this morning 208  increase Lantus 20 units and sliding scale     >HLD  - c/w statin   LDL 57    >CKD stage 3 - at baseline  f/u bmp    >Rib Fracture - old   incentive spirometry  pain control    >DVT PPX - heparin

## 2018-09-07 NOTE — CONSULT NOTE ADULT - SUBJECTIVE AND OBJECTIVE BOX
HPI: 67yo RH male with multiple medical problems including dementia and  h/o cva's in the past with baseline speech difficulty was brought in as his Aid noticed right sided facial droop around 1 pm on the day of ER visit as per history - last known well unclear.  As per EMS upon their arrival facial droop resolved and no new deficits noticed per wife and home aid. Pt. not sure about any facial droop sympt.  pt. reports that his speech is at his baseline.  Pt. has baseline gait disturbance. pt.denies any cp, no sob. no abd. pain. no fever. no n/v/d. pt.'s po status was not know initially so rectal aspirin was ordered which he refused, choked on a small sip of water.  Pt's old record was reviewed and  diet recommendations from last admission were noted and ordered.  Denies any complaints and complies with his medications.  No new distresses reported.  Denies any LOC.      PAST MEDICAL & SURGICAL HISTORY:  CVA (cerebral vascular accident)  Diabetic neuropathy  DM (diabetes mellitus)  Hyperlipidemia  AICD (automatic cardioverter/defibrillator) present  Pacemaker  Stented coronary artery  HTN (hypertension)  Cardiac pacemaker    MEDICATIONS  (STANDING):  aspirin enteric coated 81 milliGRAM(s) Oral daily  atorvastatin 20 milliGRAM(s) Oral at bedtime  clopidogrel Tablet 75 milliGRAM(s) Oral daily  dextrose 5%. 1000 milliLiter(s) (50 mL/Hr) IV Continuous <Continuous>  dextrose 50% Injectable 12.5 Gram(s) IV Push once  dextrose 50% Injectable 25 Gram(s) IV Push once  dextrose 50% Injectable 25 Gram(s) IV Push once  gabapentin 300 milliGRAM(s) Oral three times a day  heparin  Injectable 5000 Unit(s) SubCutaneous every 12 hours  hydrALAZINE 25 milliGRAM(s) Oral every 8 hours  insulin detemir injectable (LEVEMIR) 20 Unit(s) SubCutaneous at bedtime  insulin lispro (HumaLOG) corrective regimen sliding scale   SubCutaneous three times a day before meals  insulin lispro (HumaLOG) corrective regimen sliding scale   SubCutaneous at bedtime  lidocaine   Patch 1 Patch Transdermal daily  metoprolol tartrate 25 milliGRAM(s) Oral two times a day  sertraline 100 milliGRAM(s) Oral daily  silver sulfADIAZINE 1% Cream 1 Application(s) Topical daily    MEDICATIONS  (PRN):  dextrose 40% Gel 15 Gram(s) Oral once PRN Blood Glucose LESS THAN 70 milliGRAM(s)/deciliter  glucagon  Injectable 1 milliGRAM(s) IntraMuscular once PRN Glucose LESS THAN 70 milligrams/deciliter  nitroglycerin     SubLingual 0.4 milliGRAM(s) SubLingual every 5 minutes PRN Chest Pain    Allergies    No Known Allergies    Intolerances        FAMILY HISTORY:  No pertinent family history in first degree relatives          SOCIAL HISTORY:  Denies toxic habits;     REVIEW OF SYSTEMS:    As noted in the HPI.    VITAL SIGNS:  Vital Signs Last 24 Hrs  T(C): 36.9 (07 Sep 2018 10:19), Max: 37.1 (07 Sep 2018 06:45)  T(F): 98.5 (07 Sep 2018 10:19), Max: 98.8 (07 Sep 2018 06:45)  HR: 60 (07 Sep 2018 10:19) (60 - 80)  BP: 171/79 (07 Sep 2018 10:19) (145/92 - 171/79)  BP(mean): --  RR: 20 (07 Sep 2018 10:19) (16 - 20)  SpO2: 100% (07 Sep 2018 06:45) (96% - 100%)    PHYSICAL EXAMINATION:  General: Well-developed, well nourished, in no acute distress.  Eyes: Conjunctiva and sclera clear. Fundoscopic examination was deferred.  Neck: Supple.  Cardiac: +S1 & S2; Regular.  Chest: CTA b/l.    Musculoskeletal: No tenderness on palpation of spine.  No Brudzinski/Kernig's sign.    Neurologic:  - Mental Status:  Alert, awake, oriented to person, place, and time; Speech is dysarthric which is old as per wife.  Cranial Nerves II-XII:    II:  Visual acuity is normal for age ; Visual fields are full to confrontation; Pupils are equal, round, and reactive to light.  III, IV, VI:  Extraocular movements are intact without nystagmus.  V:  Facial sensation is intact in the V1-V3 distribution bilaterally.  VII:  Face is symmetric with normal eye closure and smile  VIII:  Hearing is intact and symmetric for age  IX, X:  Uvula is midline and soft palate rises symmetrically  XI:  Head turning and shoulder shrug are intact.  XII:  Tongue protrudes in the midline.  - Motor:  Strength is 5/5 throughout.  There is no pronator drift.  Normal muscle bulk and tone throughout.  - Reflexes:  2+ and symmetric throughout.  Plantars downwards b/l.  - Sensory:  Intact and symmetric to light touch, and joint-position sense.  - Coordination:  No dysmetria/dysdiadochokinesis.   - Gait: Deferred.      LABS:                          13.5   7.2   )-----------( 302      ( 07 Sep 2018 02:52 )             41.1     07 Sep 2018 02:52    138    |  101    |  23.0   ----------------------------<  274    4.3     |  23.0   |  1.99     Ca    9.3        07 Sep 2018 02:52    TPro  6.9    /  Alb  3.7    /  TBili  0.7    /  DBili  x      /  AST  16     /  ALT  17     /  AlkPhos  149    06 Sep 2018 16:39    LIVER FUNCTIONS - ( 06 Sep 2018 16:39 )  Alb: 3.7 g/dL / Pro: 6.9 g/dL / ALK PHOS: 149 U/L / ALT: 17 U/L / AST: 16 U/L / GGT: x           PT/INR - ( 06 Sep 2018 16:39 )   PT: 11.7 sec;   INR: 1.06 ratio         PTT - ( 06 Sep 2018 16:39 )  PTT:32.0 sec      RADIOLOGY & ADDITIONAL STUDIES:      CT Brain Stroke Protocol (09.06.18 @ 15:32) >  Moderate chronic microvascular changes without evidence of   an acute transcortical infarction or hemorrhage. MR is a more sensitive   imaging modality for the evaluation of an acute infarction.     US Duplex Carotid Arteries Complete, Bilateral (09.07.18 @ 11:19) >  Mild to moderate bilateral plaque as described.    No elevated velocities to suggest hemodynamically significant stenosis.    Measurement of carotid stenosis is based on velocity parameters that   correlate the residual internal carotid diameter with that of the more   distal vessel in accordance with a method such as the [place]   Symptomatic Carotid Endarterectomy Trial (NASCET).        IMPRESSION:  TIA

## 2018-09-07 NOTE — SWALLOW BEDSIDE ASSESSMENT ADULT - SLP PERTINENT HISTORY OF CURRENT PROBLEM
As per h&p: multiple medical problems and cva in the past with baseline speech difficulty was brought in as his Aid noticed right sided facial droop around 1 pm on the day of ER visit as per history - last known well unclear . As per EMS upon their arrival facial droop resolved and no new deficits noticed per wife and home aid. Pt. not sure about any facial droop sympt. pt. reports that his speech is at his baseline. pt. has baseline gait disturbance. Pt had a previous swallow evaluation from previous admission 8/30/18 with rx: soft with honey liquids. Honey liquids via teaspoon

## 2018-09-07 NOTE — OCCUPATIONAL THERAPY INITIAL EVALUATION ADULT - TRANSFER SAFETY CONCERNS NOTED: BED/CHAIR, REHAB EVAL
decreased balance during turns/decreased weight-shifting ability/losing balance/decreased safety awareness

## 2018-09-07 NOTE — SWALLOW BEDSIDE ASSESSMENT ADULT - SWALLOW EVAL: RECOMMENDED FEEDING/EATING TECHNIQUES
HONEY LIQUIDS VIA TEASPOON/oral hygiene/crush medication (when feasible)/maintain upright posture during/after eating for 30 mins/position upright (90 degrees)/small sips/bites

## 2018-09-07 NOTE — SWALLOW BEDSIDE ASSESSMENT ADULT - PHARYNGEAL PHASE
Within functional limits/pt impulsive benefits from having honey liquids via teaspoon to control rate of intake Within functional limits

## 2018-09-07 NOTE — PROGRESS NOTE ADULT - SUBJECTIVE AND OBJECTIVE BOX
Internal Medicine Hospitalist - Dr. Mirna GREEN    [id number]    68y      Male    Patient is a 68y old  Male who presents with a chief complaint of facial droop (07 Sep 2018 07:46      INTERVAL HPI/ OVERNIGHT EVENTS: Patient is seen and examined, facial droop resolved, slurred speech at baseline, denied weakness, numbness, tingling.     REVIEW OF SYSTEMS:    Denied fever, chills, abd. pain, nausea, vomiting, chest pain, SOB, headache, dizziness    PHYSICAL EXAM:    Vital Signs Last 24 Hrs  T(C): 36.9 (07 Sep 2018 10:19), Max: 37.1 (07 Sep 2018 06:45)  T(F): 98.5 (07 Sep 2018 10:19), Max: 98.8 (07 Sep 2018 06:45)  HR: 60 (07 Sep 2018 10:19) (60 - 80)  BP: 171/79 (07 Sep 2018 10:19) (145/92 - 171/79)  BP(mean): --  RR: 20 (07 Sep 2018 10:19) (15 - 20)  SpO2: 100% (07 Sep 2018 06:45) (96% - 100%)    GENERAL: NAD  HEENT: EOMI  Neck: supple  CHEST/LUNG: CTA b/l   HEART: S1S2+ audible  ABDOMEN: Soft, Nontender, Nondistended; Bowel sounds present  EXTREMITIES:  no edema  CNS: AAO X 3  Psychiatry: normal mood    LABS:                        13.5   7.2   )-----------( 302      ( 07 Sep 2018 02:52 )             41.1     09-07    138  |  101  |  23.0<H>  ----------------------------<  274<H>  4.3   |  23.0  |  1.99<H>    Ca    9.3      07 Sep 2018 02:52    TPro  6.9  /  Alb  3.7  /  TBili  0.7  /  DBili  x   /  AST  16  /  ALT  17  /  AlkPhos  149<H>  09-06    PT/INR - ( 06 Sep 2018 16:39 )   PT: 11.7 sec;   INR: 1.06 ratio         PTT - ( 06 Sep 2018 16:39 )  PTT:32.0 sec        MEDICATIONS  (STANDING):  aspirin enteric coated 81 milliGRAM(s) Oral daily  atorvastatin 20 milliGRAM(s) Oral at bedtime  clopidogrel Tablet 75 milliGRAM(s) Oral daily  dextrose 5%. 1000 milliLiter(s) (50 mL/Hr) IV Continuous <Continuous>  dextrose 50% Injectable 12.5 Gram(s) IV Push once  dextrose 50% Injectable 25 Gram(s) IV Push once  dextrose 50% Injectable 25 Gram(s) IV Push once  gabapentin 300 milliGRAM(s) Oral three times a day  heparin  Injectable 5000 Unit(s) SubCutaneous every 12 hours  hydrALAZINE 25 milliGRAM(s) Oral every 8 hours  insulin detemir injectable (LEVEMIR) 14 Unit(s) SubCutaneous at bedtime  insulin lispro (HumaLOG) corrective regimen sliding scale   SubCutaneous three times a day before meals  insulin lispro (HumaLOG) corrective regimen sliding scale   SubCutaneous at bedtime  lidocaine   Patch 1 Patch Transdermal daily  metoprolol tartrate 25 milliGRAM(s) Oral two times a day  sertraline 100 milliGRAM(s) Oral daily  silver sulfADIAZINE 1% Cream 1 Application(s) Topical daily    MEDICATIONS  (PRN):  dextrose 40% Gel 15 Gram(s) Oral once PRN Blood Glucose LESS THAN 70 milliGRAM(s)/deciliter  glucagon  Injectable 1 milliGRAM(s) IntraMuscular once PRN Glucose LESS THAN 70 milligrams/deciliter  nitroglycerin     SubLingual 0.4 milliGRAM(s) SubLingual every 5 minutes PRN Chest Pain      RADIOLOGY & ADDITIONAL TEST Internal Medicine Hospitalist - Dr. Mirna GREEN    [id number]    68y      Male    Patient is a 68y old  Male who presents with a chief complaint of facial droop (07 Sep 2018 07:46      INTERVAL HPI/ OVERNIGHT EVENTS: Patient is seen and examined, facial droop resolved, slurred speech at baseline, denied weakness, numbness, tingling.     REVIEW OF SYSTEMS:    Denied fever, chills, abd. pain, nausea, vomiting, chest pain, SOB, headache, dizziness    PHYSICAL EXAM:    Vital Signs Last 24 Hrs  T(C): 36.9 (07 Sep 2018 10:19), Max: 37.1 (07 Sep 2018 06:45)  T(F): 98.5 (07 Sep 2018 10:19), Max: 98.8 (07 Sep 2018 06:45)  HR: 60 (07 Sep 2018 10:19) (60 - 80)  BP: 171/79 (07 Sep 2018 10:19) (145/92 - 171/79)  BP(mean): --  RR: 20 (07 Sep 2018 10:19) (15 - 20)  SpO2: 100% (07 Sep 2018 06:45) (96% - 100%)    GENERAL: NAD  HEENT: EOMI, no facial droop  Neck: supple  CHEST/LUNG: CTA b/l   HEART: S1S2+ audible  ABDOMEN: Soft, Nontender, Nondistended; Bowel sounds present  EXTREMITIES:  no edema  CNS: AAO X 3, significant dysarthria, no facial droop, power equal in all 4 extremities, sensation intact       LABS:                        13.5   7.2   )-----------( 302      ( 07 Sep 2018 02:52 )             41.1     09-07    138  |  101  |  23.0<H>  ----------------------------<  274<H>  4.3   |  23.0  |  1.99<H>    Ca    9.3      07 Sep 2018 02:52    TPro  6.9  /  Alb  3.7  /  TBili  0.7  /  DBili  x   /  AST  16  /  ALT  17  /  AlkPhos  149<H>  09-06    PT/INR - ( 06 Sep 2018 16:39 )   PT: 11.7 sec;   INR: 1.06 ratio         PTT - ( 06 Sep 2018 16:39 )  PTT:32.0 sec        MEDICATIONS  (STANDING):  aspirin enteric coated 81 milliGRAM(s) Oral daily  atorvastatin 20 milliGRAM(s) Oral at bedtime  clopidogrel Tablet 75 milliGRAM(s) Oral daily  dextrose 5%. 1000 milliLiter(s) (50 mL/Hr) IV Continuous <Continuous>  dextrose 50% Injectable 12.5 Gram(s) IV Push once  dextrose 50% Injectable 25 Gram(s) IV Push once  dextrose 50% Injectable 25 Gram(s) IV Push once  gabapentin 300 milliGRAM(s) Oral three times a day  heparin  Injectable 5000 Unit(s) SubCutaneous every 12 hours  hydrALAZINE 25 milliGRAM(s) Oral every 8 hours  insulin detemir injectable (LEVEMIR) 14 Unit(s) SubCutaneous at bedtime  insulin lispro (HumaLOG) corrective regimen sliding scale   SubCutaneous three times a day before meals  insulin lispro (HumaLOG) corrective regimen sliding scale   SubCutaneous at bedtime  lidocaine   Patch 1 Patch Transdermal daily  metoprolol tartrate 25 milliGRAM(s) Oral two times a day  sertraline 100 milliGRAM(s) Oral daily  silver sulfADIAZINE 1% Cream 1 Application(s) Topical daily    MEDICATIONS  (PRN):  dextrose 40% Gel 15 Gram(s) Oral once PRN Blood Glucose LESS THAN 70 milliGRAM(s)/deciliter  glucagon  Injectable 1 milliGRAM(s) IntraMuscular once PRN Glucose LESS THAN 70 milligrams/deciliter  nitroglycerin     SubLingual 0.4 milliGRAM(s) SubLingual every 5 minutes PRN Chest Pain      RADIOLOGY & ADDITIONAL TEST    < from: CT Brain Stroke Protocol (09.06.18 @ 15:32) >    IMPRESSION:  Moderate chronic microvascular changes without evidence of   an acute transcortical infarction or hemorrhage. MR is a more sensitive   imaging modality for the evaluation of an acute infarction.     < end of copied text >

## 2018-09-07 NOTE — CONSULT NOTE ADULT - SUBJECTIVE AND OBJECTIVE BOX
[facility]                                          [address], [address], [place] [address]                                               PHONE: [phone]    FAX: [phone] [address], [address], [place] [address]                                       PHONE: [phone]    FAX: [phone]  *******************************************************************************    Reason for Consult: Elevated troponin    HPI:  AVNI GREEN is a 68y Male presented to ER as wife had noted right facial droop.  Hx of progressive dementia due to multiple past CVA's. Pt with slow speech, but he feels his speech is baseline.  He denies focal weakness.  Pt denies any residual weakness fro his past strokes.  Pt denies any fevers, chills or constitutional symptoms.  Pt is unaware of any new neurological symptoms.  Hx of coronary stent, CABG and ICD.  ICM.  Past bilateral LE revascularization.  IDDM. Hx of vocal cord paralysis requiring injection laryngoplasty. Hx of carotid disease and ischemic CM.  Pt laying flat without SOB.  Denies CP, SOB, palpitations, PND or orthopnea. No dizziness. No syncope.    PAST MEDICAL & SURGICAL HISTORY:  CVA (cerebral vascular accident)  Diabetic neuropathy  DM (diabetes mellitus)  Hyperlipidemia  AICD (automatic cardioverter/defibrillator) present  Pacemaker  Stented coronary artery  HTN (hypertension)  Cardiac pacemaker      No Known Allergies      MEDICATIONS  (STANDING):  aspirin enteric coated 81 milliGRAM(s) Oral daily  atorvastatin 20 milliGRAM(s) Oral at bedtime  clopidogrel Tablet 75 milliGRAM(s) Oral daily  dextrose 5%. 1000 milliLiter(s) (50 mL/Hr) IV Continuous <Continuous>  dextrose 50% Injectable 12.5 Gram(s) IV Push once  dextrose 50% Injectable 25 Gram(s) IV Push once  dextrose 50% Injectable 25 Gram(s) IV Push once  gabapentin 300 milliGRAM(s) Oral three times a day  heparin  Injectable 5000 Unit(s) SubCutaneous every 12 hours  hydrALAZINE 25 milliGRAM(s) Oral every 8 hours  insulin detemir injectable (LEVEMIR) 10 Unit(s) SubCutaneous at bedtime  insulin lispro (HumaLOG) corrective regimen sliding scale   SubCutaneous three times a day before meals  insulin lispro (HumaLOG) corrective regimen sliding scale   SubCutaneous at bedtime  lidocaine   Patch 1 Patch Transdermal daily  metoprolol tartrate 25 milliGRAM(s) Oral two times a day  sertraline 100 milliGRAM(s) Oral daily    MEDICATIONS  (PRN):  dextrose 40% Gel 15 Gram(s) Oral once PRN Blood Glucose LESS THAN 70 milliGRAM(s)/deciliter  glucagon  Injectable 1 milliGRAM(s) IntraMuscular once PRN Glucose LESS THAN 70 milligrams/deciliter  nitroglycerin     SubLingual 0.4 milliGRAM(s) SubLingual every 5 minutes PRN Chest Pain      Social History: no active tobacco / EtOH / IVDA    Family History: No pertinent family history in first degree relatives      ROS: As noted above, otherwise unremarkable.  REVIEW OF SYSTEMS  General: No fever, chills, fatigue, weakness, weight gain or weight loss  Skin: No rash or hives. No color changes  Ophthalmologic: No visual change. No double vision  ENMT: No ringing in the ears. No loss of hearing. No nose bleeds	  Respiratory and Thorax: No cough. No blood in the sputum. No shortness or breath. No wheeze. No asthma. No COPD  Cardiovascular: No CP, irregular heart beats, palpitations. No PND or orthopnea. No LE edema. No skin/temperature/color changes. No pain or difficulty with walking. 	  Gastrointestinal:	No abdominal pain. No nausea or vomiting. No blood in vomitus. No loss of appetite. No blood in stool. No tarry stool   Genitourinary: No frequency or urgency	  Musculoskeletal:	 No arm, buttock, thigh or calf cramps. No joint pain. No muscle pain. No muscle weakness. No major orthopedic injuries.  Neurological:	 No HA. No dizziness. No fainting. No seizures. Past hx TIA/stroke. No numbness. No tingling or weakness. Chronic speech difficulty  Psychiatric:	No suicidal ideation.  Hematology/Lymphatics: No bleeding or clotting abnormalities.	  Endocrine: No intolerance to heat or cold. No flushing sensation	  Allergic/Immunologic:	 No rhinitis. No latex allergy. No asthma    Vital Signs Last 24 Hrs  T(C): 37.1 (07 Sep 2018 06:45), Max: 37.1 (07 Sep 2018 06:45)  T(F): 98.8 (07 Sep 2018 06:45), Max: 98.8 (07 Sep 2018 06:45)  HR: 71 (07 Sep 2018 06:45) (71 - 80)  BP: 150/78 (07 Sep 2018 06:45) (145/92 - 164/95)  BP(mean): --  RR: 18 (07 Sep 2018 06:45) (15 - 18)  SpO2: 100% (07 Sep 2018 06:45) (96% - 100%)    I&O's Detail    I&O's Summary          PHYSICAL EXAM:  General: Appears well developed, well nourished, no acute distress  HEENT: Head: normocephalic, atraumatic  Eyes: Pupils equal and reactive  Neck: Supple, no carotid bruit, no JVD, no HJR  CARDIOVASCULAR: Normal S1 and S2, no murmur, rub, or gallop  LUNGS: Clear to auscultation bilaterally, no rales, rhonchi or wheeze  ABDOMEN: Soft, nontender, non-distended, positive bowel sounds, no mass or bruit  EXTREMITIES: No edema, distal pulses WNL  SKIN: Warm and dry with normal turgor  NEURO: Alert & oriented x 3, grossly intact. Slow speech  PSYCH: normal mood and affect    LABS:                        13.5   7.2   )-----------( 302      ( 07 Sep 2018 02:52 )             41.1     09-07    138  |  101  |  23.0<H>  ----------------------------<  274<H>  4.3   |  23.0  |  1.99<H>    Ca    9.3      07 Sep 2018 02:52    TPro  6.9  /  Alb  3.7  /  TBili  0.7  /  DBili  x   /  AST  16  /  ALT  17  /  AlkPhos  149<H>  09-06    CARDIAC MARKERS ( 07 Sep 2018 02:52 )  x     / 0.12 ng/mL / 85 U/L / x     / x      CARDIAC MARKERS ( 06 Sep 2018 16:39 )  x     / 0.15 ng/mL / x     / x     / x          PT/INR - ( 06 Sep 2018 16:39 )   PT: 11.7 sec;   INR: 1.06 ratio         PTT - ( 06 Sep 2018 16:39 )  PTT:32.0 sec    RADIOLOGY & ADDITIONAL STUDIES:    ECG: SR RBBB LAHB    ECHO: < from: TTE Echo Complete w/Doppler (01.22.18 @ 15:42) >  Summary:   1. Left ventricular ejection fraction, by visualestimation, is 50 to   55%.   2. Mildly decreased global left ventricular systolic function.   3. Basal inferolateral segment, basal inferior segment, mid   inferolateral segment, and mid inferior segment are abnormal as described   above.   4. Thereis borderline septal left ventricular hypertrophy.    MD Barbara Electronically signed on 1/22/2018 at 5:43:41 PM       < end of copied text >    < from: CT Brain Stroke Protocol (09.06.18 @ 15:32) >  IMPRESSION:  Moderate chronic microvascular changes without evidence of   an acute transcortical infarction or hemorrhage. MR is a more sensitive   imaging modality for the evaluation of an acute infarction.     DEON PENALOZA M.D., ATTENDING RADIOLOGIST  This document has been electronically signed. Sep  6 2018  3:39PM  < end of copied text >      < from: US Renal (08.29.18 @ 11:45) >  FINDINGS:  Rightkidney:  11.3 cm. No hydronephrosis or calculi. 0.9 cm echogenic   lesion at the upper pole.  Left kidney:  12.3 cm. No renal mass, hydronephrosis or calculi.  IMPRESSION:   No hydronephrosis. 0.9 cm echogenic right renal lesion, possibly AML but   may be confirmed by nonemergent CT or MRI.  < end of copied text >    < from: US Duplex Carotid Arteries Complete, Bilateral (01.21.18 @ 18:43) >  IMPRESSION: Moderate to severe plaque without a hemodynamic abnormality   involving the common carotid arteries or internal carotid arteries.   Elevated velocity in the right vertebral artery. Consider contrast   enhanced MRA for further evaluation as clinically warranted.    DEON PENALOZA M.D., ATTENDING RADIOLOGIST  This document has been electronically signed. Jan 22 2018  9:18AM  < end of copied text >      Assessment and Plan:  In summary, AVNI GREEN is a 68y Male with past medical history significant for presentation to ER due to right facial droop noted by spouse.  Hx of progressive dementia due to multiple past CVA's. Pt with slow speech, but he feels his speech is baseline.  He denies focal weakness.  Pt denies any residual weakness fro his past strokes.  Pt denies any fevers, chills or constitutional symptoms.  Pt is unaware of any new neurological symptoms.  Hx of coronary stent, CABG and ICD.  ICM.  Past bilateral LE revascularization.  IDDM. Hx of vocal cord paralysis requiring injection laryngoplasty. Hx of carotid disease and ischemic CM.  Pt laying flat without SOB.  Denies CP, SOB, palpitations, PND or orthopnea. No dizziness. No syncope.    Nuclear stress test 4/20/17 Fixed defect IPL wall with minimal periinfarct ischemia cw pt's known hx of occluded and collateralized Left cx and unchanged from study 2015.  EF=41%    - Elevated troponin.  Mild elevation of troponin (peak 0.15) is non specific in light of CRI (Cr=1.99).  Pt denies CP or symptoms to suggest ACS. Recent nuclear stress test with predominantly fixed defect with minimal marlen infarct ischemia and is consistent with pts hx of known occluded and collateralized left Cx. Would continue anti ischemic regimen with ASA, statin, metoprolol, and plavix.  Outpt ischemic evaluation may be pursued on DC, especially in light of presentation of possible new neurologic deficit.    - ICM.  off of lisinopril and placed on hydralazine due to renal insufficiency. Pt with mildly decreased EF on recent echo and WMA cw pts known anatomy.  Will have ICD interrogated to assess for any arrhythmia in association with pts neurologic presentation.    - CAD. Past CABG and coronary stenting. No active CP. Will maintain medical therapy for now. Mild troponin increase is non specific. Echo to assess for any change in WMA or EF to suggest progressive ASHD    - PAD. Arterial duplex scan 10/2/17 revealed patency of pts right SFA popliteal and anterior tibial arteries (previous revascularization May 2017) and mild diffuse left SFA and distal run off disease.    - DVT prophylaxis    - HTN. hydralazine 25mg daily, metoprolol 25mg po BID.  lisinopril DC'd due to renal insufficiency and hydralazine initiated. Mildly increased BP. Trend on changed meds (hydralazine added)    - HL. atorvastatin 20mg daily. check lipids    - Renal insufficiency. Recent renal US 0.9cm right renal mass. Otherwise noncontributory. TAN as indicated as per medical. Cr=1.99    - DM. Glycemic control     - Neurologic. Work up in progress for possible new neurologic event with facial droop noticed by wife, now resolved. Maintain ASA, plavix and statin. Dysphagia evaluation. Neuro input. Follow up carotids ordered.    - Sinus rhythm. Stable hemodynamics. BP control (BP mildly elevated).  Will assess on addition of hydralazine and trend. Doubt ACS.    We will follow with you    Lucretia Castillo M.D.

## 2018-09-07 NOTE — OCCUPATIONAL THERAPY INITIAL EVALUATION ADULT - ADDITIONAL COMMENTS
Pt with past history of CVA and required assist at home with ADLs and transfers with RW and wife assisted.

## 2018-09-07 NOTE — SWALLOW BEDSIDE ASSESSMENT ADULT - SWALLOW EVAL: RECOMMENDED DIET
Soft with honey liquids. HONEY LIQUIDS VIA TEASPOON. (Pt impulsive benefits from having honey liquids via teaspoon to control rate of intake)

## 2018-09-07 NOTE — CHART NOTE - NSCHARTNOTEFT_GEN_A_CORE
Called to evaluate ulceration on back    Mid right back  7x5 irregular shaped  ulceration some exudate  A/P  Burn from heating pad used at home  advised silvadene ointment qd and dsd  Discussed with wife

## 2018-09-08 LAB
ANION GAP SERPL CALC-SCNC: 11 MMOL/L — SIGNIFICANT CHANGE UP (ref 5–17)
BUN SERPL-MCNC: 25 MG/DL — HIGH (ref 8–20)
CALCIUM SERPL-MCNC: 8.9 MG/DL — SIGNIFICANT CHANGE UP (ref 8.6–10.2)
CHLORIDE SERPL-SCNC: 102 MMOL/L — SIGNIFICANT CHANGE UP (ref 98–107)
CK SERPL-CCNC: 63 U/L — SIGNIFICANT CHANGE UP (ref 30–200)
CO2 SERPL-SCNC: 24 MMOL/L — SIGNIFICANT CHANGE UP (ref 22–29)
CREAT SERPL-MCNC: 1.84 MG/DL — HIGH (ref 0.5–1.3)
GLUCOSE BLDC GLUCOMTR-MCNC: 108 MG/DL — HIGH (ref 70–99)
GLUCOSE BLDC GLUCOMTR-MCNC: 170 MG/DL — HIGH (ref 70–99)
GLUCOSE BLDC GLUCOMTR-MCNC: 222 MG/DL — HIGH (ref 70–99)
GLUCOSE BLDC GLUCOMTR-MCNC: 350 MG/DL — HIGH (ref 70–99)
GLUCOSE SERPL-MCNC: 172 MG/DL — HIGH (ref 70–115)
MAGNESIUM SERPL-MCNC: 2 MG/DL — SIGNIFICANT CHANGE UP (ref 1.8–2.6)
POTASSIUM SERPL-MCNC: 4.7 MMOL/L — SIGNIFICANT CHANGE UP (ref 3.5–5.3)
POTASSIUM SERPL-SCNC: 4.7 MMOL/L — SIGNIFICANT CHANGE UP (ref 3.5–5.3)
SODIUM SERPL-SCNC: 137 MMOL/L — SIGNIFICANT CHANGE UP (ref 135–145)
TROPONIN T SERPL-MCNC: 0.12 NG/ML — HIGH (ref 0–0.06)

## 2018-09-08 PROCEDURE — 99233 SBSQ HOSP IP/OBS HIGH 50: CPT

## 2018-09-08 RX ORDER — HYDRALAZINE HCL 50 MG
50 TABLET ORAL EVERY 8 HOURS
Qty: 0 | Refills: 0 | Status: DISCONTINUED | OUTPATIENT
Start: 2018-09-08 | End: 2018-09-10

## 2018-09-08 RX ADMIN — Medication 25 MILLIGRAM(S): at 06:38

## 2018-09-08 RX ADMIN — Medication 2: at 08:52

## 2018-09-08 RX ADMIN — Medication 20 UNIT(S): at 22:27

## 2018-09-08 RX ADMIN — LIDOCAINE 1 PATCH: 4 CREAM TOPICAL at 13:12

## 2018-09-08 RX ADMIN — Medication 81 MILLIGRAM(S): at 13:12

## 2018-09-08 RX ADMIN — Medication 4: at 18:14

## 2018-09-08 RX ADMIN — GABAPENTIN 300 MILLIGRAM(S): 400 CAPSULE ORAL at 06:38

## 2018-09-08 RX ADMIN — HEPARIN SODIUM 5000 UNIT(S): 5000 INJECTION INTRAVENOUS; SUBCUTANEOUS at 12:59

## 2018-09-08 RX ADMIN — Medication 25 MILLIGRAM(S): at 18:14

## 2018-09-08 RX ADMIN — HEPARIN SODIUM 5000 UNIT(S): 5000 INJECTION INTRAVENOUS; SUBCUTANEOUS at 22:28

## 2018-09-08 RX ADMIN — Medication 50 MILLIGRAM(S): at 13:13

## 2018-09-08 RX ADMIN — Medication 1 APPLICATION(S): at 13:12

## 2018-09-08 RX ADMIN — SERTRALINE 100 MILLIGRAM(S): 25 TABLET, FILM COATED ORAL at 13:13

## 2018-09-08 RX ADMIN — Medication 50 MILLIGRAM(S): at 22:28

## 2018-09-08 RX ADMIN — Medication 2: at 22:27

## 2018-09-08 RX ADMIN — CLOPIDOGREL BISULFATE 75 MILLIGRAM(S): 75 TABLET, FILM COATED ORAL at 13:12

## 2018-09-08 RX ADMIN — ATORVASTATIN CALCIUM 20 MILLIGRAM(S): 80 TABLET, FILM COATED ORAL at 22:28

## 2018-09-08 NOTE — PROGRESS NOTE ADULT - ASSESSMENT
This is 67 y/o man with h/o multiple CVA with residual significant dysarthria, unsteady gait   brought in as his Aid noticed right sided facial droop, As per EMS upon their arrival facial droop resolved and no new deficits noticed per wife and home aid. CT head no new stroke, cardiology and Neurology consult appreciated, repeat CT head no new stroke, carotid doppler mild to moderate, LVEF of 40-45% with wall motion abnormality- per cardio - Echo 9/7/18  This is essentially unchanged from echos in the past.      A/P    >TIA  H/o multiple old CVA   repeat CT head no new stroke  c/w aspirin, plavix, statin  unable to perform MRI due to PPM  carotid doppler mild to moderate plaque   TTE LVEF of 40-45% - per cardio unchanged from old TTE   Neurology consult appreciated   Cardiology consult appreciated - need PPM interrogation   neuro check and NIH   A1c 9.1. LDL 57  PT/OT/Speech eval  will stop gabapentin as he is feeling drowsy    >Dysphagia - on dysphagia diet  speech eval appreciated    > Elevated troponin - Flat  appreciate cardiology Mild elevation of troponin (peak 0.15) is non specific in light of CRI (Cr=1.99).  Pt denies CP or symptoms to suggest ACS. Recent nuclear stress test with predominantly fixed defect with minimal marlen infarct ischemia and is consistent with pts hx of known occluded and collateralized left Cx. Would continue anti ischemic regimen with ASA, statin, metoprolol, and plavix.  Outpt ischemic evaluation may be pursued on DC, especially in light of presentation of possible new neurologic deficit.  f/u TTE - LVEF of 40-45% with wall motion abnormality- per cardio - Echo 9/7/18  This is essentially unchanged from echos in the past (hospital and office).      >CAD - appreciate cardiology input   c/w aspirin, plavix, BB, statin, hydralazine    >HTN - uncontrolled   c/w metoprolol 25, increase hydralazine 50 q8h  monitor BP    >DM - A1c 9.1   FS this morning 170  resume home dose of  Lantus 25 units and sliding scale     >HLD  - c/w statin   LDL 57    >CKD stage 3 - at baseline  f/u bmp    >Rib Fracture - old   incentive spirometry  pain control    >DVT PPX - heparin

## 2018-09-08 NOTE — PROGRESS NOTE ADULT - SUBJECTIVE AND OBJECTIVE BOX
Internal Medicine Hospitalist - Dr. Mirna GREEN    [id number]    68y      Male    Patient is a 68y old  Male who presents with a chief complaint of facial droop (08 Sep 2018 08:14)    INTERVAL HPI/ OVERNIGHT EVENTS: Patient is seen and examined, pt is sleepy this morning, moving all 4 extremities     REVIEW OF SYSTEMS:    unable to obtain    PHYSICAL EXAM:    Vital Signs Last 24 Hrs  T(C): 36.6 (08 Sep 2018 06:32), Max: 36.7 (07 Sep 2018 21:41)  T(F): 97.9 (08 Sep 2018 06:32), Max: 98.1 (07 Sep 2018 21:41)  HR: 67 (08 Sep 2018 06:32) (66 - 73)  BP: 172/81 (08 Sep 2018 06:32) (142/85 - 172/81)  BP(mean): --  RR: 18 (08 Sep 2018 06:32) (18 - 20)  SpO2: 97% (08 Sep 2018 06:32) (97% - 100%)    GENERAL: sleepy   CHEST/LUNG: CTA b/l   HEART: S1S2+ audible  ABDOMEN: Soft, Nontender, Nondistended; Bowel sounds present  EXTREMITIES:  no edema  CNS: sleepy, moving alll 4 extremities    LABS:                        13.5   7.2   )-----------( 302      ( 07 Sep 2018 02:52 )             41.1     09-08    137  |  102  |  25.0<H>  ----------------------------<  172<H>  4.7   |  24.0  |  1.84<H>    Ca    8.9      08 Sep 2018 05:51  Mg     2.0     09-08    TPro  6.9  /  Alb  3.7  /  TBili  0.7  /  DBili  x   /  AST  16  /  ALT  17  /  AlkPhos  149<H>  09-06    PT/INR - ( 06 Sep 2018 16:39 )   PT: 11.7 sec;   INR: 1.06 ratio         PTT - ( 06 Sep 2018 16:39 )  PTT:32.0 sec        MEDICATIONS  (STANDING):  aspirin enteric coated 81 milliGRAM(s) Oral daily  atorvastatin 20 milliGRAM(s) Oral at bedtime  clopidogrel Tablet 75 milliGRAM(s) Oral daily  dextrose 5%. 1000 milliLiter(s) (50 mL/Hr) IV Continuous <Continuous>  dextrose 50% Injectable 12.5 Gram(s) IV Push once  dextrose 50% Injectable 25 Gram(s) IV Push once  dextrose 50% Injectable 25 Gram(s) IV Push once  heparin  Injectable 5000 Unit(s) SubCutaneous every 12 hours  hydrALAZINE 50 milliGRAM(s) Oral every 8 hours  insulin detemir injectable (LEVEMIR) 20 Unit(s) SubCutaneous at bedtime  insulin lispro (HumaLOG) corrective regimen sliding scale   SubCutaneous three times a day before meals  insulin lispro (HumaLOG) corrective regimen sliding scale   SubCutaneous at bedtime  lidocaine   Patch 1 Patch Transdermal daily  metoprolol tartrate 25 milliGRAM(s) Oral two times a day  sertraline 100 milliGRAM(s) Oral daily  silver sulfADIAZINE 1% Cream 1 Application(s) Topical daily    MEDICATIONS  (PRN):  dextrose 40% Gel 15 Gram(s) Oral once PRN Blood Glucose LESS THAN 70 milliGRAM(s)/deciliter  glucagon  Injectable 1 milliGRAM(s) IntraMuscular once PRN Glucose LESS THAN 70 milligrams/deciliter  nitroglycerin     SubLingual 0.4 milliGRAM(s) SubLingual every 5 minutes PRN Chest Pain      RADIOLOGY & ADDITIONAL TEST    < from: CT Head No Cont (09.07.18 @ 19:28) >  IMPRESSION:    No acute intracranial findings.  Mild atrophy and chronic white matter microvascular ischemic changes as   described.     < end of copied text >    < from: TTE Echo Complete w/Doppler (09.07.18 @ 09:32) >  Summary:   1. Mildly to moderately decreased segmental left ventricular systolic   function.   2. Basal inferior segment, basal and mid inferolateral wall, and mid   inferior segment are abnormal as described above.   3. Left ventricular ejection fraction, by visual estimation, is 40 to   45%.   4. Normal left ventricular internal cavity size.   5. Spectral Doppler shows impaired relaxation pattern of left   ventricular myocardial filling (Grade I diastolic dysfunction).   6. There is moderate left ventricular hypertrophy.   7. There is no evidence of pericardial effusion.   8. Mild tricuspid regurgitation.   9. Sclerotic aortic valve with normal opening.  10. Trace pulmonic valve regurgitation.  11. Dilated pulmonary artery.  12. Aortic root measured at Sinus of Valsalva is dilated.  13. See previous echos for comparison.    < end of copied text >    < from: US Duplex Carotid Arteries Complete, Bilateral (09.07.18 @ 11:19) >  IMPRESSION:    Mild to moderate bilateral plaque as described.    No elevated velocities to suggest hemodynamically significant stenosis.    < end of copied text >

## 2018-09-08 NOTE — PHYSICAL THERAPY INITIAL EVALUATION ADULT - IMPAIRMENTS FOUND, PT EVAL
aerobic capacity/endurance/cognitive impairment/gait, locomotion, and balance/muscle strength/poor safety awareness

## 2018-09-08 NOTE — PROGRESS NOTE ADULT - ATTENDING COMMENTS
discussed with wife in detail, per wife he sleeps a lot at home as well, will stop gabapentin - per wife he was not on Gabapentin at home.

## 2018-09-08 NOTE — PHYSICAL THERAPY INITIAL EVALUATION ADULT - ADDITIONAL COMMENTS
Pt. wife states pt. lives in a house with 3 ALEXI/HR and 0 STI. Pt. was ambulating independently, however has been falling a lot and refuses to use any AD. Pt. owns RW, W/[employer], [employer].

## 2018-09-08 NOTE — PROGRESS NOTE ADULT - SUBJECTIVE AND OBJECTIVE BOX
[facility], [facility]                                          [address], [address], [place] [address]                                               PHONE: [phone]    FAX: [phone] [address], [address], [place] [address]                                       PHONE: [phone]    FAX: [phone]  *******************************************************************************    Overnight events/Subjective Assessment: No complaints.  No acute cardiac events reported    INTERPRETATION OF TELEMETRY (personally reviewed): SR 60s with PVCs, no events    No Known Allergies    MEDICATIONS  (STANDING):  aspirin enteric coated 81 milliGRAM(s) Oral daily  atorvastatin 20 milliGRAM(s) Oral at bedtime  clopidogrel Tablet 75 milliGRAM(s) Oral daily  dextrose 5%. 1000 milliLiter(s) (50 mL/Hr) IV Continuous <Continuous>  dextrose 50% Injectable 12.5 Gram(s) IV Push once  dextrose 50% Injectable 25 Gram(s) IV Push once  dextrose 50% Injectable 25 Gram(s) IV Push once  gabapentin 300 milliGRAM(s) Oral three times a day  heparin  Injectable 5000 Unit(s) SubCutaneous every 12 hours  hydrALAZINE 25 milliGRAM(s) Oral every 8 hours  insulin detemir injectable (LEVEMIR) 20 Unit(s) SubCutaneous at bedtime  insulin lispro (HumaLOG) corrective regimen sliding scale   SubCutaneous three times a day before meals  insulin lispro (HumaLOG) corrective regimen sliding scale   SubCutaneous at bedtime  lidocaine   Patch 1 Patch Transdermal daily  metoprolol tartrate 25 milliGRAM(s) Oral two times a day  sertraline 100 milliGRAM(s) Oral daily  silver sulfADIAZINE 1% Cream 1 Application(s) Topical daily    MEDICATIONS  (PRN):  dextrose 40% Gel 15 Gram(s) Oral once PRN Blood Glucose LESS THAN 70 milliGRAM(s)/deciliter  glucagon  Injectable 1 milliGRAM(s) IntraMuscular once PRN Glucose LESS THAN 70 milligrams/deciliter  nitroglycerin     SubLingual 0.4 milliGRAM(s) SubLingual every 5 minutes PRN Chest Pain      Vital Signs Last 24 Hrs  T(C): 36.6 (08 Sep 2018 06:32), Max: 36.9 (07 Sep 2018 10:19)  T(F): 97.9 (08 Sep 2018 06:32), Max: 98.5 (07 Sep 2018 10:19)  HR: 67 (08 Sep 2018 06:32) (60 - 73)  BP: 172/81 (08 Sep 2018 06:32) (142/85 - 172/81)  BP(mean): --  RR: 18 (08 Sep 2018 06:32) (18 - 20)  SpO2: 97% (08 Sep 2018 06:32) (97% - 100%)    I&O's Detail    07 Sep 2018 07:01  -  08 Sep 2018 07:00  --------------------------------------------------------  IN:    Oral Fluid: 120 mL  Total IN: 120 mL    OUT:    Voided: 225 mL  Total OUT: 225 mL    Total NET: -105 mL        I&O's Summary    07 Sep 2018 07:01  -  08 Sep 2018 07:00  --------------------------------------------------------  IN: 120 mL / OUT: 225 mL / NET: -105 mL            PHYSICAL EXAM:  General: Appears well developed, well nourished, no acute distress  HEENT: Head: normocephalic, atraumatic  Eyes: Pupils equal and reactive  Neck: Supple, no carotid bruit, no JVD, no HJR  CARDIOVASCULAR: Normal S1 and S2, no murmur, rub, or gallop  LUNGS: Clear to auscultation bilaterally, no rales, rhonchi or wheeze  ABDOMEN: Soft, nontender, non-distended, positive bowel sounds, no mass or bruit  EXTREMITIES: No edema, distal pulses WNL  SKIN: Warm and dry with normal turgor  NEURO: Alert & oriented x 3  PSYCH: normal mood and affect        LABS:                        13.5   7.2   )-----------( 302      ( 07 Sep 2018 02:52 )             41.1     09-08    137  |  102  |  25.0<H>  ----------------------------<  172<H>  4.7   |  24.0  |  1.84<H>    Ca    8.9      08 Sep 2018 05:51  Mg     2.0     09-08    TPro  6.9  /  Alb  3.7  /  TBili  0.7  /  DBili  x   /  AST  16  /  ALT  17  /  AlkPhos  149<H>  09-06    CARDIAC MARKERS ( 08 Sep 2018 05:51 )  x     / 0.12 ng/mL / 63 U/L / x     / x      CARDIAC MARKERS ( 07 Sep 2018 02:52 )  x     / 0.12 ng/mL / 85 U/L / x     / x      CARDIAC MARKERS ( 06 Sep 2018 16:39 )  x     / 0.15 ng/mL / x     / x     / x          PT/INR - ( 06 Sep 2018 16:39 )   PT: 11.7 sec;   INR: 1.06 ratio         PTT - ( 06 Sep 2018 16:39 )  PTT:32.0 sec  serum  Lipids:   Hemoglobin A1C, Whole Blood: 9.1 % (09-07 @ 02:52)  Hemoglobin A1C, Whole Blood: 9.3 % (08-29 @ 03:20)        RADIOLOGY & ADDITIONAL STUDIES:  < from: CT Head No Cont (09.07.18 @ 19:28) >  IMPRESSION:    No acute intracranial findings.  Mild atrophy and chronic white matter microvascular ischemic changes as   described.     If acute stroke is of clinical concern, MRI with diffusion-weighted   images would be helpful for further characterization.       < end of copied text >      ECHO:  < from: TTE Echo Complete w/Doppler (09.07.18 @ 09:32) >  PHYSICIAN INTERPRETATION:  Left Ventricle: The left ventricular internal cavity size is normal.   There is moderate left ventricular hypertrophy involving the septal wall.  Left ventricular ejection fraction, by visual estimation, is 40 to 45%.   Mildly to moderately decreased segmental left ventricular systolic   function. Spectral Doppler shows impaired relaxation pattern of left   ventricular myocardial filling (Grade I diastolic dysfunction).       LV Wall Scoring:  The basal inferior segment is aneurysmal. The basal and mid inferolateral   wall  and mid inferior segment are akinetic.    Right Ventricle: The right ventricle was not well visualized. TV S' 0.1   m/s.  Left Atrium: Normal left atrial size.  Right Atrium: Normal right atrial size.  Pericardium: There is no evidence of pericardial effusion.  Mitral Valve: The mitral valve is normal in structure. Mild mitral valve   regurgitation is seen.  Tricuspid Valve: The tricuspid valve is normal. Mild tricuspid   regurgitation is visualized.  Aortic Valve: The aortic valve is trileaflet. Sclerotic aortic valve with   normal opening. No evidence of aortic valve regurgitation is seen.  Pulmonic Valve: Trace pulmonic valve regurgitation.  Aorta: Aortic root measured at Sinus of Valsalva is dilated.  Pulmonary Artery: The pulmonary artery is dilated.  Venous: The inferior vena cava is not well visualized.  Additional Comments: A pacer wire is visualized in the right atrium and   right ventricle.  In comparison to the previous echocardiogram(s): The left ventricular   function is unchanged.       Summary:   1. Mildly to moderately decreased segmental left ventricular systolic   function.   2. Basal inferior segment, basal and mid inferolateral wall, and mid   inferior segment are abnormal as described above.   3. Left ventricular ejection fraction, by visual estimation, is 40 to   45%.   4. Normal left ventricular internal cavity size.   5. Spectral Doppler shows impaired relaxation pattern of left   ventricular myocardial filling (Grade I diastolic dysfunction).   6. There is moderate left ventricular hypertrophy.   7. There is no evidence of pericardial effusion.   8. Mild tricuspid regurgitation.   9. Sclerotic aortic valve with normal opening.  10. Trace pulmonic valve regurgitation.  11. Dilated pulmonary artery.  12. Aortic root measured at Sinus of Valsalva is dilated.  13. See previous echos for comparison.      < end of copied text >    ASSESSMENT AND PLAN:  In summary, AVNI GREEN is a 68y Male admitted with facial droop from suspected TIA.  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  He has history of vocal cord paralysis requiring injection laryngoplasty.     - Nuclear stress test 4/20/17 Fixed defect IPL wall with minimal periinfarct ischemia cw pt's known hx of occluded and collateralized Left cx and unchanged from study 2015.  EF=41%  - Mild elevation of troponin (peak 0.15) is non specific in light of CRI (Cr=1.99).  Pt denies CP or symptoms to suggest ACS. Recent nuclear stress test with predominantly fixed defect with minimal marlen infarct ischemia and is consistent with pts hx of known occluded and collateralized left Cx. Would continue anti ischemic regimen with ASA, statin, metoprolol, and plavix.  Outpt ischemic evaluation may be pursued on DC, especially in light of presentation of possible new neurologic deficit.  - Echo 9/7/18 noted above.  This is essentially unchanged from echos in the past (hospital and office).  - ICM. Off of lisinopril and placed on hydralazine due to renal insufficiency. Pt with mildly decreased EF on recent echo and WMA cw pts known anatomy.    - PAD. Arterial duplex scan 10/2/17 revealed patency of pts right SFA popliteal and anterior tibial arteries (previous revascularization May 2017) and mild diffuse left SFA and distal run off disease.  - Carotid duplex 9/7/18 mild-moderate stenosis, not hemodynamically significant  - Continue ASA 81 and Plavix as per neurology  - Continue statin therapy  - Elevated BP.  Increase Hydralazine to 50mg TID, continue metoprolol 25mg po BID.  Titrate prn.  lisinopril DC'd due to renal insufficiency and hydralazine initiated.  Can consider addition of Imdur.  - DM. Glycemic control as per medicine  - Monitor renal function  - Neurology following  - Will have ICD interrogated to assess for any arrhythmia in association with pts neurologic presentation. ([employer])  - No further inpatient cardiac work up is planned        Desmond Jennings MD

## 2018-09-08 NOTE — PROGRESS NOTE ADULT - SUBJECTIVE AND OBJECTIVE BOX
INTERVAL HISTORY:  reported to stable, No interval changes -at [facility]  Chart reviewed.      VITAL SIGNS:  Vital Signs Last 24 Hrs  T(C): 36.6 (08 Sep 2018 12:21), Max: 36.7 (07 Sep 2018 21:41)  T(F): 97.8 (08 Sep 2018 12:21), Max: 98.1 (07 Sep 2018 21:41)  HR: 60 (08 Sep 2018 12:21) (60 - 73)  BP: 150/80 (08 Sep 2018 12:21) (142/85 - 172/81)  BP(mean): --  RR: 18 (08 Sep 2018 12:21) (18 - 20)  SpO2: 97% (08 Sep 2018 12:21) (97% - 100%)    PHYSICAL EXAMINATION:    Mentation:    Language/Speech:  CN:  Visual Fields:  Motor:  Sensory:  DTR:  Babinski:      MEDS:  MEDICATIONS  (STANDING):  aspirin enteric coated 81 milliGRAM(s) Oral daily  atorvastatin 20 milliGRAM(s) Oral at bedtime  clopidogrel Tablet 75 milliGRAM(s) Oral daily  dextrose 5%. 1000 milliLiter(s) (50 mL/Hr) IV Continuous <Continuous>  dextrose 50% Injectable 12.5 Gram(s) IV Push once  dextrose 50% Injectable 25 Gram(s) IV Push once  dextrose 50% Injectable 25 Gram(s) IV Push once  heparin  Injectable 5000 Unit(s) SubCutaneous every 12 hours  hydrALAZINE 50 milliGRAM(s) Oral every 8 hours  insulin detemir injectable (LEVEMIR) 20 Unit(s) SubCutaneous at bedtime  insulin lispro (HumaLOG) corrective regimen sliding scale   SubCutaneous three times a day before meals  insulin lispro (HumaLOG) corrective regimen sliding scale   SubCutaneous at bedtime  lidocaine   Patch 1 Patch Transdermal daily  metoprolol tartrate 25 milliGRAM(s) Oral two times a day  sertraline 100 milliGRAM(s) Oral daily  silver sulfADIAZINE 1% Cream 1 Application(s) Topical daily    MEDICATIONS  (PRN):  dextrose 40% Gel 15 Gram(s) Oral once PRN Blood Glucose LESS THAN 70 milliGRAM(s)/deciliter  glucagon  Injectable 1 milliGRAM(s) IntraMuscular once PRN Glucose LESS THAN 70 milligrams/deciliter  nitroglycerin     SubLingual 0.4 milliGRAM(s) SubLingual every 5 minutes PRN Chest Pain      LABS:                          13.5   7.2   )-----------( 302      ( 07 Sep 2018 02:52 )             41.1     09-08    137  |  102  |  25.0<H>  ----------------------------<  172<H>  4.7   |  24.0  |  1.84<H>    Ca    8.9      08 Sep 2018 05:51  Mg     2.0     09-08    TPro  6.9  /  Alb  3.7  /  TBili  0.7  /  DBili  x   /  AST  16  /  ALT  17  /  AlkPhos  149<H>  09-06    LIVER FUNCTIONS - ( 06 Sep 2018 16:39 )  Alb: 3.7 g/dL / Pro: 6.9 g/dL / ALK PHOS: 149 U/L / ALT: 17 U/L / AST: 16 U/L / GGT: x               RADIOLOGY & ADDITIONAL STUDIES:    < from: CT Head No Cont (09.07.18 @ 19:28) >  No acute intracranial findings.  Mild atrophy and chronic white matter microvascular ischemic changes as   described.       < end of copied text >      IMPRESSION & PLAN:    TIA  Vascular dementia    REC:  Antiplatelet therapy as prescribed.  Cerebrovascular risk factor assessment and management  Medical and Cardiac evaluation and treatment as indicated  Dispo planning. No further inhouse neurodiagnostic testing anticipated.

## 2018-09-09 LAB
GLUCOSE BLDC GLUCOMTR-MCNC: 131 MG/DL — HIGH (ref 70–99)
GLUCOSE BLDC GLUCOMTR-MCNC: 168 MG/DL — HIGH (ref 70–99)
GLUCOSE BLDC GLUCOMTR-MCNC: 178 MG/DL — HIGH (ref 70–99)
GLUCOSE BLDC GLUCOMTR-MCNC: 307 MG/DL — HIGH (ref 70–99)

## 2018-09-09 PROCEDURE — 99232 SBSQ HOSP IP/OBS MODERATE 35: CPT

## 2018-09-09 RX ADMIN — HEPARIN SODIUM 5000 UNIT(S): 5000 INJECTION INTRAVENOUS; SUBCUTANEOUS at 22:07

## 2018-09-09 RX ADMIN — Medication 20 UNIT(S): at 22:07

## 2018-09-09 RX ADMIN — LIDOCAINE 1 PATCH: 4 CREAM TOPICAL at 11:03

## 2018-09-09 RX ADMIN — ATORVASTATIN CALCIUM 20 MILLIGRAM(S): 80 TABLET, FILM COATED ORAL at 22:07

## 2018-09-09 RX ADMIN — Medication 25 MILLIGRAM(S): at 06:19

## 2018-09-09 RX ADMIN — CLOPIDOGREL BISULFATE 75 MILLIGRAM(S): 75 TABLET, FILM COATED ORAL at 11:03

## 2018-09-09 RX ADMIN — Medication 25 MILLIGRAM(S): at 17:32

## 2018-09-09 RX ADMIN — SERTRALINE 100 MILLIGRAM(S): 25 TABLET, FILM COATED ORAL at 11:03

## 2018-09-09 RX ADMIN — Medication 81 MILLIGRAM(S): at 11:03

## 2018-09-09 RX ADMIN — HEPARIN SODIUM 5000 UNIT(S): 5000 INJECTION INTRAVENOUS; SUBCUTANEOUS at 10:55

## 2018-09-09 RX ADMIN — Medication 8: at 17:32

## 2018-09-09 RX ADMIN — Medication 50 MILLIGRAM(S): at 06:19

## 2018-09-09 RX ADMIN — Medication 50 MILLIGRAM(S): at 14:11

## 2018-09-09 RX ADMIN — Medication 1 APPLICATION(S): at 11:04

## 2018-09-09 RX ADMIN — LIDOCAINE 1 PATCH: 4 CREAM TOPICAL at 23:17

## 2018-09-09 RX ADMIN — Medication 2: at 14:10

## 2018-09-09 RX ADMIN — Medication 50 MILLIGRAM(S): at 22:07

## 2018-09-09 RX ADMIN — LIDOCAINE 1 PATCH: 4 CREAM TOPICAL at 00:20

## 2018-09-09 NOTE — PROGRESS NOTE ADULT - ASSESSMENT
This is 67 y/o man with h/o multiple CVA with residual significant dysarthria, unsteady gait   brought in as his Aid noticed right sided facial droop, As per EMS upon their arrival facial droop resolved and no new deficits noticed per wife and home aid. CT head no new stroke, cardiology and Neurology consult appreciated, repeat CT head no new stroke, carotid doppler mild to moderate, LVEF of 40-45% with wall motion abnormality- per cardio - Echo 9/7/18  This is essentially unchanged from echos in the past.      A/P    >TIA  H/o multiple old CVA   repeat CT head no new stroke  c/w aspirin, plavix, statin  unable to perform MRI due to PPM  carotid doppler mild to moderate plaque   TTE LVEF of 40-45% - per cardio unchanged from old TTE   Neurology consult appreciated   Cardiology consult appreciated -   PPM interrogation negative  neuro check and NIH   A1c 9.1. LDL 57  PT/OT/Speech eval appreciated  stopped gabapentin as he was drowsy    >Dysphagia - on dysphagia diet  speech eval appreciated    > Elevated troponin - Flat  appreciate cardiology Mild elevation of troponin (peak 0.15) is non specific in light of CRI (Cr=1.99).  Pt denies CP or symptoms to suggest ACS. Recent nuclear stress test with predominantly fixed defect with minimal marlen infarct ischemia and is consistent with pts hx of known occluded and collateralized left Cx. Would continue anti ischemic regimen with ASA, statin, metoprolol, and plavix.  Outpt ischemic evaluation may be pursued on DC, especially in light of presentation of possible new neurologic deficit.  f/u TTE - LVEF of 40-45% with wall motion abnormality- per cardio - Echo 9/7/18  This is essentially unchanged from echos in the past (hospital and office).      >CAD - appreciate cardiology input   c/w aspirin, plavix, BB, statin, hydralazine    >HTN - improving   c/w metoprolol 25, increase hydralazine 50 q8h  monitor BP    >DM - A1c 9.1   FS this morning 131  c/w home dose of  Lantus 25 units and sliding scale     >HLD  - c/w statin   LDL 57    >CKD stage 3 - at baseline  f/u bmp    >Rib Fracture - old   incentive spirometry  pain control    >DVT PPX - heparin

## 2018-09-09 NOTE — PROGRESS NOTE ADULT - SUBJECTIVE AND OBJECTIVE BOX
Internal Medicine Hospitalist - Dr. Mirna GREEN    [id number]    68y      Male    Patient is a 68y old  Male who presents with a chief complaint of facial droop (09 Sep 2018 07:56)    INTERVAL HPI/ OVERNIGHT EVENTS: Patient is seen and examined, pt is more awake today, having breakfast, denied chest pain, SOB, numbness      REVIEW OF SYSTEMS:    Denied fever, chills, abd. pain, nausea, vomiting, chest pain, SOB, headache, dizziness    PHYSICAL EXAM:    Vital Signs Last 24 Hrs  T(C): 36.8 (09 Sep 2018 06:10), Max: 36.8 (09 Sep 2018 06:10)  T(F): 98.2 (09 Sep 2018 06:10), Max: 98.2 (09 Sep 2018 06:10)  HR: 75 (09 Sep 2018 06:10) (60 - 78)  BP: 140/72 (09 Sep 2018 06:10) (126/74 - 150/80)  BP(mean): --  RR: 18 (09 Sep 2018 06:10) (18 - 18)  SpO2: 97% (09 Sep 2018 06:10) (94% - 98%)    GENERAL: NAD  CHEST/LUNG: CTA b/l   HEART: S1S2+ audible  ABDOMEN: Soft, Nontender, Nondistended; Bowel sounds present  EXTREMITIES:  no edema  CNS: Awake oriented, non focal    LABS:    09-08    137  |  102  |  25.0<H>  ----------------------------<  172<H>  4.7   |  24.0  |  1.84<H>    Ca    8.9      08 Sep 2018 05:51  Mg     2.0     09-08        MEDICATIONS  (STANDING):  aspirin enteric coated 81 milliGRAM(s) Oral daily  atorvastatin 20 milliGRAM(s) Oral at bedtime  clopidogrel Tablet 75 milliGRAM(s) Oral daily  dextrose 5%. 1000 milliLiter(s) (50 mL/Hr) IV Continuous <Continuous>  dextrose 50% Injectable 12.5 Gram(s) IV Push once  dextrose 50% Injectable 25 Gram(s) IV Push once  dextrose 50% Injectable 25 Gram(s) IV Push once  heparin  Injectable 5000 Unit(s) SubCutaneous every 12 hours  hydrALAZINE 50 milliGRAM(s) Oral every 8 hours  insulin detemir injectable (LEVEMIR) 20 Unit(s) SubCutaneous at bedtime  insulin lispro (HumaLOG) corrective regimen sliding scale   SubCutaneous three times a day before meals  insulin lispro (HumaLOG) corrective regimen sliding scale   SubCutaneous at bedtime  lidocaine   Patch 1 Patch Transdermal daily  metoprolol tartrate 25 milliGRAM(s) Oral two times a day  sertraline 100 milliGRAM(s) Oral daily  silver sulfADIAZINE 1% Cream 1 Application(s) Topical daily    MEDICATIONS  (PRN):  dextrose 40% Gel 15 Gram(s) Oral once PRN Blood Glucose LESS THAN 70 milliGRAM(s)/deciliter  glucagon  Injectable 1 milliGRAM(s) IntraMuscular once PRN Glucose LESS THAN 70 milligrams/deciliter  nitroglycerin     SubLingual 0.4 milliGRAM(s) SubLingual every 5 minutes PRN Chest Pain      RADIOLOGY & ADDITIONAL TEST

## 2018-09-09 NOTE — PROGRESS NOTE ADULT - SUBJECTIVE AND OBJECTIVE BOX
[facility], [facility]                                          [address], [address], [place] [address]                                               PHONE: [phone]    FAX: [phone] [address], [address], [place] [address]                                       PHONE: [phone]    FAX: [phone]  *******************************************************************************    Overnight events/Subjective Assessment: no cardiac complaints or events    INTERPRETATION OF TELEMETRY (personally reviewed): SR 60-70s, no events    No Known Allergies    MEDICATIONS  (STANDING):  aspirin enteric coated 81 milliGRAM(s) Oral daily  atorvastatin 20 milliGRAM(s) Oral at bedtime  clopidogrel Tablet 75 milliGRAM(s) Oral daily  dextrose 5%. 1000 milliLiter(s) (50 mL/Hr) IV Continuous <Continuous>  dextrose 50% Injectable 12.5 Gram(s) IV Push once  dextrose 50% Injectable 25 Gram(s) IV Push once  dextrose 50% Injectable 25 Gram(s) IV Push once  heparin  Injectable 5000 Unit(s) SubCutaneous every 12 hours  hydrALAZINE 50 milliGRAM(s) Oral every 8 hours  insulin detemir injectable (LEVEMIR) 20 Unit(s) SubCutaneous at bedtime  insulin lispro (HumaLOG) corrective regimen sliding scale   SubCutaneous three times a day before meals  insulin lispro (HumaLOG) corrective regimen sliding scale   SubCutaneous at bedtime  lidocaine   Patch 1 Patch Transdermal daily  metoprolol tartrate 25 milliGRAM(s) Oral two times a day  sertraline 100 milliGRAM(s) Oral daily  silver sulfADIAZINE 1% Cream 1 Application(s) Topical daily    MEDICATIONS  (PRN):  dextrose 40% Gel 15 Gram(s) Oral once PRN Blood Glucose LESS THAN 70 milliGRAM(s)/deciliter  glucagon  Injectable 1 milliGRAM(s) IntraMuscular once PRN Glucose LESS THAN 70 milligrams/deciliter  nitroglycerin     SubLingual 0.4 milliGRAM(s) SubLingual every 5 minutes PRN Chest Pain      Vital Signs Last 24 Hrs  T(C): 36.8 (09 Sep 2018 06:10), Max: 36.8 (09 Sep 2018 06:10)  T(F): 98.2 (09 Sep 2018 06:10), Max: 98.2 (09 Sep 2018 06:10)  HR: 75 (09 Sep 2018 06:10) (60 - 78)  BP: 140/72 (09 Sep 2018 06:10) (126/74 - 150/80)  BP(mean): --  RR: 18 (09 Sep 2018 06:10) (18 - 18)  SpO2: 97% (09 Sep 2018 06:10) (94% - 98%)    I&O's Detail    08 Sep 2018 07:01  -  09 Sep 2018 07:00  --------------------------------------------------------  IN:  Total IN: 0 mL    OUT:    Voided: 400 mL  Total OUT: 400 mL    Total NET: -400 mL        I&O's Summary    08 Sep 2018 07:01  -  09 Sep 2018 07:00  --------------------------------------------------------  IN: 0 mL / OUT: 400 mL / NET: -400 mL            PHYSICAL EXAM:  General: Appears well developed, well nourished, no acute distress  HEENT: Head: normocephalic, atraumatic  Eyes: Pupils equal and reactive  Neck: Supple, no carotid bruit, no JVD, no HJR  CARDIOVASCULAR: Normal S1 and S2, no murmur, rub, or gallop  LUNGS: Clear to auscultation bilaterally, no rales, rhonchi or wheeze  ABDOMEN: Soft, nontender, non-distended, positive bowel sounds, no mass or bruit  EXTREMITIES: No edema, distal pulses WNL  SKIN: Warm and dry with normal turgor  NEURO: Alert & oriented   PSYCH: normal mood and affect        LABS:    09-08    137  |  102  |  25.0<H>  ----------------------------<  172<H>  4.7   |  24.0  |  1.84<H>    Ca    8.9      08 Sep 2018 05:51  Mg     2.0     09-08      CARDIAC MARKERS ( 08 Sep 2018 05:51 )  x     / 0.12 ng/mL / 63 U/L / x     / x            serum  Lipids:   Hemoglobin A1C, Whole Blood: 9.1 % (09-07 @ 02:52)  Hemoglobin A1C, Whole Blood: 9.3 % (08-29 @ 03:20)        RADIOLOGY & ADDITIONAL STUDIES:  < from: CT Head No Cont (09.07.18 @ 19:28) >  IMPRESSION:    No acute intracranial findings.  Mild atrophy and chronic white matter microvascular ischemic changes as   described.     If acute stroke is of clinical concern, MRI with diffusion-weighted   images would be helpful for further characterization.       < end of copied text >      ECHO:  < from: TTE Echo Complete w/Doppler (09.07.18 @ 09:32) >  PHYSICIAN INTERPRETATION:  Left Ventricle: The left ventricular internal cavity size is normal.   There is moderate left ventricular hypertrophy involving the septal wall.  Left ventricular ejection fraction, by visual estimation, is 40 to 45%.   Mildly to moderately decreased segmental left ventricular systolic   function. Spectral Doppler shows impaired relaxation pattern of left   ventricular myocardial filling (Grade I diastolic dysfunction).       LV Wall Scoring:  The basal inferior segment is aneurysmal. The basal and mid inferolateral   wall  and mid inferior segment are akinetic.    Right Ventricle: The right ventricle was not well visualized. TV S' 0.1   m/s.  Left Atrium: Normal left atrial size.  Right Atrium: Normal right atrial size.  Pericardium: There is no evidence of pericardial effusion.  Mitral Valve: The mitral valve is normal in structure. Mild mitral valve   regurgitation is seen.  Tricuspid Valve: The tricuspid valve is normal. Mild tricuspid   regurgitation is visualized.  Aortic Valve: The aortic valve is trileaflet. Sclerotic aortic valve with   normal opening. No evidence of aortic valve regurgitation is seen.  Pulmonic Valve: Trace pulmonic valve regurgitation.  Aorta: Aortic root measured at Sinus of Valsalva is dilated.  Pulmonary Artery: The pulmonary artery is dilated.  Venous: The inferior vena cava is not well visualized.  Additional Comments: A pacer wire is visualized in the right atrium and   right ventricle.  In comparison to the previous echocardiogram(s): The left ventricular   function is unchanged.       Summary:   1. Mildly to moderately decreased segmental left ventricular systolic   function.   2. Basal inferior segment, basal and mid inferolateral wall, and mid   inferior segment are abnormal as described above.   3. Left ventricular ejection fraction, by visual estimation, is 40 to   45%.   4. Normal left ventricular internal cavity size.   5. Spectral Doppler shows impaired relaxation pattern of left   ventricular myocardial filling (Grade I diastolic dysfunction).   6. There is moderate left ventricular hypertrophy.   7. There is no evidence of pericardial effusion.   8. Mild tricuspid regurgitation.   9. Sclerotic aortic valve with normal opening.  10. Trace pulmonic valve regurgitation.  11. Dilated pulmonary artery.  12. Aortic root measured at Sinus of Valsalva is dilated.  13. See previous echos for comparison.      < end of copied text >    ASSESSMENT AND PLAN:  In summary, AVNI GREEN is a 68y Male admitted with facial droop from suspected TIA.  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  He has history of vocal cord paralysis requiring injection laryngoplasty.     - Nuclear stress test 4/20/17 Fixed defect IPL wall with minimal periinfarct ischemia cw pt's known hx of occluded and collateralized Left cx and unchanged from study 2015.  EF=41%  - Mild elevation of troponin (peak 0.15) is non specific in light of CRI (Cr=1.99).  Pt denies CP or symptoms to suggest ACS. Recent nuclear stress test with predominantly fixed defect with minimal marlen infarct ischemia and is consistent with pts hx of known occluded and collateralized left Cx. Would continue anti ischemic regimen with ASA, statin, metoprolol, and plavix.  Outpt ischemic evaluation may be pursued on DC, especially in light of presentation of possible new neurologic deficit.  - Echo 9/7/18 noted above.  This is essentially unchanged from echos in the past (hospital and office).  - ICM. Off of lisinopril and placed on hydralazine due to renal insufficiency. Pt with mildly decreased EF on recent echo and WMA cw pts known anatomy.    - PAD. Arterial duplex scan 10/2/17 revealed patency of pts right SFA popliteal and anterior tibial arteries (previous revascularization May 2017) and mild diffuse left SFA and distal run off disease.  - Carotid duplex 9/7/18 mild-moderate stenosis, not hemodynamically significant  - Continue ASA 81 and Plavix as per neurology  - Continue statin therapy  - Cont Hydralazine to 50mg TID, continue metoprolol 25mg po BID.  Titrate prn.  lisinopril DC'd due to renal insufficiency and hydralazine initiated.  Can consider addition of Imdur.  - DM. Glycemic control as per medicine  - Monitor renal function  - Neurology following  - Med[employer]onic ICD interrogated.  No events reported.  - No further inpatient cardiac work up is planned  - Stable CV status.  Will sign off and follow prn.  Please call with any CV questions/concerns.    Desmond Jennings MD

## 2018-09-10 ENCOUNTER — TRANSCRIPTION ENCOUNTER (OUTPATIENT)
Age: 68
End: 2018-09-10

## 2018-09-10 VITALS
HEART RATE: 68 BPM | RESPIRATION RATE: 20 BRPM | SYSTOLIC BLOOD PRESSURE: 116 MMHG | OXYGEN SATURATION: 98 % | TEMPERATURE: 98 F | DIASTOLIC BLOOD PRESSURE: 62 MMHG

## 2018-09-10 LAB
ANION GAP SERPL CALC-SCNC: 12 MMOL/L — SIGNIFICANT CHANGE UP (ref 5–17)
BUN SERPL-MCNC: 32 MG/DL — HIGH (ref 8–20)
CALCIUM SERPL-MCNC: 9.2 MG/DL — SIGNIFICANT CHANGE UP (ref 8.6–10.2)
CHLORIDE SERPL-SCNC: 102 MMOL/L — SIGNIFICANT CHANGE UP (ref 98–107)
CO2 SERPL-SCNC: 26 MMOL/L — SIGNIFICANT CHANGE UP (ref 22–29)
CREAT SERPL-MCNC: 2.13 MG/DL — HIGH (ref 0.5–1.3)
GLUCOSE BLDC GLUCOMTR-MCNC: 144 MG/DL — HIGH (ref 70–99)
GLUCOSE BLDC GLUCOMTR-MCNC: 255 MG/DL — HIGH (ref 70–99)
GLUCOSE SERPL-MCNC: 156 MG/DL — HIGH (ref 70–115)
MAGNESIUM SERPL-MCNC: 2.1 MG/DL — SIGNIFICANT CHANGE UP (ref 1.8–2.6)
POTASSIUM SERPL-MCNC: 4.4 MMOL/L — SIGNIFICANT CHANGE UP (ref 3.5–5.3)
POTASSIUM SERPL-MCNC: 5.4 MMOL/L — HIGH (ref 3.5–5.3)
POTASSIUM SERPL-SCNC: 4.4 MMOL/L — SIGNIFICANT CHANGE UP (ref 3.5–5.3)
POTASSIUM SERPL-SCNC: 5.4 MMOL/L — HIGH (ref 3.5–5.3)
SODIUM SERPL-SCNC: 140 MMOL/L — SIGNIFICANT CHANGE UP (ref 135–145)

## 2018-09-10 PROCEDURE — 97530 THERAPEUTIC ACTIVITIES: CPT

## 2018-09-10 PROCEDURE — 99285 EMERGENCY DEPT VISIT HI MDM: CPT | Mod: 25

## 2018-09-10 PROCEDURE — 93880 EXTRACRANIAL BILAT STUDY: CPT

## 2018-09-10 PROCEDURE — 36415 COLL VENOUS BLD VENIPUNCTURE: CPT

## 2018-09-10 PROCEDURE — 80048 BASIC METABOLIC PNL TOTAL CA: CPT

## 2018-09-10 PROCEDURE — 82962 GLUCOSE BLOOD TEST: CPT

## 2018-09-10 PROCEDURE — 70450 CT HEAD/BRAIN W/O DYE: CPT

## 2018-09-10 PROCEDURE — 71045 X-RAY EXAM CHEST 1 VIEW: CPT

## 2018-09-10 PROCEDURE — 97535 SELF CARE MNGMENT TRAINING: CPT

## 2018-09-10 PROCEDURE — 84132 ASSAY OF SERUM POTASSIUM: CPT

## 2018-09-10 PROCEDURE — 97110 THERAPEUTIC EXERCISES: CPT

## 2018-09-10 PROCEDURE — 80061 LIPID PANEL: CPT

## 2018-09-10 PROCEDURE — 90686 IIV4 VACC NO PRSV 0.5 ML IM: CPT

## 2018-09-10 PROCEDURE — 97167 OT EVAL HIGH COMPLEX 60 MIN: CPT

## 2018-09-10 PROCEDURE — 93005 ELECTROCARDIOGRAM TRACING: CPT

## 2018-09-10 PROCEDURE — 82550 ASSAY OF CK (CPK): CPT

## 2018-09-10 PROCEDURE — 80053 COMPREHEN METABOLIC PANEL: CPT

## 2018-09-10 PROCEDURE — 85730 THROMBOPLASTIN TIME PARTIAL: CPT

## 2018-09-10 PROCEDURE — 93306 TTE W/DOPPLER COMPLETE: CPT

## 2018-09-10 PROCEDURE — 97163 PT EVAL HIGH COMPLEX 45 MIN: CPT

## 2018-09-10 PROCEDURE — 85027 COMPLETE CBC AUTOMATED: CPT

## 2018-09-10 PROCEDURE — 84484 ASSAY OF TROPONIN QUANT: CPT

## 2018-09-10 PROCEDURE — 99239 HOSP IP/OBS DSCHRG MGMT >30: CPT

## 2018-09-10 PROCEDURE — 97116 GAIT TRAINING THERAPY: CPT

## 2018-09-10 PROCEDURE — 83036 HEMOGLOBIN GLYCOSYLATED A1C: CPT

## 2018-09-10 PROCEDURE — 92610 EVALUATE SWALLOWING FUNCTION: CPT

## 2018-09-10 PROCEDURE — 85610 PROTHROMBIN TIME: CPT

## 2018-09-10 PROCEDURE — 83735 ASSAY OF MAGNESIUM: CPT

## 2018-09-10 RX ORDER — METOPROLOL TARTRATE 50 MG
1 TABLET ORAL
Qty: 0 | Refills: 0 | COMMUNITY
Start: 2018-09-10

## 2018-09-10 RX ORDER — SODIUM POLYSTYRENE SULFONATE 4.1 MEQ/G
30 POWDER, FOR SUSPENSION ORAL ONCE
Qty: 0 | Refills: 0 | Status: COMPLETED | OUTPATIENT
Start: 2018-09-10 | End: 2018-09-10

## 2018-09-10 RX ORDER — HYDRALAZINE HCL 50 MG
1 TABLET ORAL
Qty: 90 | Refills: 0 | OUTPATIENT
Start: 2018-09-10 | End: 2018-10-09

## 2018-09-10 RX ORDER — INFLUENZA VIRUS VACCINE 15; 15; 15; 15 UG/.5ML; UG/.5ML; UG/.5ML; UG/.5ML
0.5 SUSPENSION INTRAMUSCULAR ONCE
Qty: 0 | Refills: 0 | Status: COMPLETED | OUTPATIENT
Start: 2018-09-10 | End: 2018-09-10

## 2018-09-10 RX ADMIN — INFLUENZA VIRUS VACCINE 0.5 MILLILITER(S): 15; 15; 15; 15 SUSPENSION INTRAMUSCULAR at 13:50

## 2018-09-10 RX ADMIN — Medication 25 MILLIGRAM(S): at 05:15

## 2018-09-10 RX ADMIN — LIDOCAINE 1 PATCH: 4 CREAM TOPICAL at 11:45

## 2018-09-10 RX ADMIN — Medication 6: at 12:47

## 2018-09-10 RX ADMIN — SERTRALINE 100 MILLIGRAM(S): 25 TABLET, FILM COATED ORAL at 11:45

## 2018-09-10 RX ADMIN — Medication 50 MILLIGRAM(S): at 13:51

## 2018-09-10 RX ADMIN — SODIUM POLYSTYRENE SULFONATE 30 GRAM(S): 4.1 POWDER, FOR SUSPENSION ORAL at 09:46

## 2018-09-10 RX ADMIN — Medication 50 MILLIGRAM(S): at 05:15

## 2018-09-10 RX ADMIN — CLOPIDOGREL BISULFATE 75 MILLIGRAM(S): 75 TABLET, FILM COATED ORAL at 11:45

## 2018-09-10 RX ADMIN — HEPARIN SODIUM 5000 UNIT(S): 5000 INJECTION INTRAVENOUS; SUBCUTANEOUS at 09:46

## 2018-09-10 RX ADMIN — Medication 81 MILLIGRAM(S): at 11:45

## 2018-09-10 RX ADMIN — Medication 1 APPLICATION(S): at 11:46

## 2018-09-10 NOTE — DISCHARGE NOTE ADULT - ADDITIONAL INSTRUCTIONS
f/u primary care, neurology, cardiology and nephrology  f/u BMP 09/11/2018  and has appointment with PMD tomorrow  Monitor Blood pressure and finger stick at home

## 2018-09-10 NOTE — DISCHARGE NOTE ADULT - NS AS DC STROKE ED MATERIALS
Stroke Warning Signs and Symptoms/Call 911 for Stroke/Prescribed Medications/Stroke Education Booklet/Need for Followup After Discharge/Risk Factors for Stroke

## 2018-09-10 NOTE — DISCHARGE NOTE ADULT - PATIENT PORTAL LINK FT
You can access the [facility] Patient Portal, offered by [facility], by registering with the following website: [email or web address]

## 2018-09-10 NOTE — DISCHARGE NOTE ADULT - PLAN OF CARE
. c/w aspirin, plavix, statin  Monitor BP  f/u Neurology as above cont. Insulin  monitor Fs at home c/w metoprolol and hydralazine   Monitor BP c/w home medication  f/u cardiology f/u [facility] 09/11/2018 - has appointment with PMD tomorrow  f/u Nephrology s/p Kayexalate   f/u BMP 09/11/2018  and has appointment with PMD tomorrow as above  dysphagia diet resolved

## 2018-09-10 NOTE — DISCHARGE NOTE ADULT - MEDICATION SUMMARY - MEDICATIONS TO CHANGE
I will SWITCH the dose or number of times a day I take the medications listed below when I get home from the hospital:    hydrALAZINE 25 mg oral tablet  -- 1 tab(s) by mouth every 8 hours

## 2018-09-10 NOTE — DISCHARGE NOTE ADULT - HOSPITAL COURSE
This is 69 y/o man with h/o multiple CVA with residual significant dysarthria, unsteady gait   brought in as his Aid noticed right sided facial droop, As per EMS upon their arrival facial droop resolved and no new deficits noticed per wife and home aid. CT head no new stroke, cardiology and Neurology consult appreciated, unable to perform MRI due to PPM,  repeat CT head no new stroke, carotid doppler mild to moderate, LVEF of 40-45% with wall motion abnormality- per cardio - Echo 9/7/18  This is essentially unchanged from echos in the past.  Pt was seen by PT suggest MIKE, pt wife refused MIKE, wanted to take him home, risk and benefit explained, risk of fall and bleeding, understood. Labs today showed K 5.4, has h/o CKD, Kayexalate 30gm given, pt has appointment with PCP tomorrow, informed wife to have repeat BMP tomorrow with PCP - understood and agreed.     A/P    >TIA  H/o multiple old CVA   repeat CT head no new stroke  c/w aspirin, plavix, statin  unable to perform MRI due to PPM  carotid doppler mild to moderate plaque   TTE LVEF of 40-45% - per cardio unchanged from old TTE   Neurology consult appreciated   Cardiology consult appreciated -   PPM interrogation negative  A1c 9.1. LDL 57  PT/OT/Speech eval appreciated - PT suggest MIKE - wife declined   stopped gabapentin as he was noted drowsy, per wife he was not on gabapentin at home. Pt is much more awake now.      >Dysphagia - on dysphagia diet  speech eval appreciated    > Elevated troponin - Flat  appreciate cardiology Mild elevation of troponin (peak 0.15) is non specific in light of CRI (Cr=1.99).  Pt denies CP or symptoms to suggest ACS. Recent nuclear stress test with predominantly fixed defect with minimal marlen infarct ischemia and is consistent with pts hx of known occluded and collateralized left Cx. Would continue anti ischemic regimen with ASA, statin, metoprolol, and plavix.  Outpt ischemic evaluation may be pursued on DC, especially in light of presentation of possible new neurologic deficit.  f/u TTE - LVEF of 40-45% with wall motion abnormality- per cardio - Echo 9/7/18  This is essentially unchanged from echos in the past (hospital and office).      >CAD - appreciate cardiology input   c/w aspirin, plavix, BB, statin, hydralazine    >HTN - stable  c/w metoprolol 25,  hydralazine 50 q8h  monitor BP    >DM - A1c 9.1   c/w home dose of  Lantus 25 units and sliding scale     >HLD  - c/w statin   LDL 57    >CKD stage 3 - baseline Cr 2-2.2  f/u bmp tomorrow with PCP    >Hyperkalemia - Kayexalate 30mg IV x 1 dose, pt wishes to go home, f/u BMP tomorrow and has appointment with PCP.     >Rib Fracture - old   incentive spirometry    ICU Vital Signs Last 24 Hrs  T(C): 36.7 (10 Sep 2018 09:44), Max: 36.7 (09 Sep 2018 12:37)  T(F): 98.1 (10 Sep 2018 09:44), Max: 98.1 (09 Sep 2018 12:37)  HR: 66 (10 Sep 2018 09:44) (66 - 84)  BP: 127/70 (10 Sep 2018 09:44) (93/59 - 156/78)  RR: 18 (10 Sep 2018 05:12) (16 - 19)  SpO2: 93% (10 Sep 2018 05:12) (93% - 98%)    PHYSICAL EXAM:    GENERAL: NAD,  NERVOUS SYSTEM:  awake, no facial droop, moving all 4 extremities, significant dysarthria  CHEST/LUNG: positive air entry  HEART: s1/s2 audible  ABDOMEN: Soft, Nontender, Nondistended; Bowel sounds present  EXTREMITIES:  no edema    Time spent 46 minutes

## 2018-09-10 NOTE — DISCHARGE NOTE ADULT - MEDICATION SUMMARY - MEDICATIONS TO TAKE
I will START or STAY ON the medications listed below when I get home from the hospital:    aspirin 81 mg oral tablet  -- 1 tab(s) by mouth once a day  -- Indication: For TIA (transient ischemic attack)    sertraline 100 mg oral tablet  -- 1 tab(s) by mouth once a day  -- Indication: For home medication    Levemir 100 units/mL subcutaneous solution  -- 25 unit(s) subcutaneous once a day (at bedtime)  -- Indication: For dm    insulin lispro 100 units/mL subcutaneous solution  --  subcutaneous 3 times a day (before meals); 1 Unit(s) if Glucose 151 - 200  2 Unit(s) if Glucose 201 - 250  3 Unit(s) if Glucose 251 - 300  4 Unit(s) if Glucose 301 - 350  5 Unit(s) if Glucose 351 - 400  6 Unit(s) if Glucose Greater Than 400  -- Indication: For dm    loperamide 2 mg oral capsule  -- 1 cap(s) by mouth every 4 hours, As needed, Diarrhea  -- Indication: For home medication    atorvastatin 20 mg oral tablet  -- 1 tab(s) by mouth once a day (at bedtime)  -- Indication: For hld    Plavix 75 mg oral tablet  -- 1 tab(s) by mouth once a day  -- Indication: For Stroke    metoprolol tartrate 25 mg oral tablet  -- 1 tab(s) by mouth 2 times a day  -- Indication: For cad    hydrALAZINE 50 mg oral tablet  -- 1 tab(s) by mouth every 8 hours hold if SBP is less than 100  -- Indication: For htn

## 2018-09-10 NOTE — DISCHARGE NOTE ADULT - CARE PROVIDER_API CALL
Cristóbal Tellez), Neurology  [address] [place] [address]  Phone: [phone]  Fax: [phone]    Desmond Jennings), Cardiovascular Disease; Internal Medicine; Interventional Cardiology  [address] [place] [address]  Phone: [phone]  Fax: [phone]

## 2018-09-10 NOTE — DISCHARGE NOTE ADULT - CARE PLAN
Principal Discharge DX:	TIA (transient ischemic attack)  Goal:	.  Assessment and plan of treatment:	c/w aspirin, plavix, statin  Monitor BP  f/u Neurology  Secondary Diagnosis:	CVA (cerebral vascular accident)  Assessment and plan of treatment:	as above  Secondary Diagnosis:	Type 2 diabetes mellitus with other circulatory complication  Assessment and plan of treatment:	cont. Insulin  monitor Fs at home  Secondary Diagnosis:	Essential hypertension  Assessment and plan of treatment:	c/w metoprolol and hydralazine   Monitor BP  Secondary Diagnosis:	CAD (coronary artery disease)  Assessment and plan of treatment:	c/w home medication  f/u cardiology  Secondary Diagnosis:	Stage 3 chronic kidney disease  Assessment and plan of treatment:	f/u [facility] 09/11/2018 - has appointment with PMD tomorrow  f/u Nephrology  Secondary Diagnosis:	Hyperkalemia  Assessment and plan of treatment:	s/p Kayexalate   f/u [facility] 09/11/2018  and has appointment with PMD tomorrow Principal Discharge DX:	TIA (transient ischemic attack)  Goal:	.  Assessment and plan of treatment:	c/w aspirin, plavix, statin  Monitor BP  f/u Neurology  Secondary Diagnosis:	CVA (cerebral vascular accident)  Assessment and plan of treatment:	as above  dysphagia diet  Secondary Diagnosis:	Type 2 diabetes mellitus with other circulatory complication  Assessment and plan of treatment:	cont. Insulin  monitor Fs at home  Secondary Diagnosis:	Essential hypertension  Assessment and plan of treatment:	c/w metoprolol and hydralazine   Monitor BP  Secondary Diagnosis:	CAD (coronary artery disease)  Assessment and plan of treatment:	c/w home medication  f/u cardiology  Secondary Diagnosis:	Stage 3 chronic kidney disease  Assessment and plan of treatment:	f/u [facility] 09/11/2018 - has appointment with PMD tomorrow  f/u Nephrology  Secondary Diagnosis:	Hyperkalemia  Assessment and plan of treatment:	s/p Kayexalate   f/u [facility] 09/11/2018  and has appointment with PMD tomorrow Principal Discharge DX:	TIA (transient ischemic attack)  Goal:	.  Assessment and plan of treatment:	c/w aspirin, plavix, statin  Monitor BP  f/u Neurology  Secondary Diagnosis:	CVA (cerebral vascular accident)  Assessment and plan of treatment:	as above  dysphagia diet  Secondary Diagnosis:	Type 2 diabetes mellitus with other circulatory complication  Assessment and plan of treatment:	cont. Insulin  monitor Fs at home  Secondary Diagnosis:	Essential hypertension  Assessment and plan of treatment:	c/w metoprolol and hydralazine   Monitor BP  Secondary Diagnosis:	CAD (coronary artery disease)  Assessment and plan of treatment:	c/w home medication  f/u cardiology  Secondary Diagnosis:	Stage 3 chronic kidney disease  Assessment and plan of treatment:	f/u [facility] 09/11/2018 - has appointment with PMD tomorrow  f/u Nephrology  Secondary Diagnosis:	Hyperkalemia  Goal:	resolved

## 2018-09-10 NOTE — DISCHARGE NOTE ADULT - MEDICATION SUMMARY - MEDICATIONS TO STOP TAKING
I will STOP taking the medications listed below when I get home from the hospital:    gabapentin 300 mg oral capsule  -- 1 cap(s) by mouth 3 times a day

## 2018-09-10 NOTE — DISCHARGE NOTE ADULT - SECONDARY DIAGNOSIS.
CVA (cerebral vascular accident) Type 2 diabetes mellitus with other circulatory complication Essential hypertension CAD (coronary artery disease) Stage 3 chronic kidney disease Hyperkalemia

## 2018-11-05 ENCOUNTER — INPATIENT (INPATIENT)
Facility: HOSPITAL | Age: 68
LOS: 15 days | Discharge: NURSING, REHAB OR LONG-TERM CARE | DRG: 871 | End: 2018-11-21
Attending: INTERNAL MEDICINE
Payer: MEDICARE

## 2018-11-05 VITALS
OXYGEN SATURATION: 99 % | DIASTOLIC BLOOD PRESSURE: 71 MMHG | WEIGHT: 199.96 LBS | HEART RATE: 108 BPM | TEMPERATURE: 100 F | RESPIRATION RATE: 25 BRPM | SYSTOLIC BLOOD PRESSURE: 155 MMHG | HEIGHT: 76 IN

## 2018-11-05 DIAGNOSIS — A41.9 SEPSIS, UNSPECIFIED ORGANISM: ICD-10-CM

## 2018-11-05 DIAGNOSIS — R41.82 ALTERED MENTAL STATUS, UNSPECIFIED: ICD-10-CM

## 2018-11-05 DIAGNOSIS — E11.9 TYPE 2 DIABETES MELLITUS WITHOUT COMPLICATIONS: ICD-10-CM

## 2018-11-05 DIAGNOSIS — Z95.0 PRESENCE OF CARDIAC PACEMAKER: Chronic | ICD-10-CM

## 2018-11-05 DIAGNOSIS — Z86.73 PERSONAL HISTORY OF TRANSIENT ISCHEMIC ATTACK (TIA), AND CEREBRAL INFARCTION WITHOUT RESIDUAL DEFICITS: ICD-10-CM

## 2018-11-05 DIAGNOSIS — N18.9 CHRONIC KIDNEY DISEASE, UNSPECIFIED: ICD-10-CM

## 2018-11-05 DIAGNOSIS — I10 ESSENTIAL (PRIMARY) HYPERTENSION: ICD-10-CM

## 2018-11-05 LAB
ALBUMIN SERPL ELPH-MCNC: 3.8 G/DL — SIGNIFICANT CHANGE UP (ref 3.3–5.2)
ALP SERPL-CCNC: 139 U/L — HIGH (ref 40–120)
ALT FLD-CCNC: 12 U/L — SIGNIFICANT CHANGE UP
ANION GAP SERPL CALC-SCNC: 23 MMOL/L — HIGH (ref 5–17)
APPEARANCE UR: CLEAR — SIGNIFICANT CHANGE UP
APTT BLD: 34.3 SEC — SIGNIFICANT CHANGE UP (ref 27.5–36.3)
AST SERPL-CCNC: 19 U/L — SIGNIFICANT CHANGE UP
BACTERIA # UR AUTO: ABNORMAL
BASE EXCESS BLDA CALC-SCNC: -2.9 MMOL/L — LOW (ref -2–2)
BASOPHILS # BLD AUTO: 0 K/UL — SIGNIFICANT CHANGE UP (ref 0–0.2)
BILIRUB SERPL-MCNC: 0.9 MG/DL — SIGNIFICANT CHANGE UP (ref 0.4–2)
BILIRUB UR-MCNC: NEGATIVE — SIGNIFICANT CHANGE UP
BLOOD GAS COMMENTS ARTERIAL: SIGNIFICANT CHANGE UP
BUN SERPL-MCNC: 30 MG/DL — HIGH (ref 8–20)
CALCIUM SERPL-MCNC: 9.7 MG/DL — SIGNIFICANT CHANGE UP (ref 8.6–10.2)
CHLORIDE SERPL-SCNC: 95 MMOL/L — LOW (ref 98–107)
CO2 SERPL-SCNC: 17 MMOL/L — LOW (ref 22–29)
COLOR SPEC: YELLOW — SIGNIFICANT CHANGE UP
CREAT SERPL-MCNC: 2.44 MG/DL — HIGH (ref 0.5–1.3)
DIFF PNL FLD: ABNORMAL
EOSINOPHIL # BLD AUTO: 0 K/UL — SIGNIFICANT CHANGE UP (ref 0–0.5)
EPI CELLS # UR: SIGNIFICANT CHANGE UP
GAS PNL BLDA: SIGNIFICANT CHANGE UP
GLUCOSE SERPL-MCNC: 467 MG/DL — HIGH (ref 70–115)
GLUCOSE UR QL: 1000 MG/DL
HCO3 BLDA-SCNC: 22 MMOL/L — SIGNIFICANT CHANGE UP (ref 20–26)
HCT VFR BLD CALC: 40.8 % — LOW (ref 42–52)
HGB BLD-MCNC: 13.3 G/DL — LOW (ref 14–18)
HOROWITZ INDEX BLDA+IHG-RTO: SIGNIFICANT CHANGE UP
INR BLD: 1.13 RATIO — SIGNIFICANT CHANGE UP (ref 0.88–1.16)
KETONES UR-MCNC: ABNORMAL
LACTATE BLDV-MCNC: 1.7 MMOL/L — SIGNIFICANT CHANGE UP (ref 0.5–2)
LEUKOCYTE ESTERASE UR-ACNC: NEGATIVE — SIGNIFICANT CHANGE UP
LYMPHOCYTES # BLD AUTO: 0.4 K/UL — LOW (ref 1–4.8)
LYMPHOCYTES # BLD AUTO: 3 % — LOW (ref 20–55)
MCHC RBC-ENTMCNC: 29.7 PG — SIGNIFICANT CHANGE UP (ref 27–31)
MCHC RBC-ENTMCNC: 32.6 G/DL — SIGNIFICANT CHANGE UP (ref 32–36)
MCV RBC AUTO: 91.1 FL — SIGNIFICANT CHANGE UP (ref 80–94)
METHOD TYPE: SIGNIFICANT CHANGE UP
MONOCYTES # BLD AUTO: 0.4 K/UL — SIGNIFICANT CHANGE UP (ref 0–0.8)
MONOCYTES NFR BLD AUTO: 3 % — SIGNIFICANT CHANGE UP (ref 3–10)
MRSA SPEC QL CULT: SIGNIFICANT CHANGE UP
NEUTROPHILS # BLD AUTO: 16.8 K/UL — HIGH (ref 1.8–8)
NEUTROPHILS NFR BLD AUTO: 85 % — HIGH (ref 37–73)
NEUTS BAND # BLD: 9 % — HIGH (ref 0–8)
NITRITE UR-MCNC: NEGATIVE — SIGNIFICANT CHANGE UP
PCO2 BLDA: 34 MMHG — LOW (ref 35–45)
PH BLDA: 7.4 — SIGNIFICANT CHANGE UP (ref 7.35–7.45)
PH UR: 6 — SIGNIFICANT CHANGE UP (ref 5–8)
PLAT MORPH BLD: NORMAL — SIGNIFICANT CHANGE UP
PLATELET # BLD AUTO: 333 K/UL — SIGNIFICANT CHANGE UP (ref 150–400)
PO2 BLDA: 85 MMHG — SIGNIFICANT CHANGE UP (ref 83–108)
POTASSIUM SERPL-MCNC: 5.9 MMOL/L — HIGH (ref 3.5–5.3)
POTASSIUM SERPL-SCNC: 5.9 MMOL/L — HIGH (ref 3.5–5.3)
PROT SERPL-MCNC: 7.2 G/DL — SIGNIFICANT CHANGE UP (ref 6.6–8.7)
PROT UR-MCNC: 500 MG/DL
PROTHROM AB SERPL-ACNC: 13 SEC — HIGH (ref 10–12.9)
RAPID RVP RESULT: SIGNIFICANT CHANGE UP
RBC # BLD: 4.48 M/UL — LOW (ref 4.6–6.2)
RBC # FLD: 13.9 % — SIGNIFICANT CHANGE UP (ref 11–15.6)
RBC BLD AUTO: NORMAL — SIGNIFICANT CHANGE UP
RBC CASTS # UR COMP ASSIST: ABNORMAL /HPF (ref 0–4)
SAO2 % BLDA: 98 % — SIGNIFICANT CHANGE UP (ref 95–99)
SODIUM SERPL-SCNC: 135 MMOL/L — SIGNIFICANT CHANGE UP (ref 135–145)
SP GR SPEC: 1.01 — SIGNIFICANT CHANGE UP (ref 1.01–1.02)
UROBILINOGEN FLD QL: NEGATIVE MG/DL — SIGNIFICANT CHANGE UP
WBC # BLD: 17.8 K/UL — HIGH (ref 4.8–10.8)
WBC # FLD AUTO: 17.8 K/UL — HIGH (ref 4.8–10.8)
WBC UR QL: SIGNIFICANT CHANGE UP

## 2018-11-05 PROCEDURE — 74176 CT ABD & PELVIS W/O CONTRAST: CPT | Mod: 26

## 2018-11-05 PROCEDURE — 99285 EMERGENCY DEPT VISIT HI MDM: CPT

## 2018-11-05 PROCEDURE — 70450 CT HEAD/BRAIN W/O DYE: CPT | Mod: 26

## 2018-11-05 PROCEDURE — 99223 1ST HOSP IP/OBS HIGH 75: CPT | Mod: AI

## 2018-11-05 PROCEDURE — 99233 SBSQ HOSP IP/OBS HIGH 50: CPT

## 2018-11-05 PROCEDURE — 71045 X-RAY EXAM CHEST 1 VIEW: CPT | Mod: 26

## 2018-11-05 RX ORDER — SODIUM CHLORIDE 9 MG/ML
2800 INJECTION INTRAMUSCULAR; INTRAVENOUS; SUBCUTANEOUS ONCE
Qty: 0 | Refills: 0 | Status: COMPLETED | OUTPATIENT
Start: 2018-11-05 | End: 2018-11-05

## 2018-11-05 RX ORDER — INSULIN LISPRO 100/ML
VIAL (ML) SUBCUTANEOUS
Qty: 0 | Refills: 0 | Status: DISCONTINUED | OUTPATIENT
Start: 2018-11-05 | End: 2018-11-21

## 2018-11-05 RX ORDER — AZITHROMYCIN 500 MG/1
500 TABLET, FILM COATED ORAL ONCE
Qty: 0 | Refills: 0 | Status: COMPLETED | OUTPATIENT
Start: 2018-11-05 | End: 2018-11-05

## 2018-11-05 RX ORDER — DEXTROSE 50 % IN WATER 50 %
25 SYRINGE (ML) INTRAVENOUS ONCE
Qty: 0 | Refills: 0 | Status: DISCONTINUED | OUTPATIENT
Start: 2018-11-05 | End: 2018-11-21

## 2018-11-05 RX ORDER — METOPROLOL TARTRATE 50 MG
25 TABLET ORAL
Qty: 0 | Refills: 0 | Status: DISCONTINUED | OUTPATIENT
Start: 2018-11-05 | End: 2018-11-09

## 2018-11-05 RX ORDER — HYDRALAZINE HCL 50 MG
50 TABLET ORAL EVERY 8 HOURS
Qty: 0 | Refills: 0 | Status: DISCONTINUED | OUTPATIENT
Start: 2018-11-05 | End: 2018-11-21

## 2018-11-05 RX ORDER — SERTRALINE 25 MG/1
100 TABLET, FILM COATED ORAL DAILY
Qty: 0 | Refills: 0 | Status: DISCONTINUED | OUTPATIENT
Start: 2018-11-05 | End: 2018-11-21

## 2018-11-05 RX ORDER — INSULIN GLARGINE 100 [IU]/ML
20 INJECTION, SOLUTION SUBCUTANEOUS AT BEDTIME
Qty: 0 | Refills: 0 | Status: DISCONTINUED | OUTPATIENT
Start: 2018-11-05 | End: 2018-11-11

## 2018-11-05 RX ORDER — CLOPIDOGREL BISULFATE 75 MG/1
75 TABLET, FILM COATED ORAL DAILY
Qty: 0 | Refills: 0 | Status: DISCONTINUED | OUTPATIENT
Start: 2018-11-05 | End: 2018-11-21

## 2018-11-05 RX ORDER — DEXTROSE 50 % IN WATER 50 %
15 SYRINGE (ML) INTRAVENOUS ONCE
Qty: 0 | Refills: 0 | Status: DISCONTINUED | OUTPATIENT
Start: 2018-11-05 | End: 2018-11-21

## 2018-11-05 RX ORDER — ASPIRIN/CALCIUM CARB/MAGNESIUM 324 MG
81 TABLET ORAL DAILY
Qty: 0 | Refills: 0 | Status: DISCONTINUED | OUTPATIENT
Start: 2018-11-05 | End: 2018-11-21

## 2018-11-05 RX ORDER — ACETAMINOPHEN 500 MG
650 TABLET ORAL ONCE
Qty: 0 | Refills: 0 | Status: COMPLETED | OUTPATIENT
Start: 2018-11-05 | End: 2018-11-05

## 2018-11-05 RX ORDER — HEPARIN SODIUM 5000 [USP'U]/ML
5000 INJECTION INTRAVENOUS; SUBCUTANEOUS EVERY 12 HOURS
Qty: 0 | Refills: 0 | Status: DISCONTINUED | OUTPATIENT
Start: 2018-11-05 | End: 2018-11-21

## 2018-11-05 RX ORDER — SODIUM CHLORIDE 9 MG/ML
1000 INJECTION INTRAMUSCULAR; INTRAVENOUS; SUBCUTANEOUS ONCE
Qty: 0 | Refills: 0 | Status: COMPLETED | OUTPATIENT
Start: 2018-11-05 | End: 2018-11-05

## 2018-11-05 RX ORDER — PIPERACILLIN AND TAZOBACTAM 4; .5 G/20ML; G/20ML
3.38 INJECTION, POWDER, LYOPHILIZED, FOR SOLUTION INTRAVENOUS EVERY 8 HOURS
Qty: 0 | Refills: 0 | Status: DISCONTINUED | OUTPATIENT
Start: 2018-11-05 | End: 2018-11-06

## 2018-11-05 RX ORDER — ONDANSETRON 8 MG/1
4 TABLET, FILM COATED ORAL ONCE
Qty: 0 | Refills: 0 | Status: COMPLETED | OUTPATIENT
Start: 2018-11-05 | End: 2018-11-05

## 2018-11-05 RX ORDER — GLUCAGON INJECTION, SOLUTION 0.5 MG/.1ML
1 INJECTION, SOLUTION SUBCUTANEOUS ONCE
Qty: 0 | Refills: 0 | Status: DISCONTINUED | OUTPATIENT
Start: 2018-11-05 | End: 2018-11-21

## 2018-11-05 RX ORDER — CEFTRIAXONE 500 MG/1
1 INJECTION, POWDER, FOR SOLUTION INTRAMUSCULAR; INTRAVENOUS ONCE
Qty: 0 | Refills: 0 | Status: COMPLETED | OUTPATIENT
Start: 2018-11-05 | End: 2018-11-05

## 2018-11-05 RX ORDER — ACETAMINOPHEN 500 MG
1000 TABLET ORAL ONCE
Qty: 0 | Refills: 0 | Status: COMPLETED | OUTPATIENT
Start: 2018-11-05 | End: 2018-11-05

## 2018-11-05 RX ORDER — ATORVASTATIN CALCIUM 80 MG/1
20 TABLET, FILM COATED ORAL AT BEDTIME
Qty: 0 | Refills: 0 | Status: DISCONTINUED | OUTPATIENT
Start: 2018-11-05 | End: 2018-11-21

## 2018-11-05 RX ORDER — DEXTROSE 50 % IN WATER 50 %
12.5 SYRINGE (ML) INTRAVENOUS ONCE
Qty: 0 | Refills: 0 | Status: DISCONTINUED | OUTPATIENT
Start: 2018-11-05 | End: 2018-11-21

## 2018-11-05 RX ORDER — SODIUM CHLORIDE 9 MG/ML
1000 INJECTION INTRAMUSCULAR; INTRAVENOUS; SUBCUTANEOUS
Qty: 0 | Refills: 0 | Status: DISCONTINUED | OUTPATIENT
Start: 2018-11-05 | End: 2018-11-09

## 2018-11-05 RX ORDER — SODIUM CHLORIDE 9 MG/ML
1000 INJECTION, SOLUTION INTRAVENOUS
Qty: 0 | Refills: 0 | Status: DISCONTINUED | OUTPATIENT
Start: 2018-11-05 | End: 2018-11-21

## 2018-11-05 RX ADMIN — Medication 1000 MILLIGRAM(S): at 19:10

## 2018-11-05 RX ADMIN — Medication 650 MILLIGRAM(S): at 10:51

## 2018-11-05 RX ADMIN — Medication 650 MILLIGRAM(S): at 10:10

## 2018-11-05 RX ADMIN — AZITHROMYCIN 255 MILLIGRAM(S): 500 TABLET, FILM COATED ORAL at 10:51

## 2018-11-05 RX ADMIN — CEFTRIAXONE 1 GRAM(S): 500 INJECTION, POWDER, FOR SOLUTION INTRAMUSCULAR; INTRAVENOUS at 10:45

## 2018-11-05 RX ADMIN — SODIUM CHLORIDE 1000 MILLILITER(S): 9 INJECTION INTRAMUSCULAR; INTRAVENOUS; SUBCUTANEOUS at 17:28

## 2018-11-05 RX ADMIN — Medication 6: at 19:03

## 2018-11-05 RX ADMIN — SODIUM CHLORIDE 100 MILLILITER(S): 9 INJECTION INTRAMUSCULAR; INTRAVENOUS; SUBCUTANEOUS at 18:52

## 2018-11-05 RX ADMIN — CEFTRIAXONE 100 GRAM(S): 500 INJECTION, POWDER, FOR SOLUTION INTRAMUSCULAR; INTRAVENOUS at 10:04

## 2018-11-05 RX ADMIN — AZITHROMYCIN 500 MILLIGRAM(S): 500 TABLET, FILM COATED ORAL at 11:50

## 2018-11-05 RX ADMIN — ONDANSETRON 4 MILLIGRAM(S): 8 TABLET, FILM COATED ORAL at 17:02

## 2018-11-05 RX ADMIN — SODIUM CHLORIDE 2800 MILLILITER(S): 9 INJECTION INTRAMUSCULAR; INTRAVENOUS; SUBCUTANEOUS at 10:03

## 2018-11-05 RX ADMIN — Medication 400 MILLIGRAM(S): at 17:28

## 2018-11-05 RX ADMIN — SODIUM CHLORIDE 2800 MILLILITER(S): 9 INJECTION INTRAMUSCULAR; INTRAVENOUS; SUBCUTANEOUS at 13:30

## 2018-11-05 NOTE — ED ADULT NURSE NOTE - CHIEF COMPLAINT QUOTE
69y/o male c/o increase weakness and vomiting x 2 days. Pt resp even unlabored RR 25, O2Sat 99% on 2LNC. Code sepsis initiated.

## 2018-11-05 NOTE — ED ADULT TRIAGE NOTE - CHIEF COMPLAINT QUOTE
67y/o male c/o increase weakness and vomiting x 2 days. Pt resp even unlabored RR 25, O2Sat 99% on 2LNC. Code sepsis initiated.

## 2018-11-05 NOTE — H&P ADULT - ASSESSMENT
This is a 68years old male with PMH of HTN, DM, CVA and CAD brought in to the Er for the evaluation of generalized weakness and vomiting for the past few days. Reportedly patient was unable to ambulate by himself today, family called 911 and patient was brought in to the ER. No h/o fever, chills, chest pain, SOB, diarrhea, constipation or urinary complaints. Patient was recently admitted to [facility] with TIA. he was seen by cardio and neurology and discharged home on 9/10.

## 2018-11-05 NOTE — ED PROVIDER NOTE - MUSCULOSKELETAL, MLM
Spine appears normal, range of motion is not limited, no muscle or joint tenderness, mpves leanne xtremities

## 2018-11-05 NOTE — ED ADULT NURSE REASSESSMENT NOTE - NS ED NURSE REASSESS COMMENT FT1
Pt resting comfortably on stretcher.  No complaints of pain.  Respirations even and unlabored.   Pt. repositioned for comfort, extra blankets provided. Awaiting bed placement.  PIV wnl; flushing without difficulty.  In NAD, will continue to monitor.

## 2018-11-05 NOTE — H&P ADULT - HISTORY OF PRESENT ILLNESS
Patient unable to provide any history, all the information taken from the wife and ER notes Patient unable to provide any history, all the information taken from the wife and ER notes    This is a 68years old male with PMH of HTN, DM, CVA and CAD brought in to the Er for the evaluation of generalized weakness and vomiting for the past few days. Reportedly patient was unable to ambulate by himself today, family called 911 and patient was brought in to the ER. No h/o fever, chills, chest pain, SOB, diarrhea, constipation or urinary complaints. Patient was recently admitted to [facility] with TIA. he was seen by cardio and neurology and discharged home on 9/10.

## 2018-11-05 NOTE — ED PROVIDER NOTE - OBJECTIVE STATEMENT
67 y/o male , with h/o kidney , cardiac h/o CABG , sens defibrillator , high cholesterol   h/o strokes   wife sent him to hospital pt is unable to stand   sitting in bathtub unable to get him up   wife is desperate doesn't know what to do   this am vomitng , + fever

## 2018-11-05 NOTE — H&P ADULT - PROBLEM SELECTOR PLAN 2
unidentified source of infection.  CXR reviewed, no infiltrates.  unremarkable UA.  CT abdomen pending.  sepsis work up in progress.  lactic acid trending down.  continue IVF and zosyn.  follow up cultures

## 2018-11-05 NOTE — ED ADULT NURSE NOTE - NSIMPLEMENTINTERV_GEN_ALL_ED
Implemented All Universal Safety Interventions:  [place] to call system. Call bell, personal items and telephone within reach. Instruct patient to call for assistance. Room bathroom lighting operational. Non-slip footwear when patient is off stretcher. Physically safe environment: no spills, clutter or unnecessary equipment. Stretcher in lowest position, wheels locked, appropriate side rails in place.

## 2018-11-05 NOTE — ED ADULT NURSE NOTE - OBJECTIVE STATEMENT
Patient BIBA from home, patient states that he does not recall what happened today but states "My wife was not able to get me up off the floor." Per family patient has been having increased weakness at home and has been having nausea and vomiting. Patient has a hx of strokes and is a poor historian.

## 2018-11-06 LAB
-  AMPICILLIN/SULBACTAM: SIGNIFICANT CHANGE UP
-  AMPICILLIN/SULBACTAM: SIGNIFICANT CHANGE UP
-  CEFAZOLIN: SIGNIFICANT CHANGE UP
-  CEFAZOLIN: SIGNIFICANT CHANGE UP
-  CLINDAMYCIN: SIGNIFICANT CHANGE UP
-  CLINDAMYCIN: SIGNIFICANT CHANGE UP
-  DAPTOMYCIN: SIGNIFICANT CHANGE UP
-  DAPTOMYCIN: SIGNIFICANT CHANGE UP
-  ERYTHROMYCIN: SIGNIFICANT CHANGE UP
-  ERYTHROMYCIN: SIGNIFICANT CHANGE UP
-  GENTAMICIN: SIGNIFICANT CHANGE UP
-  GENTAMICIN: SIGNIFICANT CHANGE UP
-  LINEZOLID: SIGNIFICANT CHANGE UP
-  LINEZOLID: SIGNIFICANT CHANGE UP
-  OXACILLIN: SIGNIFICANT CHANGE UP
-  OXACILLIN: SIGNIFICANT CHANGE UP
-  PENICILLIN: SIGNIFICANT CHANGE UP
-  PENICILLIN: SIGNIFICANT CHANGE UP
-  RIFAMPIN: SIGNIFICANT CHANGE UP
-  RIFAMPIN: SIGNIFICANT CHANGE UP
-  TETRACYCLINE: SIGNIFICANT CHANGE UP
-  TETRACYCLINE: SIGNIFICANT CHANGE UP
-  TRIMETHOPRIM/SULFAMETHOXAZOLE: SIGNIFICANT CHANGE UP
-  TRIMETHOPRIM/SULFAMETHOXAZOLE: SIGNIFICANT CHANGE UP
-  VANCOMYCIN: SIGNIFICANT CHANGE UP
-  VANCOMYCIN: SIGNIFICANT CHANGE UP
B-OH-BUTYR SERPL-SCNC: 0.5 MMOL/L — HIGH
CULTURE RESULTS: NO GROWTH — SIGNIFICANT CHANGE UP
CULTURE RESULTS: SIGNIFICANT CHANGE UP
CULTURE RESULTS: SIGNIFICANT CHANGE UP
METHOD TYPE: SIGNIFICANT CHANGE UP
METHOD TYPE: SIGNIFICANT CHANGE UP
ORGANISM # SPEC MICROSCOPIC CNT: SIGNIFICANT CHANGE UP
SPECIMEN SOURCE: SIGNIFICANT CHANGE UP

## 2018-11-06 PROCEDURE — 99223 1ST HOSP IP/OBS HIGH 75: CPT

## 2018-11-06 PROCEDURE — 99233 SBSQ HOSP IP/OBS HIGH 50: CPT

## 2018-11-06 RX ORDER — MINERAL OIL
133 OIL (ML) MISCELLANEOUS ONCE
Qty: 0 | Refills: 0 | Status: COMPLETED | OUTPATIENT
Start: 2018-11-06 | End: 2018-11-06

## 2018-11-06 RX ORDER — VANCOMYCIN HCL 1 G
500 VIAL (EA) INTRAVENOUS EVERY 12 HOURS
Qty: 0 | Refills: 0 | Status: DISCONTINUED | OUTPATIENT
Start: 2018-11-06 | End: 2018-11-07

## 2018-11-06 RX ORDER — SACCHAROMYCES BOULARDII 250 MG
250 POWDER IN PACKET (EA) ORAL
Qty: 0 | Refills: 0 | Status: DISCONTINUED | OUTPATIENT
Start: 2018-11-06 | End: 2018-11-19

## 2018-11-06 RX ORDER — POLYETHYLENE GLYCOL 3350 17 G/17G
17 POWDER, FOR SOLUTION ORAL DAILY
Qty: 0 | Refills: 0 | Status: DISCONTINUED | OUTPATIENT
Start: 2018-11-06 | End: 2018-11-21

## 2018-11-06 RX ORDER — VANCOMYCIN HCL 1 G
VIAL (EA) INTRAVENOUS
Qty: 0 | Refills: 0 | Status: DISCONTINUED | OUTPATIENT
Start: 2018-11-06 | End: 2018-11-06

## 2018-11-06 RX ADMIN — PIPERACILLIN AND TAZOBACTAM 25 GRAM(S): 4; .5 INJECTION, POWDER, LYOPHILIZED, FOR SOLUTION INTRAVENOUS at 05:34

## 2018-11-06 RX ADMIN — Medication 1: at 12:53

## 2018-11-06 RX ADMIN — HEPARIN SODIUM 5000 UNIT(S): 5000 INJECTION INTRAVENOUS; SUBCUTANEOUS at 00:53

## 2018-11-06 RX ADMIN — INSULIN GLARGINE 20 UNIT(S): 100 INJECTION, SOLUTION SUBCUTANEOUS at 22:18

## 2018-11-06 RX ADMIN — Medication 100 MILLIGRAM(S): at 11:34

## 2018-11-06 RX ADMIN — ATORVASTATIN CALCIUM 20 MILLIGRAM(S): 80 TABLET, FILM COATED ORAL at 00:55

## 2018-11-06 RX ADMIN — Medication 25 MILLIGRAM(S): at 17:22

## 2018-11-06 RX ADMIN — Medication 81 MILLIGRAM(S): at 12:18

## 2018-11-06 RX ADMIN — Medication 250 MILLIGRAM(S): at 17:22

## 2018-11-06 RX ADMIN — HEPARIN SODIUM 5000 UNIT(S): 5000 INJECTION INTRAVENOUS; SUBCUTANEOUS at 17:22

## 2018-11-06 RX ADMIN — Medication 133 MILLILITER(S): at 17:21

## 2018-11-06 RX ADMIN — CLOPIDOGREL BISULFATE 75 MILLIGRAM(S): 75 TABLET, FILM COATED ORAL at 12:18

## 2018-11-06 RX ADMIN — Medication 50 MILLIGRAM(S): at 17:22

## 2018-11-06 RX ADMIN — Medication 100 MILLIGRAM(S): at 22:18

## 2018-11-06 RX ADMIN — SERTRALINE 100 MILLIGRAM(S): 25 TABLET, FILM COATED ORAL at 12:54

## 2018-11-06 RX ADMIN — Medication 2: at 10:07

## 2018-11-06 RX ADMIN — HEPARIN SODIUM 5000 UNIT(S): 5000 INJECTION INTRAVENOUS; SUBCUTANEOUS at 05:35

## 2018-11-06 RX ADMIN — Medication 50 MILLIGRAM(S): at 00:55

## 2018-11-06 RX ADMIN — Medication 25 MILLIGRAM(S): at 00:55

## 2018-11-06 RX ADMIN — PIPERACILLIN AND TAZOBACTAM 25 GRAM(S): 4; .5 INJECTION, POWDER, LYOPHILIZED, FOR SOLUTION INTRAVENOUS at 00:56

## 2018-11-06 RX ADMIN — INSULIN GLARGINE 20 UNIT(S): 100 INJECTION, SOLUTION SUBCUTANEOUS at 00:56

## 2018-11-06 NOTE — PROGRESS NOTE ADULT - ASSESSMENT
This is a 68years old male with PMH of HTN, DM, CVA and CAD brought in to the Er for the evaluation of generalized weakness and vomiting for the past few days and fall at home that prompted 911 call. Patient noted to be febrile in the ED with elevated white count. He has blood cultures that came back positive in 9-10 hour period for gram positive cocci in clusters. Patient's wife notes patient has a wound on his back for which he has been seeing surgery. Wife states patient mostly in bed all day and hasn't been motivated to get up and move around. He is poorly expressive likely from previous strokes. She is concerned as well that he has lost a lot of weight in the past 1 year.    Sepsis 2/2 GPC bacteremia; source likely from skin; will evaluate back  - started on vanco this morning  - zosyn discontinued  - ID consult  - hold off on echo - previous echo from september shows TR but no other valvular disease; no active heart failure; EKG NSR without KS prolongation  - trend CBC and fevers    Fecal impaction  - stool softeners and manual disimpaction per nurse    Fall; may be related to deconditioning, sepsis  - PT to see patient    dysphagia; noted by nurse after bedside swallow  - ST ordered    DM  - diabetic diet and ISS    CAD  - on statin, aspirin, and beta blocker    HTN  - controlled    Stroke history  - on aspirin and statin; no new focal symptomatology; head CT negative.    ppx: heparin sq BID  full code  case discussed with wife.

## 2018-11-06 NOTE — PHYSICAL THERAPY INITIAL EVALUATION ADULT - PERTINENT HX OF CURRENT PROBLEM, REHAB EVAL
Pt with hx of CVA/TIA and expressive aphasia presents to [facility] with reports of vomiting, weakness and difficulty ambulating

## 2018-11-06 NOTE — PROGRESS NOTE ADULT - SUBJECTIVE AND OBJECTIVE BOX
CC: Vomiting and generalized weakness. (2018 15:11)    HPI:  Patient unable to provide any history, all the information taken from the wife and ER notes    This is a 68years old male with PMH of HTN, DM, CVA and CAD brought in to the Er for the evaluation of generalized weakness and vomiting for the past few days. Reportedly patient was unable to ambulate by himself today, family called 911 and patient was brought in to the ER. No h/o fever, chills, chest pain, SOB, diarrhea, constipation or urinary complaints. Patient was recently admitted to [facility] with TIA. he was seen by cardio and neurology and discharged home on 9/10. (2018 15:11)    INTERVAL HPI/OVERNIGHT EVENTS:  patient is mostly non-verbal and possible expressive aphasia. He can answer some questions and mostly yes/no questions. Declines pain or other symptoms. Patient notes that he fell yesterday. Notes that he is in [place] but didn't know the building. Acknowledges that it is 2018.    Patient had GPC in clusters in the blood culture (9 hours of growth)    discussed with wife that patient has wound on his back for which he has been seeing a surgeon.    Vital Signs Last 24 Hrs  T(C): 36.8 (2018 07:53), Max: 39.5 (2018 16:13)  T(F): 98.3 (2018 07:53), Max: 103.1 (2018 16:13)  HR: 79 (2018 07:53) (72 - 109)  BP: 100/54 (2018 07:53) (97/47 - 153/69)  BP(mean): 84 (2018 15:11) (84 - 84)  RR: 19 (2018 07:53) (18 - 20)  SpO2: 97% (2018 07:53) (95% - 99%)    PHYSICAL EXAM:  General: poor grooming, thin and gaunt appearance; in no acute distress  Eyes: PERRLA, EOMI; conjunctiva and sclera clear  Head: Normocephalic; atraumatic  Respiratory: No wheezes, rales or rhonchi  Cardiovascular: Regular rate and rhythm. S1 and S2 Normal; No murmurs, gallops or rubs  Gastrointestinal: Soft non-tender non-distended; Normal bowel sounds  Extremities: Normal range of motion, No clubbing, cyanosis or edema  Vascular: Peripheral pulses palpable 2+ bilaterally  Neurological: Alert and oriented x4  Skin: Warm and dry. No acute rash  Lymph Nodes: No acute cervical adenopathy  Musculoskeletal: Normal gait, tone, without deformities  Psychiatric: Cooperative and appropriate                       13.3   17.8  )-----------( 333      ( 2018 10:04 )             40.8     2018 10:04    135    |  95     |  30.0   ----------------------------<  467    5.9     |  17.0   |  2.44     Ca    9.7        2018 10:04    TPro  7.2    /  Alb  3.8    /  TBili  0.9    /  DBili  x      /  AST  19     /  ALT  12     /  AlkPhos  139    2018 10:04    PT/INR - ( 2018 10:04 )   PT: 13.0 sec;   INR: 1.13 ratio         PTT - ( 2018 10:04 )  PTT:34.3 sec  CAPILLARY BLOOD GLUCOSE    POCT Blood Glucose.: 185 mg/dL (2018 12:50)  POCT Blood Glucose.: 205 mg/dL (2018 10:04)  POCT Blood Glucose.: 275 mg/dL (2018 23:25)  POCT Blood Glucose.: 407 mg/dL (2018 18:31)    LIVER FUNCTIONS - ( 2018 10:04 )  Alb: 3.8 g/dL / Pro: 7.2 g/dL / ALK PHOS: 139 U/L / ALT: 12 U/L / AST: 19 U/L / GGT: x           Urinalysis Basic - ( 2018 16:13 )    Color: Yellow / Appearance: Clear / S.015 / pH: x  Gluc: x / Ketone: Small  / Bili: Negative / Urobili: Negative mg/dL   Blood: x / Protein: 500 mg/dL / Nitrite: Negative   Leuk Esterase: Negative / RBC: 25-50 /HPF / WBC 0-2   Sq Epi: x / Non Sq Epi: Occasional / Bacteria: Few    MEDICATIONS  (STANDING):  aspirin  chewable 81 milliGRAM(s) Oral daily  atorvastatin 20 milliGRAM(s) Oral at bedtime  clopidogrel Tablet 75 milliGRAM(s) Oral daily  dextrose 5%. 1000 milliLiter(s) (50 mL/Hr) IV Continuous <Continuous>  dextrose 50% Injectable 12.5 Gram(s) IV Push once  dextrose 50% Injectable 25 Gram(s) IV Push once  dextrose 50% Injectable 25 Gram(s) IV Push once  heparin  Injectable 5000 Unit(s) SubCutaneous every 12 hours  hydrALAZINE 50 milliGRAM(s) Oral every 8 hours  insulin glargine Injectable (LANTUS) 20 Unit(s) SubCutaneous at bedtime  insulin lispro (HumaLOG) corrective regimen sliding scale   SubCutaneous three times a day before meals  metoprolol tartrate 25 milliGRAM(s) Oral two times a day  piperacillin/tazobactam IVPB. 3.375 Gram(s) IV Intermittent every 8 hours  saccharomyces boulardii 250 milliGRAM(s) Oral two times a day  sertraline 100 milliGRAM(s) Oral daily  sodium chloride 0.9%. 1000 milliLiter(s) (100 mL/Hr) IV Continuous <Continuous>  vancomycin  IVPB 500 milliGRAM(s) IV Intermittent every 12 hours    MEDICATIONS  (PRN):  dextrose 40% Gel 15 Gram(s) Oral once PRN Blood Glucose LESS THAN 70 milliGRAM(s)/deciliter  glucagon  Injectable 1 milliGRAM(s) IntraMuscular once PRN Glucose LESS THAN 70 milligrams/deciliter    RADIOLOGY & ADDITIONAL TESTS:    Culture - Blood (18 @ 10:06)    Specimen Source: .Blood Blood-Peripheral    Culture Results:   Growth in aerobic bottle: Gram Positive Cocci in Clusters  Aerobic Bottle: 9.47 Hours to positivity  Anaerobic Bottle: 10.47 Hours to positivity    CT abdomen/pelvis  IMPRESSION:   1. Rectum is distended to 7.4 cm with formed stool, potentially impacted.   2. Mild smooth eccentric thickening of the right posterolateral urinary   bladder   wall is potentially clinically significant; however neoplasm not excluded   on   this study.   3. Multiple healing right-sided rib fractures.    Head CT non contrast  IMPRESSION: Limited study. Mild to moderate chronic microvascular changes   without evidence of an acute transcortical infarction or hemorrhage. MR   is a more sensitive imaging modality for the evaluation of an acute   infarction.

## 2018-11-06 NOTE — CHART NOTE - NSCHARTNOTEFT_GEN_A_CORE
Pt failed bedside screening for dysphagia.  Made npo.  already has speech/swallow evaluation pending

## 2018-11-06 NOTE — PATIENT PROFILE ADULT - BRADEN FRICTION AND SHEAR
Ears: no ear pain and no hearing problems.Nose: no nasal congestion and no nasal drainage.Mouth/Throat: no dysphagia, no hoarseness and no throat pain.Neck: no lumps, no pain, no stiffness and no swollen glands. (2) potential problem

## 2018-11-06 NOTE — CONSULT NOTE ADULT - SUBJECTIVE AND OBJECTIVE BOX
[facility]  INFECTIOUS DISEASES AND INTERNAL MEDICINE at [place]  =======================================================  Frankie Garcia MD  Diplomates American Board of Internal Medicine and Infectious Diseases  =======================================================    [id number]  AVNI GREEN     CC: Vomiting and generalized weakness    HPI:  67y/o man with PMH of HTN, DM2, CVA and CAD s/p CABG and PM, was admitted today with high fever and generalized weakness and vomiting for the past few days. Reportedly patient was unable to ambulate by himself and had a falls few days ago injuring his back and legs.  Patient was recently admitted to [facility] with TIA. he was seen by cardio and neurology and discharged home on 9/10. Blood cultures showed GPC in clusters 4/4 bottles so ID was called for consult.   Patient is a very poor historian.     PAST MEDICAL & SURGICAL HISTORY:  CVA (cerebral vascular accident)  Diabetic neuropathy  DM (diabetes mellitus)  Hyperlipidemia  AICD (automatic cardioverter/defibrillator) present  Pacemaker  Stented coronary artery  HTN (hypertension)  Cardiac pacemaker    Social Hx: As per him no smoking, ETOH or drugs.     FAMILY HISTORY:  No pertinent family history in first degree relatives    Allergies  No Known Allergies    Antibiotics:  vancomycin  and zosyn    REVIEW OF SYSTEMS:  CONSTITUTIONAL:  + Fever or chills  HEENT:  No diplopia or blurred vision.  No sore throat or runny nose.  CARDIOVASCULAR:  No chest pain or SOB.  RESPIRATORY:  No cough, shortness of breath, PND or orthopnea.  GASTROINTESTINAL:  No nausea, vomiting or diarrhea.  GENITOURINARY:  No dysuria, frequency or urgency. No Blood in urine  MUSCULOSKELETAL:  no joint aches, no muscle pain  SKIN:  No change in skin, hair or nails.  NEUROLOGIC:  No paresthesias, fasciculations, seizures or weakness.  PSYCHIATRIC:  No disorder of thought or mood.  ENDOCRINE:  No heat or cold intolerance, polyuria or polydipsia.  HEMATOLOGICAL:  No easy bruising or bleeding.     Physical Exam:  Vital Signs Last 24 Hrs  T(C): 36.8 (2018 07:53), Max: 39.5 (2018 16:13)  T(F): 98.3 (2018 07:53), Max: 103.1 (2018 16:13)  HR: 79 (2018 07:53) (72 - 109)  BP: 100/54 (2018 07:53) (97/47 - 153/69)  BP(mean): 84 (2018 15:11) (84 - 84)  RR: 19 (2018 07:53) (18 - 20)  SpO2: 97% (2018 07:53) (95% - 99%)  GEN: NAD  HEENT: normocephalic and atraumatic. EOMI. PERRL.    NECK: Supple.  No lymphadenopathy   LUNGS: Clear to auscultation.  HEART: Regular rate and rhythm left upper chest Pacemaker, nontender, no erythema or swelling   ABDOMEN: Soft, nontender, and nondistended.  Positive bowel sounds.    : No CVA tenderness  EXTREMITIES: Without any cyanosis, clubbing, rash, lesions or edema.  NEUROLOGIC: grossly intact.  PSYCHIATRIC: Appropriate affect .  SKIN: scratches and superficial wounds in legs, healing wound in his Right mid back, with erythema, no discharge     Labs:      135  |  95<L>  |  30.0<H>  ----------------------------<  467<H>  5.9<H>   |  17.0<L>  |  2.44<H>    Ca    9.7      2018 10:04    TPro  7.2  /  Alb  3.8  /  TBili  0.9  /  DBili  x   /  AST  19  /  ALT  12  /  AlkPhos  139<H>                          13.3   17.8  )-----------( 333      ( 2018 10:04 )             40.8     PT/INR - ( 2018 10:04 )   PT: 13.0 sec;   INR: 1.13 ratio     PTT - ( 2018 10:04 )  PTT:34.3 sec  Urinalysis Basic - ( 2018 16:13 )    Color: Yellow / Appearance: Clear / S.015 / pH: x  Gluc: x / Ketone: Small  / Bili: Negative / Urobili: Negative mg/dL   Blood: x / Protein: 500 mg/dL / Nitrite: Negative   Leuk Esterase: Negative / RBC: 25-50 /HPF / WBC 0-2   Sq Epi: x / Non Sq Epi: Occasional / Bacteria: Few    LIVER FUNCTIONS - ( 2018 10:04 )  Alb: 3.8 g/dL / Pro: 7.2 g/dL / ALK PHOS: 139 U/L / ALT: 12 U/L / AST: 19 U/L / GGT: x           ABG - ( 2018 18:03 )  pH, Arterial: 7.40  pH, Blood: x     /  pCO2: 34    /  pO2: 85    / HCO3: 22    / Base Excess: -2.9  /  SaO2: 98        RECENT CULTURES:   @ 17:11        NotDetec   @ 10:06 .Blood Blood-Peripheral     Growth in aerobic bottle: Gram Positive Cocci in Clusters  Aerobic Bottle: 9.47 Hours to positivity  Anaerobic Bottle: 10.47 Hours to positivity     @ 10:05 .Blood Blood-Peripheral Blood Culture PCR    Growth in aerobic and anaerobic bottles: Gram Positive Cocci in Clusters  Aerobic Bottle: 09.07 Hours to positivity  Anaerobic Bottle: 12:17 Hours to positivity      All imaging and other data have been reviewed.

## 2018-11-06 NOTE — CONSULT NOTE ADULT - ASSESSMENT
67y/o man with PMH of HTN, DM2, CVA and CAD s/p CABG and PM, was admitted today with high fever and generalized weakness and vomiting for the past few days. Blood cultures showed GPC in clusters 4/4 bottles, possible source is skin.     High fever  GPC bacteremia   Skin ulcers     -Follow up blood cultures for ID and sensitivity most likely MRSA in PCR.   -Follow up daily WBC and fever cureve  -Repeat blood culture  -TTE and possibly will do ASHELY since has pacemaker   -Will stop zosyn  -Continue vancomycin 500mg q12h due to low CrCl  -Send vanco trough tomorrow morning, keep 15 to 20  Will follow.

## 2018-11-07 LAB
CK SERPL-CCNC: 132 U/L — SIGNIFICANT CHANGE UP (ref 30–200)
GLUCOSE BLDC GLUCOMTR-MCNC: 387 MG/DL — HIGH (ref 70–99)
GLUCOSE BLDC GLUCOMTR-MCNC: 409 MG/DL — HIGH (ref 70–99)
GLUCOSE BLDC GLUCOMTR-MCNC: 410 MG/DL — HIGH (ref 70–99)
GLUCOSE BLDC GLUCOMTR-MCNC: 471 MG/DL — CRITICAL HIGH (ref 70–99)
HCT VFR BLD CALC: 36.7 % — LOW (ref 42–52)
HGB BLD-MCNC: 12.2 G/DL — LOW (ref 14–18)
MCHC RBC-ENTMCNC: 29.5 PG — SIGNIFICANT CHANGE UP (ref 27–31)
MCHC RBC-ENTMCNC: 33.2 G/DL — SIGNIFICANT CHANGE UP (ref 32–36)
MCV RBC AUTO: 88.6 FL — SIGNIFICANT CHANGE UP (ref 80–94)
PLATELET # BLD AUTO: 185 K/UL — SIGNIFICANT CHANGE UP (ref 150–400)
RBC # BLD: 4.14 M/UL — LOW (ref 4.6–6.2)
RBC # FLD: 14.3 % — SIGNIFICANT CHANGE UP (ref 11–15.6)
VANCOMYCIN TROUGH SERPL-MCNC: 7.4 UG/ML — LOW (ref 10–20)
WBC # BLD: 9.7 K/UL — SIGNIFICANT CHANGE UP (ref 4.8–10.8)
WBC # FLD AUTO: 9.7 K/UL — SIGNIFICANT CHANGE UP (ref 4.8–10.8)

## 2018-11-07 PROCEDURE — 99232 SBSQ HOSP IP/OBS MODERATE 35: CPT

## 2018-11-07 PROCEDURE — 99233 SBSQ HOSP IP/OBS HIGH 50: CPT

## 2018-11-07 RX ORDER — HYDRALAZINE HCL 50 MG
10 TABLET ORAL ONCE
Qty: 0 | Refills: 0 | Status: COMPLETED | OUTPATIENT
Start: 2018-11-07 | End: 2018-11-07

## 2018-11-07 RX ORDER — DAPTOMYCIN 500 MG/10ML
550 INJECTION, POWDER, LYOPHILIZED, FOR SOLUTION INTRAVENOUS EVERY 24 HOURS
Qty: 0 | Refills: 0 | Status: DISCONTINUED | OUTPATIENT
Start: 2018-11-07 | End: 2018-11-21

## 2018-11-07 RX ORDER — ONDANSETRON 8 MG/1
4 TABLET, FILM COATED ORAL ONCE
Qty: 0 | Refills: 0 | Status: COMPLETED | OUTPATIENT
Start: 2018-11-07 | End: 2018-11-07

## 2018-11-07 RX ADMIN — DAPTOMYCIN 122 MILLIGRAM(S): 500 INJECTION, POWDER, LYOPHILIZED, FOR SOLUTION INTRAVENOUS at 18:10

## 2018-11-07 RX ADMIN — Medication 4: at 13:10

## 2018-11-07 RX ADMIN — Medication 10 MILLIGRAM(S): at 05:19

## 2018-11-07 RX ADMIN — HEPARIN SODIUM 5000 UNIT(S): 5000 INJECTION INTRAVENOUS; SUBCUTANEOUS at 17:25

## 2018-11-07 RX ADMIN — Medication 25 MILLIGRAM(S): at 17:24

## 2018-11-07 RX ADMIN — INSULIN GLARGINE 20 UNIT(S): 100 INJECTION, SOLUTION SUBCUTANEOUS at 21:50

## 2018-11-07 RX ADMIN — Medication 6: at 17:30

## 2018-11-07 RX ADMIN — Medication 250 MILLIGRAM(S): at 17:25

## 2018-11-07 RX ADMIN — Medication 100 MILLIGRAM(S): at 09:49

## 2018-11-07 RX ADMIN — SERTRALINE 100 MILLIGRAM(S): 25 TABLET, FILM COATED ORAL at 13:08

## 2018-11-07 RX ADMIN — Medication 3: at 08:18

## 2018-11-07 RX ADMIN — ATORVASTATIN CALCIUM 20 MILLIGRAM(S): 80 TABLET, FILM COATED ORAL at 21:51

## 2018-11-07 RX ADMIN — HEPARIN SODIUM 5000 UNIT(S): 5000 INJECTION INTRAVENOUS; SUBCUTANEOUS at 05:18

## 2018-11-07 RX ADMIN — Medication 50 MILLIGRAM(S): at 21:51

## 2018-11-07 RX ADMIN — ONDANSETRON 4 MILLIGRAM(S): 8 TABLET, FILM COATED ORAL at 05:18

## 2018-11-07 RX ADMIN — Medication 81 MILLIGRAM(S): at 11:21

## 2018-11-07 RX ADMIN — CLOPIDOGREL BISULFATE 75 MILLIGRAM(S): 75 TABLET, FILM COATED ORAL at 11:21

## 2018-11-07 RX ADMIN — Medication 50 MILLIGRAM(S): at 13:08

## 2018-11-07 NOTE — PROGRESS NOTE ADULT - ASSESSMENT
69y/o man with PMH of HTN, DM2, CVA and CAD s/p CABG and PM, was admitted today with high fever and generalized weakness and vomiting for the past few days. Blood cultures with MRSA 4/4 bottles, possible source is skin.   Sepsis 2/2 GPC bacteremia; source likely from skin; will evaluate back  +Blood cultures with MRSA 4/4 bottles, possible source is skin.     - d/c Vanco and started Daptomycin  - ID consult appreciated  - Consider TTE/ASHELY echo as patient with PPM; EKG NSR without WV prolongation  - trend CBC and fevers    Fecal impaction  - stool softeners and manual disimpaction per nurse    Fall; may be related to deconditioning, sepsis  - PT to see patient    dysphagia; s/p bedside swallow  - Dysphagia diet-> puree NO Liquids    DM  - diabetic diet and ISS    CAD  - on statin, aspirin, and beta blocker    HTN  - controlled    Stroke history  - on aspirin and statin; no new focal symptomatology; head CT negative.    ppx: heparin sq BID  full code  case discussed with wife. 69y/o man with PMH of HTN, DM2, CVA and CAD s/p CABG and PM, was admitted today with high fever and generalized weakness and vomiting for the past few days. Blood cultures with MRSA 4/4 bottles, possible source is skin.   Sepsis 2/2 GPC bacteremia; source likely from skin; will evaluate back  +Blood cultures with MRSA 4/4 bottles, possible source is skin.   - d/c Vanco and started Daptomycin  - ID consult appreciated  - Will need ASHELY echo as patient with PPM; EKG NSR without AR prolongation  - trend CBC and fevers    Fecal impaction  - stool softeners and manual disimpaction per nurse    Fall; may be related to deconditioning, sepsis  - PT to see patient    dysphagia; s/p bedside swallow  - Dysphagia diet-> puree NO Liquids    DM  - diabetic diet and ISS    CAD  - on statin, aspirin, and beta blocker    HTN  - controlled    Stroke history  - on aspirin and statin; no new focal symptomatology; head CT negative.    ppx: heparin sq BID  full code  case discussed with wife.

## 2018-11-07 NOTE — PROGRESS NOTE ADULT - SUBJECTIVE AND OBJECTIVE BOX
[facility]  INFECTIOUS DISEASES AND INTERNAL MEDICINE at [place]  =======================================================  Frankie Garcia MD  Diplomates American Board of Internal Medicine and Infectious Diseases  =======================================================    [id number]  AVNI GREEN     Follow up for bacteremia with MRSA possible source skin. Afebrile. No compliant.     PAST MEDICAL & SURGICAL HISTORY:  CVA (cerebral vascular accident)  Diabetic neuropathy  DM (diabetes mellitus)  Hyperlipidemia  AICD (automatic cardioverter/defibrillator) present  Pacemaker  Stented coronary artery  HTN (hypertension)  Cardiac pacemaker    Social Hx: As per him no smoking, ETOH or drugs.     FAMILY HISTORY:  No pertinent family history in first degree relatives    Allergies  No Known Allergies    Antibiotics:  vancomycin      REVIEW OF SYSTEMS:  Afebrile no complaint.     Physical Exam:  Vital Signs Last 24 Hrs  T(C): 36.7 (2018 09:18), Max: 36.9 (2018 21:34)  T(F): 98.1 (2018 09:18), Max: 98.4 (2018 21:34)  HR: 92 (2018 09:18) (72 - 92)  BP: 162/79 (2018 09:18) (121/54 - 176/76)  BP(mean): --  RR: 18 (2018 09:18) (18 - 18)  SpO2: 94% (2018 15:35) (94% - 94%)  GEN: NAD  HEENT: normocephalic and atraumatic. EOMI. PERRL.    NECK: Supple.  No lymphadenopathy   LUNGS: Clear to auscultation.  HEART: Regular rate and rhythm left upper chest Pacemaker, nontender, no erythema or swelling   ABDOMEN: Soft, nontender, and nondistended.  Positive bowel sounds.    : No CVA tenderness  EXTREMITIES: Without any cyanosis, clubbing, rash, lesions or edema.  NEUROLOGIC: grossly intact.  PSYCHIATRIC: Appropriate affect .  SKIN: scratches and superficial wounds in legs, healing wound in his Right mid back, with erythema, no discharge     Labs:      137  |  100  |  44.0<H>  ----------------------------<  324<H>  5.3   |  16.0<L>  |  2.21<H>    Ca    9.0      2018 08:43    Urinalysis Basic - ( 2018 16:13 )    Color: Yellow / Appearance: Clear / S.015 / pH: x  Gluc: x / Ketone: Small  / Bili: Negative / Urobili: Negative mg/dL   Blood: x / Protein: 500 mg/dL / Nitrite: Negative   Leuk Esterase: Negative / RBC: 25-50 /HPF / WBC 0-2   Sq Epi: x / Non Sq Epi: Occasional / Bacteria: Few    ABG - ( 2018 18:03 )  pH, Arterial: 7.40  pH, Blood: x     /  pCO2: 34    /  pO2: 85    / HCO3: 22    / Base Excess: -2.9  /  SaO2: 98        RECENT CULTURES:   @ 17:11    RVP:NotDete     @ 16:14 .Urine Clean Catch (Midstream)     No growth     @ 10:06 .Blood Blood-Peripheral Methicillin resistant Staphylococcus aureus    Growth in aerobic bottle: Methicillin resistant Staphylococcus aureus  Aerobic Bottle: 9.47 Hours to positivity  Anaerobic Bottle: 10.47 Hours to positivity     @ 10:05 .Blood Blood-Peripheral Methicillin resistant Staphylococcus aureus  Blood Culture PCR    Growth in aerobic and anaerobic bottles: Methicillin resistant  Staphylococcus aureus  Aerobic Bottle: 09.07 Hours to positivity  Anaerobic Bottle: 12:17 Hours to positivity      All imaging and other data have been reviewed.

## 2018-11-07 NOTE — SWALLOW BEDSIDE ASSESSMENT ADULT - SLP PERTINENT HISTORY OF CURRENT PROBLEM
Pt is known to this service, baseline diet of soft/honey liquids via teaspoon (last seen 9/7/18 when he was admitted for TIA). PMH remarkable for HTN, DM, CVA, CAD. Pt was brought to ED for weakness, vomitting, AMS with likely etiology of sepsis from GPC bacteremia, +fecal impaction.  CXR indicates no infiltrates. Although pt questionable historian, does report use of honey thickened liquids as his baseline  diet.

## 2018-11-07 NOTE — SWALLOW BEDSIDE ASSESSMENT ADULT - ORAL PHASE
Decreased anterior-posterior movement of the bolus/Delayed oral transit time Decreased anterior-posterior movement of the bolus/Delayed oral transit time/intermittent oral holding

## 2018-11-07 NOTE — SWALLOW BEDSIDE ASSESSMENT ADULT - PHARYNGEAL PHASE
no overt s/s aspiration/Delayed pharyngeal swallow Delayed pharyngeal swallow/Cough post oral intake

## 2018-11-07 NOTE — SWALLOW BEDSIDE ASSESSMENT ADULT - SWALLOW EVAL: RECOMMENDED FEEDING/EATING TECHNIQUES
ENSURE PT HAS SWALLOWED BEFORE NEXT BITE ADMINISTERED/check mouth frequently for oral residue/pocketing/crush medication (when feasible)

## 2018-11-07 NOTE — SWALLOW BEDSIDE ASSESSMENT ADULT - SWALLOW EVAL: DIAGNOSIS
Mild-moderate oral dysphagia confounded by reduced cognition and marked by reduced oral grading, slow bolus formation/propulsion, and intermittent oral holding. Suspect pharyngeal dysphagia; +delay in swallow trigger across consistencies, +overt s/s aspiration with mechancial soft and honey liquids. No overt s/s aspiration for puree at this time.

## 2018-11-07 NOTE — PROGRESS NOTE ADULT - ATTENDING COMMENTS
Attending Attestation:  I personally saw and evaluated the patient and agree with the above assessment and plan  bolded portion denotes attending addition/edit

## 2018-11-07 NOTE — PROGRESS NOTE ADULT - SUBJECTIVE AND OBJECTIVE BOX
CC: Vomiting and generalized weakness. (05 Nov 2018 15:11)    HPI:  This is a 68years old male with PMH of HTN, DM, CVA and CAD brought in to the Er for the evaluation of generalized weakness and vomiting for the past few days. Reportedly patient was unable to ambulate by himself today, family called 911 and patient was brought in to the ER. No h/o fever, chills, chest pain, SOB, diarrhea, constipation or urinary complaints. Patient was recently admitted to [facility] with TIA Patient  was seen by cardio and neurology and discharged home on 9/10. (05 Nov 2018 15:11)    Hospital Course  Patient is mostly non-verbal and possible expressive aphasia. He can answer some questions and mostly yes/no questions. Declines pain or other symptoms.   Patient had GPC in clusters in the blood culture  Evaluated by ID/ Jese/maty    As per wife, patient has wound on his back for which he has been seeing a surgeon.    PAST MEDICAL & SURGICAL HISTORY:  CVA (cerebral vascular accident)  Diabetic neuropathy  DM (diabetes mellitus)  Hyperlipidemia  AICD (automatic cardioverter/defibrillator) present  Pacemaker  Stented coronary artery  HTN (hypertension)  Cardiac pacemaker      MEDICATIONS  (STANDING):  aspirin  chewable 81 milliGRAM(s) Oral daily  atorvastatin 20 milliGRAM(s) Oral at bedtime  clopidogrel Tablet 75 milliGRAM(s) Oral daily  DAPTOmycin IVPB 550 milliGRAM(s) IV Intermittent every 24 hours  dextrose 5%. 1000 milliLiter(s) (50 mL/Hr) IV Continuous <Continuous>  dextrose 50% Injectable 12.5 Gram(s) IV Push once  dextrose 50% Injectable 25 Gram(s) IV Push once  dextrose 50% Injectable 25 Gram(s) IV Push once  heparin  Injectable 5000 Unit(s) SubCutaneous every 12 hours      Vital Signs Last 24 Hrs  T(C): 36.9 (07 Nov 2018 17:20), Max: 36.9 (06 Nov 2018 21:34)  T(F): 98.4 (07 Nov 2018 17:20), Max: 98.4 (06 Nov 2018 21:34)  HR: 101 (07 Nov 2018 17:20) (72 - 102)  BP: 128/74 (07 Nov 2018 17:20) (121/54 - 185/88)  BP(mean): --  RR: 18 (07 Nov 2018 17:20) (18 - 18)  SpO2: 97% (07 Nov 2018 17:20) (97% - 97%)hydrALAZINE 50 milliGRAM(s) Oral every 8 hours  insulin glargine Injectable (LANTUS) 20 Unit(s) SubCutaneous at bedtime  insulin lispro (HumaLOG) corrective regimen sliding scale   SubCutaneous three times a day before meals  metoprolol tartrate 25 milliGRAM(s) Oral two times a day  saccharomyces boulardii 250 milliGRAM(s) Oral two times a day  sertraline 100 milliGRAM(s) Oral daily  sodium chloride 0.9%. 1000 milliLiter(s) (100 mL/Hr) IV Continuous <Continuous>    MEDICATIONS  (PRN):  dextrose 40% Gel 15 Gram(s) Oral once PRN Blood Glucose LESS THAN 70 milliGRAM(s)/deciliter  glucagon  Injectable 1 milliGRAM(s) IntraMuscular once PRN Glucose LESS THAN 70 milligrams/deciliter  polyethylene glycol 3350 17 Gram(s) Oral daily PRN Constipation    LAB:                       12.2   9.7   )-----------( 185      ( 07 Nov 2018 11:46 )             36.7     11-07    137  |  100  |  44.0<H>  ----------------------------<  324<H>  5.3   |  16.0<L>  |  2.21<H>    Ca    9.0      07 Nov 2018 08:43        PHYSICAL EXAM:  General: NAD  Eyes: PERRLA, EOMI; conjunctiva and sclera clear  Head: Normocephalic; atraumatic  Respiratory: No wheezes, rales or rhonchi  Cardiovascular: Regular rate and rhythm. S1 and S2 Normal; No murmurs, gallops or rubs  Gastrointestinal: Soft non-tender non-distended; Normal bowel sounds  Extremities: Normal range of motion, No clubbing, cyanosis or edema  Vascular: Peripheral pulses palpable 2+ bilaterally  Neurological: Alert and oriented x4  Skin: Warm and dry. No acute rash CC: Vomiting and generalized weakness. (05 Nov 2018 15:11)    HPI:  This is a 68years old male with PMH of HTN, DM, CVA and CAD brought in to the Er for the evaluation of generalized weakness and vomiting for the past few days. Reportedly patient was unable to ambulate by himself today, family called 911 and patient was brought in to the ER. No h/o fever, chills, chest pain, SOB, diarrhea, constipation or urinary complaints. Patient was recently admitted to [facility] with TIA Patient  was seen by cardio and neurology and discharged home on 9/10. (05 Nov 2018 15:11)    Hospital Course  Patient is mostly non-verbal and possible expressive aphasia. He can answer some questions and mostly yes/no questions. Declines pain or other symptoms.   Patient had GPC in clusters in the blood culture  Evaluated by ID/ Jese/maty    As per wife, patient has wound on his back for which he has been seeing a surgeon.    PAST MEDICAL & SURGICAL HISTORY:  CVA (cerebral vascular accident)  Diabetic neuropathy  DM (diabetes mellitus)  Hyperlipidemia  AICD (automatic cardioverter/defibrillator) present  Pacemaker  Stented coronary artery  HTN (hypertension)  Cardiac pacemaker      MEDICATIONS  (STANDING):  aspirin  chewable 81 milliGRAM(s) Oral daily  atorvastatin 20 milliGRAM(s) Oral at bedtime  clopidogrel Tablet 75 milliGRAM(s) Oral daily  DAPTOmycin IVPB 550 milliGRAM(s) IV Intermittent every 24 hours  dextrose 5%. 1000 milliLiter(s) (50 mL/Hr) IV Continuous <Continuous>  dextrose 50% Injectable 12.5 Gram(s) IV Push once  dextrose 50% Injectable 25 Gram(s) IV Push once  dextrose 50% Injectable 25 Gram(s) IV Push once  heparin  Injectable 5000 Unit(s) SubCutaneous every 12 hours      Vital Signs Last 24 Hrs:  T(C): 36.9 (07 Nov 2018 17:20), Max: 36.9 (06 Nov 2018 21:34)  T(F): 98.4 (07 Nov 2018 17:20), Max: 98.4 (06 Nov 2018 21:34)  HR: 101 (07 Nov 2018 17:20) (72 - 102)  BP: 128/74 (07 Nov 2018 17:20) (121/54 - 185/88)  BP(mean): --  RR: 18 (07 Nov 2018 17:20) (18 - 18)  SpO2: 97% (07 Nov 2018 17:20) (97% - 97%)    MEDICATIONS:  hydrALAZINE 50 milliGRAM(s) Oral every 8 hours  insulin glargine Injectable (LANTUS) 20 Unit(s) SubCutaneous at bedtime  insulin lispro (HumaLOG) corrective regimen sliding scale   SubCutaneous three times a day before meals  metoprolol tartrate 25 milliGRAM(s) Oral two times a day  saccharomyces boulardii 250 milliGRAM(s) Oral two times a day  sertraline 100 milliGRAM(s) Oral daily  sodium chloride 0.9%. 1000 milliLiter(s) (100 mL/Hr) IV Continuous <Continuous>    MEDICATIONS  (PRN):  dextrose 40% Gel 15 Gram(s) Oral once PRN Blood Glucose LESS THAN 70 milliGRAM(s)/deciliter  glucagon  Injectable 1 milliGRAM(s) IntraMuscular once PRN Glucose LESS THAN 70 milligrams/deciliter  polyethylene glycol 3350 17 Gram(s) Oral daily PRN Constipation    LAB:                       12.2   9.7   )-----------( 185      ( 07 Nov 2018 11:46 )             36.7     11-07    137  |  100  |  44.0<H>  ----------------------------<  324<H>  5.3   |  16.0<L>  |  2.21<H>    Ca    9.0      07 Nov 2018 08:43    PHYSICAL EXAM:  General: NAD; thin appearing male  Eyes: PERRLA, EOMI; conjunctiva and sclera clear  Head: Normocephalic; atraumatic  Respiratory: No wheezes, rales or rhonchi  Cardiovascular: Regular rate and rhythm. S1 and S2 Normal; No murmurs, gallops or rubs; pacemaker site appears clean  Gastrointestinal: Soft non-tender non-distended; Normal bowel sounds  Extremities: Normal range of motion, No clubbing, cyanosis or edema  Vascular: Peripheral pulses palpable 2+ bilaterally  Neurological: Alert and oriented x4  Skin: Warm and dry. No acute rash

## 2018-11-08 LAB
-  AMPICILLIN/SULBACTAM: SIGNIFICANT CHANGE UP
-  AMPICILLIN/SULBACTAM: SIGNIFICANT CHANGE UP
-  CEFAZOLIN: SIGNIFICANT CHANGE UP
-  CEFAZOLIN: SIGNIFICANT CHANGE UP
-  CLINDAMYCIN: SIGNIFICANT CHANGE UP
-  CLINDAMYCIN: SIGNIFICANT CHANGE UP
-  DAPTOMYCIN: SIGNIFICANT CHANGE UP
-  DAPTOMYCIN: SIGNIFICANT CHANGE UP
-  ERYTHROMYCIN: SIGNIFICANT CHANGE UP
-  ERYTHROMYCIN: SIGNIFICANT CHANGE UP
-  GENTAMICIN: SIGNIFICANT CHANGE UP
-  GENTAMICIN: SIGNIFICANT CHANGE UP
-  LINEZOLID: SIGNIFICANT CHANGE UP
-  LINEZOLID: SIGNIFICANT CHANGE UP
-  OXACILLIN: SIGNIFICANT CHANGE UP
-  OXACILLIN: SIGNIFICANT CHANGE UP
-  PENICILLIN: SIGNIFICANT CHANGE UP
-  PENICILLIN: SIGNIFICANT CHANGE UP
-  RIFAMPIN: SIGNIFICANT CHANGE UP
-  RIFAMPIN: SIGNIFICANT CHANGE UP
-  TETRACYCLINE: SIGNIFICANT CHANGE UP
-  TETRACYCLINE: SIGNIFICANT CHANGE UP
-  TRIMETHOPRIM/SULFAMETHOXAZOLE: SIGNIFICANT CHANGE UP
-  TRIMETHOPRIM/SULFAMETHOXAZOLE: SIGNIFICANT CHANGE UP
-  VANCOMYCIN: SIGNIFICANT CHANGE UP
-  VANCOMYCIN: SIGNIFICANT CHANGE UP
ANION GAP SERPL CALC-SCNC: 13 MMOL/L — SIGNIFICANT CHANGE UP (ref 5–17)
BUN SERPL-MCNC: 48 MG/DL — HIGH (ref 8–20)
CALCIUM SERPL-MCNC: 8.6 MG/DL — SIGNIFICANT CHANGE UP (ref 8.6–10.2)
CHLORIDE SERPL-SCNC: 106 MMOL/L — SIGNIFICANT CHANGE UP (ref 98–107)
CK SERPL-CCNC: 55 U/L — SIGNIFICANT CHANGE UP (ref 30–200)
CO2 SERPL-SCNC: 23 MMOL/L — SIGNIFICANT CHANGE UP (ref 22–29)
CREAT SERPL-MCNC: 2.05 MG/DL — HIGH (ref 0.5–1.3)
CULTURE RESULTS: SIGNIFICANT CHANGE UP
CULTURE RESULTS: SIGNIFICANT CHANGE UP
GLUCOSE BLDC GLUCOMTR-MCNC: 294 MG/DL — HIGH (ref 70–99)
GLUCOSE BLDC GLUCOMTR-MCNC: 313 MG/DL — HIGH (ref 70–99)
GLUCOSE BLDC GLUCOMTR-MCNC: 342 MG/DL — HIGH (ref 70–99)
GLUCOSE SERPL-MCNC: 377 MG/DL — HIGH (ref 70–115)
HCT VFR BLD CALC: 32.9 % — LOW (ref 42–52)
HGB BLD-MCNC: 11 G/DL — LOW (ref 14–18)
MCHC RBC-ENTMCNC: 29.6 PG — SIGNIFICANT CHANGE UP (ref 27–31)
MCHC RBC-ENTMCNC: 33.4 G/DL — SIGNIFICANT CHANGE UP (ref 32–36)
MCV RBC AUTO: 88.4 FL — SIGNIFICANT CHANGE UP (ref 80–94)
METHOD TYPE: SIGNIFICANT CHANGE UP
METHOD TYPE: SIGNIFICANT CHANGE UP
ORGANISM # SPEC MICROSCOPIC CNT: SIGNIFICANT CHANGE UP
PLATELET # BLD AUTO: 193 K/UL — SIGNIFICANT CHANGE UP (ref 150–400)
POTASSIUM SERPL-MCNC: 4.3 MMOL/L — SIGNIFICANT CHANGE UP (ref 3.5–5.3)
POTASSIUM SERPL-SCNC: 4.3 MMOL/L — SIGNIFICANT CHANGE UP (ref 3.5–5.3)
RBC # BLD: 3.72 M/UL — LOW (ref 4.6–6.2)
RBC # FLD: 14.3 % — SIGNIFICANT CHANGE UP (ref 11–15.6)
SODIUM SERPL-SCNC: 142 MMOL/L — SIGNIFICANT CHANGE UP (ref 135–145)
SPECIMEN SOURCE: SIGNIFICANT CHANGE UP
SPECIMEN SOURCE: SIGNIFICANT CHANGE UP
WBC # BLD: 7.9 K/UL — SIGNIFICANT CHANGE UP (ref 4.8–10.8)
WBC # FLD AUTO: 7.9 K/UL — SIGNIFICANT CHANGE UP (ref 4.8–10.8)

## 2018-11-08 PROCEDURE — 99233 SBSQ HOSP IP/OBS HIGH 50: CPT

## 2018-11-08 PROCEDURE — 99232 SBSQ HOSP IP/OBS MODERATE 35: CPT

## 2018-11-08 RX ADMIN — Medication 25 MILLIGRAM(S): at 18:18

## 2018-11-08 RX ADMIN — CLOPIDOGREL BISULFATE 75 MILLIGRAM(S): 75 TABLET, FILM COATED ORAL at 12:43

## 2018-11-08 RX ADMIN — Medication 50 MILLIGRAM(S): at 14:44

## 2018-11-08 RX ADMIN — Medication 3: at 18:18

## 2018-11-08 RX ADMIN — HEPARIN SODIUM 5000 UNIT(S): 5000 INJECTION INTRAVENOUS; SUBCUTANEOUS at 18:18

## 2018-11-08 RX ADMIN — Medication 4: at 08:34

## 2018-11-08 RX ADMIN — Medication 4: at 12:43

## 2018-11-08 RX ADMIN — Medication 50 MILLIGRAM(S): at 05:37

## 2018-11-08 RX ADMIN — ATORVASTATIN CALCIUM 20 MILLIGRAM(S): 80 TABLET, FILM COATED ORAL at 22:54

## 2018-11-08 RX ADMIN — Medication 50 MILLIGRAM(S): at 22:54

## 2018-11-08 RX ADMIN — Medication 25 MILLIGRAM(S): at 05:37

## 2018-11-08 RX ADMIN — DAPTOMYCIN 122 MILLIGRAM(S): 500 INJECTION, POWDER, LYOPHILIZED, FOR SOLUTION INTRAVENOUS at 18:19

## 2018-11-08 RX ADMIN — HEPARIN SODIUM 5000 UNIT(S): 5000 INJECTION INTRAVENOUS; SUBCUTANEOUS at 05:37

## 2018-11-08 RX ADMIN — SERTRALINE 100 MILLIGRAM(S): 25 TABLET, FILM COATED ORAL at 12:43

## 2018-11-08 RX ADMIN — Medication 81 MILLIGRAM(S): at 12:43

## 2018-11-08 RX ADMIN — Medication 250 MILLIGRAM(S): at 18:18

## 2018-11-08 RX ADMIN — INSULIN GLARGINE 20 UNIT(S): 100 INJECTION, SOLUTION SUBCUTANEOUS at 22:54

## 2018-11-08 RX ADMIN — Medication 250 MILLIGRAM(S): at 05:37

## 2018-11-08 NOTE — DIETITIAN INITIAL EVALUATION ADULT. - PERTINENT LABORATORY DATA
11-08 Na142 mmol/L Glu 377 mg/dL<H> K+ 4.3 mmol/L Cr  2.05 mg/dL<H> BUN 48.0 mg/dL<H> Phos n/a   Alb n/a   PAB n/a

## 2018-11-08 NOTE — PROGRESS NOTE ADULT - ASSESSMENT
69y/o man with PMH of HTN, DM2, CVA and CAD s/p CABG and PM, was admitted today with high fever and generalized weakness and vomiting for the past few days. Blood cultures with MRSA 4/4 bottles, possible source is skin since growing staph.     High fever  GPC bacteremia   Skin ulcers     -Repeat blood cultures are positive on 11/6 as well, now 8/8 growing staph.   -Repeat blood culture daily until negative.   -Follow up daily WBC and fever curve  -Needs ASHELY since has pacemaker and prolonged bacteremia with MRSA.    -Continue Daptomycin 550mg daily   Will follow. 69y/o man with PMH of HTN, DM2, CVA and CAD s/p CABG and PM, was admitted today with high fever and generalized weakness and vomiting for the past few days. Blood cultures with MRSA 4/4 bottles, possible source is skin since growing staph.     High fever  GPC bacteremia   Skin ulcers     -Contact isolation   -Repeat blood cultures are positive on 11/6 as well, now 8/8 growing staph.   -Repeat blood culture daily until negative.   -Follow up daily WBC and fever curve  -Needs ASHELY since has pacemaker and prolonged bacteremia with MRSA.    -Continue Daptomycin 550mg daily   Will follow.

## 2018-11-08 NOTE — PROGRESS NOTE ADULT - SUBJECTIVE AND OBJECTIVE BOX
CC: Vomiting and generalized weakness. (05 Nov 2018 15:11)    HPI: 69y/o man with PMH of HTN, DM2, CVA and CAD s/p CABG and PM, was admitted with high fever, generalized weakness and vomiting for the past few days.. Admitted with  Sepsis 2/2 GPC bacteremia; source likely from skin.   +Blood cultures with MRSA 8/8 bottles.     Interval: Seen by speech and swallow, recommends puree diet with no liquids. Cardio consulted for possible ASHELY given MRSA bacteremia. Pt is NPO for possible ASHELY Today.     Pt seen and examined at bedside. Patient is ANO x 3. Declines pain or other symptoms.     Vital Signs Last 24 Hrs  T(C): 36.7 (08 Nov 2018 11:17), Max: 36.9 (07 Nov 2018 17:20)  T(F): 98.1 (08 Nov 2018 11:17), Max: 98.4 (07 Nov 2018 17:20)  HR: 76 (08 Nov 2018 11:17) (76 - 102)  BP: 110/71 (08 Nov 2018 11:17) (110/71 - 185/88)  BP(mean): --  RR: 19 (08 Nov 2018 11:17) (18 - 19)  SpO2: 97% (07 Nov 2018 21:49) (97% - 97%)    PHYSICAL EXAM:  General: NAD  Eyes: PERRLA, EOMI; conjunctiva and sclera clear  Head: Normocephalic; atraumatic  Respiratory: No wheezes, rales or rhonchi  Cardiovascular: Regular rate and rhythm. S1 and S2 Normal; No murmurs, gallops or rubs  Gastrointestinal: Soft non-tender non-distended; Normal bowel sounds  Extremities: Normal range of motion, No clubbing, cyanosis or edema  Vascular: Peripheral pulses palpable 2+ bilaterally  Neurological: Alert and oriented x 3  Skin: uperficial wounds in legs, healing wound in his Right mid back, with erythema, no discharge     LAB:                       12.2   9.7   )-----------( 185      ( 07 Nov 2018 11:46 )             36.7     11-07    137  |  100  |  44.0<H>  ----------------------------<  324<H>  5.3   |  16.0<L>  |  2.21<H>    Ca    9.0      07 Nov 2018 08:43 CC: Vomiting and generalized weakness. (05 Nov 2018 15:11)    HPI: 69y/o man with PMH of HTN, DM2, CVA and CAD s/p CABG and PM, was admitted with high fever, generalized weakness and vomiting for the past few days.. Admitted with  Sepsis 2/2 GPC bacteremia; source likely from skin.   +Blood cultures with MRSA 8/8 bottles.     Interval: Seen by speech and swallow, recommends puree diet with no liquids. Cardio consulted for possible ASHELY given MRSA bacteremia. Pt is NPO for possible ASHELY Today.     Pt seen and examined at bedside. Patient is ANO x 3. Declines pain or other symptoms.     Vital Signs Last 24 Hrs  T(C): 36.7 (08 Nov 2018 11:17), Max: 36.9 (07 Nov 2018 17:20)  T(F): 98.1 (08 Nov 2018 11:17), Max: 98.4 (07 Nov 2018 17:20)  HR: 76 (08 Nov 2018 11:17) (76 - 102)  BP: 110/71 (08 Nov 2018 11:17) (110/71 - 185/88)  BP(mean): --  RR: 19 (08 Nov 2018 11:17) (18 - 19)  SpO2: 97% (07 Nov 2018 21:49) (97% - 97%)    PHYSICAL EXAM:  General: NAD  Eyes: PERRLA, EOMI; conjunctiva and sclera clear  Head: Normocephalic; atraumatic  Respiratory: No wheezes, rales or rhonchi  Cardiovascular: Regular rate and rhythm. S1 and S2 Normal; No murmurs, gallops or rubs  Gastrointestinal: Soft non-tender non-distended; Normal bowel sounds  Extremities: Normal range of motion, No clubbing, cyanosis or edema  Vascular: Peripheral pulses palpable 2+ bilaterally  Neurological: Alert and oriented x 3  Skin: superficial wound to right knee and flank.  healing wound in his Right mid back, with erythema, no discharge     LAB:                       12.2   9.7   )-----------( 185      ( 07 Nov 2018 11:46 )             36.7     11-07    137  |  100  |  44.0<H>  ----------------------------<  324<H>  5.3   |  16.0<L>  |  2.21<H>    Ca    9.0      07 Nov 2018 08:43 CC: Vomiting and generalized weakness. (05 Nov 2018 15:11)    HPI: 69y/o man with PMH of HTN, DM2, CVA and CAD s/p CABG and PM, was admitted with high fever, generalized weakness and vomiting for the past few days.. Admitted with  Sepsis 2/2 GPC bacteremia; source likely from skin.   +Blood cultures with MRSA 8/8 bottles.     Interval: Seen by speech and swallow, recommends puree diet with no liquids. Cardio consulted for possible ASHELY given MRSA bacteremia. Pt is NPO for possible ASHELY Today.     Pt seen and examined at bedside. Patient is ANO x 3. Declines pain or other symptoms.     discussed with wife the possibility of having ASHELY this afternoon patient wife is in agreement.    Vital Signs Last 24 Hrs  T(C): 36.7 (08 Nov 2018 11:17), Max: 36.9 (07 Nov 2018 17:20)  T(F): 98.1 (08 Nov 2018 11:17), Max: 98.4 (07 Nov 2018 17:20)  HR: 76 (08 Nov 2018 11:17) (76 - 102)  BP: 110/71 (08 Nov 2018 11:17) (110/71 - 185/88)  BP(mean): --  RR: 19 (08 Nov 2018 11:17) (18 - 19)  SpO2: 97% (07 Nov 2018 21:49) (97% - 97%)    PHYSICAL EXAM:  General: NAD  Eyes: PERRLA, EOMI; conjunctiva and sclera clear  Head: Normocephalic; atraumatic  Respiratory: No wheezes, rales or rhonchi  Cardiovascular: Regular rate and rhythm. S1 and S2 Normal; No murmurs, gallops or rubs  Gastrointestinal: Soft non-tender non-distended; Normal bowel sounds  Extremities: Normal range of motion, No clubbing, cyanosis or edema  Vascular: Peripheral pulses palpable 2+ bilaterally  Neurological: Alert and oriented x 3  Skin: superficial wound to right knee and flank.  Healing wound in his Right mid back, erythema improved, no discharge     LAB:                       12.2   9.7   )-----------( 185      ( 07 Nov 2018 11:46 )             36.7     11-07    137  |  100  |  44.0<H>  ----------------------------<  324<H>  5.3   |  16.0<L>  |  2.21<H>    Ca    9.0      07 Nov 2018 08:43

## 2018-11-08 NOTE — PROGRESS NOTE ADULT - ASSESSMENT
67y/o man with PMH of HTN, DM2, CVA and CAD s/p CABG and PM, was admitted with high fever, generalized weakness and vomiting for the past few days.. Admitted with  Sepsis 2/2 GPC bacteremia; source likely from skin.   +Blood cultures with MRSA 8/8 bottles. Started on IV abx as per ID.  Seen by speech and swallow, recommends puree diet with no liquids. Cardio consulted for possible ASHELY given MRSA bacteremia. Pt is NPO for possible ASHELY Today.     MRSA Bacteremia:   - + 4/4 inital blood cultures  - now with repeat positive blood cultures from 11/6, now 8/8 growing staph  - Will repeat blood cultures daily until negative.   - Follow up daily WBC and fever curve  - Patient is for ASHELY today with cardiology as patient with pacemaker and prolonged bacteremia with MRSA.    - Continue Daptomycin 550mg daily as per ID  - ID consult appreciated, recs implemented    Fecal impaction  - stool softeners and manual disimpaction per nurse    Fall:  - may be related to deconditioning and sepsis  - PT recommends MIKE. SW aware    Dysphagia  - Evaluated by speech and swallow, recommends dysphagia diet:  puree NO Liquids    DM  - diabetic diet and ISS    CAD  - on statin, aspirin, and beta blocker    HTN  - controlled    Stroke history  - on aspirin and statin; no new focal symptomatology; head CT negative.    ppx: heparin sq BID  full code  case discussed with wife. 69y/o man with PMH of HTN, DM2, CVA and CAD s/p CABG and PM, was admitted with high fever, generalized weakness and vomiting for the past few days.. Admitted with  Sepsis 2/2 GPC bacteremia; source likely from skin.   +Blood cultures with MRSA 8/8 bottles. Started on IV abx as per ID.  Seen by speech and swallow, recommends puree diet with no liquids. Cardio consulted for possible ASHELY given MRSA bacteremia. Pt is NPO for possible ASHELY Today.     MRSA Bacteremia:   - + 4/4 inital blood cultures  - now with repeat positive blood cultures from 11/6, now 8/8 growing staph  - Will repeat blood cultures daily until negative.   - wound cultures taken yesterday pending   - Follow up daily WBC and fever curve  - Patient is for ASHELY today with cardiology as patient with pacemaker and prolonged bacteremia with MRSA.    - Continue Daptomycin 550mg daily as per ID  - ID consult appreciated, recs implemented    Fecal impaction  - stool softeners and manual disimpaction per nurse    Fall:  - may be related to deconditioning and sepsis  - PT recommends MIKE. SW aware    Dysphagia  - Evaluated by speech and swallow, recommends dysphagia diet:  puree NO Liquids    DM  - diabetic diet and ISS    CAD  - on statin, aspirin, and beta blocker    HTN  - controlled    Stroke history  - on aspirin and statin; no new focal symptomatology; head CT negative.    ppx: heparin sq BID  full code  case discussed with wife. 67y/o man with PMH of HTN, DM2, CVA and CAD s/p CABG and PM, was admitted with high fever, generalized weakness and vomiting for the past few days.. Admitted with  Sepsis 2/2 GPC bacteremia; source likely from skin.   +Blood cultures with MRSA 8/8 bottles. Started on IV abx as per ID.  Seen by speech and swallow, recommends puree diet with no liquids. Cardio consulted for possible ASHELY given MRSA bacteremia. Pt is NPO for possible ASHELY Today.     MRSA Bacteremia:   - + 4/4 inital blood cultures  - now with repeat positive blood cultures from 11/6, now 8/8 growing staph  - Will repeat blood cultures daily until negative. Cultures ordered today  - wound cultures taken yesterday pending   - Follow up daily WBC and fever curve  - Patient is for ASHELY today with cardiology as patient with pacemaker and prolonged bacteremia with MRSA.    - Continue Daptomycin 550mg daily as per ID  - ID consult appreciated, recs implemented    Fecal impaction  - stool softeners and manual disimpaction per nurse    Fall:  - may be related to deconditioning and sepsis  - PT recommends MIKE. SW aware    Dysphagia  - Evaluated by speech and swallow, recommends dysphagia diet:  puree NO Liquids    DM  - diabetic diet and ISS    CAD  - on statin, aspirin, and beta blocker    HTN  - controlled    Stroke history  - on aspirin and statin; no new focal symptomatology; head CT negative.    ppx: heparin sq BID  full code  case discussed with wife. 69y/o man with PMH of HTN, DM2, CVA and CAD s/p CABG and PM, was admitted with high fever, generalized weakness and vomiting for the past few days.. Admitted with  Sepsis 2/2 GPC bacteremia; source likely from skin.   +Blood cultures with MRSA 8/8 bottles. Started on IV abx as per ID.  Seen by speech and swallow, recommends puree diet with no liquids. Cardio consulted for possible ASHELY given MRSA bacteremia. Pt is NPO for possible ASHELY Today.     MRSA Bacteremia:   - + 4/4 inital blood cultures  - now with repeat positive blood cultures from 11/6, now 8/8 growing staph  - Will repeat blood cultures daily until negative. Cultures ordered today  - wound cultures taken yesterday pending   - Follow up daily WBC and fever curve  - Patient is for ASHELY today with cardiology as patient with pacemaker and prolonged bacteremia with MRSA.    - Continue Daptomycin 550mg daily as per ID  - ID consult appreciated, recs implemented    Fecal impaction  - stool softeners and manual disimpaction per nurse  - will discuss with nurse to see if improved; no re-imaging necessary at this point    Fall:  - may be related to deconditioning and sepsis  - PT recommends MIKE. SW aware    Dysphagia  - Evaluated by speech and swallow, recommends dysphagia diet:  puree NO Liquids  - will have them re-evaluate    DM  - diabetic diet and ISS    CAD  - on statin, aspirin, and beta blocker    HTN  - controlled    Stroke history  - on aspirin and statin; no new focal symptomatology; head CT negative.    ppx: heparin sq BID  full code  case discussed with wife.

## 2018-11-08 NOTE — PROGRESS NOTE ADULT - SUBJECTIVE AND OBJECTIVE BOX
[facility]  INFECTIOUS DISEASES AND INTERNAL MEDICINE at [place]  =======================================================  Frankie Garcia MD  Diplomates American Board of Internal Medicine and Infectious Diseases  =======================================================    [id number]  AVNI GREEN     Follow up for bacteremia with MRSA possible source skin. Afebrile. No compliant. sitting on chair.     PAST MEDICAL & SURGICAL HISTORY:  CVA (cerebral vascular accident)  Diabetic neuropathy  DM (diabetes mellitus)  Hyperlipidemia  AICD (automatic cardioverter/defibrillator) present  Pacemaker  Stented coronary artery  HTN (hypertension)  Cardiac pacemaker    Social Hx: As per him no smoking, ETOH or drugs.     FAMILY HISTORY:  No pertinent family history in first degree relatives    Allergies  No Known Allergies    Antibiotics:  vancomycin      REVIEW OF SYSTEMS:  Afebrile no complaint.     Physical Exam:  Vital Signs Last 24 Hrs  T(C): 36.7 (08 Nov 2018 11:17), Max: 36.9 (07 Nov 2018 17:20)  T(F): 98.1 (08 Nov 2018 11:17), Max: 98.4 (07 Nov 2018 17:20)  HR: 76 (08 Nov 2018 11:17) (76 - 102)  BP: 110/71 (08 Nov 2018 11:17) (110/71 - 185/88)  BP(mean): --  RR: 19 (08 Nov 2018 11:17) (18 - 19)  SpO2: 97% (07 Nov 2018 21:49) (97% - 97%)  GEN: NAD  HEENT: normocephalic and atraumatic. EOMI. PERRL.    NECK: Supple.  No lymphadenopathy   LUNGS: Clear to auscultation.  HEART: Regular rate and rhythm left upper chest Pacemaker, nontender, no erythema or swelling   ABDOMEN: Soft, nontender, and nondistended.  Positive bowel sounds.    : No CVA tenderness  EXTREMITIES: Without any cyanosis, clubbing, rash, lesions or edema.  NEUROLOGIC: grossly intact.  PSYCHIATRIC: Appropriate affect .  SKIN: scratches and superficial wounds in legs, healing wound in his Right mid back, with erythema, no discharge     Labs:  11-08    142  |  106  |  48.0<H>  ----------------------------<  377<H>  4.3   |  23.0  |  2.05<H>    Ca    8.6      08 Nov 2018 07:22                        11.0   7.9   )-----------( 193      ( 08 Nov 2018 07:22 )             32.9     CARDIAC MARKERS ( 08 Nov 2018 07:22 )  x     / x     / 55 U/L / x     / x      CARDIAC MARKERS ( 07 Nov 2018 15:37 )  x     / x     / 132 U/L / x     / x        RECENT CULTURES:  11-07 @ 11:41 Skin wound     Few Staphylococcus aureus Susceptibility to follow.  Numerous Corynebacterium species    11-06 @ 22:42 .Blood Blood Methicillin resistant Staphylococcus aureus    Growth in aerobic and anaerobic bottles: Methicillin resistant  Staphylococcus aureus (KNOWN)  Aerobic Bottle: 14:58 Hours to positivity  Anaerobic Bottle: 15:48 Hours to positivity  11-05 @ 17:11        NotDetec  11-05 @ 16:14 .Urine Clean Catch (Midstream)     No growth    11-05 @ 10:06 .Blood Blood-Peripheral Methicillin resistant Staphylococcus aureus    Growth in aerobic bottle: Methicillin resistant Staphylococcus aureus  Aerobic Bottle: 9.47 Hours to positivity  Anaerobic Bottle: 10.47 Hours to positivity    11-05 @ 10:05 .Blood Blood-Peripheral Methicillin resistant Staphylococcus aureus  Blood Culture PCR    Growth in aerobic and anaerobic bottles: Methicillin resistant  Staphylococcus aureus  Aerobic Bottle: 09.07 Hours to positivity  Anaerobic Bottle: 12:17 Hours to positivity    All imaging and other data have been reviewed.

## 2018-11-08 NOTE — CONSULT NOTE ADULT - SUBJECTIVE AND OBJECTIVE BOX
[facility]                                          [address], [address], [place] [address]                                               PHONE: [phone]    FAX: [phone] [address], [address], [place] [address]                                       PHONE: [phone]    FAX: [phone]  *******************************************************************************    Reason for Consult: cardiology evaluation, +BC 4/4 MRSA), r/o infective endocarditis    HPI:  AVNI GREEN is a 68y Male admitted with fever, weakness. BC + for MRSA. Asked to evaluate and arrange ASHELY.  Long history of CAD with ischemic CM, carotid disease, PAD, DM, HTN, HL, prior TIA/CVA, dementia.    History from chart as patient si essentially nonverbal.    PAST MEDICAL & SURGICAL HISTORY:  CVA (cerebral vascular accident)  Diabetic neuropathy  DM (diabetes mellitus)  Hyperlipidemia  AICD (automatic cardioverter/defibrillator) present  Pacemaker  Stented coronary artery  HTN (hypertension)  Cardiac pacemaker      No Known Allergies      MEDICATIONS  (STANDING):  aspirin  chewable 81 milliGRAM(s) Oral daily  atorvastatin 20 milliGRAM(s) Oral at bedtime  clopidogrel Tablet 75 milliGRAM(s) Oral daily  DAPTOmycin IVPB 550 milliGRAM(s) IV Intermittent every 24 hours  dextrose 5%. 1000 milliLiter(s) (50 mL/Hr) IV Continuous <Continuous>  dextrose 50% Injectable 12.5 Gram(s) IV Push once  dextrose 50% Injectable 25 Gram(s) IV Push once  dextrose 50% Injectable 25 Gram(s) IV Push once  heparin  Injectable 5000 Unit(s) SubCutaneous every 12 hours  hydrALAZINE 50 milliGRAM(s) Oral every 8 hours  insulin glargine Injectable (LANTUS) 20 Unit(s) SubCutaneous at bedtime  insulin lispro (HumaLOG) corrective regimen sliding scale   SubCutaneous three times a day before meals  metoprolol tartrate 25 milliGRAM(s) Oral two times a day  saccharomyces boulardii 250 milliGRAM(s) Oral two times a day  sertraline 100 milliGRAM(s) Oral daily  sodium chloride 0.9%. 1000 milliLiter(s) (100 mL/Hr) IV Continuous <Continuous>    MEDICATIONS  (PRN):  dextrose 40% Gel 15 Gram(s) Oral once PRN Blood Glucose LESS THAN 70 milliGRAM(s)/deciliter  glucagon  Injectable 1 milliGRAM(s) IntraMuscular once PRN Glucose LESS THAN 70 milligrams/deciliter  polyethylene glycol 3350 17 Gram(s) Oral daily PRN Constipation      Social History: former smoker / - EtOH / - IVDA    Family History: No pertinent family history in first degree relatives      ROS: As noted above, otherwise unremarkable.    Vital Signs Last 24 Hrs  T(C): 36.7 (08 Nov 2018 11:17), Max: 36.9 (07 Nov 2018 17:20)  T(F): 98.1 (08 Nov 2018 11:17), Max: 98.4 (07 Nov 2018 17:20)  HR: 96 (08 Nov 2018 14:42) (76 - 102)  BP: 149/76 (08 Nov 2018 14:42) (110/71 - 185/88)  BP(mean): --  RR: 19 (08 Nov 2018 11:17) (18 - 19)  SpO2: 97% (07 Nov 2018 21:49) (97% - 97%)    I&O's Detail    07 Nov 2018 07:01  -  08 Nov 2018 07:00  --------------------------------------------------------  IN:    Solution: 350 mL  Total IN: 350 mL    OUT:    Voided: 3 mL  Total OUT: 3 mL    Total NET: 347 mL        I&O's Summary    07 Nov 2018 07:01  -  08 Nov 2018 07:00  --------------------------------------------------------  IN: 350 mL / OUT: 3 mL / NET: 347 mL            PHYSICAL EXAM:  General: Appears ill-appearing male no acute distress  HEENT: Head: normocephalic, atraumatic  Eyes: Pupils equal and reactive  Neck: Supple, left CEA, bilateral carotid bruits, no JVD, no HJR  CARDIOVASCULAR: Normal S1 and S2, soft HSM  LUNGS: Clear to auscultation bilaterally, no rales, rhonchi or wheeze  ABDOMEN: Soft, nontender, non-distended, positive bowel sounds, no mass or bruit  EXTREMITIES: No edema, distal pulses reduced, bandage on anterior left shin  SKIN: Warm and dry with normal turgor  NEURO: Alert, nonverbal, moves all extremities  PSYCH: nounable to assess affect as nonverbal    LABS:                        11.0   7.9   )-----------( 193      ( 08 Nov 2018 07:22 )             32.9     11-08    142  |  106  |  48.0<H>  ----------------------------<  377<H>  4.3   |  23.0  |  2.05<H>    Ca    8.6      08 Nov 2018 07:22      CARDIAC MARKERS ( 08 Nov 2018 07:22 )  x     / x     / 55 U/L / x     / x      CARDIAC MARKERS ( 07 Nov 2018 15:37 )  x     / x     / 132 U/L / x     / x              RADIOLOGY & ADDITIONAL STUDIES:    ECG: SR / RBBB    ECHO (9/4/18): LVEF 40-45% with inferior akinesis, mild-mod MR      Assessment and Plan:  In summary, AVNI GREEN is a 68y Male with past medical history significant for CAD, PAD, HTN, HL, DM, prior CVA, dementia admitted with fever, weaklness and sepsis. BC + for MRSA  Will arrange ASHELY for tomorrow AM (NPO after MN ordered)  Would continue all current CV medications  Will follow with you    Thank you    José Miguel Rangel MD  [facility]

## 2018-11-08 NOTE — DIETITIAN INITIAL EVALUATION ADULT. - OTHER INFO
BMI 20.9 Could not interview pt and obtain info secondary to pt being aphasic. Pt was answering some questions with nodding his head yes or no. Pt currently NPO but took puree diet with no liquids yesterday poor. A1C 9.1 noted, vomiting noted PTA.

## 2018-11-09 LAB
-  AMPICILLIN/SULBACTAM: SIGNIFICANT CHANGE UP
-  CEFAZOLIN: SIGNIFICANT CHANGE UP
-  CLINDAMYCIN: SIGNIFICANT CHANGE UP
-  DAPTOMYCIN: SIGNIFICANT CHANGE UP
-  ERYTHROMYCIN: SIGNIFICANT CHANGE UP
-  GENTAMICIN: SIGNIFICANT CHANGE UP
-  LINEZOLID: SIGNIFICANT CHANGE UP
-  OXACILLIN: SIGNIFICANT CHANGE UP
-  PENICILLIN: SIGNIFICANT CHANGE UP
-  RIFAMPIN: SIGNIFICANT CHANGE UP
-  TETRACYCLINE: SIGNIFICANT CHANGE UP
-  TRIMETHOPRIM/SULFAMETHOXAZOLE: SIGNIFICANT CHANGE UP
-  VANCOMYCIN: SIGNIFICANT CHANGE UP
ANION GAP SERPL CALC-SCNC: 13 MMOL/L — SIGNIFICANT CHANGE UP (ref 5–17)
BUN SERPL-MCNC: 51 MG/DL — HIGH (ref 8–20)
CALCIUM SERPL-MCNC: 9.5 MG/DL — SIGNIFICANT CHANGE UP (ref 8.6–10.2)
CHLORIDE SERPL-SCNC: 110 MMOL/L — HIGH (ref 98–107)
CO2 SERPL-SCNC: 24 MMOL/L — SIGNIFICANT CHANGE UP (ref 22–29)
CREAT SERPL-MCNC: 2.13 MG/DL — HIGH (ref 0.5–1.3)
CULTURE RESULTS: SIGNIFICANT CHANGE UP
GLUCOSE BLDC GLUCOMTR-MCNC: 215 MG/DL — HIGH (ref 70–99)
GLUCOSE BLDC GLUCOMTR-MCNC: 226 MG/DL — HIGH (ref 70–99)
GLUCOSE BLDC GLUCOMTR-MCNC: 249 MG/DL — HIGH (ref 70–99)
GLUCOSE BLDC GLUCOMTR-MCNC: 253 MG/DL — HIGH (ref 70–99)
GLUCOSE SERPL-MCNC: 269 MG/DL — HIGH (ref 70–115)
HCT VFR BLD CALC: 36.7 % — LOW (ref 42–52)
HGB BLD-MCNC: 11.8 G/DL — LOW (ref 14–18)
MAGNESIUM SERPL-MCNC: 3 MG/DL — HIGH (ref 1.6–2.6)
MCHC RBC-ENTMCNC: 28.5 PG — SIGNIFICANT CHANGE UP (ref 27–31)
MCHC RBC-ENTMCNC: 32.2 G/DL — SIGNIFICANT CHANGE UP (ref 32–36)
MCV RBC AUTO: 88.6 FL — SIGNIFICANT CHANGE UP (ref 80–94)
METHOD TYPE: SIGNIFICANT CHANGE UP
ORGANISM # SPEC MICROSCOPIC CNT: SIGNIFICANT CHANGE UP
ORGANISM # SPEC MICROSCOPIC CNT: SIGNIFICANT CHANGE UP
PLATELET # BLD AUTO: 227 K/UL — SIGNIFICANT CHANGE UP (ref 150–400)
POTASSIUM SERPL-MCNC: 5.2 MMOL/L — SIGNIFICANT CHANGE UP (ref 3.5–5.3)
POTASSIUM SERPL-SCNC: 5.2 MMOL/L — SIGNIFICANT CHANGE UP (ref 3.5–5.3)
RBC # BLD: 4.14 M/UL — LOW (ref 4.6–6.2)
RBC # FLD: 14.1 % — SIGNIFICANT CHANGE UP (ref 11–15.6)
SODIUM SERPL-SCNC: 147 MMOL/L — HIGH (ref 135–145)
SPECIMEN SOURCE: SIGNIFICANT CHANGE UP
WBC # BLD: 8.6 K/UL — SIGNIFICANT CHANGE UP (ref 4.8–10.8)
WBC # FLD AUTO: 8.6 K/UL — SIGNIFICANT CHANGE UP (ref 4.8–10.8)

## 2018-11-09 PROCEDURE — 93312 ECHO TRANSESOPHAGEAL: CPT | Mod: 26

## 2018-11-09 PROCEDURE — 99232 SBSQ HOSP IP/OBS MODERATE 35: CPT

## 2018-11-09 PROCEDURE — 93320 DOPPLER ECHO COMPLETE: CPT | Mod: 26

## 2018-11-09 PROCEDURE — 99233 SBSQ HOSP IP/OBS HIGH 50: CPT

## 2018-11-09 PROCEDURE — 93325 DOPPLER ECHO COLOR FLOW MAPG: CPT | Mod: 26

## 2018-11-09 RX ORDER — METOPROLOL TARTRATE 50 MG
50 TABLET ORAL
Qty: 0 | Refills: 0 | Status: DISCONTINUED | OUTPATIENT
Start: 2018-11-09 | End: 2018-11-21

## 2018-11-09 RX ORDER — MAGNESIUM SULFATE 500 MG/ML
2 VIAL (ML) INJECTION ONCE
Qty: 0 | Refills: 0 | Status: COMPLETED | OUTPATIENT
Start: 2018-11-09 | End: 2018-11-09

## 2018-11-09 RX ADMIN — Medication 50 MILLIGRAM(S): at 23:12

## 2018-11-09 RX ADMIN — INSULIN GLARGINE 20 UNIT(S): 100 INJECTION, SOLUTION SUBCUTANEOUS at 23:12

## 2018-11-09 RX ADMIN — ATORVASTATIN CALCIUM 20 MILLIGRAM(S): 80 TABLET, FILM COATED ORAL at 23:12

## 2018-11-09 RX ADMIN — Medication 250 MILLIGRAM(S): at 08:10

## 2018-11-09 RX ADMIN — Medication 50 MILLIGRAM(S): at 08:09

## 2018-11-09 RX ADMIN — Medication 81 MILLIGRAM(S): at 15:37

## 2018-11-09 RX ADMIN — Medication 250 MILLIGRAM(S): at 17:44

## 2018-11-09 RX ADMIN — SERTRALINE 100 MILLIGRAM(S): 25 TABLET, FILM COATED ORAL at 15:36

## 2018-11-09 RX ADMIN — DAPTOMYCIN 122 MILLIGRAM(S): 500 INJECTION, POWDER, LYOPHILIZED, FOR SOLUTION INTRAVENOUS at 17:52

## 2018-11-09 RX ADMIN — HEPARIN SODIUM 5000 UNIT(S): 5000 INJECTION INTRAVENOUS; SUBCUTANEOUS at 08:09

## 2018-11-09 RX ADMIN — Medication 2: at 17:44

## 2018-11-09 RX ADMIN — Medication 50 MILLIGRAM(S): at 15:36

## 2018-11-09 RX ADMIN — Medication 50 MILLIGRAM(S): at 17:44

## 2018-11-09 RX ADMIN — CLOPIDOGREL BISULFATE 75 MILLIGRAM(S): 75 TABLET, FILM COATED ORAL at 15:36

## 2018-11-09 RX ADMIN — HEPARIN SODIUM 5000 UNIT(S): 5000 INJECTION INTRAVENOUS; SUBCUTANEOUS at 17:43

## 2018-11-09 RX ADMIN — Medication 50 GRAM(S): at 09:31

## 2018-11-09 RX ADMIN — Medication 25 MILLIGRAM(S): at 08:09

## 2018-11-09 RX ADMIN — Medication 3: at 08:30

## 2018-11-09 NOTE — PROGRESS NOTE ADULT - SUBJECTIVE AND OBJECTIVE BOX
CC: Vomiting and generalized weakness. (05 Nov 2018 15:11)    HPI: 69y/o man with PMH of HTN, DM2, CVA and CAD s/p CABG and PM, was admitted with high fever, generalized weakness and vomiting for the past few days.. Admitted with  Sepsis 2/2 GPC bacteremia; source likely from skin.  +Blood cultures with MRSA 8/8 bottles. Pt seen by cardiology, agrees with ASHELY. For ASHELY today.     Interval: Monitor was checked this morning, overnight pt had 23-25 beats of vtach, no provider made aware? Ordered stat mg level to be drawn, am K WNL. Will give 2mg of magnesium and reconsult cardio however pt is planned for ASHELY with cardio today.     Pt seen and examined at bedside. Patient is ANO x 3. Declines pain or other symptoms.     Vital Signs Last 24 Hrs  T(C): 36.5 (09 Nov 2018 05:00), Max: 36.7 (08 Nov 2018 11:17)  T(F): 97.7 (09 Nov 2018 05:00), Max: 98.1 (08 Nov 2018 11:17)  HR: 88 (09 Nov 2018 05:00) (76 - 98)  BP: 138/71 (09 Nov 2018 05:00) (110/71 - 161/64)  BP(mean): --  RR: 18 (09 Nov 2018 05:00) (18 - 19)  SpO2: --    PHYSICAL EXAM:  General: NAD  Eyes: PERRLA, EOMI; conjunctiva and sclera clear  Head: Normocephalic; atraumatic  Respiratory: No wheezes, rales or rhonchi  Cardiovascular: Regular rate and rhythm. S1 and S2 Normal; No murmurs, gallops or rubs  Gastrointestinal: Soft non-tender non-distended; Normal bowel sounds  Extremities: Normal range of motion, No clubbing, cyanosis or edema  Vascular: Peripheral pulses palpable 2+ bilaterally  Neurological: Alert and oriented x 3  Skin: superficial wound to left knee. multiple scratches to b/l LE.      LAB:                       12.2   9.7   )-----------( 185      ( 07 Nov 2018 11:46 )             36.7     11-07    137  |  100  |  44.0<H>  ----------------------------<  324<H>  5.3   |  16.0<L>  |  2.21<H>    Ca    9.0      07 Nov 2018 08:43

## 2018-11-09 NOTE — PROGRESS NOTE ADULT - ASSESSMENT
69y/o man with PMH of HTN, DM2, CVA and CAD s/p CABG and PM, was admitted today with high fever and generalized weakness and vomiting for the past few days. Blood cultures with MRSA 4/4 bottles, possible source is skin since growing staph.     High fever  GPC bacteremia   Skin ulcers     -Contact isolation   -MRSA in 4 sets of blood cultures from 11/5 and 11/6 (8 out of 8 bottles)  -Repeat blood culture x2 pending  -Follow up daily WBC and fever curve, both normal.   -Today going for ASHELY (has pacemaker and prolonged bacteremia with MRSA)    -Continue Daptomycin 550mg daily   Will follow.

## 2018-11-09 NOTE — PROGRESS NOTE ADULT - SUBJECTIVE AND OBJECTIVE BOX
[facility]  INFECTIOUS DISEASES AND INTERNAL MEDICINE at [place]  =======================================================  Frankie Garcia MD  Diplomates American Board of Internal Medicine and Infectious Diseases  =======================================================    [id number]  AVNI GREEN     Follow up for bacteremia with MRSA, source is skin. Afebrile. No compliant. ASHELY today.     PAST MEDICAL & SURGICAL HISTORY:  CVA (cerebral vascular accident)  Diabetic neuropathy  DM (diabetes mellitus)  Hyperlipidemia  AICD (automatic cardioverter/defibrillator) present  Pacemaker  Stented coronary artery  HTN (hypertension)  Cardiac pacemaker    Social Hx: As per him no smoking, ETOH or drugs.     FAMILY HISTORY:  No pertinent family history in first degree relatives    Allergies  No Known Allergies    Antibiotics:  vancomycin      REVIEW OF SYSTEMS:  Afebrile no complaint.     Physical Exam:  Vital Signs Last 24 Hrs  T(C): 36.5 (09 Nov 2018 05:00), Max: 36.6 (08 Nov 2018 17:51)  T(F): 97.7 (09 Nov 2018 05:00), Max: 97.8 (08 Nov 2018 17:51)  HR: 88 (09 Nov 2018 05:00) (88 - 98)  BP: 138/71 (09 Nov 2018 05:00) (136/76 - 161/64)  RR: 18 (09 Nov 2018 05:00) (18 - 18)  GEN: NAD  HEENT: normocephalic and atraumatic. EOMI. PERRL.    NECK: Supple.  No lymphadenopathy   LUNGS: Clear to auscultation.  HEART: Regular rate and rhythm left upper chest Pacemaker, nontender, no erythema or swelling   ABDOMEN: Soft, nontender, and nondistended.  Positive bowel sounds.    : No CVA tenderness  EXTREMITIES: Without any cyanosis, clubbing, rash, lesions or edema.  NEUROLOGIC: grossly intact.  PSYCHIATRIC: Appropriate affect .  SKIN: scratches and superficial wounds in legs, healing wound in his Right mid back, with erythema, no discharge     Labs:  11-09    147<H>  |  110<H>  |  51.0<H>  ----------------------------<  269<H>  5.2   |  24.0  |  2.13<H>    Ca    9.5      09 Nov 2018 06:35                        11.8   8.6   )-----------( 227      ( 09 Nov 2018 06:35 )             36.7       CARDIAC MARKERS ( 08 Nov 2018 07:22 )  x     / x     / 55 U/L / x     / x      CARDIAC MARKERS ( 07 Nov 2018 15:37 )  x     / x     / 132 U/L / x     / x        RECENT CULTURES:  11-07 @ 11:41 Skin wound Methicillin resistant Staphylococcus aureus    Few Methicillin resistant Staphylococcus aureus (KNOWN)  Numerous Corynebacterium species    11-06 @ 22:42 .Blood Blood Methicillin resistant Staphylococcus aureus    Growth in aerobic and anaerobic bottles: Methicillin resistant  Staphylococcus aureus (KNOWN)  Aerobic Bottle: 14:58 Hours to positivity  Anaerobic Bottle: 15:48 Hours to positivity    11-05 @ 17:11        NotDetec  11-05 @ 16:14 .Urine Clean Catch (Midstream)     No growth    11-05 @ 10:06 .Blood Blood-Peripheral Methicillin resistant Staphylococcus aureus    Growth in aerobic bottle: Methicillin resistant Staphylococcus aureus  Aerobic Bottle: 9.47 Hours to positivity  Anaerobic Bottle: 10.47 Hours to positivity    11-05 @ 10:05 .Blood Blood-Peripheral Methicillin resistant Staphylococcus aureus  Blood Culture PCR    Growth in aerobic and anaerobic bottles: Methicillin resistant  Staphylococcus aureus  Aerobic Bottle: 09.07 Hours to positivity  Anaerobic Bottle: 12:17 Hours to positivity      All imaging and other data have been reviewed.

## 2018-11-09 NOTE — PROGRESS NOTE ADULT - SUBJECTIVE AND OBJECTIVE BOX
INTERVAL HISTORY: very poor historian, no acute distress.  	  MEDICATIONS:  hydrALAZINE 50 milliGRAM(s) Oral every 8 hours  metoprolol tartrate 25 milliGRAM(s) Oral two times a day  DAPTOmycin IVPB 550 milliGRAM(s) IV Intermittent every 24 hours  sertraline 100 milliGRAM(s) Oral daily  polyethylene glycol 3350 17 Gram(s) Oral daily PRN  atorvastatin 20 milliGRAM(s) Oral at bedtime  dextrose 40% Gel 15 Gram(s) Oral once PRN  dextrose 50% Injectable 12.5 Gram(s) IV Push once  dextrose 50% Injectable 25 Gram(s) IV Push once  dextrose 50% Injectable 25 Gram(s) IV Push once  glucagon  Injectable 1 milliGRAM(s) IntraMuscular once PRN  insulin glargine Injectable (LANTUS) 20 Unit(s) SubCutaneous at bedtime  insulin lispro (HumaLOG) corrective regimen sliding scale   SubCutaneous three times a day before meals  aspirin  chewable 81 milliGRAM(s) Oral daily  clopidogrel Tablet 75 milliGRAM(s) Oral daily  dextrose 5%. 1000 milliLiter(s) IV Continuous <Continuous>  heparin  Injectable 5000 Unit(s) SubCutaneous every 12 hours  magnesium sulfate  IVPB 2 Gram(s) IV Intermittent once        PHYSICAL EXAM:  T(C): 36.5 (11-09-18 @ 05:00), Max: 36.7 (11-08-18 @ 11:17)  HR: 88 (11-09-18 @ 05:00) (76 - 98)  BP: 138/71 (11-09-18 @ 05:00) (110/71 - 161/64)  RR: 18 (11-09-18 @ 05:00) (18 - 19)  SpO2: --  Wt(kg): --  I&O's Summary        Appearance: Normal	  HEENT:   Normal oral mucosa  Cardiovascular: Normal S1 S2, No JVD, No murmurs, No edema  Respiratory: Lungs clear to auscultation	  Psychiatry: A & O x 3, Mood & affect appropriate  Gastrointestinal:  Soft, Non-tender, + BS	  Skin: No rashes, No ecchymoses, No cyanosis  Neurologic: Non-focal  Extremities: Normal range of motion, No clubbing, cyanosis or edema  Vascular: Peripheral pulses palpable 2+ bilaterally  	        LABS:	 	                      11.8   8.6   )-----------( 227      ( 09 Nov 2018 06:35 )             36.7     11-09    147<H>  |  110<H>  |  51.0<H>  ----------------------------<  269<H>  5.2   |  24.0  |  2.13<H>    Ca    9.5      09 Nov 2018 06:35         ASSESSMENT/PLAN: AVNI GREEN is a 68y Male with past medical history significant for CAD, PAD, HTN, HL, DM, prior CVA, dementia.   Admitted with fever, weakness and sepsis. BC + for MRSA  ASHELY today (NPO after MN ordered).

## 2018-11-09 NOTE — PROGRESS NOTE ADULT - ASSESSMENT
69y/o man with PMH of HTN, DM2, CVA and CAD s/p CABG and PM, was admitted with high fever, generalized weakness and vomiting for the past few days.. Admitted with  Sepsis 2/2 GPC bacteremia; source likely from skin. +Blood cultures with MRSA 8/8 bottles. Started on IV abx as per ID.  Seen by speech and swallow, recommends puree diet with no liquids. Cardio consulted for ASHELY today. episodic vtach last night according to monitor, will check electrolytes and reconsult cardio.. pt with hx of CAD s/p CABG and pacemaker.     MRSA Bacteremia:   - + 4/4 inital blood cultures  - now with repeat positive blood cultures from 11/6, now 8/8 growing staph  - Will repeat blood cultures daily until negative. Cultures ordered today  - Wound cultures taken yesterday pending   - Follow up daily WBC and fever curve  - Patient is for ASHELY today with cardiology as patient with pacemaker and prolonged bacteremia with MRSA.    - Continue Daptomycin 550mg daily as per ID  - ID consult appreciated, recs implemented    Nonsustained Vtach  - 23-25 beats of vtach noted on monitor yesterday evening  - pt without any cardiac complaints today  - electrolytes WNL. Will get stat mg level and in the meantime give 2mg of mag  - cardio consult   - continue to stay on monitor     Hypernatremia:  - mild  - will DC NS IVF  - follow up repeat labs in am    Fecal impaction  - stool softeners and manual disimpaction per nurse  - as per nurse, pt has not been noticed with recent bowel movement.. will assess pt after ASHELY and when diet is reinitiated   - if no bowel movement today consider reimaging tomorrow    Fall:  - may be related to deconditioning and sepsis  - PT recommends MIKE. SW aware    Dysphagia  - Evaluated by speech and swallow, recommends dysphagia diet:  puree NO Liquids  - will have them re-evaluate    DM  - diabetic diet and ISS    CAD  - hx of CABG  - on statin, aspirin, and beta blocker    HTN  - controlled    Stroke history  - on aspirin and statin; no new focal symptomatology; head CT negative.    ppx: heparin sq BID  full code  case discussed with wife. 69y/o man with PMH of HTN, DM2, CVA and CAD s/p CABG and PM, was admitted with high fever, generalized weakness and vomiting for the past few days.. Admitted with  Sepsis 2/2 GPC bacteremia; source likely from skin. +Blood cultures with MRSA 8/8 bottles. Started on IV abx as per ID.  Seen by speech and swallow, recommends puree diet with no liquids. Cardio consulted for ASHELY today. episodic vtach last night according to monitor, will check electrolytes and reconsult cardio.. pt with hx of CAD s/p CABG and pacemaker.     MRSA Bacteremia:   - + 4/4 inital blood cultures  - now with repeat positive blood cultures from 11/6, now 8/8 growing staph  - Will repeat blood cultures daily until negative. Cultures ordered today  - Wound cultures taken yesterday pending   - Follow up daily WBC and fever curve  - Patient is for ASHELY today with cardiology as patient with pacemaker and prolonged bacteremia with MRSA.    - Continue Daptomycin 550mg daily as per ID  - ID consult appreciated, recs implemented    Nonsustained Vtach  - 23-25 beats of vtach noted on monitor yesterday evening  - pt without any cardiac complaints today  - electrolytes WNL. Will get stat mg level and in the meantime give 2mg of mag  - cardiology following  - will increase beta blocker to 50mg BID.  - continue to stay on monitor     Hypernatremia/hyperchloremia: 2/2 IVF  - mild  - will DC NS IVF  - follow up repeat labs in am; if improved should restart fluids as patient is not currently taking liquids    Fecal impaction  - stool softeners and manual disimpaction per nurse  - as per nurse, pt has not been noticed with recent bowel movement.. will assess pt after ASHELY and when diet is reinitiated   - if no bowel movement today consider reimaging tomorrow    Fall:  - may be related to deconditioning and sepsis  - PT recommends MIKE. SW aware    Dysphagia  - Evaluated by speech and swallow, recommends dysphagia diet:  puree NO Liquids    DM  - diabetic diet and ISS    CAD  - hx of CABG  - on statin, aspirin, and beta blocker    HTN  - controlled    Stroke history  - on aspirin and statin; no new focal symptomatology; head CT negative.    ppx: heparin sq BID  full code  case discussed with wife.

## 2018-11-10 LAB
ALBUMIN SERPL ELPH-MCNC: 2.8 G/DL — LOW (ref 3.3–5.2)
ALP SERPL-CCNC: 80 U/L — SIGNIFICANT CHANGE UP (ref 40–120)
ALT FLD-CCNC: 9 U/L — SIGNIFICANT CHANGE UP
ANION GAP SERPL CALC-SCNC: 13 MMOL/L — SIGNIFICANT CHANGE UP (ref 5–17)
AST SERPL-CCNC: 10 U/L — SIGNIFICANT CHANGE UP
BILIRUB SERPL-MCNC: 0.4 MG/DL — SIGNIFICANT CHANGE UP (ref 0.4–2)
BUN SERPL-MCNC: 48 MG/DL — HIGH (ref 8–20)
CALCIUM SERPL-MCNC: 8.9 MG/DL — SIGNIFICANT CHANGE UP (ref 8.6–10.2)
CHLORIDE SERPL-SCNC: 113 MMOL/L — HIGH (ref 98–107)
CO2 SERPL-SCNC: 22 MMOL/L — SIGNIFICANT CHANGE UP (ref 22–29)
CREAT SERPL-MCNC: 1.91 MG/DL — HIGH (ref 0.5–1.3)
GLUCOSE BLDC GLUCOMTR-MCNC: 151 MG/DL — HIGH (ref 70–99)
GLUCOSE BLDC GLUCOMTR-MCNC: 214 MG/DL — HIGH (ref 70–99)
GLUCOSE BLDC GLUCOMTR-MCNC: 223 MG/DL — HIGH (ref 70–99)
GLUCOSE BLDC GLUCOMTR-MCNC: 99 MG/DL — SIGNIFICANT CHANGE UP (ref 70–99)
GLUCOSE SERPL-MCNC: 235 MG/DL — HIGH (ref 70–115)
HCT VFR BLD CALC: 34.8 % — LOW (ref 42–52)
HGB BLD-MCNC: 11.5 G/DL — LOW (ref 14–18)
MCHC RBC-ENTMCNC: 29.7 PG — SIGNIFICANT CHANGE UP (ref 27–31)
MCHC RBC-ENTMCNC: 33 G/DL — SIGNIFICANT CHANGE UP (ref 32–36)
MCV RBC AUTO: 89.9 FL — SIGNIFICANT CHANGE UP (ref 80–94)
PLATELET # BLD AUTO: 213 K/UL — SIGNIFICANT CHANGE UP (ref 150–400)
POTASSIUM SERPL-MCNC: 3.8 MMOL/L — SIGNIFICANT CHANGE UP (ref 3.5–5.3)
POTASSIUM SERPL-SCNC: 3.8 MMOL/L — SIGNIFICANT CHANGE UP (ref 3.5–5.3)
PROT SERPL-MCNC: 5.7 G/DL — LOW (ref 6.6–8.7)
RBC # BLD: 3.87 M/UL — LOW (ref 4.6–6.2)
RBC # FLD: 14.5 % — SIGNIFICANT CHANGE UP (ref 11–15.6)
SODIUM SERPL-SCNC: 148 MMOL/L — HIGH (ref 135–145)
WBC # BLD: 7.1 K/UL — SIGNIFICANT CHANGE UP (ref 4.8–10.8)
WBC # FLD AUTO: 7.1 K/UL — SIGNIFICANT CHANGE UP (ref 4.8–10.8)

## 2018-11-10 PROCEDURE — 99232 SBSQ HOSP IP/OBS MODERATE 35: CPT

## 2018-11-10 RX ORDER — DOCUSATE SODIUM 100 MG
100 CAPSULE ORAL THREE TIMES A DAY
Qty: 0 | Refills: 0 | Status: DISCONTINUED | OUTPATIENT
Start: 2018-11-10 | End: 2018-11-21

## 2018-11-10 RX ADMIN — Medication 50 MILLIGRAM(S): at 17:54

## 2018-11-10 RX ADMIN — Medication 50 MILLIGRAM(S): at 06:05

## 2018-11-10 RX ADMIN — Medication 250 MILLIGRAM(S): at 17:56

## 2018-11-10 RX ADMIN — Medication 250 MILLIGRAM(S): at 06:05

## 2018-11-10 RX ADMIN — Medication 1: at 11:58

## 2018-11-10 RX ADMIN — ATORVASTATIN CALCIUM 20 MILLIGRAM(S): 80 TABLET, FILM COATED ORAL at 22:59

## 2018-11-10 RX ADMIN — CLOPIDOGREL BISULFATE 75 MILLIGRAM(S): 75 TABLET, FILM COATED ORAL at 11:58

## 2018-11-10 RX ADMIN — INSULIN GLARGINE 20 UNIT(S): 100 INJECTION, SOLUTION SUBCUTANEOUS at 22:59

## 2018-11-10 RX ADMIN — HEPARIN SODIUM 5000 UNIT(S): 5000 INJECTION INTRAVENOUS; SUBCUTANEOUS at 17:55

## 2018-11-10 RX ADMIN — Medication 81 MILLIGRAM(S): at 11:58

## 2018-11-10 RX ADMIN — Medication 100 MILLIGRAM(S): at 23:00

## 2018-11-10 RX ADMIN — Medication 2: at 08:07

## 2018-11-10 RX ADMIN — DAPTOMYCIN 122 MILLIGRAM(S): 500 INJECTION, POWDER, LYOPHILIZED, FOR SOLUTION INTRAVENOUS at 18:29

## 2018-11-10 RX ADMIN — SERTRALINE 100 MILLIGRAM(S): 25 TABLET, FILM COATED ORAL at 11:58

## 2018-11-10 RX ADMIN — Medication 50 MILLIGRAM(S): at 23:00

## 2018-11-10 RX ADMIN — HEPARIN SODIUM 5000 UNIT(S): 5000 INJECTION INTRAVENOUS; SUBCUTANEOUS at 06:05

## 2018-11-10 NOTE — PROGRESS NOTE ADULT - MINUTES
Problem: NICU 36+ weeks: Day of Life 1 (Date of birth)  Goal: Activity/Safety  Infant will be provided appropriate activity to stimulate growth and development according to gestational age. Outcome: Progressing Towards Goal  Pt identification band verified. Pt allowed adequate rest periods between care to promote growth. Velcro name band x 2 in place. Maternal prenatal history on chart. Goal: Consults, if ordered  All consultations will be made in a timely manner and good communication between disciplines will be observed as evidenced by coordinated care of patent and family. Outcome: Progressing Towards Goal  Lactation consulted to assist pt mother with breast pumping and introduction breast feeding while pt in NICU. No further consultations made at this time. Goal: Diagnostic Test/Procedures  Infant will maintain normal blood glucose levels, optimal metabolic function, electrolyte and renal function, and growth related to birth weight/length. Infant will have normal hematocrit/hemoglobin values and will be free of signs/symptoms hyperbilirubinemia. Outcome: Progressing Towards Goal  RN to obtain CBC, BMP, and Bilirubin in am 4/11/17 per Md orders. Hearing screen and Car seat test to be completed prior to discharge. No further diagnostic tests/ procedures ordered at this time. Goal: Nutrition/Diet  Infant will demonstrate tolerance of feedings as evidenced by minimal residual and/or regurgitation. Infant will have adequate nutrition as evidenced by good weight gain of at least 15-30 grams a day, adequate intake with good PO skills. Outcome: Progressing Towards Goal  Pt receiving Breast feeding with supplement Breast milk 20 hugh ad susan Q 3 hours. Goal: Discharge Planning  Parents competent in providing feedings and administering home medications; demonstrate appropriate use of thermometer and bulb syringe. Able to demonstrate safe infant sleep guidelines and appropriate use of car seat.  Follow up appointment reviewed. Outcome: Resolved/Met Date Met:  04/11/17  Pt to be discharged home when pt demonstrates tolerance of feedings as evidenced by minimal residual and/or regurgitation, has adequate intake with good PO skills, and  Improved nutrition as evidenced by good weight gain of at least 15-30 grams a day. Goal: Medications  Infant will receive right medication at the right time, right dose, and right route as ordered by physician. Outcome: Progressing Towards Goal  Pt receiving Sucrose up to 2 ml po per procedure and/ or Q 8 hours administered as needed for comfort/ pain management. No further medications ordered at this time      Goal: Respiratory  Oxygen saturation within defined limits, target SpO2 92-97%. Infant will maintain effective airway clearance and will have effective gas exchange. Outcome: Progressing Towards Goal  Continuous pulse oximetry in place with alarms set per protocol. Pt remains on room air since 4/10 @ 2100 with O2 saturations within normal limits. Goal: Treatments/Interventions/Procedures  Treatments, interventions, and procedures initiated in a timely manner to maintain a state of equilibrium during growth and development process as evidenced by standards of care. Infant will maintain a body temperature as evidenced by axillary temperature = or > 97.2 degrees F. Outcome: Progressing Towards Goal  Pt remains in radiant warmer with heat off and wrapped- temperature > = 97.2 degrees and stable. All further treatments/ interventions to be completed as tolerated per protocol. Goal: *Demonstrates behavior appropriate to gestational age  Infant will not experience any developmental delays through environmental stressors being minimized, and enhancing parent-infant relationships by understanding infants behavior and interacting developmentally appropriate.   Outcome: Resolved/Met Date Met:  04/11/17  Pt demonstrates appropriate behavior according to gestational 45 age.  Goal: *Absence of infection signs and symptoms  Infant will receive appropriate medications and will be free of infection as evidenced by negative blood cultures. Outcome: Progressing Towards Goal  Blood culture results pending.

## 2018-11-10 NOTE — PROGRESS NOTE ADULT - SUBJECTIVE AND OBJECTIVE BOX
[facility], [facility]                                                    [address], [address], [place] [address]                                                         PHONE: [phone]    FAX: [phone] [address], [address], [place] [address]                                                 PHONE: [phone]    FAX: [phone]  *******************************************************************************  cc: Bacteremia, +BC 4/4 MRSA), r/o infective endocarditis    HPI:  AVNI GREEN is a 68y Male admitted with fever, weakness. BC + for MRSA. Asked to evaluate and arrange ASHELY.  Long history of CAD with ischemic CM, carotid disease, PAD, PM, DM, HTN, HL, prior TIA/CVA, dementia. Pt denies CP, SOB, palpitations, PND or orthopnea. No dizziness or syncope    PAST MEDICAL & SURGICAL HISTORY:  CVA (cerebral vascular accident)  Diabetic neuropathy  DM (diabetes mellitus)  Hyperlipidemia  AICD (automatic cardioverter/defibrillator) present  Pacemaker  Stented coronary artery  HTN (hypertension)  Cardiac pacemaker    Social History: former smoker / - EtOH / - IVDA    Family History: No pertinent family history in first degree relatives    ROS: As noted above, otherwise unremarkable.    No Known Allergies    Overnight events/Subjective Assessment:    INTERPRETATION OF TELEMETRY (personally reviewed):    No Known Allergies    MEDICATIONS  (STANDING):  aspirin  chewable 81 milliGRAM(s) Oral daily  atorvastatin 20 milliGRAM(s) Oral at bedtime  clopidogrel Tablet 75 milliGRAM(s) Oral daily  DAPTOmycin IVPB 550 milliGRAM(s) IV Intermittent every 24 hours  dextrose 5%. 1000 milliLiter(s) (50 mL/Hr) IV Continuous <Continuous>  dextrose 50% Injectable 12.5 Gram(s) IV Push once  dextrose 50% Injectable 25 Gram(s) IV Push once  dextrose 50% Injectable 25 Gram(s) IV Push once  heparin  Injectable 5000 Unit(s) SubCutaneous every 12 hours  hydrALAZINE 50 milliGRAM(s) Oral every 8 hours  insulin glargine Injectable (LANTUS) 20 Unit(s) SubCutaneous at bedtime  insulin lispro (HumaLOG) corrective regimen sliding scale   SubCutaneous three times a day before meals  metoprolol tartrate 50 milliGRAM(s) Oral two times a day  saccharomyces boulardii 250 milliGRAM(s) Oral two times a day  sertraline 100 milliGRAM(s) Oral daily    MEDICATIONS  (PRN):  dextrose 40% Gel 15 Gram(s) Oral once PRN Blood Glucose LESS THAN 70 milliGRAM(s)/deciliter  glucagon  Injectable 1 milliGRAM(s) IntraMuscular once PRN Glucose LESS THAN 70 milligrams/deciliter  polyethylene glycol 3350 17 Gram(s) Oral daily PRN Constipation      Vital Signs Last 24 Hrs  T(C): 36.7 (10 Nov 2018 05:38), Max: 36.7 (09 Nov 2018 23:06)  T(F): 98.1 (10 Nov 2018 05:38), Max: 98.1 (10 Nov 2018 05:38)  HR: 65 (10 Nov 2018 05:38) (60 - 72)  BP: 135/69 (10 Nov 2018 05:38) (135/69 - 164/81)  BP(mean): --  RR: 18 (10 Nov 2018 05:38) (18 - 18)  SpO2: 97% (09 Nov 2018 23:06) (97% - 97%)    I&O's Detail    09 Nov 2018 07:01  -  10 Nov 2018 07:00  --------------------------------------------------------  IN:    Solution: 50 mL    Solution: 50 mL  Total IN: 100 mL    OUT:    Voided: 1 mL  Total OUT: 1 mL    Total NET: 99 mL        I&O's Summary    09 Nov 2018 07:01  -  10 Nov 2018 07:00  --------------------------------------------------------  IN: 100 mL / OUT: 1 mL / NET: 99 mL            PHYSICAL EXAM:  General: Appears well developed, well nourished, no acute distress  HEENT: Head: normocephalic, atraumatic  Eyes: Pupils equal and reactive  Neck: Supple, no carotid bruit, no JVD, no HJR  CARDIOVASCULAR: Normal S1 and S2, no murmur, rub, or gallop  LUNGS: Clear to auscultation bilaterally, no rales, rhonchi or wheeze  ABDOMEN: Soft, nontender, non-distended, positive bowel sounds, no mass or bruit  EXTREMITIES: No edema, distal pulses WNL  SKIN: Warm and dry with normal turgor  NEURO: Alert & oriented x 3, grossly intact  PSYCH: normal mood and affect    LABS:                        11.5   7.1   )-----------( 213      ( 10 Nov 2018 07:28 )             34.8     11-10    148<H>  |  113<H>  |  48.0<H>  ----------------------------<  235<H>  3.8   |  22.0  |  1.91<H>    Ca    8.9      10 Nov 2018 07:28  Mg     3.0     11-09    TPro  5.7<L>  /  Alb  2.8<L>  /  TBili  0.4  /  DBili  x   /  AST  10  /  ALT  9   /  AlkPhos  80  11-10          serum  Lipids:         RADIOLOGY & ADDITIONAL STUDIES:    ECHO: < from: TTE Echo Complete w/Doppler (09.07.18 @ 09:32) >  Summary:   1. Mildly to moderately decreased segmental left ventricular systolic   function.   2. Basal inferior segment, basal and mid inferolateral wall, and mid   inferior segment are abnormal as described above.   3. Left ventricular ejection fraction, by visual estimation, is 40 to   45%.   4. Normal left ventricular internal cavity size.   5. Spectral Doppler shows impaired relaxation pattern of left   ventricular myocardial filling (Grade I diastolic dysfunction).   6. There is moderate left ventricular hypertrophy.   7. There is no evidence of pericardial effusion.   8. Mild tricuspid regurgitation.   9. Sclerotic aortic valve with normal opening.  10. Trace pulmonic valve regurgitation.  11. Dilated pulmonary artery.  12. Aortic root measured at Sinus of Valsalva is dilated.  13. See previous echos for comparison.    < end of copied text >      < from: ASHELY Echo Doppler (11.09.18 @ 12:54) >  Summary:   1. Left ventricular ejection fraction, by visual estimation, is 40 to   45%.   2. Technically fair study.   3. Mildly decreased global left ventricular systolic function.   4. The left ventricular diastolic function could not be assessed in this   study.   5. There is no evidence of pericardial effusion.   6. Mild mitral valve regurgitation.   7. Mild tricuspid regurgitation.   8. Trace pulmonic valve regurgitation.   9. There is no vegetation on any of the cardiac valve. Thrombus coated   pacing leads are seen.  10. Clinical correlation is advised.  11. Color flow doppler and intravenous injection of agitated saline   demonstrates the presence of an intact intra atrial septum.  12. No left atrial appendage thrombus.    < end of copied text >      ASSESSMENT AND PLAN:  In summary, AVNI GREEN is a 68y Male with past medical history significant for  past medical history significant for CAD, PPM, PAD, HTN, HL, DM, prior CVA, dementia admitted with fever, weakness and sepsis. BC + for MRSA    - ASHELY performed as above. No evidence of vegetation identified. Mildly decreased EF, mild valvular heart disease.  Management of bacteremia as per ID.    - Thrombus on pacing leads. On ASA and plavix. Currently getting heparin for DVT prophylaxis in hospital. Would add eliquis 2.5mg q12 on DC when heparin is discontinued    - CAD. Pt denies symptoms to suggest ACS. Would maintain current cardiac medications    - PM. Follow up with Dr Emmanuel on DC    - HTN. BP is controlled.    - PAD. stable    - No further in pt cardiac alvarez planned. Please reconsult PRN as needed      Lucretia Castillo MD

## 2018-11-10 NOTE — PROGRESS NOTE ADULT - ASSESSMENT
69y/o man with PMH of HTN, DM2, CVA and CAD s/p CABG and PM, was admitted with high fever, generalized weakness and vomiting for the past few days.. Admitted with  Sepsis 2/2 GPC bacteremia; source likely from skin. +Blood cultures with MRSA 8/8 bottles. Started on IV abx as per ID.  Seen by speech and swallow, recommends puree diet with no liquids. Cardio consulted for ASHELY today. episodic vtach last night according to monitor, will check electrolytes and reconsult cardio.. pt with hx of CAD s/p CABG and pacemaker.     MRSA Bacteremia likely source is left knee as ASHELY neg:   - blood cultures sent today - will follow  - now with repeat positive blood cultures from 11/6, now 8/8 growing staph  - Will repeat blood cultures daily until negative.   - Wound cultures taken yesterday pending   - Follow up daily WBC and fever curve  - Continue Daptomycin 550mg daily as per ID  - ID consult appreciated, recs implemented    Nonsustained Vtach  - occasional bigeminy noted  - 23-25 beats of vtach noted on monitor 11/10  - pt without any cardiac complaints today  - electrolytes WNL  - cardiology following  - will increase beta blocker to 50mg BID.  - continue to stay on monitor     Hypernatremia/hyperchloremia: 2/2 IVF  - mild  - will DC NS IVF  - follow up repeat labs in am; if improved should restart fluids as patient is not currently taking liquids    Fecal impaction  - stool softeners and manual disimpaction per nurse    Fall:  - may be related to deconditioning and sepsis  - PT recommends MIKE. SW aware    Dysphagia  - Evaluated by speech and swallow, recommends dysphagia diet:  puree NO Liquids    DM  - diabetic diet and ISS    CAD  - hx of CABG  - on statin, aspirin, and beta blocker    HTN  - controlled    Stroke history  - on aspirin and statin; no new focal symptomatology; head CT negative.    ppx: heparin sq BID  full code  case discussed with wife.

## 2018-11-10 NOTE — PROGRESS NOTE ADULT - SUBJECTIVE AND OBJECTIVE BOX
CC:  patient seen upon return from ASHELY, still hypersomnolent.      HPI:  Patient unable to provide any history, all the information taken from the wife and ER notes    This is a 68years old male with PMH of HTN, DM, CVA and CAD brought in to the Er for the evaluation of generalized weakness and vomiting for the past few days. Reportedly patient was unable to ambulate by himself today, family called 911 and patient was brought in to the ER. No h/o fever, chills, chest pain, SOB, diarrhea, constipation or urinary complaints. Patient was recently admitted to [facility] with TIA. he was seen by cardio and neurology and discharged home on 9/10. (05 Nov 2018 15:11)    REVIEW OF SYSTEMS:    Patient denied fever, chills, abdominal pain, nausea, vomiting, cough, shortness of breath, chest pain or palpitations    Vital Signs Last 24 Hrs  T(C): 36.1 (10 Nov 2018 17:31), Max: 36.7 (09 Nov 2018 23:06)  T(F): 97 (10 Nov 2018 17:31), Max: 98.1 (10 Nov 2018 05:38)  HR: 55 (10 Nov 2018 17:31) (55 - 74)  BP: 159/73 (10 Nov 2018 17:31) (109/69 - 159/73)  BP(mean): --  RR: 18 (10 Nov 2018 10:58) (18 - 18)  SpO2: 98% (10 Nov 2018 10:58) (97% - 98%)I&O's Summary    09 Nov 2018 07:01  -  10 Nov 2018 07:00  --------------------------------------------------------  IN: 100 mL / OUT: 1 mL / NET: 99 mL    CAPILLARY BLOOD GLUCOSE    PHYSICAL EXAM:  GENERAL: NAD  HEENT: PERRL, +EOMI, anicteric, no [place]  NECK: Supple, No JVD   CHEST/LUNG: CTA bilaterally; Normal effort  HEART: S1S2 Normal intensity, no murmurs, gallops or rubs noted  ABDOMEN: Soft, BS Normoactive, NT, ND, no HSM noted  EXTREMITIES:  1+ radial and DP pulses noted, no clubbing, cyanosis, or edema noted, FROM x 4  SKIN: + heeling right knee wound; minimal drainage noted  NEURO: no focal deficits noted  PSYCH: flat affect; insight/judgement appropriate    LABS:                        11.5   7.1   )-----------( 213      ( 10 Nov 2018 07:28 )             34.8     11-10    148<H>  |  113<H>  |  48.0<H>  ----------------------------<  235<H>  3.8   |  22.0  |  1.91<H>    Ca    8.9      10 Nov 2018 07:28  Mg     3.0     11-09    TPro  5.7<L>  /  Alb  2.8<L>  /  TBili  0.4  /  DBili  x   /  AST  10  /  ALT  9   /  AlkPhos  80  11-10      RADIOLOGY & ADDITIONAL TESTS:    MEDICATIONS:  MEDICATIONS  (STANDING):  aspirin  chewable 81 milliGRAM(s) Oral daily  atorvastatin 20 milliGRAM(s) Oral at bedtime  clopidogrel Tablet 75 milliGRAM(s) Oral daily  DAPTOmycin IVPB 550 milliGRAM(s) IV Intermittent every 24 hours  dextrose 5%. 1000 milliLiter(s) (50 mL/Hr) IV Continuous <Continuous>  dextrose 50% Injectable 12.5 Gram(s) IV Push once  dextrose 50% Injectable 25 Gram(s) IV Push once  dextrose 50% Injectable 25 Gram(s) IV Push once  heparin  Injectable 5000 Unit(s) SubCutaneous every 12 hours  hydrALAZINE 50 milliGRAM(s) Oral every 8 hours  insulin glargine Injectable (LANTUS) 20 Unit(s) SubCutaneous at bedtime  insulin lispro (HumaLOG) corrective regimen sliding scale   SubCutaneous three times a day before meals  metoprolol tartrate 50 milliGRAM(s) Oral two times a day  saccharomyces boulardii 250 milliGRAM(s) Oral two times a day  sertraline 100 milliGRAM(s) Oral daily    MEDICATIONS  (PRN):  dextrose 40% Gel 15 Gram(s) Oral once PRN Blood Glucose LESS THAN 70 milliGRAM(s)/deciliter  glucagon  Injectable 1 milliGRAM(s) IntraMuscular once PRN Glucose LESS THAN 70 milligrams/deciliter  polyethylene glycol 3350 17 Gram(s) Oral daily PRN Constipation

## 2018-11-11 LAB
GLUCOSE BLDC GLUCOMTR-MCNC: 191 MG/DL — HIGH (ref 70–99)
GLUCOSE BLDC GLUCOMTR-MCNC: 211 MG/DL — HIGH (ref 70–99)
GLUCOSE BLDC GLUCOMTR-MCNC: 264 MG/DL — HIGH (ref 70–99)
GLUCOSE BLDC GLUCOMTR-MCNC: 264 MG/DL — HIGH (ref 70–99)

## 2018-11-11 RX ORDER — INSULIN GLARGINE 100 [IU]/ML
26 INJECTION, SOLUTION SUBCUTANEOUS AT BEDTIME
Qty: 0 | Refills: 0 | Status: DISCONTINUED | OUTPATIENT
Start: 2018-11-11 | End: 2018-11-12

## 2018-11-11 RX ADMIN — Medication 2: at 08:52

## 2018-11-11 RX ADMIN — Medication 50 MILLIGRAM(S): at 14:52

## 2018-11-11 RX ADMIN — HEPARIN SODIUM 5000 UNIT(S): 5000 INJECTION INTRAVENOUS; SUBCUTANEOUS at 05:51

## 2018-11-11 RX ADMIN — Medication 100 MILLIGRAM(S): at 05:52

## 2018-11-11 RX ADMIN — CLOPIDOGREL BISULFATE 75 MILLIGRAM(S): 75 TABLET, FILM COATED ORAL at 12:27

## 2018-11-11 RX ADMIN — SERTRALINE 100 MILLIGRAM(S): 25 TABLET, FILM COATED ORAL at 12:26

## 2018-11-11 RX ADMIN — Medication 50 MILLIGRAM(S): at 22:53

## 2018-11-11 RX ADMIN — Medication 100 MILLIGRAM(S): at 22:53

## 2018-11-11 RX ADMIN — Medication 3: at 17:36

## 2018-11-11 RX ADMIN — Medication 50 MILLIGRAM(S): at 17:37

## 2018-11-11 RX ADMIN — Medication 50 MILLIGRAM(S): at 05:51

## 2018-11-11 RX ADMIN — Medication 81 MILLIGRAM(S): at 12:26

## 2018-11-11 RX ADMIN — Medication 250 MILLIGRAM(S): at 05:51

## 2018-11-11 RX ADMIN — HEPARIN SODIUM 5000 UNIT(S): 5000 INJECTION INTRAVENOUS; SUBCUTANEOUS at 17:36

## 2018-11-11 RX ADMIN — DAPTOMYCIN 122 MILLIGRAM(S): 500 INJECTION, POWDER, LYOPHILIZED, FOR SOLUTION INTRAVENOUS at 18:45

## 2018-11-11 RX ADMIN — Medication 250 MILLIGRAM(S): at 17:37

## 2018-11-11 RX ADMIN — INSULIN GLARGINE 26 UNIT(S): 100 INJECTION, SOLUTION SUBCUTANEOUS at 22:53

## 2018-11-11 RX ADMIN — Medication 1: at 12:28

## 2018-11-11 RX ADMIN — ATORVASTATIN CALCIUM 20 MILLIGRAM(S): 80 TABLET, FILM COATED ORAL at 22:53

## 2018-11-11 RX ADMIN — Medication 100 MILLIGRAM(S): at 14:52

## 2018-11-11 NOTE — PROGRESS NOTE ADULT - SUBJECTIVE AND OBJECTIVE BOX
CC: poor historian due to dementia - denied any complaints    HPI:  Patient unable to provide any history, all the information taken from the wife and ER notes    This is a 68years old male with PMH of HTN, DM, CVA and CAD brought in to the Er for the evaluation of generalized weakness and vomiting for the past few days. Reportedly patient was unable to ambulate by himself today, family called 911 and patient was brought in to the ER. No h/o fever, chills, chest pain, SOB, diarrhea, constipation or urinary complaints. Patient was recently admitted to [facility] with TIA. he was seen by cardio and neurology and discharged home on 9/10. (05 Nov 2018 15:11)    REVIEW OF SYSTEMS:    Patient denied fever, chills, abdominal pain, nausea, vomiting, cough, shortness of breath, chest pain or palpitations    Vital Signs Last 24 Hrs  T(C): 36.5 (11 Nov 2018 15:32), Max: 36.7 (11 Nov 2018 04:30)  T(F): 97.7 (11 Nov 2018 15:32), Max: 98 (11 Nov 2018 04:30)  HR: 61 (11 Nov 2018 17:29) (58 - 63)  BP: 148/70 (11 Nov 2018 17:29) (123/70 - 148/70)  BP(mean): --  RR: 20 (11 Nov 2018 15:32) (20 - 20)  SpO2: 98% (11 Nov 2018 14:49) (98% - 98%)I&O's Summary  CAPILLARY BLOOD GLUCOSE    PHYSICAL EXAM:  GENERAL: NAD; slow to respond  HEENT: +EOMI, anicteric, no [place]  NECK: Supple, No JVD   CHEST/LUNG: CTA bilaterally; Normal effort  HEART: S1S2 Normal intensity, no murmurs, gallops or rubs noted  ABDOMEN: Soft, BS Normoactive, NT, ND, no HSM noted  EXTREMITIES:  1+ radial and DP pulses noted, no clubbing, cyanosis, or edema noted; limited ROM  SKIN: No rashes or lesions noted; + multiple tattoos throughout  NEURO: A&Ox1 - self, no focal deficits noted, CN II-XII intact  PSYCH: flat affect; insight/judgement poor    LABS:                        11.5   7.1   )-----------( 213      ( 10 Nov 2018 07:28 )             34.8     11-10    148<H>  |  113<H>  |  48.0<H>  ----------------------------<  235<H>  3.8   |  22.0  |  1.91<H>    Ca    8.9      10 Nov 2018 07:28    TPro  5.7<L>  /  Alb  2.8<L>  /  TBili  0.4  /  DBili  x   /  AST  10  /  ALT  9   /  AlkPhos  80  11-10        RADIOLOGY & ADDITIONAL TESTS:    MEDICATIONS:  MEDICATIONS  (STANDING):  aspirin  chewable 81 milliGRAM(s) Oral daily  atorvastatin 20 milliGRAM(s) Oral at bedtime  clopidogrel Tablet 75 milliGRAM(s) Oral daily  DAPTOmycin IVPB 550 milliGRAM(s) IV Intermittent every 24 hours  dextrose 5%. 1000 milliLiter(s) (50 mL/Hr) IV Continuous <Continuous>  dextrose 50% Injectable 12.5 Gram(s) IV Push once  dextrose 50% Injectable 25 Gram(s) IV Push once  dextrose 50% Injectable 25 Gram(s) IV Push once  docusate sodium 100 milliGRAM(s) Oral three times a day  heparin  Injectable 5000 Unit(s) SubCutaneous every 12 hours  hydrALAZINE 50 milliGRAM(s) Oral every 8 hours  insulin glargine Injectable (LANTUS) 26 Unit(s) SubCutaneous at bedtime  insulin lispro (HumaLOG) corrective regimen sliding scale   SubCutaneous three times a day before meals  metoprolol tartrate 50 milliGRAM(s) Oral two times a day  saccharomyces boulardii 250 milliGRAM(s) Oral two times a day  sertraline 100 milliGRAM(s) Oral daily    MEDICATIONS  (PRN):  dextrose 40% Gel 15 Gram(s) Oral once PRN Blood Glucose LESS THAN 70 milliGRAM(s)/deciliter  glucagon  Injectable 1 milliGRAM(s) IntraMuscular once PRN Glucose LESS THAN 70 milligrams/deciliter  polyethylene glycol 3350 17 Gram(s) Oral daily PRN Constipation

## 2018-11-11 NOTE — PROGRESS NOTE ADULT - ASSESSMENT
69y/o man with PMH of HTN, DM2, CVA and CAD s/p CABG and PM, was admitted with high fever, generalized weakness and vomiting for the past few days.. Admitted with  Sepsis 2/2 GPC bacteremia; source likely from skin. +Blood cultures with MRSA 8/8 bottles. Started on IV abx as per ID.  Seen by speech and swallow, recommends puree diet with no liquids. Cardio consulted for ASHELY - done 11/9 and neg for vegetation.      MRSA Bacteremia likely source is left knee as ASHELY neg:   - repeat blood cultures sent 11/10 - will follow  - blood cultures from 11/6, now 8/8 growing MRSA  - Will repeat blood cultures daily until negative.   - Wound cultures from knee + MRSA   - Follow up daily WBC and fever curve  - Continue Daptomycin 550mg daily as per ID  - ID consult appreciated    Nonsustained Vtach - resolved; tele showed sinus rhythm with PACs  - occasional bigeminy resolved  - 23-25 beats of vtach noted on monitor 11/10  - pt without any cardiac complaints today  - electrolytes WNL  - cardiology following  - beta blocker increased 11/9 to 50mg BID.  - discontinued monitor     Hypernatremia/hyperchloremia: 2/2 IVF  - unchanged  - off NS IVF  - follow up repeat labs in am; if improved should restart fluids as patient is not currently taking liquids    Fecal impaction  - stool softeners and bowel regimen    Fall:  - may be related to deconditioning and sepsis  - PT recommends MIKE. SW aware    Dysphagia  - Evaluated by speech and swallow, recommends dysphagia diet:  puree NO Liquids; consider repeating swallow eval to see if liquids possible    DM  - diabetic diet and ISS    CAD  - hx of CABG  - on statin, aspirin, and beta blocker    HTN  - controlled    Stroke history  - on aspirin and statin; no new focal symptomatology; head CT negative.    ppx: heparin sq BID  full code  case discussed with wife. 67y/o man with PMH of HTN, DM2, CVA and CAD s/p CABG and PM, was admitted with high fever, generalized weakness and vomiting for the past few days.. Admitted with  Sepsis 2/2 GPC bacteremia; source likely from skin. +Blood cultures with MRSA 8/8 bottles. Started on IV abx as per ID.  Seen by speech and swallow, recommends puree diet with no liquids. Cardio consulted for ASHELY - done 11/9 and neg for vegetation.      MRSA Bacteremia likely source is left knee as ASHELY neg:   - repeat blood cultures sent 11/10 - will follow  - blood cultures from 11/6, now 8/8 growing MRSA  - Will repeat blood cultures daily until negative.   - Wound cultures from knee + MRSA   - Follow up daily WBC and fever curve  - Continue Daptomycin 550mg daily as per ID  - ID consult appreciated    Nonsustained Vtach - resolved; tele showed sinus rhythm with PACs  - occasional bigeminy resolved  - 23-25 beats of vtach noted on monitor 11/10  - pt without any cardiac complaints today  - electrolytes WNL  - cardiology following  - beta blocker increased 11/9 to 50mg BID.  - discontinued monitor     Hypernatremia/hyperchloremia: 2/2 IVF  - unchanged  - off NS IVF  - follow up repeat labs in am; if improved should restart fluids as patient is not currently taking liquids    Fecal impaction  - stool softeners and bowel regimen    Fall:  - may be related to deconditioning and sepsis  - PT recommends MIKE. SW aware    Dysphagia  - Evaluated by speech and swallow, recommends dysphagia diet:  puree NO Liquids; consider repeating swallow eval to see if liquids possible    DM- uncontrolled  - diabetic diet and ISS  - lantus increased from 20 to 26 units tonight  CAD  - hx of CABG  - on statin, aspirin, and beta blocker    HTN  - controlled    Stroke history  - on aspirin and statin; no new focal symptomatology; head CT negative.    ppx: heparin sq BID  full code  case discussed with wife.

## 2018-11-12 LAB
ANION GAP SERPL CALC-SCNC: 14 MMOL/L — SIGNIFICANT CHANGE UP (ref 5–17)
BUN SERPL-MCNC: 51 MG/DL — HIGH (ref 8–20)
CALCIUM SERPL-MCNC: 8.8 MG/DL — SIGNIFICANT CHANGE UP (ref 8.6–10.2)
CHLORIDE SERPL-SCNC: 110 MMOL/L — HIGH (ref 98–107)
CO2 SERPL-SCNC: 23 MMOL/L — SIGNIFICANT CHANGE UP (ref 22–29)
CREAT SERPL-MCNC: 2.07 MG/DL — HIGH (ref 0.5–1.3)
GLUCOSE BLDC GLUCOMTR-MCNC: 154 MG/DL — HIGH (ref 70–99)
GLUCOSE BLDC GLUCOMTR-MCNC: 224 MG/DL — HIGH (ref 70–99)
GLUCOSE BLDC GLUCOMTR-MCNC: 279 MG/DL — HIGH (ref 70–99)
GLUCOSE SERPL-MCNC: 322 MG/DL — HIGH (ref 70–115)
HCT VFR BLD CALC: 39.7 % — LOW (ref 42–52)
HGB BLD-MCNC: 12.7 G/DL — LOW (ref 14–18)
MAGNESIUM SERPL-MCNC: 2.3 MG/DL — SIGNIFICANT CHANGE UP (ref 1.8–2.6)
MCHC RBC-ENTMCNC: 29.3 PG — SIGNIFICANT CHANGE UP (ref 27–31)
MCHC RBC-ENTMCNC: 32 G/DL — SIGNIFICANT CHANGE UP (ref 32–36)
MCV RBC AUTO: 91.5 FL — SIGNIFICANT CHANGE UP (ref 80–94)
PHOSPHATE SERPL-MCNC: 3.3 MG/DL — SIGNIFICANT CHANGE UP (ref 2.4–4.7)
PLATELET # BLD AUTO: 275 K/UL — SIGNIFICANT CHANGE UP (ref 150–400)
POTASSIUM SERPL-MCNC: 3.9 MMOL/L — SIGNIFICANT CHANGE UP (ref 3.5–5.3)
POTASSIUM SERPL-SCNC: 3.9 MMOL/L — SIGNIFICANT CHANGE UP (ref 3.5–5.3)
RBC # BLD: 4.34 M/UL — LOW (ref 4.6–6.2)
RBC # FLD: 14.6 % — SIGNIFICANT CHANGE UP (ref 11–15.6)
SODIUM SERPL-SCNC: 147 MMOL/L — HIGH (ref 135–145)
WBC # BLD: 5.7 K/UL — SIGNIFICANT CHANGE UP (ref 4.8–10.8)
WBC # FLD AUTO: 5.7 K/UL — SIGNIFICANT CHANGE UP (ref 4.8–10.8)

## 2018-11-12 PROCEDURE — 99233 SBSQ HOSP IP/OBS HIGH 50: CPT

## 2018-11-12 PROCEDURE — 99232 SBSQ HOSP IP/OBS MODERATE 35: CPT

## 2018-11-12 RX ORDER — SODIUM CHLORIDE 9 MG/ML
1000 INJECTION, SOLUTION INTRAVENOUS
Qty: 0 | Refills: 0 | Status: DISCONTINUED | OUTPATIENT
Start: 2018-11-12 | End: 2018-11-14

## 2018-11-12 RX ORDER — INSULIN GLARGINE 100 [IU]/ML
28 INJECTION, SOLUTION SUBCUTANEOUS AT BEDTIME
Qty: 0 | Refills: 0 | Status: DISCONTINUED | OUTPATIENT
Start: 2018-11-12 | End: 2018-11-18

## 2018-11-12 RX ADMIN — Medication 3: at 08:47

## 2018-11-12 RX ADMIN — Medication 1: at 17:20

## 2018-11-12 RX ADMIN — Medication 81 MILLIGRAM(S): at 12:19

## 2018-11-12 RX ADMIN — DAPTOMYCIN 122 MILLIGRAM(S): 500 INJECTION, POWDER, LYOPHILIZED, FOR SOLUTION INTRAVENOUS at 17:41

## 2018-11-12 RX ADMIN — Medication 100 MILLIGRAM(S): at 06:27

## 2018-11-12 RX ADMIN — HEPARIN SODIUM 5000 UNIT(S): 5000 INJECTION INTRAVENOUS; SUBCUTANEOUS at 06:27

## 2018-11-12 RX ADMIN — Medication 100 MILLIGRAM(S): at 14:56

## 2018-11-12 RX ADMIN — HEPARIN SODIUM 5000 UNIT(S): 5000 INJECTION INTRAVENOUS; SUBCUTANEOUS at 17:20

## 2018-11-12 RX ADMIN — Medication 50 MILLIGRAM(S): at 06:27

## 2018-11-12 RX ADMIN — Medication 250 MILLIGRAM(S): at 17:20

## 2018-11-12 RX ADMIN — Medication 2: at 12:21

## 2018-11-12 RX ADMIN — Medication 50 MILLIGRAM(S): at 06:40

## 2018-11-12 RX ADMIN — SERTRALINE 100 MILLIGRAM(S): 25 TABLET, FILM COATED ORAL at 12:19

## 2018-11-12 RX ADMIN — Medication 250 MILLIGRAM(S): at 06:27

## 2018-11-12 RX ADMIN — CLOPIDOGREL BISULFATE 75 MILLIGRAM(S): 75 TABLET, FILM COATED ORAL at 12:19

## 2018-11-12 RX ADMIN — SODIUM CHLORIDE 75 MILLILITER(S): 9 INJECTION, SOLUTION INTRAVENOUS at 18:28

## 2018-11-12 RX ADMIN — Medication 50 MILLIGRAM(S): at 14:56

## 2018-11-12 NOTE — PROGRESS NOTE ADULT - ASSESSMENT
69y/o man with PMH of HTN, DM2, CVA and CAD s/p CABG and PM, was admitted today with high fever and generalized weakness and vomiting for the past few days. Blood cultures with MRSA 4/4 bottles, possible source is skin since growing staph.   ASHELY with thrombus around the lead but no vegetation on valves.     High fever  GPC bacteremia   Skin ulcers     -Contact isolation   -MRSA in 4 sets of blood cultures from 11/5 and 11/6 (8 out of 8 bottles)  -Repeat blood culture x2 pending   -Follow up daily WBC and fever curve, both normal.   -ASHELY with thrombus around the lead, needs to come out.  -Continue Daptomycin 550mg daily, will need long treatment about 4-6weeks after removal PPM.  WIll follow.

## 2018-11-12 NOTE — PROGRESS NOTE ADULT - ASSESSMENT
67y/o man with PMH of HTN, DM2, CVA and CAD s/p CABG and PM, was admitted with high fever, generalized weakness and vomiting for the past few days.. Admitted with  Sepsis 2/2 GPC bacteremia; source likely from skin. +Blood cultures with MRSA 8/8 bottles. Started on IV abx as per ID.  Seen by speech and swallow, recommends puree diet with no liquids. Cardio consulted for ASHELY - done 11/9 and neg for vegetation.      MRSA Bacteremia likely site of entry left knee vs wound on the back. ASHELY neg for vegetations however showed thrombus on the pace maker leads. Patient had persistent bacteremia despite adequate antibiotic therapy with vancomycin. Discussed with ID regarding pacemaker leads; they will review the imaging. Will discuss with cardiology.  - repeat blood cultures sent 11/10 - will follow  - blood cultures from 11/6, now 8/8 growing MRSA  - Will repeat blood cultures daily until negative.   - Wound cultures from knee + MRSA   - Follow up daily WBC and fever curve  - Continue Daptomycin 550mg daily as per ID  - ID consult appreciated    Thrombus on pacemaker leads; unsure if these are infected;   - cardiology recs appreciated  - ?whether or not these are infected.  - will need anticoagulation; possibly NOAC.    Nonsustained Vtach - resolved; tele showed sinus rhythm with PACs  - cardiology following  - on increased 11/9 to 50mg BID.  - discontinued monitor 11/11    Hypernatremia/hyperchloremia: 2/2 IVF  - unchanged; patient not drinking  - off NS IVF  - labs in am  - speech to re-evaluate today    Fecal impaction  - stool softeners and bowel regimen    Fall:  - may be related to deconditioning and sepsis  - PT recommends MIKE. SW aware    Dysphagia  - Evaluated by speech and swallow, recommends dysphagia diet:  puree NO Liquids; consider repeating swallow eval to see if liquids possible  - speech to re-evaluate today    DM- uncontrolled  - diabetic diet and ISS  - lantus increased from 26 to 28 units tonight    CAD  - hx of CABG  - on statin, aspirin, and beta blocker    HTN  - controlled    Stroke history  - on aspirin and statin; no new focal symptomatology; head CT negative.    ppx: heparin sq BID  full code  case discussed with wife.

## 2018-11-12 NOTE — PROGRESS NOTE ADULT - SUBJECTIVE AND OBJECTIVE BOX
CC: poor historian due to dementia denied any complaints    HPI:  Patient unable to provide any history, all the information taken from the wife and ER notes. This is a 68years old male with PMH of HTN, DM, CVA and CAD brought in to the Er for the evaluation of generalized weakness and vomiting for the past few days. Reportedly patient was unable to ambulate by himself today, family called 911 and patient was brought in to the ER. No h/o fever, chills, chest pain, SOB, diarrhea, constipation or urinary complaints. Patient was recently admitted to [facility] with TIA. He was seen by cardio and neurology and discharged home on 9/10.    Interval Events:  No acute events overnight. Patient without complaint. Chart reviewed over the weekend. Patient had ASHELY performed last week which showed thrombus on the pacemaker leads. Aside from that no vegetations.    REVIEW OF SYSTEMS:    Patient denied fever, chills, abdominal pain, nausea, vomiting, cough, shortness of breath, chest pain or palpitations    ICU Vital Signs Last 24 Hrs  T(C): 36.6 (12 Nov 2018 11:17), Max: 36.6 (12 Nov 2018 11:17)  T(F): 97.8 (12 Nov 2018 11:17), Max: 97.8 (12 Nov 2018 11:17)  HR: 56 (12 Nov 2018 11:17) (56 - 66)  BP: 117/66 (12 Nov 2018 11:17) (117/66 - 148/70)  BP(mean): --  ABP: --  ABP(mean): --  RR: 18 (12 Nov 2018 11:17) (18 - 20)  SpO2: 98% (11 Nov 2018 22:48) (98% - 98%)    PHYSICAL EXAM:  GENERAL: NAD; slow to respond  HEENT: +EOMI, anicteric, no [place]  NECK: Supple, No JVD   CHEST/LUNG: CTA bilaterally; Normal effort; patient wound on the back looks to be improved, less erythema than admission.  HEART: S1S2 Normal intensity, no murmurs, gallops or rubs noted  ABDOMEN: Soft, BS Normoactive, NT, ND, no HSM noted  EXTREMITIES:  1+ radial and DP pulses noted, no clubbing, cyanosis, or edema noted; limited ROM  SKIN: No rashes or lesions noted; + multiple tattoos throughout; scattered skin abrasions from fall at home that have scabbing in different stages.  NEURO: A&Ox1 - self, no focal deficits noted, CN II-XII intact  PSYCH: flat affect; insight/judgement poor    LABS:                        11.5   7.1   )-----------( 213      ( 10 Nov 2018 07:28 )             34.8     11-10    148<H>  |  113<H>  |  48.0<H>  ----------------------------<  235<H>  3.8   |  22.0  |  1.91<H>    Ca    8.9      10 Nov 2018 07:28    TPro  5.7<L>  /  Alb  2.8<L>  /  TBili  0.4  /  DBili  x   /  AST  10  /  ALT  9   /  AlkPhos  80  11-10        RADIOLOGY & ADDITIONAL TESTS:    MEDICATIONS:  MEDICATIONS  (STANDING):  aspirin  chewable 81 milliGRAM(s) Oral daily  atorvastatin 20 milliGRAM(s) Oral at bedtime  clopidogrel Tablet 75 milliGRAM(s) Oral daily  DAPTOmycin IVPB 550 milliGRAM(s) IV Intermittent every 24 hours  dextrose 5%. 1000 milliLiter(s) (50 mL/Hr) IV Continuous <Continuous>  dextrose 50% Injectable 12.5 Gram(s) IV Push once  dextrose 50% Injectable 25 Gram(s) IV Push once  dextrose 50% Injectable 25 Gram(s) IV Push once  docusate sodium 100 milliGRAM(s) Oral three times a day  heparin  Injectable 5000 Unit(s) SubCutaneous every 12 hours  hydrALAZINE 50 milliGRAM(s) Oral every 8 hours  insulin glargine Injectable (LANTUS) 26 Unit(s) SubCutaneous at bedtime  insulin lispro (HumaLOG) corrective regimen sliding scale   SubCutaneous three times a day before meals  metoprolol tartrate 50 milliGRAM(s) Oral two times a day  saccharomyces boulardii 250 milliGRAM(s) Oral two times a day  sertraline 100 milliGRAM(s) Oral daily    MEDICATIONS  (PRN):  dextrose 40% Gel 15 Gram(s) Oral once PRN Blood Glucose LESS THAN 70 milliGRAM(s)/deciliter  glucagon  Injectable 1 milliGRAM(s) IntraMuscular once PRN Glucose LESS THAN 70 milligrams/deciliter  polyethylene glycol 3350 17 Gram(s) Oral daily PRN Constipation

## 2018-11-12 NOTE — PROGRESS NOTE ADULT - SUBJECTIVE AND OBJECTIVE BOX
[facility]  INFECTIOUS DISEASES AND INTERNAL MEDICINE at [place]  =======================================================  Frankie Garcia MD  Diplomates American Board of Internal Medicine and Infectious Diseases  =======================================================    [id number]  AVIN GREEN     Follow up for bacteremia with MRSA, source is skin. Afebrile. No compliant. ASHELY done with thrombus around lead.     PAST MEDICAL & SURGICAL HISTORY:  CVA (cerebral vascular accident)  Diabetic neuropathy  DM (diabetes mellitus)  Hyperlipidemia  AICD (automatic cardioverter/defibrillator) present  Pacemaker  Stented coronary artery  HTN (hypertension)  Cardiac pacemaker    Social Hx: As per him no smoking, ETOH or drugs.     FAMILY HISTORY:  No pertinent family history in first degree relatives    Allergies  No Known Allergies    Antibiotics:  vancomycin      REVIEW OF SYSTEMS:  Afebrile no complaint.     Physical Exam:  Vital Signs Last 24 Hrs  T(C): 36.6 (12 Nov 2018 11:17), Max: 36.6 (12 Nov 2018 11:17)  T(F): 97.8 (12 Nov 2018 11:17), Max: 97.8 (12 Nov 2018 11:17)  HR: 56 (12 Nov 2018 11:17) (56 - 66)  BP: 117/66 (12 Nov 2018 11:17) (117/66 - 148/70)  RR: 18 (12 Nov 2018 11:17) (18 - 20)  SpO2: 98% (11 Nov 2018 22:48) (98% - 98%)  GEN: NAD  HEENT: normocephalic and atraumatic. EOMI. PERRL.    NECK: Supple.  No lymphadenopathy   LUNGS: Clear to auscultation.  HEART: Regular rate and rhythm left upper chest Pacemaker, nontender, no erythema or swelling   ABDOMEN: Soft, nontender, and nondistended.  Positive bowel sounds.    : No CVA tenderness  EXTREMITIES: Without any cyanosis, clubbing, rash, lesions or edema.  NEUROLOGIC: grossly intact.  PSYCHIATRIC: Appropriate affect .  SKIN: scratches and superficial wounds in legs, healing wound in his Right mid back, with erythema, no discharge     Labs:  11-12    147<H>  |  110<H>  |  51.0<H>  ----------------------------<  322<H>  3.9   |  23.0  |  2.07<H>    Ca    8.8      12 Nov 2018 08:57  Phos  3.3     11-12  Mg     2.3     11-12                        12.7   5.7   )-----------( 275      ( 12 Nov 2018 08:57 )             39.7     RECENT CULTURES:  11-07 @ 11:41 Skin wound Methicillin resistant Staphylococcus aureus    Few Methicillin resistant Staphylococcus aureus (KNOWN)  Numerous Corynebacterium species    11-06 @ 22:42 .Blood Blood Methicillin resistant Staphylococcus aureus    Growth in aerobic and anaerobic bottles: Methicillin resistant  Staphylococcus aureus (KNOWN)  Aerobic Bottle: 14:58 Hours to positivity  Anaerobic Bottle: 15:48 Hours to positivity  11-05 @ 17:11        NotDetec  11-05 @ 16:14 .Urine Clean Catch (Midstream)     No growth    11-05 @ 10:06 .Blood Blood-Peripheral Methicillin resistant Staphylococcus aureus    Growth in aerobic bottle: Methicillin resistant Staphylococcus aureus  Aerobic Bottle: 9.47 Hours to positivity  Anaerobic Bottle: 10.47 Hours to positivity    11-05 @ 10:05 .Blood Blood-Peripheral Methicillin resistant Staphylococcus aureus  Blood Culture PCR    Growth in aerobic and anaerobic bottles: Methicillin resistant  Staphylococcus aureus  Aerobic Bottle: 09.07 Hours to positivity  Anaerobic Bottle: 12:17 Hours to positivity    All imaging and other data have been reviewed.    ASHELY:   Summary:   1. Left ventricular ejection fraction, by visual estimation, is 40 to   45%.   2. Technically fair study.   3. Mildly decreased global left ventricular systolic function.   4. The left ventricular diastolic function could not be assessed in this   study.   5. There is no evidence of pericardial effusion.   6. Mild mitral valve regurgitation.   7. Mild tricuspid regurgitation.   8. Trace pulmonic valve regurgitation.   9. There is no vegetation on any of the cardiac valve. Thrombus coated   pacing leads are seen.  10. Clinical correlation is advised.  11. Color flow doppler and intravenous injection of agitated saline   demonstrates the presence of an intact intra atrial septum.  12. No left atrial appendage thrombus.

## 2018-11-13 DIAGNOSIS — R78.81 BACTEREMIA: ICD-10-CM

## 2018-11-13 LAB
ALBUMIN SERPL ELPH-MCNC: 3 G/DL — LOW (ref 3.3–5.2)
ALP SERPL-CCNC: 87 U/L — SIGNIFICANT CHANGE UP (ref 40–120)
ALT FLD-CCNC: 15 U/L — SIGNIFICANT CHANGE UP
ANION GAP SERPL CALC-SCNC: 12 MMOL/L — SIGNIFICANT CHANGE UP (ref 5–17)
AST SERPL-CCNC: 18 U/L — SIGNIFICANT CHANGE UP
BILIRUB SERPL-MCNC: 0.4 MG/DL — SIGNIFICANT CHANGE UP (ref 0.4–2)
BUN SERPL-MCNC: 48 MG/DL — HIGH (ref 8–20)
CALCIUM SERPL-MCNC: 8.5 MG/DL — LOW (ref 8.6–10.2)
CHLORIDE SERPL-SCNC: 116 MMOL/L — HIGH (ref 98–107)
CO2 SERPL-SCNC: 22 MMOL/L — SIGNIFICANT CHANGE UP (ref 22–29)
CREAT SERPL-MCNC: 2.03 MG/DL — HIGH (ref 0.5–1.3)
GLUCOSE BLDC GLUCOMTR-MCNC: 207 MG/DL — HIGH (ref 70–99)
GLUCOSE BLDC GLUCOMTR-MCNC: 233 MG/DL — HIGH (ref 70–99)
GLUCOSE BLDC GLUCOMTR-MCNC: 237 MG/DL — HIGH (ref 70–99)
GLUCOSE BLDC GLUCOMTR-MCNC: 247 MG/DL — HIGH (ref 70–99)
GLUCOSE BLDC GLUCOMTR-MCNC: 289 MG/DL — HIGH (ref 70–99)
GLUCOSE SERPL-MCNC: 279 MG/DL — HIGH (ref 70–115)
HCT VFR BLD CALC: 37.8 % — LOW (ref 42–52)
HGB BLD-MCNC: 11.8 G/DL — LOW (ref 14–18)
MCHC RBC-ENTMCNC: 28.2 PG — SIGNIFICANT CHANGE UP (ref 27–31)
MCHC RBC-ENTMCNC: 31.2 G/DL — LOW (ref 32–36)
MCV RBC AUTO: 90.4 FL — SIGNIFICANT CHANGE UP (ref 80–94)
PLATELET # BLD AUTO: 339 K/UL — SIGNIFICANT CHANGE UP (ref 150–400)
POTASSIUM SERPL-MCNC: 3.9 MMOL/L — SIGNIFICANT CHANGE UP (ref 3.5–5.3)
POTASSIUM SERPL-SCNC: 3.9 MMOL/L — SIGNIFICANT CHANGE UP (ref 3.5–5.3)
PROT SERPL-MCNC: 5.9 G/DL — LOW (ref 6.6–8.7)
RBC # BLD: 4.18 M/UL — LOW (ref 4.6–6.2)
RBC # FLD: 14.5 % — SIGNIFICANT CHANGE UP (ref 11–15.6)
SODIUM SERPL-SCNC: 150 MMOL/L — HIGH (ref 135–145)
WBC # BLD: 8.3 K/UL — SIGNIFICANT CHANGE UP (ref 4.8–10.8)
WBC # FLD AUTO: 8.3 K/UL — SIGNIFICANT CHANGE UP (ref 4.8–10.8)

## 2018-11-13 PROCEDURE — 99233 SBSQ HOSP IP/OBS HIGH 50: CPT

## 2018-11-13 PROCEDURE — 99232 SBSQ HOSP IP/OBS MODERATE 35: CPT

## 2018-11-13 RX ADMIN — SERTRALINE 100 MILLIGRAM(S): 25 TABLET, FILM COATED ORAL at 13:13

## 2018-11-13 RX ADMIN — Medication 2: at 17:51

## 2018-11-13 RX ADMIN — ATORVASTATIN CALCIUM 20 MILLIGRAM(S): 80 TABLET, FILM COATED ORAL at 00:40

## 2018-11-13 RX ADMIN — HEPARIN SODIUM 5000 UNIT(S): 5000 INJECTION INTRAVENOUS; SUBCUTANEOUS at 17:52

## 2018-11-13 RX ADMIN — Medication 50 MILLIGRAM(S): at 05:51

## 2018-11-13 RX ADMIN — DAPTOMYCIN 122 MILLIGRAM(S): 500 INJECTION, POWDER, LYOPHILIZED, FOR SOLUTION INTRAVENOUS at 17:53

## 2018-11-13 RX ADMIN — Medication 100 MILLIGRAM(S): at 13:14

## 2018-11-13 RX ADMIN — Medication 100 MILLIGRAM(S): at 23:55

## 2018-11-13 RX ADMIN — Medication 100 MILLIGRAM(S): at 00:40

## 2018-11-13 RX ADMIN — Medication 2: at 08:08

## 2018-11-13 RX ADMIN — Medication 250 MILLIGRAM(S): at 05:51

## 2018-11-13 RX ADMIN — CLOPIDOGREL BISULFATE 75 MILLIGRAM(S): 75 TABLET, FILM COATED ORAL at 13:14

## 2018-11-13 RX ADMIN — Medication 50 MILLIGRAM(S): at 13:14

## 2018-11-13 RX ADMIN — Medication 50 MILLIGRAM(S): at 23:54

## 2018-11-13 RX ADMIN — Medication 3: at 13:13

## 2018-11-13 RX ADMIN — ATORVASTATIN CALCIUM 20 MILLIGRAM(S): 80 TABLET, FILM COATED ORAL at 23:55

## 2018-11-13 RX ADMIN — INSULIN GLARGINE 28 UNIT(S): 100 INJECTION, SOLUTION SUBCUTANEOUS at 00:39

## 2018-11-13 RX ADMIN — Medication 250 MILLIGRAM(S): at 17:53

## 2018-11-13 RX ADMIN — Medication 81 MILLIGRAM(S): at 13:14

## 2018-11-13 RX ADMIN — Medication 50 MILLIGRAM(S): at 00:40

## 2018-11-13 RX ADMIN — HEPARIN SODIUM 5000 UNIT(S): 5000 INJECTION INTRAVENOUS; SUBCUTANEOUS at 05:51

## 2018-11-13 RX ADMIN — Medication 100 MILLIGRAM(S): at 05:51

## 2018-11-13 NOTE — CONSULT NOTE ADULT - ASSESSMENT
68 year old male with PMH as above.  Now with MRSA bacteremia and suspected thrombus/vegetation on AICD lead.  Plan for AICD lead extraction tomorrow.

## 2018-11-13 NOTE — CONSULT NOTE ADULT - SUBJECTIVE AND OBJECTIVE BOX
Surgeon: Jacques    Consult requesting by: Sujatha    HISTORY OF PRESENT ILLNESS:  68y Male with PMH CVA x7, DM, HLD,  HTN, dementia, CAD s/p PCI and CABG 2014-[facility], Single Chamber MDT ICD implanted 2011 (6947-dual coil) admitted with MRSA bacteremia (source likely left knee vs back wound), being treated with daptomycin. ASHELY 11/9/18: EF 40-45%, no vegetation on valves, +thrombus on ICD lead. Plan for AICD Lead extraction.    Pt currently lying in bed.  Opens eyes to verbal command, but not verbally answering questions.  No family at bedside.  Information obtained from chart.    PAST MEDICAL & SURGICAL HISTORY:  CVA (cerebral vascular accident)  Diabetic neuropathy  DM (diabetes mellitus)  Hyperlipidemia  AICD (automatic cardioverter/defibrillator) present  Pacemaker  Stented coronary artery  HTN (hypertension)  Cardiac pacemaker      MEDICATIONS  (STANDING):  aspirin  chewable 81 milliGRAM(s) Oral daily  atorvastatin 20 milliGRAM(s) Oral at bedtime  clopidogrel Tablet 75 milliGRAM(s) Oral daily  DAPTOmycin IVPB 550 milliGRAM(s) IV Intermittent every 24 hours  dextrose 5%. 1000 milliLiter(s) (50 mL/Hr) IV Continuous <Continuous>  dextrose 50% Injectable 12.5 Gram(s) IV Push once  dextrose 50% Injectable 25 Gram(s) IV Push once  dextrose 50% Injectable 25 Gram(s) IV Push once  docusate sodium 100 milliGRAM(s) Oral three times a day  heparin  Injectable 5000 Unit(s) SubCutaneous every 12 hours  hydrALAZINE 50 milliGRAM(s) Oral every 8 hours  insulin glargine Injectable (LANTUS) 28 Unit(s) SubCutaneous at bedtime  insulin lispro (HumaLOG) corrective regimen sliding scale   SubCutaneous three times a day before meals  metoprolol tartrate 50 milliGRAM(s) Oral two times a day  saccharomyces boulardii 250 milliGRAM(s) Oral two times a day  sertraline 100 milliGRAM(s) Oral daily  sodium chloride 0.45%. 1000 milliLiter(s) (75 mL/Hr) IV Continuous <Continuous>    MEDICATIONS  (PRN):  dextrose 40% Gel 15 Gram(s) Oral once PRN Blood Glucose LESS THAN 70 milliGRAM(s)/deciliter  glucagon  Injectable 1 milliGRAM(s) IntraMuscular once PRN Glucose LESS THAN 70 milligrams/deciliter  polyethylene glycol 3350 17 Gram(s) Oral daily PRN Constipation    Antiplatelet therapy:   ASA and Plavix last today.    Allergies    No Known Allergies    Intolerances        SOCIAL HISTORY:  Smoker: [ ] Yes  [x ] No        PACK YEARS:                         WHEN QUIT?  ETOH use: [ ] Yes  [x ] No              FREQUENCY / QUANTITY:  Ilicit Drug use:  [ ] Yes  [x ] No  Occupation: unable to assess  Live with: Wife  Assisted device use: unable to assess    FAMILY HISTORY:  No pertinent family history in first degree relatives      Review of Systems  CONSTITUTIONAL:  Fevers[ ] chills[ ] sweats[ ] fatigue[ x] weight loss[ ] weight gain [ ]                                     NEGATIVE [ ]   NEURO:  parathesias[ ] seizures [ ]  syncope [ ]  confusion [ x]                                                                                NEGATIVE[ ]   EYES: glasses[ ]  blurry vision[ ]  discharge[ ] pain[ ] glaucoma [ ]                                                                          NEGATIVE[x ]   ENMT:  difficulty hearing [ ]  vertigo[ ]  dysphagia[ ] epistaxis[ ] recent dental work [ ]                                    NEGATIVE[x ]   CV:  chest pain[ ] palpitations[ ] DELANEY [ ] diaphoresis [ ]                                                                                           NEGATIVE[x ]   RESPIRATORY:  wheezing[ ] SOB[ ] cough [ ] sputum[ ] hemoptysis[ ]                                                                  NEGATIVE[x ]   GI:  nausea[ ]  vomiting [ ]  diarrhea[ ] constipation [ ] melena [ ]                                                                      NEGATIVE[x ]   : hematuria[ ]  dysuria[ ] urgency[ ] incontinence[ ]                                                                                            NEGATIVE[ x]   MUSKULOSKELETAL:  arthritis[ ]  joint swelling [ ] muscle weakness [ x]                                                                NEGATIVE[ ]   SKIN/BREAST:  rash[ ] itching [ ]  hair loss[ ] masses[ ]                                                                                              NEGATIVE[x ]   PSYCH:  dementia [ ] depression [ ] anxiety[ ]                                                                                                               NEGATIVE[ x]   HEME/LYMPH:  bruises easily[ ] enlarged lymph nodes[ ] tender lymph nodes[ ]                                               NEGATIVE[xs ]   ENDOCRINE:  cold intolerance[ ] heat intolerance[ ] polydipsia[ ]                                                                          NEGATIVE[x ]     PHYSICAL EXAM  Vital Signs Last 24 Hrs  T(C): 36.4 (13 Nov 2018 17:17), Max: 36.6 (13 Nov 2018 00:37)  T(F): 97.6 (13 Nov 2018 17:17), Max: 97.9 (13 Nov 2018 00:37)  HR: 57 (13 Nov 2018 17:17) (57 - 66)  BP: 166/79 (13 Nov 2018 17:17) (142/61 - 168/74)  BP(mean): --  RR: 18 (13 Nov 2018 00:37) (18 - 18)  SpO2: 96% (13 Nov 2018 00:37) (96% - 96%)    CONSTITUTIONAL:                                                                        Neuro: WNL[ ] Normal exam oriented to person/place & time with no focal motor or sensory  deficits. Other   nonverbal.  Opens eyes to verbal command only.                Eyes: WNL[x ]   Normal exam of conjunctiva & lids, pupils equally reactive. Other     ENT: WNL[x ]    Normal exam of nasal/oral mucosa with absence of cyanosis. Other  Neck: WNL[x ]  Normal exam of jugular veins, trachea & thyroid. Other  Chest: WNL[x ] Normal lung exam with good air movement absence of wheezes, rales, or rhonchi: Other                                                                                CV:  Auscultation: normal [ x] S3[ ] S4[ ] Irregular [ ] Rub[ ] Clicks[ ]    Murmurs none:[ x]systolic [ ]  diastolic [ ] holosystolic [ ]  Carotids: No Bruits[x ] Other                                                                                           GI:           WNL[x ] Normal exam of abdomen, liver & spleen with no noted masses or tenderness. Other                                                                                                        Extremities: WNL[ x] Normal no evidence of cyanosis or deformity Edema: none[ ]trace[ ]1+[ ]2+[ ]3+[ ]4+[ ]  Lower Extremity Pulses: Right[pp ] Left[ pp]  SKIN :WNL[x ] Normal exam to inspection & palpation. Other:                                                          LABS:                        11.8   8.3   )-----------( 339      ( 13 Nov 2018 07:32 )             37.8     11-13    150<H>  |  116<H>  |  48.0<H>  ----------------------------<  279<H>  3.9   |  22.0  |  2.03<H>    Ca    8.5<L>      13 Nov 2018 07:32  Phos  3.3     11-12  Mg     2.3     11-12    TPro  5.9<L>  /  Alb  3.0<L>  /  TBili  0.4  /  DBili  x   /  AST  18  /  ALT  15  /  AlkPhos  87  11-13      Cardiac Cath: NA    TTE / ASHELY: < from: ASHELY Echo Doppler (11.09.18 @ 12:54) >  Summary:   1. Left ventricular ejection fraction, by visual estimation, is 40 to   45%.   2. Technically fair study.   3. Mildly decreased global left ventricular systolic function.   4. The left ventricular diastolic function could not be assessed in this   study.   5. There is no evidence of pericardial effusion.   6. Mild mitral valve regurgitation.   7. Mild tricuspid regurgitation.   8. Trace pulmonic valve regurgitation.   9. There is no vegetation on any of the cardiac valve. Thrombus coated   pacing leads are seen.  10. Clinical correlation is advised.  11. Color flow doppler and intravenous injection of agitated saline   demonstrates the presence of an intact intra atrial septum.  12. No left atrial appendage thrombus.    [id number] Zeny Horn MD, Electronically signed on 11/9/2018 at 3:31:46 PM       *** Final ***    ZENY HORN   This document has been electronically signed. Nov 9 2018 12:54PM    < end of copied text >

## 2018-11-13 NOTE — CHART NOTE - NSCHARTNOTEFT_GEN_A_CORE
Upon Nutritional Assessment by the Registered Dietitian your patient was determined to meet criteria / has evidence of the following diagnosis/diagnoses:          [ ]  Mild Protein Calorie Malnutrition        [ ]  Moderate Protein Calorie Malnutrition        [x ] Severe Protein Calorie Malnutrition  Chronic        [ ] Unspecified Protein Calorie Malnutrition        [ ] Underweight / BMI <19        [ ] Morbid Obesity / BMI > 40      Findings as based on:  •  Comprehensive nutrition assessment and consultation  •  Calorie counts (nutrient intake analysis)  •  Food acceptance and intake status from observations by staff  •  Follow up  •  Patient education  •  Intervention secondary to interdisciplinary rounds  •   concerns      Treatment:    The following diet has been recommended:  Rec Ensure Pudding TID      PROVIDER Section:     By signing this assessment you are acknowledging and agree with the diagnosis/diagnoses assigned by the Registered Dietitian    Comments:

## 2018-11-13 NOTE — PROGRESS NOTE ADULT - ASSESSMENT
67y/o man with PMH of HTN, DM2, CVA and CAD s/p CABG and PM, was admitted with high fever, generalized weakness and vomiting for the past few days.. Admitted with  Sepsis 2/2 GPC bacteremia; source likely from skin. +Blood cultures with MRSA 8/8 bottles. Started on IV abx as per ID.  Seen by speech and swallow, recommends puree diet with no liquids. Cardio consulted for ASHELY - done 11/9 and neg for vegetation.      MRSA Bacteremia likely site of entry left knee vs wound on the back. ASHELY neg for vegetations however showed thrombus on the pace maker leads. Patient had persistent bacteremia despite adequate antibiotic therapy with vancomycin. Discussed with ID regarding pacemaker leads; they suggest pacemaker removal. Cardiology currently discussing with Dr. Emmanuel for pacemaker removal. Wife aware of plan.  - repeat blood cultures sent 11/10 - these were negative for 2 days - likely the start date for abx going forward.  - Wound cultures from back wound + MRSA   - Follow up daily WBC and fever curve  - Continue Daptomycin 550mg daily as per ID  - ID consult appreciated    Thrombus on pacemaker leads; unsure if these are infected;   - cardiology recs appreciated.  - ?whether or not these are infected.  - will need anticoagulation; possibly NOAC per cardiology if the leads are not removed    Nonsustained Vtach - resolved; tele showed sinus rhythm with PACs  - cardiology following  - on increased 11/9 to 50mg BID.  - discontinued monitor 11/11  - if PM removed will likely need life-vest    Hypernatremia/hyperchloremia: 2/2 IVF  - unchanged; patient not drinking  - failed bedside again today  - off NS IVF  - labs in am  - asked patient if PEG tube has ever been offered; not likely that dysphagia will improve given from previous strokes  - will discuss with wife whether to proceed with PEG.    Fecal impaction  - stool softeners and bowel regimen    Fall:  - may be related to deconditioning and sepsis  - PT recommends MIKE. SW aware    Dysphagia  - Evaluated by speech and swallow, recommends dysphagia diet:  puree NO Liquids; consider repeating swallow eval to see if liquids possible  - speech to re-evaluate today    DM- uncontrolled  - diabetic diet and ISS  - lantus increased from 26 to 28 units tonight    CAD  - hx of CABG  - on statin, aspirin, and beta blocker    HTN  - controlled    Stroke history  - on aspirin and statin; no new focal symptomatology; head CT negative.    ppx: heparin sq BID  full code  case discussed with wife.

## 2018-11-13 NOTE — ADVANCED PRACTICE NURSE CONSULT - RECOMMEDATIONS
Follow Nutritional suggestions as per Dietary consult.    Recommendation for right flank wound:  Cleanse wound and periwound with normal saline, apply Medihoney gel to wound bed covered by dry dressing; treatment to be done every other day.    Goal of care:  promote wound healing    Continue low air loss bed therapy, continue heel elevation with Z-flex fluidized positioning boots, continue to turn & reposition q2h with Z-eunice fluidized positioning device, and single breathable pad, continue measures to decrease friction/shear/pressure.      Plan discussed with family (spouse and son), primary nurse, Taisha Salas and message left for Dr. Lee Peña.    Please call wound care service line @ [phone], if further assistance is needed.

## 2018-11-13 NOTE — PROGRESS NOTE ADULT - ASSESSMENT
ASSESSMENT/PLAN: 	  AVNI GREEN is a 68y Male with past medical history significant for  past medical history significant for CAD, PPM, PAD, HTN, HL, DM, prior CVA, dementia admitted with fever, weakness and sepsis. BC + for MRSA  ASHELY performed as above. No evidence of vegetation identified. Mildly decreased EF, mild valvular heart disease.  Thrombus on pace wire noted.  Thrombus on pacing leads. On ASA and plavix. Currently getting heparin for DVT prophylaxis in hospital. Would add Eliquis 2.5mg q12 on DC when heparin is discontinued  CAD. Pt denies symptoms to suggest ACS. Would maintain current cardiac medications  Will ask Dr Emmanuel to see pt regarding Pacer wire removal per ID recommendation.

## 2018-11-13 NOTE — CONSULT NOTE ADULT - SUBJECTIVE AND OBJECTIVE BOX
68 year old male with a known history of HTN, DM, CVA, dementia, [employer] single chamber ICD implanted 2011, and CAD brought in to the ER for the evaluation of generalized weakness and vomiting for the past few days. Admitted with a working diagnosis of MRSA bacteremia/sepsis (entry site likely left knee vs back wound).     PAST MEDICAL & SURGICAL HISTORY:  CVA (cerebral vascular accident)  Diabetic neuropathy  DM (diabetes mellitus)  Hyperlipidemia  AICD (automatic cardioverter/defibrillator) present  Pacemaker  Stented coronary artery  HTN (hypertension)  Cardiac pacemaker    REVIEW OF SYSTEMS: Unable to obtain patient altered.    MEDICATIONS  (STANDING):  aspirin  chewable 81 milliGRAM(s) Oral daily  atorvastatin 20 milliGRAM(s) Oral at bedtime  clopidogrel Tablet 75 milliGRAM(s) Oral daily  DAPTOmycin IVPB 550 milliGRAM(s) IV Intermittent every 24 hours  dextrose 5%. 1000 milliLiter(s) (50 mL/Hr) IV Continuous <Continuous>  dextrose 50% Injectable 12.5 Gram(s) IV Push once  dextrose 50% Injectable 25 Gram(s) IV Push once  dextrose 50% Injectable 25 Gram(s) IV Push once  docusate sodium 100 milliGRAM(s) Oral three times a day  heparin  Injectable 5000 Unit(s) SubCutaneous every 12 hours  hydrALAZINE 50 milliGRAM(s) Oral every 8 hours  insulin glargine Injectable (LANTUS) 28 Unit(s) SubCutaneous at bedtime  insulin lispro (HumaLOG) corrective regimen sliding scale   SubCutaneous three times a day before meals  metoprolol tartrate 50 milliGRAM(s) Oral two times a day  saccharomyces boulardii 250 milliGRAM(s) Oral two times a day  sertraline 100 milliGRAM(s) Oral daily  sodium chloride 0.45%. 1000 milliLiter(s) (75 mL/Hr) IV Continuous <Continuous>    MEDICATIONS  (PRN):  dextrose 40% Gel 15 Gram(s) Oral once PRN Blood Glucose LESS THAN 70 milliGRAM(s)/deciliter  glucagon  Injectable 1 milliGRAM(s) IntraMuscular once PRN Glucose LESS THAN 70 milligrams/deciliter  polyethylene glycol 3350 17 Gram(s) Oral daily PRN Constipation    Allergies  No Known Allergies    SOCIAL HISTORY:    FAMILY HISTORY:  No pertinent family history in first degree relatives    Vital Signs Last 24 Hrs  T(C): 36.3 (13 Nov 2018 04:53), Max: 36.6 (13 Nov 2018 00:37)  T(F): 97.3 (13 Nov 2018 04:53), Max: 97.9 (13 Nov 2018 00:37)  HR: 63 (13 Nov 2018 04:53) (54 - 66)  BP: 142/61 (13 Nov 2018 04:53) (135/74 - 168/74)  RR: 18 (13 Nov 2018 00:37) (18 - 18)  SpO2: 96% (13 Nov 2018 00:37) (96% - 96%)    Physical Exam:  Constitutional: AAOx1, thin frail, cachectic. Appears older than stated age  Neck: supple, No JVD  Cardiovascular: +S1S2 RRR, no murmurs, rubs, gallops   Pulmonary: CTA b/l, unlabored, no wheezes, rales, or rhonchi  Abdomen: +BS, soft NTND  Extremities: no edema b/l, +distal pulses b/l  Neuro: non focal, speech clear, MOCK x 4    LABS:                        11.8   8.3   )-----------( 339      ( 13 Nov 2018 07:32 )             37.8   11-13    150<H>  |  116<H>  |  48.0<H>  ----------------------------<  279<H>  3.9   |  22.0  |  2.03<H>  Ca    8.5<L>      13 Nov 2018 07:32  Phos  3.3     11-12  Mg     2.3     11-12  TPro  5.9<L>  /  Alb  3.0<L>  /  TBili  0.4  /  DBili  x   /  AST  18  /  ALT  15  /  AlkPhos  87  11-13  LIVER FUNCTIONS - ( 13 Nov 2018 07:32 )  Alb: 3.0 g/dL / Pro: 5.9 g/dL / ALK PHOS: 87 U/L / ALT: 15 U/L / AST: 18 U/L / GGT: x           RADIOLOGY & ADDITIONAL STUDIES:  Echo: 11/9/18  Summary:   1. Left ventricular ejection fraction, by visual estimation, is 40 to 45%.   2. Technically fair study.   3. Mildly decreased global left ventricular systolic function.   4. The left ventricular diastolic function could not be assessed in this study.   5. There is no evidence of pericardial effusion.   6. Mild mitral valve regurgitation.   7. Mild tricuspid regurgitation.   8. Trace pulmonic valve regurgitation.   9. There is no vegetation on any of the cardiac valve. Thrombus coated pacing leads are seen.  10. Clinical correlation is advised.  11. Color flow doppler and intravenous injection of agitated saline demonstrates the presence of an intact intra atrial septum.  12. No left atrial appendage thrombus.    CXR 11/5/18  Impression: No acute pulmonary disease. Status post CABG and pacemaker    CT head 11/5/18  IMPRESSION: Limited study. Mild to moderate chronic microvascular changes   without evidence of an acute transcortical infarction or hemorrhage. MR   is a more sensitive imaging modality for the evaluation of an acute   infarction. 68 year old male with a known history of HTN, DM, CVA x 7, dementia, CAD s/p PCI and CABG 2014-[facility], Single Chamber MDT ICD implanted 2011 (6947-dual coil) admitted with MRSA bacteremia (source likely left knee vs back wound), being treated with daptomycin. ASHELY 11/9/18: EF 40-45%, no vegetation on valves, +thrombus on ICD lead. Dr Emmanuel requested evaluation for ICD system extraction.    Wife and son bedside. Pt sleeping  requested not to wake him.  Wife is HCP and making all medical decisions at this time secondary to significant dementia from prior strokes. He lives home and she is primary caregiver. He is unable to walk, eat or dress independently.  She does not recall indication for ICD implant, she denies prior syncope. While in hospital in 2011 he had a "fast heart rate and ICD was implanted". She denies prior ICD shock    ECG 11/5/18: SR 94bpm, RBBB, LAFB    PAST MEDICAL & SURGICAL HISTORY:  CVA (cerebral vascular accident)  Diabetic neuropathy  DM (diabetes mellitus)  Hyperlipidemia  AICD (automatic cardioverter/defibrillator) present:MDT   Pacemaker  Stented coronary artery  HTN (hypertension)  Cardiac pacemaker    REVIEW OF SYSTEMS: Unable to obtain patient altered.    MEDICATIONS  (STANDING):  aspirin  chewable 81 milliGRAM(s) Oral daily  atorvastatin 20 milliGRAM(s) Oral at bedtime  clopidogrel Tablet 75 milliGRAM(s) Oral daily  DAPTOmycin IVPB 550 milliGRAM(s) IV Intermittent every 24 hours  dextrose 5%. 1000 milliLiter(s) (50 mL/Hr) IV Continuous <Continuous>  dextrose 50% Injectable 12.5 Gram(s) IV Push once  dextrose 50% Injectable 25 Gram(s) IV Push once  dextrose 50% Injectable 25 Gram(s) IV Push once  docusate sodium 100 milliGRAM(s) Oral three times a day  heparin  Injectable 5000 Unit(s) SubCutaneous every 12 hours  hydrALAZINE 50 milliGRAM(s) Oral every 8 hours  insulin glargine Injectable (LANTUS) 28 Unit(s) SubCutaneous at bedtime  insulin lispro (HumaLOG) corrective regimen sliding scale   SubCutaneous three times a day before meals  metoprolol tartrate 50 milliGRAM(s) Oral two times a day  saccharomyces boulardii 250 milliGRAM(s) Oral two times a day  sertraline 100 milliGRAM(s) Oral daily  sodium chloride 0.45%. 1000 milliLiter(s) (75 mL/Hr) IV Continuous <Continuous>    MEDICATIONS  (PRN):  dextrose 40% Gel 15 Gram(s) Oral once PRN Blood Glucose LESS THAN 70 milliGRAM(s)/deciliter  glucagon  Injectable 1 milliGRAM(s) IntraMuscular once PRN Glucose LESS THAN 70 milligrams/deciliter  polyethylene glycol 3350 17 Gram(s) Oral daily PRN Constipation    Allergies  No Known Allergies    SOCIAL HISTORY: lives with wife, denies drugs, ETOH, smoking. Pt requires assistance to eat, dress and ambulate    FAMILY HISTORY:  No pertinent family history in first degree relatives    Vital Signs Last 24 Hrs  T(C): 36.3 (13 Nov 2018 04:53), Max: 36.6 (13 Nov 2018 00:37)  T(F): 97.3 (13 Nov 2018 04:53), Max: 97.9 (13 Nov 2018 00:37)  HR: 63 (13 Nov 2018 04:53) (54 - 66)  BP: 142/61 (13 Nov 2018 04:53) (135/74 - 168/74)  RR: 18 (13 Nov 2018 00:37) (18 - 18)  SpO2: 96% (13 Nov 2018 00:37) (96% - 96%)    Physical Exam: (family request not to wake pt)  Constitutional: sleeping, thin frail, cachectic. Appears older than stated age      LABS:                        11.8   8.3   )-----------( 339      ( 13 Nov 2018 07:32 )             37.8   11-13    150<H>  |  116<H>  |  48.0<H>  ----------------------------<  279<H>  3.9   |  22.0  |  2.03<H>  Ca    8.5<L>      13 Nov 2018 07:32  Phos  3.3     11-12  Mg     2.3     11-12  TPro  5.9<L>  /  Alb  3.0<L>  /  TBili  0.4  /  DBili  x   /  AST  18  /  ALT  15  /  AlkPhos  87  11-13  LIVER FUNCTIONS - ( 13 Nov 2018 07:32 )  Alb: 3.0 g/dL / Pro: 5.9 g/dL / ALK PHOS: 87 U/L / ALT: 15 U/L / AST: 18 U/L / GGT: x           RADIOLOGY & ADDITIONAL STUDIES:  Echo: 11/9/18  Summary:   1. Left ventricular ejection fraction, by visual estimation, is 40 to 45%.   2. Technically fair study.   3. Mildly decreased global left ventricular systolic function.   4. The left ventricular diastolic function could not be assessed in this study.   5. There is no evidence of pericardial effusion.   6. Mild mitral valve regurgitation.   7. Mild tricuspid regurgitation.   8. Trace pulmonic valve regurgitation.   9. There is no vegetation on any of the cardiac valve. Thrombus coated pacing leads are seen.  10. Clinical correlation is advised.  11. Color flow doppler and intravenous injection of agitated saline demonstrates the presence of an intact intra atrial septum.  12. No left atrial appendage thrombus.    CXR 11/5/18  Impression: No acute pulmonary disease. Status post CABG and pacemaker    CT head 11/5/18  IMPRESSION: Limited study. Mild to moderate chronic microvascular changes   without evidence of an acute transcortical infarction or hemorrhage. MR   is a more sensitive imaging modality for the evaluation of an acute   infarction.     A/P: 68 year old male with a known history of HTN, DM, CVA x 7, dementia, CAD s/p PCI and CABG 2014-[facility], Single Chamber MDT ICD implanted 2011 (6947-dual coil) admitted with MRSA bacteremia (source likely left knee vs back wound), being treated with daptomycin. ASHELY 11/9/18: EF 40-45%, no vegetation on valves, +thrombus on ICD lead. Dr Emmanuel requested evaluation for ICD system extraction. 8/8 blood cultures with gram+ MRSA.    -wife agreeable to ICD system extraction  -MDT will do full device interrogation to evaluate if any prior tachy therapies received and %vpacing (baseline bifasicular block). Report of 25 beats of NSVT in hospitalist note, telemetry strips not available. Current EF 40-45% and baseline severe dementia. Will require further discussion for decision of re implantation as risk may outweigh benefit.     will d/w Dr Solares 68 year old male with a known history of HTN, DM, CVA x 7, dementia, CAD s/p PCI and CABG 2014-[facility], Single Chamber MDT ICD implanted 2011 (6947-dual coil) admitted with MRSA bacteremia (source likely left knee vs back wound), being treated with daptomycin. ASHELY 11/9/18: EF 40-45%, no vegetation on valves, +thrombus on ICD lead. Dr Emmanuel requested evaluation for ICD system extraction.    Wife and son bedside. Pt sleeping  requested not to wake him.  Wife is HCP and making all medical decisions at this time secondary to significant dementia from prior strokes. He lives home and she is primary caregiver. He is unable to walk, eat or dress independently.  She does not recall indication for ICD implant, she denies prior syncope. While in hospital in 2011 he had a "fast heart rate and ICD was implanted". She denies prior ICD shock    ECG 11/5/18: SR 94bpm, RBBB, LAFB    PAST MEDICAL & SURGICAL HISTORY:  CVA (cerebral vascular accident)  Diabetic neuropathy  DM (diabetes mellitus)  Hyperlipidemia  AICD (automatic cardioverter/defibrillator) present:MDT   Pacemaker  Stented coronary artery  HTN (hypertension)  Cardiac pacemaker    REVIEW OF SYSTEMS: Unable to obtain patient altered.    MEDICATIONS  (STANDING):  aspirin  chewable 81 milliGRAM(s) Oral daily  atorvastatin 20 milliGRAM(s) Oral at bedtime  clopidogrel Tablet 75 milliGRAM(s) Oral daily  DAPTOmycin IVPB 550 milliGRAM(s) IV Intermittent every 24 hours  dextrose 5%. 1000 milliLiter(s) (50 mL/Hr) IV Continuous <Continuous>  dextrose 50% Injectable 12.5 Gram(s) IV Push once  dextrose 50% Injectable 25 Gram(s) IV Push once  dextrose 50% Injectable 25 Gram(s) IV Push once  docusate sodium 100 milliGRAM(s) Oral three times a day  heparin  Injectable 5000 Unit(s) SubCutaneous every 12 hours  hydrALAZINE 50 milliGRAM(s) Oral every 8 hours  insulin glargine Injectable (LANTUS) 28 Unit(s) SubCutaneous at bedtime  insulin lispro (HumaLOG) corrective regimen sliding scale   SubCutaneous three times a day before meals  metoprolol tartrate 50 milliGRAM(s) Oral two times a day  saccharomyces boulardii 250 milliGRAM(s) Oral two times a day  sertraline 100 milliGRAM(s) Oral daily  sodium chloride 0.45%. 1000 milliLiter(s) (75 mL/Hr) IV Continuous <Continuous>    MEDICATIONS  (PRN):  dextrose 40% Gel 15 Gram(s) Oral once PRN Blood Glucose LESS THAN 70 milliGRAM(s)/deciliter  glucagon  Injectable 1 milliGRAM(s) IntraMuscular once PRN Glucose LESS THAN 70 milligrams/deciliter  polyethylene glycol 3350 17 Gram(s) Oral daily PRN Constipation    Allergies  No Known Allergies    SOCIAL HISTORY: lives with wife, denies drugs, ETOH, smoking. Pt requires assistance to eat, dress and ambulate    FAMILY HISTORY:  No pertinent family history in first degree relatives    Vital Signs Last 24 Hrs  T(C): 36.3 (13 Nov 2018 04:53), Max: 36.6 (13 Nov 2018 00:37)  T(F): 97.3 (13 Nov 2018 04:53), Max: 97.9 (13 Nov 2018 00:37)  HR: 63 (13 Nov 2018 04:53) (54 - 66)  BP: 142/61 (13 Nov 2018 04:53) (135/74 - 168/74)  RR: 18 (13 Nov 2018 00:37) (18 - 18)  SpO2: 96% (13 Nov 2018 00:37) (96% - 96%)    Physical Exam: (family request not to wake pt)  Constitutional: sleeping, thin frail, cachectic. Appears older than stated age      LABS:                        11.8   8.3   )-----------( 339      ( 13 Nov 2018 07:32 )             37.8   11-13    150<H>  |  116<H>  |  48.0<H>  ----------------------------<  279<H>  3.9   |  22.0  |  2.03<H>  Ca    8.5<L>      13 Nov 2018 07:32  Phos  3.3     11-12  Mg     2.3     11-12  TPro  5.9<L>  /  Alb  3.0<L>  /  TBili  0.4  /  DBili  x   /  AST  18  /  ALT  15  /  AlkPhos  87  11-13  LIVER FUNCTIONS - ( 13 Nov 2018 07:32 )  Alb: 3.0 g/dL / Pro: 5.9 g/dL / ALK PHOS: 87 U/L / ALT: 15 U/L / AST: 18 U/L / GGT: x           RADIOLOGY & ADDITIONAL STUDIES:  Echo: 11/9/18  Summary:   1. Left ventricular ejection fraction, by visual estimation, is 40 to 45%.   2. Technically fair study.   3. Mildly decreased global left ventricular systolic function.   4. The left ventricular diastolic function could not be assessed in this study.   5. There is no evidence of pericardial effusion.   6. Mild mitral valve regurgitation.   7. Mild tricuspid regurgitation.   8. Trace pulmonic valve regurgitation.   9. There is no vegetation on any of the cardiac valve. Thrombus coated pacing leads are seen.  10. Clinical correlation is advised.  11. Color flow doppler and intravenous injection of agitated saline demonstrates the presence of an intact intra atrial septum.  12. No left atrial appendage thrombus.    CXR 11/5/18  Impression: No acute pulmonary disease. Status post CABG and pacemaker    CT head 11/5/18  IMPRESSION: Limited study. Mild to moderate chronic microvascular changes   without evidence of an acute transcortical infarction or hemorrhage. MR   is a more sensitive imaging modality for the evaluation of an acute   infarction.     A/P: 68 year old male with a known history of HTN, DM, CVA x 7, dementia, CAD s/p PCI and CABG 2014-[facility], Single Chamber MDT ICD implanted 2011 (6947-dual coil) admitted with MRSA bacteremia (source likely left knee vs back wound), being treated with daptomycin. ASHELY 11/9/18: EF 40-45%, no vegetation on valves, +thrombus on ICD lead. Dr Emmanuel requested evaluation for ICD system extraction. 8/8 blood cultures with gram+ MRSA.    -wife agreeable to ICD system extraction  -MDT will do full device interrogation to evaluate if any prior tachy therapies received and %vpacing (baseline bifasicular block). Report of 25 beats of NSVT in hospitalist note, telemetry strips not available, pt not on tele monitor at this time. Current EF 40-45% and baseline severe dementia. Will require further discussion for decision of re implantation as risk may outweigh benefit.   -Continued plan per medical, ID and primary cardiology team    will d/w Dr Solares

## 2018-11-13 NOTE — PROGRESS NOTE ADULT - SUBJECTIVE AND OBJECTIVE BOX
[facility]  INFECTIOUS DISEASES AND INTERNAL MEDICINE at [place]  =======================================================  Frankie Garcia MD  Diplomates American Board of Internal Medicine and Infectious Diseases  =======================================================    [id number]  AVNI GREEN     Follow up for bacteremia with MRSA, source is skin ulcer in his right mid-back. Afebrile. No compliant. ASHELY done with thrombus around lead.   Followed by cardiology, EP will see the patient.     PAST MEDICAL & SURGICAL HISTORY:  CVA (cerebral vascular accident)  Diabetic neuropathy  DM (diabetes mellitus)  Hyperlipidemia  AICD (automatic cardioverter/defibrillator) present  Pacemaker  Stented coronary artery  HTN (hypertension)  Cardiac pacemaker    Social Hx: As per him no smoking, ETOH or drugs.     FAMILY HISTORY:  No pertinent family history in first degree relatives    Allergies  No Known Allergies    Antibiotics:  vancomycin      REVIEW OF SYSTEMS:  Afebrile no complaint.     Physical Exam:  Vital Signs Last 24 Hrs  T(C): 36.3 (13 Nov 2018 04:53), Max: 36.6 (13 Nov 2018 00:37)  T(F): 97.3 (13 Nov 2018 04:53), Max: 97.9 (13 Nov 2018 00:37)  HR: 63 (13 Nov 2018 04:53) (54 - 66)  BP: 142/61 (13 Nov 2018 04:53) (135/74 - 168/74)  BP(mean): --  RR: 18 (13 Nov 2018 00:37) (18 - 18)  SpO2: 96% (13 Nov 2018 00:37) (96% - 96%)  GEN: NAD  HEENT: normocephalic and atraumatic. EOMI. PERRL.    NECK: Supple.  No lymphadenopathy   LUNGS: Clear to auscultation.  HEART: Regular rate and rhythm left upper chest Pacemaker, nontender, no erythema or swelling   ABDOMEN: Soft, nontender, and nondistended.  Positive bowel sounds.    : No CVA tenderness  EXTREMITIES: Without any cyanosis, clubbing, rash, lesions or edema.  NEUROLOGIC: grossly intact.  PSYCHIATRIC: Appropriate affect .  SKIN: scratches and superficial wounds in legs, healing wound in his Right mid back, with erythema, minimal discharge     Labs:  11-13    150<H>  |  116<H>  |  48.0<H>  ----------------------------<  279<H>  3.9   |  22.0  |  2.03<H>    Ca    8.5<L>      13 Nov 2018 07:32  Phos  3.3     11-12  Mg     2.3     11-12    TPro  5.9<L>  /  Alb  3.0<L>  /  TBili  0.4  /  DBili  x   /  AST  18  /  ALT  15  /  AlkPhos  87  11-13                        11.8   8.3   )-----------( 339      ( 13 Nov 2018 07:32 )             37.8     LIVER FUNCTIONS - ( 13 Nov 2018 07:32 )  Alb: 3.0 g/dL / Pro: 5.9 g/dL / ALK PHOS: 87 U/L / ALT: 15 U/L / AST: 18 U/L / GGT: x           RECENT CULTURES:  11-10 @ 13:42 .Blood Blood     No growth at 48 hours    11-07 @ 11:41 Skin wound Methicillin resistant Staphylococcus aureus    Few Methicillin resistant Staphylococcus aureus (KNOWN)  Numerous Corynebacterium species    11-06 @ 22:42 .Blood Blood Methicillin resistant Staphylococcus aureus    Growth in aerobic and anaerobic bottles: Methicillin resistant  Staphylococcus aureus (KNOWN)  Aerobic Bottle: 14:58 Hours to positivity  Anaerobic Bottle: 15:48 Hours to positivity    11-05 @ 17:11   RVP:NotDetec    11-05 @ 16:14 .Urine Clean Catch (Midstream)     No growth    11-05 @ 10:06 .Blood Blood-Peripheral Methicillin resistant Staphylococcus aureus    Growth in aerobic bottle: Methicillin resistant Staphylococcus aureus  Aerobic Bottle: 9.47 Hours to positivity  Anaerobic Bottle: 10.47 Hours to positivity    11-05 @ 10:05 .Blood Blood-Peripheral Methicillin resistant Staphylococcus aureus  Blood Culture PCR    Growth in aerobic and anaerobic bottles: Methicillin resistant  Staphylococcus aureus  Aerobic Bottle: 09.07 Hours to positivity  Anaerobic Bottle: 12:17 Hours to positivity    All imaging and other data have been reviewed.    ASHELY:   Summary:   1. Left ventricular ejection fraction, by visual estimation, is 40 to   45%.   2. Technically fair study.   3. Mildly decreased global left ventricular systolic function.   4. The left ventricular diastolic function could not be assessed in this   study.   5. There is no evidence of pericardial effusion.   6. Mild mitral valve regurgitation.   7. Mild tricuspid regurgitation.   8. Trace pulmonic valve regurgitation.   9. There is no vegetation on any of the cardiac valve. Thrombus coated   pacing leads are seen.  10. Clinical correlation is advised.  11. Color flow doppler and intravenous injection of agitated saline   demonstrates the presence of an intact intra atrial septum.  12. No left atrial appendage thrombus.

## 2018-11-13 NOTE — PROGRESS NOTE ADULT - SUBJECTIVE AND OBJECTIVE BOX
CC: poor historian due to dementia denied any complaints    HPI:  Patient unable to provide any history, all the information taken from the wife and ER notes. This is a 68years old male with PMH of HTN, DM, CVA and CAD brought in to the Er for the evaluation of generalized weakness and vomiting for the past few days. Reportedly patient was unable to ambulate by himself today, family called 911 and patient was brought in to the ER. No h/o fever, chills, chest pain, SOB, diarrhea, constipation or urinary complaints. Patient was recently admitted to [facility] with TIA. He was seen by cardio and neurology and discharged home on 9/10.    Interval Events:  No acute events overnight. Patient without complaint. Patient attempted bedside swallow this morning with primary care providers and failed (immediately started coughing on swallow attempt; hold liquid in his mouth for some time). Patient states that at home he was having same trouble with thin liquids.     Called the spouse this morning to discuss with her the current plan for cardiac evaluation for possible PM removal.    ICU Vital Signs Last 24 Hrs  T(C): 36.3 (13 Nov 2018 04:53), Max: 36.6 (13 Nov 2018 00:37)  T(F): 97.3 (13 Nov 2018 04:53), Max: 97.9 (13 Nov 2018 00:37)  HR: 63 (13 Nov 2018 04:53) (54 - 66)  BP: 142/61 (13 Nov 2018 04:53) (135/74 - 168/74)  BP(mean): --  ABP: --  ABP(mean): --  RR: 18 (13 Nov 2018 00:37) (18 - 18)  SpO2: 96% (13 Nov 2018 00:37) (96% - 96%)    PHYSICAL EXAM:  GENERAL: NAD; slow to respond; answers questions with one-worded answers  HEENT: +EOMI, anicteric, no [place]  NECK: Supple, No JVD   CHEST/LUNG: CTA bilaterally; Normal effort; patient wound on the back looks to be improved, less erythema than admission.  HEART: S1S2 Normal intensity, no murmurs, gallops or rubs noted  ABDOMEN: Soft, BS Normoactive, NT, ND, no HSM noted  EXTREMITIES:  1+ radial and DP pulses noted, no clubbing, cyanosis, or edema noted; limited ROM  SKIN: No rashes or lesions noted; + multiple tattoos throughout; scattered skin abrasions from fall at home that have scabbing in different stages.  NEURO: A&Ox1 - self, no focal deficits noted, CN II-XII intact  PSYCH: flat affect; insight/judgement poor    LABS:                        11.8   8.3   )-----------( 339      ( 13 Nov 2018 07:32 )             37.8     11-13    150<H>  |  116<H>  |  48.0<H>  ----------------------------<  279<H>  3.9   |  22.0  |  2.03<H>    Ca    8.5<L>      13 Nov 2018 07:32  Phos  3.3     11-12  Mg     2.3     11-12    TPro  5.9<L>  /  Alb  3.0<L>  /  TBili  0.4  /  DBili  x   /  AST  18  /  ALT  15  /  AlkPhos  87  11-13    RADIOLOGY & ADDITIONAL TESTS:    MEDICATIONS:  MEDICATIONS  (STANDING):  aspirin  chewable 81 milliGRAM(s) Oral daily  atorvastatin 20 milliGRAM(s) Oral at bedtime  clopidogrel Tablet 75 milliGRAM(s) Oral daily  DAPTOmycin IVPB 550 milliGRAM(s) IV Intermittent every 24 hours  dextrose 5%. 1000 milliLiter(s) (50 mL/Hr) IV Continuous <Continuous>  dextrose 50% Injectable 12.5 Gram(s) IV Push once  dextrose 50% Injectable 25 Gram(s) IV Push once  dextrose 50% Injectable 25 Gram(s) IV Push once  docusate sodium 100 milliGRAM(s) Oral three times a day  heparin  Injectable 5000 Unit(s) SubCutaneous every 12 hours  hydrALAZINE 50 milliGRAM(s) Oral every 8 hours  insulin glargine Injectable (LANTUS) 26 Unit(s) SubCutaneous at bedtime  insulin lispro (HumaLOG) corrective regimen sliding scale   SubCutaneous three times a day before meals  metoprolol tartrate 50 milliGRAM(s) Oral two times a day  saccharomyces boulardii 250 milliGRAM(s) Oral two times a day  sertraline 100 milliGRAM(s) Oral daily    MEDICATIONS  (PRN):  dextrose 40% Gel 15 Gram(s) Oral once PRN Blood Glucose LESS THAN 70 milliGRAM(s)/deciliter  glucagon  Injectable 1 milliGRAM(s) IntraMuscular once PRN Glucose LESS THAN 70 milligrams/deciliter  polyethylene glycol 3350 17 Gram(s) Oral daily PRN Constipation

## 2018-11-13 NOTE — ADVANCED PRACTICE NURSE CONSULT - ASSESSMENT
A&Ox1 (person) with confusion and disorientation, chairfast, incontinent of urine and stool. Skin warm, dry with fair skin turgor, scattered areas of hyperpigmentation and hypopigmentation, blanchable erythema on bilateral heels.    Patient noted to have a wound (etiology unknown) to his right flank which measures 6.5 x 1.5 x 0.1 cm, the wound bed is pink with scant serous drainage.  No warmth, redness or induration noted to periwound.

## 2018-11-13 NOTE — CHART NOTE - NSCHARTNOTEFT_GEN_A_CORE
Source: Patient [ x]  Family [ ]   other [x ]    Current Diet: Puree no liquids  Pt aphasic. Pt nodding head yes to recent weight loss. MD spoke with family   about weight loss over past year.     Patient reports [ ] nausea  [ ] vomiting [ ] diarrhea [ ] constipation  [ ]chewing problems [x ] swallowing issues  [ ] other:     PO intake:  < 50% [ ]   50-75%  [ ]   %  [x ]  other : ~50%    Source for PO intake [ ] Patient [ ] family [x ] chart [ ] staff [ ] other    Enteral /Parenteral Nutrition:     Current Weight: no new weight in EMR. Pt sitting in chair at this time.     % Weight Change     Pertinent Medications: MEDICATIONS  (STANDING):  aspirin  chewable 81 milliGRAM(s) Oral daily  atorvastatin 20 milliGRAM(s) Oral at bedtime  clopidogrel Tablet 75 milliGRAM(s) Oral daily  DAPTOmycin IVPB 550 milliGRAM(s) IV Intermittent every 24 hours  dextrose 5%. 1000 milliLiter(s) (50 mL/Hr) IV Continuous <Continuous>  dextrose 50% Injectable 12.5 Gram(s) IV Push once  dextrose 50% Injectable 25 Gram(s) IV Push once  dextrose 50% Injectable 25 Gram(s) IV Push once  docusate sodium 100 milliGRAM(s) Oral three times a day  heparin  Injectable 5000 Unit(s) SubCutaneous every 12 hours  hydrALAZINE 50 milliGRAM(s) Oral every 8 hours  insulin glargine Injectable (LANTUS) 28 Unit(s) SubCutaneous at bedtime  insulin lispro (HumaLOG) corrective regimen sliding scale   SubCutaneous three times a day before meals  metoprolol tartrate 50 milliGRAM(s) Oral two times a day  saccharomyces boulardii 250 milliGRAM(s) Oral two times a day  sertraline 100 milliGRAM(s) Oral daily  sodium chloride 0.45%. 1000 milliLiter(s) (75 mL/Hr) IV Continuous <Continuous>    MEDICATIONS  (PRN):  dextrose 40% Gel 15 Gram(s) Oral once PRN Blood Glucose LESS THAN 70 milliGRAM(s)/deciliter  glucagon  Injectable 1 milliGRAM(s) IntraMuscular once PRN Glucose LESS THAN 70 milligrams/deciliter  polyethylene glycol 3350 17 Gram(s) Oral daily PRN Constipation    Pertinent Labs: CBC Full  -  ( 13 Nov 2018 07:32 )  WBC Count : 8.3 K/uL  Hemoglobin : 11.8 g/dL  Hematocrit : 37.8 %  Platelet Count - Automated : 339 K/uL  Mean Cell Volume : 90.4 fl  Mean Cell Hemoglobin : 28.2 pg  Mean Cell Hemoglobin Concentration : 31.2 g/dL  Auto Neutrophil # : x  Auto Lymphocyte # : x  Auto Monocyte # : x  Auto Eosinophil # : x  Auto Basophil # : x  Auto Neutrophil % : x  Auto Lymphocyte % : x  Auto Monocyte % : x  Auto Eosinophil % : x  Auto Basophil % : x    11-13 Na150 mmol/L<H> Glu 279 mg/dL<H> K+ 3.9 mmol/L Cr  2.03 mg/dL<H> BUN 48.0 mg/dL<H> Phos n/a   Alb 3.0 g/dL<L> PAB n/a             Skin:     Nutrition focused physical exam conducted - found signs of malnutrition [ ]absent [x ]present    Subcutaneous fat loss: [x ] Orbital fat pads region, [ ]Buccal fat region, [ ]Triceps region,  [ ]Ribs region    Muscle wasting: [x ]Temples region, [ ]Clavicle region, [ ]Shoulder region, [ ]Scapula region, [ ]Interosseous region,  [ ]thigh region, [ ]Calf region    Estimated Needs:   [x ] no change since previous assessment  [ ] recalculated:     Current Nutrition Diagnosis: Pt with severe chronic malnutrition related to inadequate energy intake in setting of CVA, sepsis, difficulty swallowing as evidenced by 20-30# weight loss over past year, <75% intake >1 mo, need for dysphagia diet, moderate amount of fat/muscle depletion in orbitals, temples.  Pt now on puree no liquids taking 50% of meals.     Recommendations: Ensure Pudding TID    Monitoring and Evaluation:   [x ] PO intake [x ] Tolerance to diet prescription [X] Weights  [X] Follow up per protocol [X] Labs:

## 2018-11-13 NOTE — ADVANCED PRACTICE NURSE CONSULT - REASON FOR CONSULT
Patient seen on skin care rounds after wound care referral received for assessment of skin impairment and recommendations of topical management. Chart reviewed:  BMI (24.3), Endy (13), Serum albumin (3.0 g/dL) and Total Protein (5.9 g/dL).  Family (spouse and son) report that the patient has had this wound (etiology unknown) for several months and was being treated by his primary physician.  Patient admitted with HTN, DM2, CVA and CAD s/p CABG and PM, was admitted with high fever and generalized weakness and vomiting over several days

## 2018-11-14 DIAGNOSIS — I73.9 PERIPHERAL VASCULAR DISEASE, UNSPECIFIED: ICD-10-CM

## 2018-11-14 DIAGNOSIS — I63.9 CEREBRAL INFARCTION, UNSPECIFIED: ICD-10-CM

## 2018-11-14 DIAGNOSIS — R13.10 DYSPHAGIA, UNSPECIFIED: ICD-10-CM

## 2018-11-14 DIAGNOSIS — Z51.5 ENCOUNTER FOR PALLIATIVE CARE: ICD-10-CM

## 2018-11-14 LAB
BLD GP AB SCN SERPL QL: SIGNIFICANT CHANGE UP
GLUCOSE BLDC GLUCOMTR-MCNC: 172 MG/DL — HIGH (ref 70–99)
GLUCOSE BLDC GLUCOMTR-MCNC: 176 MG/DL — HIGH (ref 70–99)
GLUCOSE BLDC GLUCOMTR-MCNC: 178 MG/DL — HIGH (ref 70–99)
GLUCOSE BLDC GLUCOMTR-MCNC: 213 MG/DL — HIGH (ref 70–99)
GLUCOSE BLDC GLUCOMTR-MCNC: 252 MG/DL — HIGH (ref 70–99)
TYPE + AB SCN PNL BLD: SIGNIFICANT CHANGE UP

## 2018-11-14 PROCEDURE — 99233 SBSQ HOSP IP/OBS HIGH 50: CPT

## 2018-11-14 PROCEDURE — 99232 SBSQ HOSP IP/OBS MODERATE 35: CPT

## 2018-11-14 PROCEDURE — 99223 1ST HOSP IP/OBS HIGH 75: CPT

## 2018-11-14 RX ORDER — SODIUM CHLORIDE 9 MG/ML
1000 INJECTION, SOLUTION INTRAVENOUS
Qty: 0 | Refills: 0 | Status: DISCONTINUED | OUTPATIENT
Start: 2018-11-14 | End: 2018-11-15

## 2018-11-14 RX ADMIN — Medication 100 MILLIGRAM(S): at 22:43

## 2018-11-14 RX ADMIN — ATORVASTATIN CALCIUM 20 MILLIGRAM(S): 80 TABLET, FILM COATED ORAL at 22:42

## 2018-11-14 RX ADMIN — SODIUM CHLORIDE 50 MILLILITER(S): 9 INJECTION, SOLUTION INTRAVENOUS at 12:33

## 2018-11-14 RX ADMIN — INSULIN GLARGINE 28 UNIT(S): 100 INJECTION, SOLUTION SUBCUTANEOUS at 22:37

## 2018-11-14 RX ADMIN — SERTRALINE 100 MILLIGRAM(S): 25 TABLET, FILM COATED ORAL at 12:34

## 2018-11-14 RX ADMIN — Medication 50 MILLIGRAM(S): at 22:42

## 2018-11-14 RX ADMIN — Medication 2: at 12:26

## 2018-11-14 RX ADMIN — Medication 250 MILLIGRAM(S): at 18:03

## 2018-11-14 RX ADMIN — Medication 81 MILLIGRAM(S): at 12:34

## 2018-11-14 RX ADMIN — INSULIN GLARGINE 28 UNIT(S): 100 INJECTION, SOLUTION SUBCUTANEOUS at 00:04

## 2018-11-14 RX ADMIN — Medication 50 MILLIGRAM(S): at 18:03

## 2018-11-14 RX ADMIN — DAPTOMYCIN 122 MILLIGRAM(S): 500 INJECTION, POWDER, LYOPHILIZED, FOR SOLUTION INTRAVENOUS at 18:02

## 2018-11-14 RX ADMIN — CLOPIDOGREL BISULFATE 75 MILLIGRAM(S): 75 TABLET, FILM COATED ORAL at 12:34

## 2018-11-14 RX ADMIN — HEPARIN SODIUM 5000 UNIT(S): 5000 INJECTION INTRAVENOUS; SUBCUTANEOUS at 18:02

## 2018-11-14 RX ADMIN — Medication 3: at 18:02

## 2018-11-14 NOTE — CONSULT NOTE ADULT - ASSESSMENT
68yr man, appears frail,   multiple medical issues  and hospitalizaitons,  hx CVA and CAD s/p CABG, CKD, DM and PM admitted with MRSA bacteremia, ASHELY negative for vegetations, + thrombus on PM leads. Family electing no surgery for removal of PM.

## 2018-11-14 NOTE — PROGRESS NOTE ADULT - SUBJECTIVE AND OBJECTIVE BOX
[facility]  INFECTIOUS DISEASES AND INTERNAL MEDICINE at [place]  =======================================================  Frankie Garcia MD  Diplomates American Board of Internal Medicine and Infectious Diseases  =======================================================    [id number]  AVNI GREEN     Follow up for bacteremia with MRSA, source is skin ulcer in his right mid-back. Afebrile. No compliant. ASHELY done with thrombus around lead.   Family doesn't want any surgical intervention and PPM/lead removal.     PAST MEDICAL & SURGICAL HISTORY:  CVA (cerebral vascular accident)  Diabetic neuropathy  DM (diabetes mellitus)  Hyperlipidemia  AICD (automatic cardioverter/defibrillator) present  Pacemaker  Stented coronary artery  HTN (hypertension)  Cardiac pacemaker    Social Hx: As per him no smoking, ETOH or drugs.     FAMILY HISTORY:  No pertinent family history in first degree relatives    Allergies  No Known Allergies    Antibiotics:  vancomycin      REVIEW OF SYSTEMS:  Afebrile no complaint.     Physical Exam:  Vital Signs Last 24 Hrs  T(C): 36.4 (14 Nov 2018 04:40), Max: 36.8 (13 Nov 2018 22:00)  T(F): 97.6 (14 Nov 2018 04:40), Max: 98.2 (13 Nov 2018 22:00)  HR: 73 (14 Nov 2018 04:40) (57 - 73)  BP: 131/69 (14 Nov 2018 04:40) (131/69 - 166/79)  BP(mean): --  RR: 18 (13 Nov 2018 22:00) (18 - 18)  SpO2: 96% (13 Nov 2018 22:00) (96% - 96%)  GEN: NAD  HEENT: normocephalic and atraumatic. EOMI. PERRL.    NECK: Supple.  No lymphadenopathy   LUNGS: Clear to auscultation.  HEART: Regular rate and rhythm left upper chest Pacemaker, nontender, no erythema or swelling   ABDOMEN: Soft, nontender, and nondistended.  Positive bowel sounds.    : No CVA tenderness  EXTREMITIES: Right toe with an ulcer, no cellulitis around it.   NEUROLOGIC: grossly intact.  PSYCHIATRIC: Appropriate affect .  SKIN: scratches and superficial wounds in toes/legs, healing wound in his Right mid back, with erythema, minimal discharge     Labs:  11-13    150<H>  |  116<H>  |  48.0<H>  ----------------------------<  279<H>  3.9   |  22.0  |  2.03<H>    Ca    8.5<L>      13 Nov 2018 07:32    TPro  5.9<L>  /  Alb  3.0<L>  /  TBili  0.4  /  DBili  x   /  AST  18  /  ALT  15  /  AlkPhos  87  11-13                        11.8   8.3   )-----------( 339      ( 13 Nov 2018 07:32 )             37.8     LIVER FUNCTIONS - ( 13 Nov 2018 07:32 )  Alb: 3.0 g/dL / Pro: 5.9 g/dL / ALK PHOS: 87 U/L / ALT: 15 U/L / AST: 18 U/L / GGT: x           RECENT CULTURES:  11-12 @ 09:02 .Blood Blood     No growth at 48 hours    11-10 @ 13:42 .Blood Blood     No growth at 48 hours    11-07 @ 11:41 Skin wound Methicillin resistant Staphylococcus aureus    Few Methicillin resistant Staphylococcus aureus (KNOWN)  Numerous Corynebacterium species    11-06 @ 22:42 .Blood Blood Methicillin resistant Staphylococcus aureus    Growth in aerobic and anaerobic bottles: Methicillin resistant  Staphylococcus aureus (KNOWN)  Aerobic Bottle: 14:58 Hours to positivity  Anaerobic Bottle: 15:48 Hours to positivity    11-05 @ 17:11        NotDetec  11-05 @ 16:14 .Urine Clean Catch (Midstream)     No growth    11-05 @ 10:06 .Blood Blood-Peripheral Methicillin resistant Staphylococcus aureus    Growth in aerobic bottle: Methicillin resistant Staphylococcus aureus  Aerobic Bottle: 9.47 Hours to positivity  Anaerobic Bottle: 10.47 Hours to positivity    11-05 @ 10:05 .Blood Blood-Peripheral Methicillin resistant Staphylococcus aureus  Blood Culture PCR    Growth in aerobic and anaerobic bottles: Methicillin resistant  Staphylococcus aureus  Aerobic Bottle: 09.07 Hours to positivity  Anaerobic Bottle: 12:17 Hours to positivity    All imaging and other data have been reviewed.    ASHELY:   Summary:   1. Left ventricular ejection fraction, by visual estimation, is 40 to   45%.   2. Technically fair study.   3. Mildly decreased global left ventricular systolic function.   4. The left ventricular diastolic function could not be assessed in this   study.   5. There is no evidence of pericardial effusion.   6. Mild mitral valve regurgitation.   7. Mild tricuspid regurgitation.   8. Trace pulmonic valve regurgitation.   9. There is no vegetation on any of the cardiac valve. Thrombus coated   pacing leads are seen.  10. Clinical correlation is advised.  11. Color flow doppler and intravenous injection of agitated saline   demonstrates the presence of an intact intra atrial septum.  12. No left atrial appendage thrombus.

## 2018-11-14 NOTE — PROGRESS NOTE ADULT - SUBJECTIVE AND OBJECTIVE BOX
INTERVAL HPI:  Patient was seen and examined by PA and PA student. Case discussed with attending.  Laying comfortably asleep in bed in no acute distress.  Patient awoke during exam, but was not interactive.   No overnight events as per nurse.    REVIEW OF SYSTEMS:    Unable to obtain because patient was not interactive, which is his baseline.    ICU Vital Signs Last 24 Hrs  T(C): 36.4 (14 Nov 2018 04:40), Max: 36.8 (13 Nov 2018 22:00)  T(F): 97.6 (14 Nov 2018 04:40), Max: 98.2 (13 Nov 2018 22:00)  HR: 73 (14 Nov 2018 04:40) (57 - 73)  BP: 131/69 (14 Nov 2018 04:40) (131/69 - 166/79)  RR: 18 (13 Nov 2018 22:00) (18 - 18)  SpO2: 96% (13 Nov 2018 22:00) (96% - 96%)    PHYSICAL EXAM:  GENERAL: No acute distress.  NECK: Supple, No JVD, no cervical lymphadenopathy.   CHEST/LUNG: Clear to auscultation b/l. No rales, rhonchi or wheezing.   HEART: Regular rate and rhythm. Normal S1S2. No murmurs, gallops, or rubs.   ABDOMEN: Soft, nontender, nondistended. Normal bowel sounds in all 4 quadrants.   EXTREMITIES:  1+ radial and DP pulses noted, no clubbing, cyanosis, or edema noted;  FROM x 4. Necrotic appearing tissue on distal tip of the great toe. Multiple scabbed areas to multiple digits of the foot and ankles bilaterally.  SKIN: No rashes or lesions noted; + multiple tattoos throughout; scattered skin abrasions from fall at home that have scabbing in different stages.  PSYCH: insight/judgement unable to assess.    LABS:  Type + Screen (11.14.18 @ 06:34)    Type + Screen: O POS INTERVAL HPI:  Patient was seen and examined by PA and PA student. Case discussed with attending.  Laying comfortably asleep in bed in no acute distress.  Patient awoke during exam, but was not interactive.   No overnight events as per nurse.    REVIEW OF SYSTEMS:    Unable to obtain because patient was not interactive, which is his baseline.    ICU Vital Signs Last 24 Hrs  T(C): 36.4 (14 Nov 2018 04:40), Max: 36.8 (13 Nov 2018 22:00)  T(F): 97.6 (14 Nov 2018 04:40), Max: 98.2 (13 Nov 2018 22:00)  HR: 73 (14 Nov 2018 04:40) (57 - 73)  BP: 131/69 (14 Nov 2018 04:40) (131/69 - 166/79)  RR: 18 (13 Nov 2018 22:00) (18 - 18)  SpO2: 96% (13 Nov 2018 22:00) (96% - 96%)    PHYSICAL EXAM:  GENERAL: No acute distress.  NECK: Supple, No JVD, no cervical lymphadenopathy.   CHEST/LUNG: Clear to auscultation b/l. No rales, rhonchi or wheezing.   HEART: Regular rate and rhythm. Normal S1S2. No murmurs, gallops, or rubs.   ABDOMEN: Soft, nontender, nondistended. Normal bowel sounds in all 4 quadrants.   EXTREMITIES:  1+ radial and DP pulses noted, no clubbing, cyanosis, or edema noted;  FROM x 4. Shiny atrophic appearance on shins b/l with no hair. Necrotic appearing tissue on distal tip of the great toe, purulent discharge upon deep palpation . Multiple scabbed areas to multiple digits of the foot and ankles bilaterally.   SKIN: No rashes or lesions noted; + multiple tattoos throughout; scattered skin abrasions from fall at home that have scabbing in different stages.  PSYCH: insight/judgement unable to assess.    LABS:  Type + Screen (11.14.18 @ 06:34)    Type + Screen: O POS CC: MRSA bacteremia    INTERVAL HPI:  Patient was seen and examined by PA and PA student. Case discussed with attending.  Laying comfortably asleep in bed in no acute distress.  Patient awoke during exam, but was not interactive.   No overnight events as per nurse.    REVIEW OF SYSTEMS:    Unable to obtain because patient was not interactive, which is his baseline.    ICU Vital Signs Last 24 Hrs  T(C): 36.4 (14 Nov 2018 04:40), Max: 36.8 (13 Nov 2018 22:00)  T(F): 97.6 (14 Nov 2018 04:40), Max: 98.2 (13 Nov 2018 22:00)  HR: 73 (14 Nov 2018 04:40) (57 - 73)  BP: 131/69 (14 Nov 2018 04:40) (131/69 - 166/79)  RR: 18 (13 Nov 2018 22:00) (18 - 18)  SpO2: 96% (13 Nov 2018 22:00) (96% - 96%)    PHYSICAL EXAM:  GENERAL: No acute distress.  NECK: Supple, No JVD, no cervical lymphadenopathy.   CHEST/LUNG: Clear to auscultation b/l. No rales, rhonchi or wheezing.   HEART: Regular rate and rhythm. Normal S1S2. No murmurs, gallops, or rubs.   ABDOMEN: Soft, nontender, nondistended. Normal bowel sounds in all 4 quadrants.   EXTREMITIES:  1+ radial and DP pulses noted, no clubbing, cyanosis, or edema noted;  FROM x 4. Shiny atrophic appearance on shins b/l with no hair. Necrotic appearing tissue on distal tip of the great toe, purulent discharge upon deep palpation . Multiple scabbed areas to multiple digits of the foot and ankles bilaterally.   SKIN: No rashes or lesions noted; + multiple tattoos throughout; scattered skin abrasions from fall at home that have scabbing in different stages.  PSYCH: insight/judgement unable to assess.    LABS:  Type + Screen (11.14.18 @ 06:34)    Type + Screen: O POS

## 2018-11-14 NOTE — PROGRESS NOTE ADULT - ATTENDING COMMENTS
Attending Attestation:  I personally saw and evaluated the patient and agree with the above assessment and plan  I added problem regarding necrotic first right toe  patient to be evaluated by podiatry; their pre-malhotra note indicates he may not be a candidate for surgery  discussed for a second time with the wife the overall decline of the patient over past year secondary to strokes as well as underlying depression that may be due to these comorbid conditions and poor quality of life. I informed her that palliative consult at this time is appropriate. She agrees with speaking to palliative team and will be in hospital at 1PM tomorrow. She is agreeable to receiving phone calls from them.

## 2018-11-14 NOTE — CONSULT NOTE ADULT - SUBJECTIVE AND OBJECTIVE BOX
This is a 68years old male with PMH of HTN, DM, CVA and CAD seen bedside for left foot ulcerations. Patient unable to provide any history.    PMH: CVA (cerebral vascular accident)  Diabetic neuropathy  DM (diabetes mellitus)  Hyperlipidemia  AICD (automatic cardioverter/defibrillator) present  Pacemaker  Stented coronary artery  HTN (hypertension)    PSH: Cardiac pacemaker    Allergies: No Known Allergies    Labs:                          11.8   8.3   )-----------( 339      ( 13 Nov 2018 07:32 )             37.8     WBC Trend  8.3 Date (11-13 @ 07:32)  5.7 Date (11-12 @ 08:57)  7.1 Date (11-10 @ 07:28)  8.6 Date (11-09 @ 06:35)  7.9 Date (11-08 @ 07:22)  9.7 Date (11-07 @ 11:46)  17.8<H> Date (11-05 @ 10:04)      Chem  11-13    150<H>  |  116<H>  |  48.0<H>  ----------------------------<  279<H>  3.9   |  22.0  |  2.03<H>    Ca    8.5<L>      13 Nov 2018 07:32    TPro  5.9<L>  /  Alb  3.0<L>  /  TBili  0.4  /  DBili  x   /  AST  18  /  ALT  15  /  AlkPhos  87  11-13    T(F): 97.6 (11-14-18 @ 04:40), Max: 98.2 (11-13-18 @ 22:00)  HR: 73 (11-14-18 @ 04:40) (57 - 73)  BP: 131/69 (11-14-18 @ 04:40) (131/69 - 166/79)  RR: 18 (11-13-18 @ 22:00) (18 - 18)  SpO2: 96% (11-13-18 @ 22:00) (96% - 96%)    O:   General: Pleasant  male NAD & AOX3.    Integument:  Skin warm, dry and supple bilateral.    Dry stable gangrenous lesions noted to the left foot and ankle, hallux, 2nd and 3rd digit and lateral malleolus with rubor noted to digits 1-5. No purulence noted for the hallux ulceration today. To the 2nd and 3rd digits the dry gangrenous lesions appear superficial.  Vascular: Dorsalis Pedis and Posterior Tibial pulses 1/4.  Capillary re-fill time less then 3 seconds digits 1-5 bilateral.    Neuro: Protective sensation diminished to the level of the digits bilateral.  MSK: Muscle strength 5/5 all major muscle groups bilateral.    A: Left foot ulceration    P:   Chart reviewed and Patient evaluated  Discussed diagnosis and treatment with patient    Lesions appear dry and stable  Awaiting demarcation of left hallux gangrene.  Patient to be offloaded at all times in bed to prevent pressure ulcerations. Please apply green boots.   No dressing required to lesions. Goal is to keep the feet dry to prevent wet gangrene.  As patient is pending palliative consult to be kept comfortable it is unlikely he is a surgical candidate for hallux amputation  If family wishes to proceed with surgical treatment then baseline XRs would be needed.   Will follow at request for comfort care measures.    Continue with IV antibiotics As Per ID  Ordered CANDI  Weight bearing as tolerated.    Discussed with Attending Dr. Guerrero

## 2018-11-14 NOTE — CONSULT NOTE ADULT - PROBLEM SELECTOR RECOMMENDATION 5
Patient with now with negative blood cultures- ID recommending 6wks IV Daptomycin, then MIKE?  Patient frail with multiple medical issues and hospitalizations.  Will need to see if patient can advance to liquid consistency. Plan for further GOC discussion when patient more awake and can participate in discussion

## 2018-11-14 NOTE — CONSULT NOTE ADULT - SUBJECTIVE AND OBJECTIVE BOX
Palliative Medicine Initial Consultation Note    HPI:  This is a 68years old male with PMH of HTN, DM, CVA and CAD brought in to the Er for the evaluation of generalized weakness and vomiting for the past few days.  No h/o fever, chills, chest pain, SOB, diarrhea, constipation or urinary complaints. Patient was recently admitted to [facility] with TIA. he was seen by cardio and neurology and discharged home on 9/10.  Hospital course:  Patient found to have MRSA bacteremia ASHELY neg.  Family do not want PM removed understanding this is possible source of infection.  Repeat blood cultures now neg.     PERTINENT PMH REVIEWED:  [ x] YES [ ] NO         AICD (automatic cardioverter/defibrillator) present    CVA (cerebral vascular accident)    Diabetic neuropathy    DM (diabetes mellitus)    HTN (hypertension)    Hyperlipidemia    Pacemaker    Stented coronary artery.     Past Surgical History:  Cardiac pacemaker.      SOCIAL HISTORY:  Unable to obtain- 2/2 MS                                     Admitted from: [x home [ ] SNF _________ [ ] [facility] ________    Surrogate/HCP/Guardian: NO HCP in chart- daughter and son surrogates    FAMILY HISTORY:  Unable to obtain 2/2 MS        Baseline ADLs (prior to admission): Unable to obtain 2/2 MS  Independent [ ] moderately [ ] fully   Dependent   [ ] moderately [ ]fully    MEDICATIONS  (STANDING):  aspirin  chewable 81 milliGRAM(s) Oral daily  atorvastatin 20 milliGRAM(s) Oral at bedtime  clopidogrel Tablet 75 milliGRAM(s) Oral daily  DAPTOmycin IVPB 550 milliGRAM(s) IV Intermittent every 24 hours  dextrose 5% 1000 milliLiter(s) (50 mL/Hr) IV Continuous <Continuous>  dextrose 5%. 1000 milliLiter(s) (50 mL/Hr) IV Continuous <Continuous>  dextrose 50% Injectable 12.5 Gram(s) IV Push once  dextrose 50% Injectable 25 Gram(s) IV Push once  dextrose 50% Injectable 25 Gram(s) IV Push once  docusate sodium 100 milliGRAM(s) Oral three times a day  heparin  Injectable 5000 Unit(s) SubCutaneous every 12 hours  hydrALAZINE 50 milliGRAM(s) Oral every 8 hours  insulin glargine Injectable (LANTUS) 28 Unit(s) SubCutaneous at bedtime  insulin lispro (HumaLOG) corrective regimen sliding scale   SubCutaneous three times a day before meals  metoprolol tartrate 50 milliGRAM(s) Oral two times a day  saccharomyces boulardii 250 milliGRAM(s) Oral two times a day  sertraline 100 milliGRAM(s) Oral daily    MEDICATIONS  (PRN):  dextrose 40% Gel 15 Gram(s) Oral once PRN Blood Glucose LESS THAN 70 milliGRAM(s)/deciliter  glucagon  Injectable 1 milliGRAM(s) IntraMuscular once PRN Glucose LESS THAN 70 milligrams/deciliter  polyethylene glycol 3350 17 Gram(s) Oral daily PRN Constipation      Allergies    No Known Allergies    Intolerances        REVIEW OF SYSTEMS       [ x] Unable to obtain due to poor mentation           Karnofsky Performance Score/Palliative Performance Status Version 2:   30     %    Vital Signs Last 24 Hrs  T(C): 36.4 (14 Nov 2018 04:40), Max: 36.8 (13 Nov 2018 22:00)  T(F): 97.6 (14 Nov 2018 04:40), Max: 98.2 (13 Nov 2018 22:00)  HR: 73 (14 Nov 2018 04:40) (57 - 73)  BP: 131/69 (14 Nov 2018 04:40) (131/69 - 166/79)  BP(mean): --  RR: 18 (13 Nov 2018 22:00) (18 - 18)  SpO2: 96% (13 Nov 2018 22:00) (96% - 96%)    PHYSICAL EXAM:    General: Elderly man, appears older than stated age, NAD -  sleeping , arousable    HEENT: [x ] normal  [ ] dry mouth  [ ] ET tube/trach    Lungs: [x ] comfortable [ ] tachypnea/labored breathing  [ ] excessive secretions    CV: [x normal  [ ] tachycardia    GI: soft NT ND  : [x] normal  [ ] incontinent  [ ] oliguria/anuria  [ ] schneider    MSK: [ ] normal  [x ] weakness  [ ] edema             [ ] ambulatory  [ ] bedbound/wheelchair bound    Skin: [ ] normal  [ ] pressure ulcers- Stage_____  [ xno rash  warm dry  LABS:                        11.8   8.3   )-----------( 339      ( 13 Nov 2018 07:32 )             37.8     11-13    150<H>  |  116<H>  |  48.0<H>  ----------------------------<  279<H>  3.9   |  22.0  |  2.03<H>    Ca    8.5<L>      13 Nov 2018 07:32    TPro  5.9<L>  /  Alb  3.0<L>  /  TBili  0.4  /  DBili  x   /  AST  18  /  ALT  15  /  AlkPhos  87  11-13        I&O's Summary    13 Nov 2018 07:01  -  14 Nov 2018 07:00  --------------------------------------------------------  IN: 1800 mL / OUT: 0 mL / NET: 1800 mL        RADIOLOGY & ADDITIONAL STUDIES:  < from: Xray Chest 1 View-PORTABLE IMMEDIATE (11.05.18 @ 11:52) >     EXAM:  XR CHEST PORTABLE IMMED 1V                          PROCEDURE DATE:  11/05/2018          INTERPRETATION:  History: Sepsis. Pacemaker    Technique:  AP portable    Comparisons:  Chest x-ray dated 9/6/2018    Findings:     No acute infiltrates, congestion or pleural effusions  . No   change in pacemaker wires in right atrium and ventricle.    The pulmonary   vasculature and aorta are normal for age. Heart size is unremarkable.     Status post median sternotomy for bypass surgery.    Impression: No acute pulmonary disease. Status post CABG and pacemaker      < end of copied text >    < from: CT Head No Cont (11.05.18 @ 15:42) >     EXAM:  CT BRAIN                          PROCEDURE DATE:  11/05/2018          INTERPRETATION:  CLINICAL HISTORY: Fall    COMPARISON: CT head dated 9/7/2018    TECHNIQUE: Noncontrast CT of the head. Multiplanar reformations are   submitted.    FINDINGS:     Old moderate left inferior cerebellar hemisphere infarct. Old right   cerebellar hemisphere infarcts. Old multiple bilateral basal ganglia and   corona radiata lacunar infarcts.    There is periventricular and subcortical white matter hypodensity without   mass effect, nonspecific, likely representing mild to moderate chronic   microvascular ischemic changes. There is no compelling evidence for an   acute transcortical infarction. There is no evidence of mass, mass   effect, midline shift or extra-axial fluid collection. The lateral   ventricles and cortical sulci are age-appropriate in size and   configuration.     Mild polypoid inflammatory mucosal changes are seen throughout the   various portions of the paranasal sinuses. The orbits and mastoid air   cells are unremarkable.  The calvarium is intact.    IMPRESSION: Limited study. Mild to moderate chronic microvascular changes   without evidence of an acute transcortical infarction or hemorrhage. MR   is a more sensitive imaging modality for the evaluation of an acute   infarction.         < end of copied text >      < from: ASHELY Echo Doppler (11.09.18 @ 12:54) >  Summary:   1. Left ventricular ejection fraction, by visual estimation, is 40 to   45%.   2. Technically fair study.   3. Mildly decreased global left ventricular systolic function.   4. The left ventricular diastolic function could not be assessed in this   study.   5. There is no evidence of pericardial effusion.   6. Mild mitral valve regurgitation.   7. Mild tricuspid regurgitation.   8. Trace pulmonic valve regurgitation.   9. There is no vegetation on any of the cardiac valve. Thrombus coated   pacing leads are seen.  10. Clinical correlation is advised.  11. Color flow doppler and intravenous injection of agitated saline   demonstrates the presence of an intact intra atrial septum.  12. No left atrial appendage thrombus.    [id number] Brayden Koch MD, Electronically signed on 11/9/2018 at 3:31:46 PM         < end of copied text >          ADVANCE DIRECTIVES:  [ ] YES [ x] NO   DNR [ ] YES [x ] NO  Completed on:                     MOLST  [ ] YES [ x]x NO   Completed on:  Living Will  [ ] YES [ xNO   Completed on:    Thank you for the opportunity to assist with the care of this patient.   [facility] Consult [facility] [phone].

## 2018-11-14 NOTE — CONSULT NOTE ADULT - PROBLEM SELECTOR RECOMMENDATION 4
On puree diet, no liquid  Will need to see if patient can advance to liquid  Eventual repeat swallow eval

## 2018-11-14 NOTE — PROGRESS NOTE ADULT - ASSESSMENT
67 y/o man with PMH of HTN, DM2, CVA and CAD s/p CABG and PM, was admitted with high fever, generalized weakness and vomiting for the past few days.. Admitted with  Sepsis 2/2 GPC bacteremia; source likely from skin. +Blood cultures with MRSA 8/8 bottles. Started on IV abx as per ID.  Seen by speech and swallow, recommended puree diet with no liquids. Cardio consulted for ASHELY - done 11/9 and neg for vegetation. Thrombus noted on PM leads. EP and Cardio are following the case. Scheduled for Pacemaker extraction in OR today, 11/14.     1. MRSA Bacteremia; ASHELY neg for vegetations however showed thrombus on the pace maker leads. Patient had persistent bacteremia despite adequate antibiotic therapy with vancomycin. Cardiology, ID and EP consulted, plan is OR today for pacemaker extraction.    - blood cultures from 11/6, now 8/8 growing MRSA  - repeat blood cultures sent 11/10 - negative for growth. .   - Wound cultures from knee + MRSA   - Continue to follow up daily WBC and fever curve - has remained afebrile and without leukocytosis.  - Continue Daptomycin 550mg daily as per ID for about 6 weeks after PPM.   - ID following.     2. Thrombus on pacemaker leads; unsure if these are infected;   - cardiology and CT surgery following.  - CT surgery planning PPM extraction in OR.   - Still unclear if thrombus on leads is infected. Will culture post PPM extraction.  - will need anticoagulation; possibly NOAC.     3. Nonsustained Vtach - resolved; tele showed sinus rhythm with PACs  - cardiology following.  - Metoprolol dose increased to 50mg BID on 11/9.  - cardiac monitor discontinued on 11/11    4. Hypernatremia/hyperchloremia: 2/2 IVF  - Patient unable to drink pure liquids as per speech. Must have honey-thick liquids as per the family.   - Continue IVF for hydration.   - labs ordered this AM, will adjust the plan accordingly based on results.   - speech to re-evaluate for thickened liquids.     5. Fecal impaction  - stool softeners and bowel regimen  - continue to monitor for BM.    6. Fall:  - may be related to deconditioning and sepsis  - out of bed with assistance to the chair.   - PT recommends MIKE. SW aware.    7. Dysphagia  - Evaluated by speech and swallow, recommends dysphagia diet:  puree NO Liquids; consider repeating swallow eval to see if liquids possible  - speech to re-evaluate for thickened liquids.     8. DM- uncontrolled  - diabetic diet and ISS  - lantus increased from 26 to 28 units tonight    9. CAD  - hx of CABG  - on statin, aspirin, and beta blocker    10. HTN  - not well controlled.  - consider adjusting BP meds.     11. Stroke history  - on aspirin and statin; no new focal symptomatology; head CT negative.    12. Prophylactic Measures  - heparin sq BID  - full code  - case discussed with wife.    DISPO: OR today 11/14 for PPM extraction with EP and CT surgery. 67 y/o man with PMH of HTN, DM2, CVA and CAD s/p CABG and PM, was admitted with high fever, generalized weakness and vomiting for the past few days.. Admitted with  Sepsis 2/2 GPC bacteremia; source likely from skin. +Blood cultures with MRSA 8/8 bottles. Started on IV abx as per ID.  Seen by speech and swallow, recommended puree diet with no liquids. Cardio consulted for ASHELY - done 11/9 and neg for vegetation. Thrombus noted on PM leads. EP and Cardio are following the case. Was scheduled for pacemaker extraction in OR today, 11/14, but family decided against it. Family wants to pursue palliative care for patient comfort.      1. MRSA Bacteremia; ASHELY neg for vegetations however showed thrombus on the pace maker leads. Patient had persistent bacteremia despite adequate antibiotic therapy with vancomycin. Cardiology, ID and EP consulted, plan is OR today for pacemaker extraction.    - blood cultures from 11/6, now 8/8 growing MRSA  - repeat blood cultures sent 11/10 - negative for growth. .   - Wound cultures from knee + MRSA.   - Continue to follow up daily WBC and fever curve - has remained afebrile and without leukocytosis.  - Continue Daptomycin 550mg daily as per ID for about 6 weeks.   - ID following.   - Family decided they wanted to pursue palliative care to make the patient comfortable.    2. Thrombus on pacemaker leads; unsure if these are infected;   - cardiology and CT surgery following.  - CT surgery was planning PPM extraction in OR. Family has decided against it.    - Still unclear if thrombus on leads is infected.  - Trial with Daptomycin for 6 weeks since no surgery is planned and blood cultures have returned negative.   - will need anticoagulation; possibly NOAC.     3. Nonsustained Vtach - resolved; tele showed sinus rhythm with PACs  - cardiology following.  - Metoprolol dose increased to 50mg BID on 11/9.  - cardiac monitor discontinued on 11/11    4. Hypernatremia/hyperchloremia: 2/2 IVF  - Patient unable to drink pure liquids as per speech. Must have honey-thick liquids as per the family.   - Changed IVF to dextrose, will monitor BG  - labs ordered this AM, will adjust the plan accordingly based on results.   - speech to re-evaluate for thickened liquids.     5. Peripheral Vascular Disease:  - Necrotic appearing distal left great toe.  - Left Lateral malleolus ulcer.  - Patient may not be a candidate for MRI because of pre-2011 AICD.  - will continue to monitor.   - Podiatry consult pending.     5. Fecal impaction  - stool softeners and bowel regimen  - continue to monitor for BM.    6. Fall:  - may be related to deconditioning and sepsis  - out of bed with assistance to the chair.   - PT recommends MIKE. SW aware.    7. Dysphagia  - Evaluated by speech and swallow, recommends dysphagia diet:  puree NO Liquids;   - speech to re-evaluate for thickened liquids.     8. DM- uncontrolled  - diabetic diet and ISS  - lantus increased from 26 to 28 units yesterday.  - Still running high.  - Continue to monitor glucose.     9. CAD  - hx of CABG  - on statin, aspirin, and beta blocker    10. HTN  - not well controlled.  - continue to monitor BP.   - consider adjusting BP meds.     11. Stroke history  - on aspirin and statin; no new focal symptomatology; head CT negative.    12. Prophylactic Measures  - heparin sq BID for DVT prophylaxis  - full code  - case discussed with wife.    DISPO: OR cancelled today 11/14 for PPM extraction with EP and CT surgery.  Palliative consult pending. ID wants to continue Daptomycin for 6 weeks. Will need PICC line placement. 69 y/o man with PMH of HTN, DM2, CVA and CAD s/p CABG and PM, was admitted with high fever, generalized weakness and vomiting for the past few days.. Admitted with  Sepsis 2/2 GPC bacteremia; source likely from skin. +Blood cultures with MRSA 8/8 bottles. Started on IV abx as per ID.  Seen by speech and swallow, recommended puree diet with no liquids. Cardio consulted for ASHELY - done 11/9 and neg for vegetation. Thrombus noted on PM leads. EP and Cardio are following the case. Was scheduled for pacemaker extraction in OR today, 11/14, but family decided against it. Family wants to pursue palliative care for patient comfort.      1. MRSA Bacteremia; ASHELY neg for vegetations however showed thrombus on the pace maker leads. Patient had persistent bacteremia despite adequate antibiotic therapy with vancomycin. Cardiology, ID and EP consulted regarding pace maker removal  - blood cultures from 11/6, now 8/8 growing MRSA  - repeat blood cultures sent 11/10 - negative for growth. .   - Wound cultures from knee + MRSA.   - Continue to follow up daily WBC and fever curve - has remained afebrile and without leukocytosis.  - Continue Daptomycin 550mg daily as per ID for about 6 weeks.   - ID following.   - family decided against surgery today after discussing with EP and Dr. Hanna  - defibrillator was deactivated today    2. Thrombus on pacemaker leads; unsure if these are infected;   - cardiology and CT surgery following.  - CT surgery was planning PPM extraction in OR. Family has decided against it.    - Still unclear if thrombus on leads is infected.  - Trial with Daptomycin for 6 weeks since no surgery is planned and blood cultures have returned negative.   - will need anticoagulation; possibly NOAC. Will discuss with cardiology.    3. Hypernatremia/hyperchloremia: 2/2 IVF  - Patient unable to drink pure liquids as per speech. Must have honey-thick liquids as per the family.   - Changed IVF to D5W will monitor BG  - labs ordered this AM, will adjust the plan accordingly based on results.   - speech trialed nectar thickened liquids which patient had trouble with; family says they use honey thickened liquids; will discuss with speech therapy again to retry    4. Necrotic appearing first toe of the left foot  - podiatry and vascular consulted  - history of PAD in both legs with balloon angioplasty in the past - not likely a surgical candidate  - ID to evaluate toe    5.  Nonsustained Vtach - resolved; tele showed sinus rhythm with PACs  - cardiology following.  - Metoprolol dose increased to 50mg BID on 11/9.  - cardiac monitor discontinued on 11/11    6. Peripheral Vascular Disease:  - Necrotic appearing distal left great toe.  - Left Lateral malleolus ulcer.  - Patient may not be a candidate for MRI because of pre-2011 AICD.  - will continue to monitor.   - Podiatry consult pending.     7.  Fecal impaction  - stool softeners and bowel regimen  - continue to monitor for BM.    8. Fall:  - may be related to deconditioning and sepsis  - out of bed with assistance to the chair.   - PT recommends MIKE. SW aware.    9. Dysphagia  - Evaluated by speech and swallow, recommends dysphagia diet:  puree NO Liquids;   - speech to re-evaluate for thickened liquids.     10. DM- uncontrolled  - diabetic diet and ISS  - lantus increased from 26 to 28 units yesterday.  - Still running high.  - Continue to monitor glucose.     11. CAD  - hx of CABG  - on statin, aspirin, and beta blocker    12 HTN  - not well controlled.  - continue to monitor BP.   - consider adjusting BP meds.     13 Stroke history  - on aspirin and statin; no new focal symptomatology; head CT negative.    14. Prophylactic Measures  - heparin sq BID for DVT prophylaxis  - full code  - case discussed with wife.    DISPO: OR cancelled today 11/14 for PPM extraction with EP and CT surgery.  Palliative consult pending. ID wants to continue Daptomycin for 6 weeks. Will need PICC line placement.

## 2018-11-14 NOTE — PROGRESS NOTE ADULT - ASSESSMENT
67y/o man with PMH of HTN, DM2, CVA and CAD s/p CABG and PM, was admitted today with high fever and generalized weakness and vomiting for the past few days. Blood cultures with MRSA, possible source is skin since growing the same MRSA.   ASHELY with thrombus around the lead but no vegetation on valves. Family doesn't want any intervention but agree with at least 6 weeks of IV Daptomycin.     High fever  MRSA bacteremia   Skin ulcers with MRSA  PPM lead thrombus     -Contact isolation   -MRSA in 4 sets of blood cultures from 11/5 and 11/6 (8 out of 8 bottles)  -Repeat blood culture negative on 11/10  -Follow up daily WBC and fever curve, both normal.   -ASHELY with thrombus around the lead, no intervention as his family wish  -Will put PICC line for at least 6 weeks of IV ABx  -Will D/C to rehab to complete the course.   -Continue Daptomycin 550mg daily  -Needs evaluation at the end of 6 weeks for possible suppressive ABx treatment since leads are not removed.   -Feet ulcers management as per podiatry and wound care.   Will follow.

## 2018-11-14 NOTE — CONSULT NOTE ADULT - ATTENDING COMMENTS
Patient was seen bedside with resident.  I reviewed the above assessment and documentation completed by the resident physician.  I verbally discussed the evaluation and treatment plan with resident.  The podiatry team will continue to follow patient while in house.
Above H& P reviewed and independently verified. 68 year old gentleman with possible AICD lead infection in setting of positive blood culture. He has multiple CVA in past and has significant dementia, and his wife makes the medical decisions for him. He is also S/P CABG, and the AICD was implanted in 2011. He is also very frail.    Given his frailty, medical comorbidities and significant dementia, any rescue procedure in setting of cardiovascular injury during AICD lead extraction would be extremely high risk. This potential risk was discussed in detail with his wife, and risks vs benefits of continuing with  antibiotics therapy only vs  lead extraction was explained.  She would be reviewing this further with his family.

## 2018-11-14 NOTE — PROGRESS NOTE ADULT - SUBJECTIVE AND OBJECTIVE BOX
INTERVAL HISTORY: Non-verbal  	  MEDICATIONS:  hydrALAZINE 50 milliGRAM(s) Oral every 8 hours  metoprolol tartrate 50 milliGRAM(s) Oral two times a day  DAPTOmycin IVPB 550 milliGRAM(s) IV Intermittent every 24 hours  sertraline 100 milliGRAM(s) Oral daily  docusate sodium 100 milliGRAM(s) Oral three times a day  polyethylene glycol 3350 17 Gram(s) Oral daily PRN  atorvastatin 20 milliGRAM(s) Oral at bedtime  dextrose 40% Gel 15 Gram(s) Oral once PRN  dextrose 50% Injectable 12.5 Gram(s) IV Push once  dextrose 50% Injectable 25 Gram(s) IV Push once  dextrose 50% Injectable 25 Gram(s) IV Push once  glucagon  Injectable 1 milliGRAM(s) IntraMuscular once PRN  insulin glargine Injectable (LANTUS) 28 Unit(s) SubCutaneous at bedtime  insulin lispro (HumaLOG) corrective regimen sliding scale   SubCutaneous three times a day before meals  aspirin  chewable 81 milliGRAM(s) Oral daily  clopidogrel Tablet 75 milliGRAM(s) Oral daily  dextrose 5% 1000 milliLiter(s) IV Continuous <Continuous>  dextrose 5%. 1000 milliLiter(s) IV Continuous <Continuous>  heparin  Injectable 5000 Unit(s) SubCutaneous every 12 hours        PHYSICAL EXAM:  T(C): 36.4 (11-14-18 @ 04:40), Max: 36.8 (11-13-18 @ 22:00)  HR: 73 (11-14-18 @ 04:40) (57 - 73)  BP: 131/69 (11-14-18 @ 04:40) (131/69 - 166/79)  RR: 18 (11-13-18 @ 22:00) (18 - 18)  SpO2: 96% (11-13-18 @ 22:00) (96% - 96%)  Wt(kg): --  I&O's Summary    13 Nov 2018 07:01  -  14 Nov 2018 07:00  --------------------------------------------------------  IN: 1800 mL / OUT: 0 mL / NET: 1800 mL          Appearance: Normal	  Cardiovascular: Normal S1 S2, No JVD, No murmurs, No edema  Respiratory: Lungs clear to auscultation	  Psychiatry: A & O x 3, Mood & affect appropriate  Gastrointestinal:  Soft, Non-tender, + BS	  Skin: No rashes, No ecchymoses, No cyanosis  Neurologic: Non-focal  Extremities: Normal range of motion, No clubbing, cyanosis or edema  Vascular: Peripheral pulses palpable 2+ bilaterally    TELEMETRY: 	      LABS:	 	                          11.8   8.3   )-----------( 339      ( 13 Nov 2018 07:32 )             37.8     11-13    150<H>  |  116<H>  |  48.0<H>  ----------------------------<  279<H>  3.9   |  22.0  |  2.03<H>    Ca    8.5<L>      13 Nov 2018 07:32    TPro  5.9<L>  /  Alb  3.0<L>  /  TBili  0.4  /  DBili  x   /  AST  18  /  ALT  15  /  AlkPhos  87  11-13      ASSESSMENT/PLAN: AVNI GREEN is a 68y Male with past medical history significant for  past medical history significant for CAD, PPM, PAD, HTN, HL, DM, prior CVA, dementia admitted with fever, weakness and sepsis. BC + for MRSA  ASHELY performed as above. No evidence of vegetation identified. Mildly decreased EF, mild valvular heart disease.  Thrombus on pace wire noted.  Thrombus on pacing leads. On ASA and Plavix Currently getting heparin for DVT prophylaxis in hospital.   CAD. Pt denies symptoms to suggest ACS. Would maintain current cardiac medications  For Pacer wire removal per ID recommendation.

## 2018-11-15 LAB
ANION GAP SERPL CALC-SCNC: 14 MMOL/L — SIGNIFICANT CHANGE UP (ref 5–17)
BUN SERPL-MCNC: 38 MG/DL — HIGH (ref 8–20)
CALCIUM SERPL-MCNC: 8.2 MG/DL — LOW (ref 8.6–10.2)
CHLORIDE SERPL-SCNC: 117 MMOL/L — HIGH (ref 98–107)
CK SERPL-CCNC: 30 U/L — SIGNIFICANT CHANGE UP (ref 30–200)
CO2 SERPL-SCNC: 22 MMOL/L — SIGNIFICANT CHANGE UP (ref 22–29)
CREAT SERPL-MCNC: 2.05 MG/DL — HIGH (ref 0.5–1.3)
CULTURE RESULTS: SIGNIFICANT CHANGE UP
CULTURE RESULTS: SIGNIFICANT CHANGE UP
GLUCOSE BLDC GLUCOMTR-MCNC: 139 MG/DL — HIGH (ref 70–99)
GLUCOSE BLDC GLUCOMTR-MCNC: 178 MG/DL — HIGH (ref 70–99)
GLUCOSE BLDC GLUCOMTR-MCNC: 238 MG/DL — HIGH (ref 70–99)
GLUCOSE BLDC GLUCOMTR-MCNC: 258 MG/DL — HIGH (ref 70–99)
GLUCOSE BLDC GLUCOMTR-MCNC: 293 MG/DL — HIGH (ref 70–99)
GLUCOSE SERPL-MCNC: 149 MG/DL — HIGH (ref 70–115)
HCT VFR BLD CALC: 35.6 % — LOW (ref 42–52)
HGB BLD-MCNC: 11.3 G/DL — LOW (ref 14–18)
MAGNESIUM SERPL-MCNC: 2.1 MG/DL — SIGNIFICANT CHANGE UP (ref 1.6–2.6)
MCHC RBC-ENTMCNC: 29.2 PG — SIGNIFICANT CHANGE UP (ref 27–31)
MCHC RBC-ENTMCNC: 31.7 G/DL — LOW (ref 32–36)
MCV RBC AUTO: 92 FL — SIGNIFICANT CHANGE UP (ref 80–94)
MRSA PCR RESULT.: SIGNIFICANT CHANGE UP
PHOSPHATE SERPL-MCNC: 2.8 MG/DL — SIGNIFICANT CHANGE UP (ref 2.4–4.7)
PLATELET # BLD AUTO: 311 K/UL — SIGNIFICANT CHANGE UP (ref 150–400)
POTASSIUM SERPL-MCNC: 3.5 MMOL/L — SIGNIFICANT CHANGE UP (ref 3.5–5.3)
POTASSIUM SERPL-SCNC: 3.5 MMOL/L — SIGNIFICANT CHANGE UP (ref 3.5–5.3)
RBC # BLD: 3.87 M/UL — LOW (ref 4.6–6.2)
RBC # FLD: 14.9 % — SIGNIFICANT CHANGE UP (ref 11–15.6)
S AUREUS DNA NOSE QL NAA+PROBE: SIGNIFICANT CHANGE UP
SODIUM SERPL-SCNC: 153 MMOL/L — HIGH (ref 135–145)
SPECIMEN SOURCE: SIGNIFICANT CHANGE UP
SPECIMEN SOURCE: SIGNIFICANT CHANGE UP
WBC # BLD: 8.3 K/UL — SIGNIFICANT CHANGE UP (ref 4.8–10.8)
WBC # FLD AUTO: 8.3 K/UL — SIGNIFICANT CHANGE UP (ref 4.8–10.8)

## 2018-11-15 PROCEDURE — 99233 SBSQ HOSP IP/OBS HIGH 50: CPT

## 2018-11-15 PROCEDURE — 99497 ADVNCD CARE PLAN 30 MIN: CPT

## 2018-11-15 PROCEDURE — 73630 X-RAY EXAM OF FOOT: CPT | Mod: 26,LT

## 2018-11-15 RX ORDER — SODIUM CHLORIDE 9 MG/ML
1000 INJECTION, SOLUTION INTRAVENOUS
Qty: 0 | Refills: 0 | Status: DISCONTINUED | OUTPATIENT
Start: 2018-11-15 | End: 2018-11-19

## 2018-11-15 RX ORDER — CHLORHEXIDINE GLUCONATE 213 G/1000ML
1 SOLUTION TOPICAL
Qty: 0 | Refills: 0 | Status: DISCONTINUED | OUTPATIENT
Start: 2018-11-15 | End: 2018-11-21

## 2018-11-15 RX ADMIN — Medication 100 MILLIGRAM(S): at 06:14

## 2018-11-15 RX ADMIN — Medication 81 MILLIGRAM(S): at 11:48

## 2018-11-15 RX ADMIN — SERTRALINE 100 MILLIGRAM(S): 25 TABLET, FILM COATED ORAL at 11:48

## 2018-11-15 RX ADMIN — CLOPIDOGREL BISULFATE 75 MILLIGRAM(S): 75 TABLET, FILM COATED ORAL at 11:48

## 2018-11-15 RX ADMIN — Medication 250 MILLIGRAM(S): at 17:19

## 2018-11-15 RX ADMIN — INSULIN GLARGINE 28 UNIT(S): 100 INJECTION, SOLUTION SUBCUTANEOUS at 23:35

## 2018-11-15 RX ADMIN — Medication 3: at 17:19

## 2018-11-15 RX ADMIN — SODIUM CHLORIDE 50 MILLILITER(S): 9 INJECTION, SOLUTION INTRAVENOUS at 06:13

## 2018-11-15 RX ADMIN — CHLORHEXIDINE GLUCONATE 1 APPLICATION(S): 213 SOLUTION TOPICAL at 13:23

## 2018-11-15 RX ADMIN — Medication 50 MILLIGRAM(S): at 17:19

## 2018-11-15 RX ADMIN — HEPARIN SODIUM 5000 UNIT(S): 5000 INJECTION INTRAVENOUS; SUBCUTANEOUS at 17:19

## 2018-11-15 RX ADMIN — ATORVASTATIN CALCIUM 20 MILLIGRAM(S): 80 TABLET, FILM COATED ORAL at 23:35

## 2018-11-15 RX ADMIN — DAPTOMYCIN 122 MILLIGRAM(S): 500 INJECTION, POWDER, LYOPHILIZED, FOR SOLUTION INTRAVENOUS at 18:12

## 2018-11-15 RX ADMIN — Medication 2: at 11:48

## 2018-11-15 RX ADMIN — Medication 50 MILLIGRAM(S): at 23:35

## 2018-11-15 RX ADMIN — Medication 250 MILLIGRAM(S): at 06:14

## 2018-11-15 RX ADMIN — SODIUM CHLORIDE 100 MILLILITER(S): 9 INJECTION, SOLUTION INTRAVENOUS at 17:19

## 2018-11-15 RX ADMIN — Medication 50 MILLIGRAM(S): at 06:14

## 2018-11-15 RX ADMIN — Medication 100 MILLIGRAM(S): at 15:47

## 2018-11-15 RX ADMIN — HEPARIN SODIUM 5000 UNIT(S): 5000 INJECTION INTRAVENOUS; SUBCUTANEOUS at 06:14

## 2018-11-15 RX ADMIN — Medication 100 MILLIGRAM(S): at 23:35

## 2018-11-15 NOTE — CHART NOTE - NSCHARTNOTEFT_GEN_A_CORE
Source: Patient [ ]  Family [ ]   other [x ]EMR    Current Diet: Puree no liquids with ensure pudding BID      PO intake:  < 50% [x ]   50-75%  [ ]   %  [ ]  other :    Source for PO intake [ ] Patient [ ] family [x ] chart [ ] staff [ ] other    Enteral /Parenteral Nutrition:     Current Weight: no new weight noted    % Weight Change     Pertinent Medications: MEDICATIONS  (STANDING):  aspirin  chewable 81 milliGRAM(s) Oral daily  atorvastatin 20 milliGRAM(s) Oral at bedtime  clopidogrel Tablet 75 milliGRAM(s) Oral daily  DAPTOmycin IVPB 550 milliGRAM(s) IV Intermittent every 24 hours  dextrose 5% 1000 milliLiter(s) (50 mL/Hr) IV Continuous <Continuous>  dextrose 5%. 1000 milliLiter(s) (50 mL/Hr) IV Continuous <Continuous>  dextrose 50% Injectable 12.5 Gram(s) IV Push once  dextrose 50% Injectable 25 Gram(s) IV Push once  dextrose 50% Injectable 25 Gram(s) IV Push once  docusate sodium 100 milliGRAM(s) Oral three times a day  heparin  Injectable 5000 Unit(s) SubCutaneous every 12 hours  hydrALAZINE 50 milliGRAM(s) Oral every 8 hours  insulin glargine Injectable (LANTUS) 28 Unit(s) SubCutaneous at bedtime  insulin lispro (HumaLOG) corrective regimen sliding scale   SubCutaneous three times a day before meals  metoprolol tartrate 50 milliGRAM(s) Oral two times a day  saccharomyces boulardii 250 milliGRAM(s) Oral two times a day  sertraline 100 milliGRAM(s) Oral daily    MEDICATIONS  (PRN):  dextrose 40% Gel 15 Gram(s) Oral once PRN Blood Glucose LESS THAN 70 milliGRAM(s)/deciliter  glucagon  Injectable 1 milliGRAM(s) IntraMuscular once PRN Glucose LESS THAN 70 milligrams/deciliter  polyethylene glycol 3350 17 Gram(s) Oral daily PRN Constipation    Pertinent Labs: CBC Full  -  ( 15 Nov 2018 07:20 )  WBC Count : 8.3 K/uL  Hemoglobin : 11.3 g/dL  Hematocrit : 35.6 %  Platelet Count - Automated : 311 K/uL  Mean Cell Volume : 92.0 fl  Mean Cell Hemoglobin : 29.2 pg  Mean Cell Hemoglobin Concentration : 31.7 g/dL  Auto Neutrophil # : x  Auto Lymphocyte # : x  Auto Monocyte # : x  Auto Eosinophil # : x  Auto Basophil # : x  Auto Neutrophil % : x  Auto Lymphocyte % : x  Auto Monocyte % : x  Auto Eosinophil % : x  Auto Basophil % : x    11-15 Na153 mmol/L<H> Glu 149 mg/dL<H> K+ 3.5 mmol/L Cr  2.05 mg/dL<H> BUN 38.0 mg/dL<H> Phos 2.8 mg/dL Alb n/a   PAB n/a             Skin:     Nutrition focused physical exam conducted - found signs of malnutrition [ ]absent [x ]present    Subcutaneous fat loss: [ x] Orbital fat pads region, [ ]Buccal fat region, [ ]Triceps region,  [ ]Ribs region    Muscle wasting: [x ]Temples region, [ ]Clavicle region, [ ]Shoulder region, [ ]Scapula region, [ ]Interosseous region,  [ ]thigh region, [ ]Calf region    Estimated Needs:   [x ] no change since previous assessment  [ ] recalculated:     Current Nutrition Diagnosis:  Pt with severe chronic malnutrition related to inadequate energy intake in setting of CVA, sepsis, difficulty swallowing as evidenced by 20-30# weight loss over past year, <75% intake >1 mo, need for dysphagia diet, moderate amount of fat/muscle depletion in orbitals, temples.  Pt now on puree no liquids taking 50% of meals. Palliative is plan as per team.     Recommendations: Continue Ensure Pudding TID        Recommendations:     Monitoring and Evaluation:   [ x] PO intake [x ] Tolerance to diet prescription [X] Weights  [X] Follow up per protocol [X] Labs:

## 2018-11-15 NOTE — CHART NOTE - NSCHARTNOTEFT_GEN_A_CORE
68y Male with PMH CVA x7, DM, HLD,  HTN, dementia, CAD s/p PCI and CABG 2014-[facility], Single Chamber MDT ICD implanted 2011 (6947-dual coil) admitted with MRSA bacteremia (source likely left knee vs back wound), being treated with daptomycin. ASHELY 11/9/18: EF 40-45%, no vegetation on valves, +thrombus on ICD lead.     11/15 Pt's case was personally reviewed by Dr. Hanna, as per his note "given his frailty, medical comorbidities and significant dementia, any rescue procedure in setting of cardiovascular injury during AICD lead extraction would be extremely high risk. This potential risk was discussed in detail with his wife, and risks vs benefits of continuing with antibiotics therapy only vs lead extraction was explained". Family reviewed this information and opted for conservative medical therapy (chart reviewed, cardiology note noted).    Will continue care per primary team   Family opted for conservative medical therapy   CT surgery to sign off at this time  Plan discussed with Dr. Hanna

## 2018-11-15 NOTE — PROGRESS NOTE ADULT - ATTENDING COMMENTS
Attending Attestation:  I personally saw and evaluated the patient and agree with the above assessment and plan  discussed with wife and son at separate times regarding goals of care concerning PEG tube and DNR/DNI. Decision was deferred to patient for now who is able to say that he wants 'to live'. AICD was turned back on by EP.  patient to be evaluated by podiatry; their pre-malhotra note indicates he may not be a candidate for surgery  will obtain X-ray to discern the degree of infection or if osteo present  Wife and son do not seem to grasp gravity of the situation Mr. Mg is in. They spoke with palliative today. For now decision is to treat. Optimal treatment for now is IV antibiotics for 6 weeks with repeat echo as outpatient with ID and cardiology follow up. Patient [facility] to go to [facility]  continues to be hypernatremic; wife will bring in her version of honey thick liquids so that we can determine if patient is able to tolerate; if not may need to go forward with PEG tube.  30 minutes were spent talking with son and wife regarding decisions of DNR/DNI and PEG tube placement in addition to the care provided for the patient.

## 2018-11-15 NOTE — PROGRESS NOTE ADULT - SUBJECTIVE AND OBJECTIVE BOX
INTERVAL HISTORY: lethargic  	  MEDICATIONS:  hydrALAZINE 50 milliGRAM(s) Oral every 8 hours  metoprolol tartrate 50 milliGRAM(s) Oral two times a day  DAPTOmycin IVPB 550 milliGRAM(s) IV Intermittent every 24 hours  sertraline 100 milliGRAM(s) Oral daily  docusate sodium 100 milliGRAM(s) Oral three times a day  polyethylene glycol 3350 17 Gram(s) Oral daily PRN  atorvastatin 20 milliGRAM(s) Oral at bedtime  dextrose 40% Gel 15 Gram(s) Oral once PRN  dextrose 50% Injectable 12.5 Gram(s) IV Push once  dextrose 50% Injectable 25 Gram(s) IV Push once  dextrose 50% Injectable 25 Gram(s) IV Push once  glucagon  Injectable 1 milliGRAM(s) IntraMuscular once PRN  insulin glargine Injectable (LANTUS) 28 Unit(s) SubCutaneous at bedtime  insulin lispro (HumaLOG) corrective regimen sliding scale   SubCutaneous three times a day before meals  aspirin  chewable 81 milliGRAM(s) Oral daily  clopidogrel Tablet 75 milliGRAM(s) Oral daily  dextrose 5% 1000 milliLiter(s) IV Continuous <Continuous>  dextrose 5%. 1000 milliLiter(s) IV Continuous <Continuous>  heparin  Injectable 5000 Unit(s) SubCutaneous every 12 hours        PHYSICAL EXAM:  T(C): 36.8 (11-15-18 @ 04:45), Max: 36.8 (11-15-18 @ 04:45)  HR: 74 (11-15-18 @ 04:45) (72 - 79)  BP: 150/62 (11-15-18 @ 04:45) (127/78 - 150/62)  RR: 18 (11-14-18 @ 22:30) (16 - 18)  SpO2: 97% (11-14-18 @ 22:30) (95% - 97%)  Wt(kg): --  I&O's Summary    14 Nov 2018 07:01  -  15 Nov 2018 07:00  --------------------------------------------------------  IN: 1000 mL / OUT: 1 mL / NET: 999 mL          Appearance: Normal	  HEENT:   Normal oral mucosa  Cardiovascular: Normal S1 S2, No JVD, No murmurs, No edema  Respiratory: Lungs clear to auscultation	  Gastrointestinal:  Soft, Non-tender, + BS	  Skin: No rashes, No ecchymoses, No cyanosis  Neurologic: Non-focal  Extremities: Normal range of motion, No clubbing, cyanosis or edema  Vascular: Peripheral pulses palpable 2+ bilaterally    	    LABS:	 	                          11.3   8.3   )-----------( 311      ( 15 Nov 2018 07:20 )             35.6     11-15    153<H>  |  117<H>  |  38.0<H>  ----------------------------<  149<H>  3.5   |  22.0  |  2.05<H>    Ca    8.2<L>      15 Nov 2018 07:20  Phos  2.8     11-15  Mg     2.1     11-15        ASSESSMENT/PLAN: 	PLAN: AVNI GREEN is a 68y Male with past medical history significant for  past medical history significant for CAD, PPM, PAD, HTN, HL, DM, prior CVA, dementia admitted with fever, weakness and sepsis. BC + for MRSA. ASHELY performed as above. No evidence of vegetation identified. Mildly decreased EF, mild valvular heart disease.  Thrombus on pace wire noted.  Thrombus on pacing lead. On ASA and Plavix Currently getting heparin for DVT prophylaxis in hospital.   CAD. Pt denies symptoms to suggest ACS. Would maintain current cardiac medications  Despite recommendation for pacer wire removal per ID recommendation, family opts for conservative medical therapy at this time.  Abx therapy per ID. Will follow pt PRN, thank you.

## 2018-11-15 NOTE — CHART NOTE - NSCHARTNOTEFT_GEN_A_CORE
Discussion with wife regarding risks/benefits of lead extraction.    Had a lengthy conversation regarding patient's overall prognosis. Wife states "I lost my  3 years ago" as he's been nonverbal and virtually demented over all this time.   She also claims that he would "rather be dead" than end up with a prolonged hospitalization resulting in intubation and multiple resuscitations.     Explained the overall mortality of device infections is >60% if the system is not extracted. The risk of extraction (specifically SVC tear and need for open heart surgery) is <1%.     She did have a long discussion with Dr. Hanna from [facility] who explained the high risks related specifically to a redo open heart procedure.     After all was considered, she opted to defer extraction at this time. She feels that in the rare event a complication would occur, she does not want to expose her children to the "guilt" of having made the decision to proceed with the procedure.     Additionally, the role of defibrillator and the nonexistent benefit it would provide at this point was discussed. She is amenable to disabling tachy-therapies. This was performed, but then turned back on at the request ot the primary team until an official DNR order can be placed in the chart.       Do not hesitate to call back should the family change their mind regarding the patient's disposition.       45 minutes was spent at the bedside counseling the patient's wife.

## 2018-11-15 NOTE — PROGRESS NOTE ADULT - SUBJECTIVE AND OBJECTIVE BOX
CC:  follow up GOC  INTERVAL HPI/OVERNIGHT EVENTS:  on puree diet no liquids  PRESENT SYMPTOMS: SOURCE:  Patient/Family/Team    PAIN SCALE:  0 = none  1 = mild   2 = moderate  3 = severe    Pain:     Dyspnea:  [ ] YES [ x] NO  Anxiety:  [ ] YES [x ] NO  Fatigue: [ x] YES [ ] NO  Nausea: [ ] YES [ x] NO  Loss of Appetite: [ x] YES [ ] NO  Other symptoms: __________    MEDICATIONS  (STANDING):  aspirin  chewable 81 milliGRAM(s) Oral daily  atorvastatin 20 milliGRAM(s) Oral at bedtime  chlorhexidine 2% Cloths 1 Application(s) Topical <User Schedule>  clopidogrel Tablet 75 milliGRAM(s) Oral daily  DAPTOmycin IVPB 550 milliGRAM(s) IV Intermittent every 24 hours  dextrose 5%. 1000 milliLiter(s) (100 mL/Hr) IV Continuous <Continuous>  dextrose 5%. 1000 milliLiter(s) (50 mL/Hr) IV Continuous <Continuous>  dextrose 50% Injectable 12.5 Gram(s) IV Push once  dextrose 50% Injectable 25 Gram(s) IV Push once  dextrose 50% Injectable 25 Gram(s) IV Push once  docusate sodium 100 milliGRAM(s) Oral three times a day  heparin  Injectable 5000 Unit(s) SubCutaneous every 12 hours  hydrALAZINE 50 milliGRAM(s) Oral every 8 hours  insulin glargine Injectable (LANTUS) 28 Unit(s) SubCutaneous at bedtime  insulin lispro (HumaLOG) corrective regimen sliding scale   SubCutaneous three times a day before meals  metoprolol tartrate 50 milliGRAM(s) Oral two times a day  saccharomyces boulardii 250 milliGRAM(s) Oral two times a day  sertraline 100 milliGRAM(s) Oral daily    MEDICATIONS  (PRN):  dextrose 40% Gel 15 Gram(s) Oral once PRN Blood Glucose LESS THAN 70 milliGRAM(s)/deciliter  glucagon  Injectable 1 milliGRAM(s) IntraMuscular once PRN Glucose LESS THAN 70 milligrams/deciliter  polyethylene glycol 3350 17 Gram(s) Oral daily PRN Constipation      Allergies    No Known Allergies    Intolerances      Karnofsky Performance Score/Palliative Performance Status Version 2: 20  %    Vital Signs Last 24 Hrs  T(C): 36.3 (15 Nov 2018 12:49), Max: 36.8 (15 Nov 2018 04:45)  T(F): 97.4 (15 Nov 2018 12:49), Max: 98.3 (15 Nov 2018 04:45)  HR: 72 (15 Nov 2018 12:49) (72 - 78)  BP: 115/66 (15 Nov 2018 12:49) (115/66 - 150/62)  BP(mean): --  RR: 20 (15 Nov 2018 12:49) (18 - 20)  SpO2: 97% (14 Nov 2018 22:30) (97% - 97%)    PHYSICAL EXAM:    General: awake, NAD, minimally verbal  HEENT: [x ] normal  [ ] dry mouth  [ ] ET tube/trach    Lungs: [x ] comfortable [ ] tachypnea/labored breathing  [ ] excessive secretions    CV: [ x] normal  [ ] tachycardia    GI: [ x] normal  [ ] distended  [ ] tender  [ ] no BS               [ ] PEG/NG/OG tube    : [ ] normal  [x ] incontinent  [ ] oliguria/anuria  [ ] schneider    MSK: [ ] normal  [ x] weakness  [ ] edema             [ ] ambulatory  [ x] bedbound/wheelchair bound    Skin: [ ] normal  [ ] pressure ulcers- Stage_____  [x ] no rash    LABS:                        11.3   8.3   )-----------( 311      ( 15 Nov 2018 07:20 )             35.6     11-15    153<H>  |  117<H>  |  38.0<H>  ----------------------------<  149<H>  3.5   |  22.0  |  2.05<H>    Ca    8.2<L>      15 Nov 2018 07:20  Phos  2.8     11-15  Mg     2.1     11-15          I&O's Summary    14 Nov 2018 07:01  -  15 Nov 2018 07:00  --------------------------------------------------------  IN: 1000 mL / OUT: 1 mL / NET: 999 mL    Thank you for the opportunity to assist with the care of this patient.   [place] [facility] [phone].

## 2018-11-15 NOTE — PROGRESS NOTE ADULT - ASSESSMENT
68yr man, appears frail,   multiple medical issues  and hospitalizaitons,  hx CVA and CAD s/p CABG, CKD, DM and PM admitted with MRSA bacteremia, ASHELY negative for vegetations, + thrombus on PM leads. Family electing no surgery for removal of PM

## 2018-11-15 NOTE — PROGRESS NOTE ADULT - ASSESSMENT
69 y/o man with PMH of HTN, DM2, CVA and CAD s/p CABG and PM, was admitted with high fever, generalized weakness and vomiting for the past few days.. Admitted with  Sepsis 2/2 GPC bacteremia; source likely from skin. +Blood cultures with MRSA 8/8 bottles. Started on IV abx as per ID.  Seen by speech and swallow, recommended puree diet with no liquids. Cardio consulted for ASHELY - done 11/9 and neg for vegetation. + Thrombus noted on PM leads.. questionable PM infection though not definitive source of infection. ID recommended pacemaker to be removed however pts family decided against it.  EP and Cardio are following the case.. Pacemaker/AICD was interrogated, prematurely shut off as per cardio yesterday.. pt is still a full code with no DNR/DNI or MOLST form in chart. EP called today to turn PPM/AICD back on until further discussions are held with family and a more definitive care plan is established.  Family wants to pursue palliative care for patient comfort, palliative to discuss with family further GOC. Pt was noticed to have necrotic tissue to left foot/toes, podiatry consulted.. pt with hx of PAD with balloon angioplasty in the past. Given pt is pending a palliative consult to keep pt comfortable it is unlikely that he is a surgical candidate for hallux amputation. Will get baseline xrays of left lower foot for now.     1. MRSA Bacteremia; ASHELY neg for vegetations however showed thrombus on the pace maker leads. Patient had persistent bacteremia despite adequate antibiotic therapy with vancomycin. Concern for infected pacemaker. Cardiology, ID and EP consulted regarding pace maker extraction  - blood cultures from 11/6, now 8/8 growing MRSA  - repeat blood cultures sent 11/10 - negative for growth. .   - wound cultures from knee + MRSA.   - continue to follow up daily WBC and fever curve - has remained afebrile and without leukocytosis.  - continue Daptomycin 550mg daily as per ID for about 6 weeks. Will need PICC line. Order has been placed  - ID following.   - family decided against PPM/AICD extraction surgery today after discussing with EP and Dr. Hanna risks vs benefits. Family now wishing to pursue palliative care for patient comfort. Palliative to hold [facility] convo with patients family.   - defibrillator was prematurely deactivated yesterday, reactivated today until a more definitive care plan can be established with patient and family     2. Thrombus on pacemaker leads; unsure if these are infected;   - cardiology and CT surgery following.  - CT surgery was planning PPM extraction in OR. Family has decided against it.    - Still unclear if thrombus on leads is infected.  - Trial with Daptomycin for 6 weeks since no surgery is planned and blood cultures have returned negative.   - Will need anticoagulation. As per cardiology, recommends Eliquis 2.5 BID on discharge     3. Hypernatremia/hyperchloremia: 2/2 IVF  - Patient unable to drink liquids as per speech.  - Changed IVF to D5W.. Serum sodium this morning still elevated at 150. Will continue with fluids and repeat BMP  - Speech attempted nectar thickened liquids which patient had trouble with; family says they use honey thickened liquids; will discuss with speech therapy again to retry with honey thickened liquids.. pt is currently on no liquid diet.     4. Necrotic appearing first toe of the left foot  - podiatry and vascular consulted  - history of PAD in both legs with balloon angioplasty in the past - given pt is pending a palliative consult to be keep pt comfortable it is unlikely that he is a surgical candidate for hallux amputation.. Will get baseline xrays incase family were to proceed with any surgical intervention.   - follow up xrays     5.  Nonsustained Vtach - resolved; tele showed sinus rhythm with PACs  - Cardiology following.  - Metoprolol dose increased to 50mg BID on 11/9.  - Cardiac monitor discontinued on 11/11    6. Peripheral Vascular Disease:  - Necrotic appearing distal left great toe.  - Left Lateral malleolus ulcer.  - Patient is not a candidate for MRI because of pre-2011 AICD.  - Will continue to monitor.   - Podiatry consult appreciated, see plan above.     7.  Fecal impaction  - stool softeners and bowel regimen  - continue to monitor for BM.    8. Fall:  - may be related to deconditioning and sepsis  - out of bed with assistance to the chair.   - PT recommends MIKE. SW aware.    9. Dysphagia  - Evaluated by speech and swallow, recommends dysphagia diet:  puree NO Liquids;     10. DM- uncontrolled  - Diabetic diet and ISS  - Lantus increased from 26 to 28 units recently, BG better controlled   - Continue to monitor glucose.     11. CAD  - Hx of CABG  - On statin, aspirin, and beta blocker    12 HTN  - continue to monitor BP.   - adjust meds as needed     13 Stroke history  - on aspirin and statin; no new focal symptomatology; head CT negative.    14. Prophylactic Measures  - heparin sq BID for DVT prophylaxis  - full code  - case discussed with wife.    15. Palliative encounter  - palliative briefly saw pt yesterday  - palliative to further discuss GOC with family/pt    DISPO: Palliative consult pending. Plan for Daptomycin for 6 weeks. Will need PICC line placement, order placed for PICC line. PT recommends MIKE on discharge. 67 y/o man with PMH of HTN, DM2, CVA and CAD s/p CABG and PM, was admitted with high fever, generalized weakness and vomiting for the past few days.. Admitted with  Sepsis 2/2 GPC bacteremia; source likely from skin. +Blood cultures with MRSA 8/8 bottles. Started on IV abx as per ID.  Seen by speech and swallow, recommended puree diet with no liquids. Cardio consulted for ASHELY - done 11/9 and neg for vegetation. + Thrombus noted on PM leads.. questionable PM infection though not definitive source of infection. ID recommended pacemaker to be removed however pts family decided against it.  EP and Cardio are following the case.. Pacemaker/AICD was interrogated, prematurely shut off as per cardio yesterday.. pt is still a full code with no DNR/DNI or MOLST form in chart. EP called today to turn PPM/AICD back on until further discussions are held with family and a more definitive care plan is established.  Family wants to pursue palliative care for patient comfort, palliative to discuss with family further GOC. Pt was noticed to have necrotic tissue to left foot/toes, podiatry consulted.. pt with hx of PAD with balloon angioplasty in the past. Given pt is pending a palliative consult to keep pt comfortable it is unlikely that he is a surgical candidate for hallux amputation. Will get baseline xrays of left lower foot for now.     1. MRSA Bacteremia; ASHELY neg for vegetations however showed thrombus on the pace maker leads. Patient had persistent bacteremia despite adequate antibiotic therapy with vancomycin. Concern for infected pacemaker. Cardiology, ID and EP consulted regarding pace maker extraction  - blood cultures from 11/6, now 8/8 growing MRSA  - repeat blood cultures sent 11/10 - negative for growth. .   - wound cultures from knee + MRSA.   - continue to follow up daily WBC and fever curve - has remained afebrile and without leukocytosis.  - continue Daptomycin 550mg daily as per ID for about 6 weeks. Will need PICC line. Order has been placed  - ID following.   - family decided against PPM/AICD extraction surgery today after discussing with EP and Dr. Hanna risks vs benefits. Family now wishing to pursue palliative care for patient comfort. Palliative to hold [facility] convo with patients family.   - defibrillator was prematurely deactivated yesterday, reactivated today until a more definitive care plan can be established with patient and family     2. Thrombus on pacemaker leads; unsure if these are infected;   - cardiology and CT surgery following.  - CT surgery was planning PPM extraction in OR. Family has decided against it.    - Still unclear if thrombus on leads is infected.  - Trial with Daptomycin for 6 weeks since no surgery is planned and blood cultures have returned negative.   - Will need anticoagulation. As per cardiology, recommends Eliquis 2.5 BID on discharge     3. Hypernatremia/hyperchloremia: 2/2 IVF  - Patient unable to drink liquids as per speech.  - Changed IVF to D5W.. Serum sodium this morning still elevated at 150. Will continue with fluids and repeat BMP  - Speech attempted nectar thickened liquids which patient had trouble with; family says they use honey thickened liquids; will discuss with speech therapy again to retry with honey thickened liquids.. pt is currently on no liquid diet.     4. Necrotic appearing first toe of the left foot  - podiatry and vascular consulted  - history of PAD in both legs with balloon angioplasty in the past - given pt is pending a palliative consult to be keep pt comfortable it is unlikely that he is a surgical candidate for hallux amputation.. Will get baseline xrays incase family were to proceed with any surgical intervention.   - follow up xrays     5.  Nonsustained Vtach - resolved; tele showed sinus rhythm with PACs  - Cardiology following.  - Metoprolol dose increased to 50mg BID on 11/9.  - Cardiac monitor discontinued on 11/11    6. Peripheral Vascular Disease:  - Necrotic appearing distal left great toe.  - Left Lateral malleolus ulcer.  - Patient is not a candidate for MRI because of pre-2011 AICD.  - Will continue to monitor.   - Podiatry consult appreciated, see plan above.     7.  Fecal impaction  - stool softeners and bowel regimen  - continue to monitor for BM.    8. Fall:  - may be related to deconditioning and sepsis  - out of bed with assistance to the chair.   - PT recommends MIKE. SW aware.    9. Dysphagia  - Evaluated by speech and swallow, recommends dysphagia diet:  puree NO Liquids;   - will request speech reeval to see if we can advance to liquid consistency     10. DM- uncontrolled  - Diabetic diet and ISS  - Lantus increased from 26 to 28 units recently, BG better controlled   - Continue to monitor glucose.     11. CAD  - Hx of CABG  - On statin, aspirin, and beta blocker    12 HTN  - continue to monitor BP.   - adjust meds as needed     13 Stroke history  - on aspirin and statin; no new focal symptomatology; head CT negative.    14. Prophylactic Measures  - heparin sq BID for DVT prophylaxis  - full code  - case discussed with wife.    15. Palliative encounter  - palliative briefly saw pt yesterday  - palliative to further discuss GOC with family/pt. Family will be here today around 1pm     DISPO: Palliative consult pending. Plan for Daptomycin for 6 weeks. Will need PICC line placement, order placed for PICC line. PT recommends MIKE on discharge.

## 2018-11-16 DIAGNOSIS — I25.10 ATHEROSCLEROTIC HEART DISEASE OF NATIVE CORONARY ARTERY WITHOUT ANGINA PECTORIS: ICD-10-CM

## 2018-11-16 DIAGNOSIS — R13.12 DYSPHAGIA, OROPHARYNGEAL PHASE: ICD-10-CM

## 2018-11-16 LAB
ANION GAP SERPL CALC-SCNC: 10 MMOL/L — SIGNIFICANT CHANGE UP (ref 5–17)
BUN SERPL-MCNC: 33 MG/DL — HIGH (ref 8–20)
CALCIUM SERPL-MCNC: 8.2 MG/DL — LOW (ref 8.6–10.2)
CHLORIDE SERPL-SCNC: 111 MMOL/L — HIGH (ref 98–107)
CO2 SERPL-SCNC: 21 MMOL/L — LOW (ref 22–29)
CREAT SERPL-MCNC: 1.82 MG/DL — HIGH (ref 0.5–1.3)
GLUCOSE BLDC GLUCOMTR-MCNC: 193 MG/DL — HIGH (ref 70–99)
GLUCOSE BLDC GLUCOMTR-MCNC: 230 MG/DL — HIGH (ref 70–99)
GLUCOSE BLDC GLUCOMTR-MCNC: 250 MG/DL — HIGH (ref 70–99)
GLUCOSE BLDC GLUCOMTR-MCNC: 268 MG/DL — HIGH (ref 70–99)
GLUCOSE SERPL-MCNC: 199 MG/DL — HIGH (ref 70–115)
POTASSIUM SERPL-MCNC: 3.7 MMOL/L — SIGNIFICANT CHANGE UP (ref 3.5–5.3)
POTASSIUM SERPL-SCNC: 3.7 MMOL/L — SIGNIFICANT CHANGE UP (ref 3.5–5.3)
SODIUM SERPL-SCNC: 142 MMOL/L — SIGNIFICANT CHANGE UP (ref 135–145)

## 2018-11-16 PROCEDURE — 71045 X-RAY EXAM CHEST 1 VIEW: CPT | Mod: 26

## 2018-11-16 PROCEDURE — 99233 SBSQ HOSP IP/OBS HIGH 50: CPT

## 2018-11-16 PROCEDURE — 76937 US GUIDE VASCULAR ACCESS: CPT | Mod: 26

## 2018-11-16 PROCEDURE — 99358 PROLONG SERVICE W/O CONTACT: CPT

## 2018-11-16 PROCEDURE — 99497 ADVNCD CARE PLAN 30 MIN: CPT

## 2018-11-16 PROCEDURE — 99223 1ST HOSP IP/OBS HIGH 75: CPT

## 2018-11-16 PROCEDURE — 36569 INSJ PICC 5 YR+ W/O IMAGING: CPT

## 2018-11-16 PROCEDURE — 99232 SBSQ HOSP IP/OBS MODERATE 35: CPT

## 2018-11-16 RX ORDER — CIPROFLOXACIN LACTATE 400MG/40ML
250 VIAL (ML) INTRAVENOUS EVERY 12 HOURS
Qty: 0 | Refills: 0 | Status: DISCONTINUED | OUTPATIENT
Start: 2018-11-16 | End: 2018-11-21

## 2018-11-16 RX ADMIN — Medication 50 MILLIGRAM(S): at 05:53

## 2018-11-16 RX ADMIN — HEPARIN SODIUM 5000 UNIT(S): 5000 INJECTION INTRAVENOUS; SUBCUTANEOUS at 05:53

## 2018-11-16 RX ADMIN — Medication 100 MILLIGRAM(S): at 05:52

## 2018-11-16 RX ADMIN — Medication 250 MILLIGRAM(S): at 17:59

## 2018-11-16 RX ADMIN — HEPARIN SODIUM 5000 UNIT(S): 5000 INJECTION INTRAVENOUS; SUBCUTANEOUS at 18:00

## 2018-11-16 RX ADMIN — Medication 1: at 08:12

## 2018-11-16 RX ADMIN — Medication 50 MILLIGRAM(S): at 17:59

## 2018-11-16 RX ADMIN — Medication 100 MILLIGRAM(S): at 22:10

## 2018-11-16 RX ADMIN — ATORVASTATIN CALCIUM 20 MILLIGRAM(S): 80 TABLET, FILM COATED ORAL at 22:10

## 2018-11-16 RX ADMIN — CLOPIDOGREL BISULFATE 75 MILLIGRAM(S): 75 TABLET, FILM COATED ORAL at 12:47

## 2018-11-16 RX ADMIN — SODIUM CHLORIDE 100 MILLILITER(S): 9 INJECTION, SOLUTION INTRAVENOUS at 14:14

## 2018-11-16 RX ADMIN — CHLORHEXIDINE GLUCONATE 1 APPLICATION(S): 213 SOLUTION TOPICAL at 05:53

## 2018-11-16 RX ADMIN — SODIUM CHLORIDE 100 MILLILITER(S): 9 INJECTION, SOLUTION INTRAVENOUS at 05:53

## 2018-11-16 RX ADMIN — Medication 250 MILLIGRAM(S): at 18:09

## 2018-11-16 RX ADMIN — Medication 2: at 18:00

## 2018-11-16 RX ADMIN — Medication 3: at 12:47

## 2018-11-16 RX ADMIN — Medication 250 MILLIGRAM(S): at 05:52

## 2018-11-16 RX ADMIN — Medication 50 MILLIGRAM(S): at 22:10

## 2018-11-16 RX ADMIN — INSULIN GLARGINE 28 UNIT(S): 100 INJECTION, SOLUTION SUBCUTANEOUS at 22:09

## 2018-11-16 RX ADMIN — Medication 81 MILLIGRAM(S): at 12:47

## 2018-11-16 RX ADMIN — DAPTOMYCIN 122 MILLIGRAM(S): 500 INJECTION, POWDER, LYOPHILIZED, FOR SOLUTION INTRAVENOUS at 17:59

## 2018-11-16 RX ADMIN — SERTRALINE 100 MILLIGRAM(S): 25 TABLET, FILM COATED ORAL at 12:47

## 2018-11-16 RX ADMIN — Medication 100 MILLIGRAM(S): at 14:13

## 2018-11-16 NOTE — CONSULT NOTE ADULT - PROBLEM SELECTOR RECOMMENDATION 9
pt on pureed with no liquids.   Would repeat another bedside swallow to see if honey thick liquids is tolerated  IF he fails, pt will need both ID ( cultures negative but still being treated for few more weeks) and cardiology clearance for EGD/PEG placement . Will need to hold plavix and ASA 5 days before and ( elaquis if pt is started on this)
ID recommending 6weeks IV Daptomycin, and possible  suppressive therapy thereafter
vegetation/thrombus on AICD lead.  Plan for lead extraction in OR per EP.  Discussed with Dr. Hanna.

## 2018-11-16 NOTE — PROGRESS NOTE ADULT - SUBJECTIVE AND OBJECTIVE BOX
CC:  follow up on GOC,   INTERVAL HPI/OVERNIGHT EVENTS:      PRESENT SYMPTOMS: SOURCE:  Patient/Family/Team    PAIN SCALE:  0 = none  1 = mild   2 = moderate  3 = severe    Pain: nods no    Dyspnea:  [ ] YES [ x] NO  Anxiety:  [ ] YES [ x] NO  Fatigue: [ x] YES [ ] NO    Nausea: [ ] YES [ ] NO  na  Loss of Appetite: [ x] YES [ ] NO    Other symptoms: __________    MEDICATIONS  (STANDING):  aspirin  chewable 81 milliGRAM(s) Oral daily  atorvastatin 20 milliGRAM(s) Oral at bedtime  chlorhexidine 2% Cloths 1 Application(s) Topical <User Schedule>  ciprofloxacin     Tablet 250 milliGRAM(s) Oral every 12 hours  clopidogrel Tablet 75 milliGRAM(s) Oral daily  DAPTOmycin IVPB 550 milliGRAM(s) IV Intermittent every 24 hours  dextrose 5%. 1000 milliLiter(s) (100 mL/Hr) IV Continuous <Continuous>  dextrose 5%. 1000 milliLiter(s) (50 mL/Hr) IV Continuous <Continuous>  dextrose 50% Injectable 12.5 Gram(s) IV Push once  dextrose 50% Injectable 25 Gram(s) IV Push once  dextrose 50% Injectable 25 Gram(s) IV Push once  docusate sodium 100 milliGRAM(s) Oral three times a day  heparin  Injectable 5000 Unit(s) SubCutaneous every 12 hours  hydrALAZINE 50 milliGRAM(s) Oral every 8 hours  insulin glargine Injectable (LANTUS) 28 Unit(s) SubCutaneous at bedtime  insulin lispro (HumaLOG) corrective regimen sliding scale   SubCutaneous three times a day before meals  metoprolol tartrate 50 milliGRAM(s) Oral two times a day  saccharomyces boulardii 250 milliGRAM(s) Oral two times a day  sertraline 100 milliGRAM(s) Oral daily    MEDICATIONS  (PRN):  dextrose 40% Gel 15 Gram(s) Oral once PRN Blood Glucose LESS THAN 70 milliGRAM(s)/deciliter  glucagon  Injectable 1 milliGRAM(s) IntraMuscular once PRN Glucose LESS THAN 70 milligrams/deciliter  polyethylene glycol 3350 17 Gram(s) Oral daily PRN Constipation      Allergies    No Known Allergies    Intolerances    Karnofsky Performance Score/Palliative Performance Status Version 2:   30  %    Vital Signs Last 24 Hrs  T(C): 36.6 (16 Nov 2018 12:00), Max: 36.9 (16 Nov 2018 05:07)  T(F): 97.9 (16 Nov 2018 12:00), Max: 98.5 (16 Nov 2018 05:07)  HR: 66 (16 Nov 2018 14:10) (62 - 70)  BP: 107/52 (16 Nov 2018 14:10) (107/52 - 152/77)  BP(mean): --  RR: 18 (16 Nov 2018 12:00) (18 - 18)  SpO2: 95% (15 Nov 2018 23:25) (95% - 95%)    PHYSICAL EXAM:    General: Awake, NAD.  Not conversive when spoken to. occasional nods when asked simple questions about pain.     HEENT: NCAT     Lungs: [ x] comfortable [ ] tachypnea/labored breathing  [ ] excessive secretions    CV: [ x] normal  [ ] tachycardia    GI: [x ] normal  [ ] distended  [ ] tender  [ ] no BS               [ ] PEG/NG/OG tube    : [ ] normal  [x ] incontinent  [ ] oliguria/anuria  [ ] schneider    MSK: [ ] normal  [ x] weakness  [ ] edema             [ ] ambulatory  [ ] bedbound/wheelchair bound    Skin: [ ] normal  [ ] pressure ulcers- Stage_____  [x ] no rash    LABS:                        11.3   8.3   )-----------( 311      ( 15 Nov 2018 07:20 )             35.6     11-16    142  |  111<H>  |  33.0<H>  ----------------------------<  199<H>  3.7   |  21.0<L>  |  1.82<H>    Ca    8.2<L>      16 Nov 2018 08:49  Phos  2.8     11-15  Mg     2.1     11-15      I&O's Summary    15 Nov 2018 07:01  -  16 Nov 2018 07:00  --------------------------------------------------------  IN: 1500 mL / OUT: 0 mL / NET: 1500 mL        RADIOLOGY & ADDITIONAL STUDIES:  < from: Xray Foot AP + Lateral + Oblique, Left (11.15.18 @ 14:31) >     EXAM:  FOOT-LEFT                          PROCEDURE DATE:  11/15/2018          INTERPRETATION:  Radiographs of the left foot         CLINICAL INFORMATION:   Necrosis at the level of the first hallux and   digits    TECHNIQUE:  Frontal, oblique andlateral views of the foot were obtained.    FINDINGS:   No prior similar studies are available for review.    The forefoot demonstrates linear lucencies of the distal tuft of the   great toe suggestive of osteomyelitis and possible fracture. Normal   alignment is seen. Joint spaces are preserved. Sesamoids are intact.    The midfoot appears intact.    The hindfoot demonstrates no plantar calcaneal spur.    There is some swelling at the level of the distal great toe.   Atherosclerotic vascular calcifications are noted.    IMPRESSION:   Abnormal appearance of the tuft of the great toe with soft   tissue swelling and in the bone suggesting osteomyelitis and/or fracture.     < end of copied text >    Thank you for the opportunity to assist with the care of this patient.   [facility] [phone].

## 2018-11-16 NOTE — PROGRESS NOTE ADULT - ASSESSMENT
67y/o man with PMH of HTN, DM2, CVA and CAD s/p CABG and PM, was admitted today with high fever and generalized weakness and vomiting for the past few days. Blood cultures with MRSA, possible source is skin since growing the same MRSA.   ASHELY with thrombus around the lead but no vegetation on valves unclear that if this a lead vegetation or just noninfected fibrin stand that can happen in PPM leads frequently Since he has MRSA, the thrombus can get infected as well. Family doesn't want any intervention but agree with at least 6 weeks of IV Daptomycin.     High fever  MRSA bacteremia   Skin ulcers with MRSA  PPM lead thrombus     -Contact isolation   -MRSA in 4 sets of blood cultures from 11/5 and 11/6 (8 out of 8 bottles)  -Repeat blood culture negative on 11/10  -Follow up daily WBC and fever curve, both normal.   -ASHELY with thrombus around the lead, no intervention per family wish  -PICC line  -Continue Daptomycin 550mg daily  -Needs evaluation at the end of 6 weeks for possible suppressive oral therapy.   -no need for rifampin    -Feet ulcers management as per podiatry and wound care.   Will follow. 67y/o man with PMH of HTN, DM2, CVA and CAD s/p CABG and PM, was admitted today with high fever and generalized weakness and vomiting for the past few days. Blood cultures with MRSA, possible source is skin since growing the same MRSA.   ASHELY with thrombus around the lead but no vegetation on valves unclear that if this a lead vegetation or just noninfected fibrin stand that can happen in PPM leads frequently Since he has MRSA, the thrombus can get infected as well. Family doesn't want any intervention but agree with at least 6 weeks of IV Daptomycin.   Xray of ulcerated L1st toe showed OM, most likely no intervention will be done. Can add cipro empirically to cover GNR including pseudomonas.     High fever  MRSA bacteremia   Skin ulcers with MRSA  PPM lead thrombus     -Contact isolation   -MRSA in 4 sets of blood cultures from 11/5 and 11/6 (8 out of 8 bottles)  -Repeat blood culture negative on 11/10  -Follow up daily WBC and fever curve, both normal.   -ASHELY with thrombus around the lead, no intervention per family wish  -PICC line  -Continue Daptomycin 550mg daily  -Add cipro 250mg q12h for 6weeks to cover OM in L 1st toe, watch for diarrhea   -Needs evaluation at the end of 6 weeks for possible suppressive oral therapy.   -no need for rifampin    -Feet ulcers management as per podiatry and wound care.   Will follow.

## 2018-11-16 NOTE — PROGRESS NOTE ADULT - SUBJECTIVE AND OBJECTIVE BOX
[facility]  INFECTIOUS DISEASES AND INTERNAL MEDICINE at [place]  =======================================================  Frankie Garcia MD  Diplomates American Board of Internal Medicine and Infectious Diseases  =======================================================    [id number]  AVNI GREEN     Follow up for bacteremia with MRSA, source is skin ulcer in his right mid-back. Afebrile. No compliant. ASHELY done with thrombus around lead.   Family doesn't want any surgical intervention and PPM/lead removal.     PAST MEDICAL & SURGICAL HISTORY:  CVA (cerebral vascular accident)  Diabetic neuropathy  DM (diabetes mellitus)  Hyperlipidemia  AICD (automatic cardioverter/defibrillator) present  Pacemaker  Stented coronary artery  HTN (hypertension)  Cardiac pacemaker    Social Hx: As per him no smoking, ETOH or drugs.     FAMILY HISTORY:  No pertinent family history in first degree relatives    Allergies  No Known Allergies    Antibiotics:  vancomycin      REVIEW OF SYSTEMS:  Afebrile no complaint.     Physical Exam:  Vital Signs Last 24 Hrs  T(C): 36.6 (16 Nov 2018 12:00), Max: 36.9 (16 Nov 2018 05:07)  T(F): 97.9 (16 Nov 2018 12:00), Max: 98.5 (16 Nov 2018 05:07)  HR: 63 (16 Nov 2018 12:00) (62 - 70)  BP: 115/61 (16 Nov 2018 12:00) (103/58 - 152/77)  RR: 18 (16 Nov 2018 12:00) (16 - 18)  SpO2: 95% (15 Nov 2018 23:25) (95% - 95%)  GEN: NAD  HEENT: normocephalic and atraumatic. EOMI. PERRL.    NECK: Supple.  No lymphadenopathy   LUNGS: Clear to auscultation.  HEART: Regular rate and rhythm left upper chest Pacemaker, nontender, no erythema or swelling   ABDOMEN: Soft, nontender, and nondistended.  Positive bowel sounds.    : No CVA tenderness  EXTREMITIES: Right toe with an ulcer, no cellulitis around it.   NEUROLOGIC: grossly intact.  PSYCHIATRIC: Appropriate affect .  SKIN: scratches and superficial wounds in toes/legs, healing wound in his Right mid back, with erythema, minimal discharge     Labs:  11-16    142  |  111<H>  |  33.0<H>  ----------------------------<  199<H>  3.7   |  21.0<L>  |  1.82<H>    Ca    8.2<L>      16 Nov 2018 08:49  Phos  2.8     11-15  Mg     2.1     11-15                      11.3   8.3   )-----------( 311      ( 15 Nov 2018 07:20 )             35.6     CARDIAC MARKERS ( 15 Nov 2018 07:20 )  x     / x     / 30 U/L / x     / x        RECENT CULTURES:  11-13 @ 07:32 .Blood Blood     No growth at 48 hours    11-12 @ 09:02 .Blood Blood     No growth at 48 hours    11-10 @ 13:42 .Blood Blood     No growth at 5 days.    11-07 @ 11:41 Skin wound Methicillin resistant Staphylococcus aureus    Few Methicillin resistant Staphylococcus aureus (KNOWN)  Numerous Corynebacterium species    11-06 @ 22:42 .Blood Blood Methicillin resistant Staphylococcus aureus    Growth in aerobic and anaerobic bottles: Methicillin resistant  Staphylococcus aureus (KNOWN)  Aerobic Bottle: 14:58 Hours to positivity  Anaerobic Bottle: 15:48 Hours to positivity    11-05 @ 17:11        NotDetec  11-05 @ 16:14 .Urine Clean Catch (Midstream)     No growth    11-05 @ 10:06 .Blood Blood-Peripheral Methicillin resistant Staphylococcus aureus    Growth in aerobic bottle: Methicillin resistant Staphylococcus aureus  Aerobic Bottle: 9.47 Hours to positivity  Anaerobic Bottle: 10.47 Hours to positivity    11-05 @ 10:05 .Blood Blood-Peripheral Methicillin resistant Staphylococcus aureus  Blood Culture PCR    Growth in aerobic and anaerobic bottles: Methicillin resistant  Staphylococcus aureus  Aerobic Bottle: 09.07 Hours to positivity  Anaerobic Bottle: 12:17 Hours to positivity    All imaging and other data have been reviewed.    ASHELY:   Summary:   1. Left ventricular ejection fraction, by visual estimation, is 40 to   45%.   2. Technically fair study.   3. Mildly decreased global left ventricular systolic function.   4. The left ventricular diastolic function could not be assessed in this   study.   5. There is no evidence of pericardial effusion.   6. Mild mitral valve regurgitation.   7. Mild tricuspid regurgitation.   8. Trace pulmonic valve regurgitation.   9. There is no vegetation on any of the cardiac valve. Thrombus coated   pacing leads are seen.  10. Clinical correlation is advised.  11. Color flow doppler and intravenous injection of agitated saline   demonstrates the presence of an intact intra atrial septum.  12. No left atrial appendage thrombus.

## 2018-11-16 NOTE — PROCEDURE NOTE - NSPROCDETAILS_GEN_ALL_CORE
supine position/sterile technique, catheter placed/sterile dressing applied/location identified, draped/prepped, sterile technique used

## 2018-11-16 NOTE — GOALS OF CARE CONVERSATION - PERSONAL ADVANCE DIRECTIVE - CONVERSATION DETAILS
Wife states she is main surrogate  Patient without capacity to make decisions regarding health care given poor mentals status - likely component of vascular dementia given multiple CVAs    Informed wife  regarding my conversation with ID-although  long term abx could be given, only a 30-40% cure rate and still  high risk for reinfection given no surgery to removed leads. Wife acknowledges and affirms would not want  to have surgery.  Discussed dyphagia- on puree no liquids, at risk for dehydration.  Patient has been evaluated by SLP several occasions. Discussed PEG tube feedings, risks and benefits. Informed risks of infections and does not completely prevent aspiration.  Consideration of pleasure feeds if cannot advance, with the understanding of risk of aspiration, respiratory failure, death. She would consider pleasure feeds if cannot advance to puree.  Discussed advance directives/CPR risks and benefits. Limited benefit given significant multiple medical issue. She would not want to burden her  with such interventions. and  agrees to DNR/I and wishes for a natural death.

## 2018-11-16 NOTE — PROGRESS NOTE ADULT - PROBLEM SELECTOR PLAN 6
See Goals of Care Note.  In summary- patient is DNR/I.  Continuing current treatments.  Wife does not think she would want PEG for  if cannot advance to liquids. Discussed pleasure feeds. However, would try  repeat swallow eval again  before placing any orders for pleasure feeds.  Consider recalling EP to shut off AICD now that patient is DNR/I. See Goals of Care Note.  In summary- patient is DNR/I. No PEG tube. MOLST completed  Continuing current treatments.  Wife does not think she would want PEG for  if cannot advance to liquids. Discussed pleasure feeds. However, would try  repeat swallow eval again before placing any orders for pleasure feeds.  Consider recalling EP to shut off AICD now that patient is DNR/I.

## 2018-11-16 NOTE — GOALS OF CARE CONVERSATION - PERSONAL ADVANCE DIRECTIVE - TREATMENT GUIDELINE COMMENT
DNR/I - MOLST completed  Consider recalling EP to d/c AICD  Consider repeat swallow eval.  Continuing with abx and eventual MIKE.  Wife has been discussed hospice   Family meeting time start 2: 30  Time end 3:05pm DNR/I , NO PEG tube.  MOLST completed  Consider recalling EP to d/c AICD  Consider repeat swallow eval.  Continuing with abx and eventual MIKE.  Wife has been discussed hospice   Family meeting time start 2: 30  Time end 3:05pm

## 2018-11-16 NOTE — PROGRESS NOTE ADULT - ASSESSMENT
68yr man, appears frail,   multiple medical issues  and hospitalizations  hx CVA and CAD s/p CABG, CKD, DM and PM admitted with MRSA bacteremia, ASHELY negative for vegetations, + thrombus on PM leads. Family electing no surgery for removal of PM. Patient at high risk for infection given no surgery to removed PM lead.

## 2018-11-16 NOTE — PROGRESS NOTE ADULT - ATTENDING COMMENTS
Attending Attestation:  I personally saw and evaluated the patient and agree with the above assessment and plan  Had a family meeting again with wife and palliative care today regarding GOC for PEG and DNR/DNI. Patient has waxing and waning mental status that doesn't allow him to demonstrate full capacity. Wife feels that based on previous wishes that patient would not 'want to live like this' and that he would not want to be brought back to life with discharge from AICD and would rather die naturally. Decision was made to make patient DNR/DNI. Discussed with them regarding PEG tube. Wife feels that this is something that he would not want. at this time she understands it's not safe for him to drink honey thick liquids but knows he would not want PEG tube. GI was consulted to evaluate patient prior to this, at this time PEG tube on hold. Wife was supposed to bring in thickener but did not today. Will reattempt honey-thickened liquids with patient again per GI request. Additionally podiatry needs to re-evaluate patient for osteomyelitis of the foot and discuss treatment options and prognosis if surgery is not an option. Attending Attestation:  I personally saw and evaluated the patient and agree with the above assessment and plan  Had a family meeting again with wife and palliative care today regarding GOC for PEG and DNR/DNI. Patient has waxing and waning mental status that doesn't allow him to demonstrate full capacity. Wife feels that based on previous wishes that patient would not 'want to live like this' and that he would not want to be brought back to life with discharge from AICD and would rather die naturally. Decision was made to make patient DNR/DNI. Discussed with them regarding PEG tube. Wife feels that this is something that he would not want. at this time she understands it's not safe for him to drink honey thick liquids but knows he would not want PEG tube. GI was consulted to evaluate patient prior to this, at this time PEG tube on hold. Wife was supposed to bring in thickener but did not today. Will reattempt honey-thickened liquids with patient again per GI request. Additionally podiatry needs to re-evaluate patient for osteomyelitis of the foot and discuss treatment options and prognosis if surgery is not an option.  Discussions were 33 minutes in addition to the regular care provided for patient.

## 2018-11-16 NOTE — PROGRESS NOTE ADULT - SUBJECTIVE AND OBJECTIVE BOX
[facility], [facility]                                                    [address], [address], [place] [address]                                                         PHONE: [phone]    FAX: [phone] [address], [address], [place] [address]                                                 PHONE: [phone]    FAX: [phone]  *******************************************************************************  cc: Bacteremia, +BC 4/4 MRSA), r/o infective endocarditis    HPI:  AVNI GREEN is a 68y Male admitted with fever, weakness. BC + for MRSA. Asked to evaluate and arrange ASHELY.  Long history of CAD with ischemic CM, carotid disease, PAD, PM, DM, HTN, HL, prior TIA/CVA, dementia. Pt denies CP, SOB, palpitations, PND or orthopnea. No dizziness or syncope    PAST MEDICAL & SURGICAL HISTORY:  CVA (cerebral vascular accident)  Diabetic neuropathy  DM (diabetes mellitus)  Hyperlipidemia  AICD (automatic cardioverter/defibrillator) present  Pacemaker  Stented coronary artery  HTN (hypertension)  Cardiac pacemaker    Social History: former smoker / - EtOH / - IVDA    Family History: No pertinent family history in first degree relatives    ROS: As noted above, otherwise unremarkable.    No Known Allergies    Overnight events/Subjective Assessment: no new complaints        MEDICATIONS  (STANDING):  aspirin  chewable 81 milliGRAM(s) Oral daily  atorvastatin 20 milliGRAM(s) Oral at bedtime  chlorhexidine 2% Cloths 1 Application(s) Topical <User Schedule>  clopidogrel Tablet 75 milliGRAM(s) Oral daily  DAPTOmycin IVPB 550 milliGRAM(s) IV Intermittent every 24 hours  dextrose 5%. 1000 milliLiter(s) (100 mL/Hr) IV Continuous <Continuous>  dextrose 5%. 1000 milliLiter(s) (50 mL/Hr) IV Continuous <Continuous>  dextrose 50% Injectable 12.5 Gram(s) IV Push once  dextrose 50% Injectable 25 Gram(s) IV Push once  dextrose 50% Injectable 25 Gram(s) IV Push once  docusate sodium 100 milliGRAM(s) Oral three times a day  heparin  Injectable 5000 Unit(s) SubCutaneous every 12 hours  hydrALAZINE 50 milliGRAM(s) Oral every 8 hours  insulin glargine Injectable (LANTUS) 28 Unit(s) SubCutaneous at bedtime  insulin lispro (HumaLOG) corrective regimen sliding scale   SubCutaneous three times a day before meals  metoprolol tartrate 50 milliGRAM(s) Oral two times a day  saccharomyces boulardii 250 milliGRAM(s) Oral two times a day  sertraline 100 milliGRAM(s) Oral daily    MEDICATIONS  (PRN):  dextrose 40% Gel 15 Gram(s) Oral once PRN Blood Glucose LESS THAN 70 milliGRAM(s)/deciliter  glucagon  Injectable 1 milliGRAM(s) IntraMuscular once PRN Glucose LESS THAN 70 milligrams/deciliter  polyethylene glycol 3350 17 Gram(s) Oral daily PRN Constipation      Vital Signs Last 24 Hrs  T(C): 36.9 (16 Nov 2018 05:07), Max: 36.9 (16 Nov 2018 05:07)  T(F): 98.5 (16 Nov 2018 05:07), Max: 98.5 (16 Nov 2018 05:07)  HR: 64 (16 Nov 2018 05:07) (62 - 72)  BP: 134/67 (16 Nov 2018 05:07) (103/58 - 152/77)  BP(mean): --  RR: 18 (15 Nov 2018 23:25) (16 - 20)  SpO2: 95% (15 Nov 2018 23:25) (95% - 95%)    I&O's Detail    15 Nov 2018 07:01  -  16 Nov 2018 07:00  --------------------------------------------------------  IN:    dextrose 5%.: 1500 mL  Total IN: 1500 mL    OUT:  Total OUT: 0 mL    Total NET: 1500 mL        I&O's Summary    15 Nov 2018 07:01  -  16 Nov 2018 07:00  --------------------------------------------------------  IN: 1500 mL / OUT: 0 mL / NET: 1500 mL            PHYSICAL EXAM:  General: Appears well developed, well nourished, no acute distress  HEENT: Head: normocephalic, atraumatic  Eyes: Pupils equal and reactive  Neck: Supple, no carotid bruit, no JVD, no HJR  CARDIOVASCULAR: Normal S1 and S2, no murmur, rub, or gallop  LUNGS: Clear to auscultation bilaterally, no rales, rhonchi or wheeze  ABDOMEN: Soft, nontender, non-distended, positive bowel sounds, no mass or bruit  EXTREMITIES: No edema, distal pulses WNL  SKIN: Warm and dry with normal turgor  NEURO: Alert & oriented x 3, grossly intact  PSYCH: normal mood and affect            LABS:                        11.3   8.3   )-----------( 311      ( 15 Nov 2018 07:20 )             35.6     11-15    153<H>  |  117<H>  |  38.0<H>  ----------------------------<  149<H>  3.5   |  22.0  |  2.05<H>    Ca    8.2<L>      15 Nov 2018 07:20  Phos  2.8     11-15  Mg     2.1     11-15      CARDIAC MARKERS ( 15 Nov 2018 07:20 )  x     / x     / 30 U/L / x     / x            serum  Lipids:         RADIOLOGY & ADDITIONAL STUDIES:    < from: ASHELY Echo Doppler (11.09.18 @ 12:54) >  Summary:   1. Left ventricular ejection fraction, by visual estimation, is 40 to   45%.   2. Technically fair study.   3. Mildly decreased global left ventricular systolic function.   4. The left ventricular diastolic function could not be assessed in this   study.   5. There is no evidence of pericardial effusion.   6. Mild mitral valve regurgitation.   7. Mild tricuspid regurgitation.   8. Trace pulmonic valve regurgitation.   9. There is no vegetation on any of the cardiac valve. Thrombus coated   pacing leads are seen.  10. Clinical correlation is advised.  11. Color flow doppler and intravenous injection of agitated saline   demonstrates the presence of an intact intra atrial septum.  12. No left atrial appendage thrombus.    < end of copied text >    ASSESSMENT AND PLAN:  In summary, AVNI GREEN is a 68y Male with past medical history significant for  past medical history significant for CAD, PPM, PAD, HTN, HL, DM, prior CVA, dementia admitted with fever, weakness and sepsis. BC + for MRSA    - ASHELY performed as above.   Management of bacteremia as per ID. Family has opted for conservative approach and does not wish PM lead extraction at this time. I have also dw pt, who nods his head in affirmation of this decision.    - Thrombus on pacing leads. On ASA and plavix. Currently getting heparin for DVT prophylaxis in hospital. Would add eliquis 2.5mg q12 on DC when heparin is discontinued    - CAD. Pt denies symptoms to suggest ACS. Would maintain current cardiac medications    - PM. Follow up with Dr Emmanuel on DC    - HTN. BP is controlled.    - PAD. stable    - Pt and family opting for a conservative approach. Will need long term ABX due to suspicion of lead involvement with bacteremia.    - Please reconsult PRN if family should change their mind and opt for lead extraction    - Will need to delineate long term wishes    - Will follow on a PRN bases as needed        Lucretia Castillo MD

## 2018-11-16 NOTE — PROGRESS NOTE ADULT - ASSESSMENT
69 y/o man with PMH of HTN, DM2, CVA and CAD s/p CABG and PM, was admitted with high fever, generalized weakness and vomiting for the past few days.. Admitted with  Sepsis 2/2 GPC bacteremia; source likely from skin. +Blood cultures with MRSA 8/8 bottles. Started on IV abx as per ID.  Seen by speech and swallow, recommended puree diet with no liquids. Cardio consulted for ASHELY - done 11/9 and neg for vegetation. + Thrombus noted on PM leads.. questionable PM infection though not definitive source of infection. ID recommended pacemaker to be removed however pts family decided against it.  EP and Cardio are following the case.. Pacemaker/AICD was interrogated, prematurely shut off as per cardio yesterday.. pt is still a full code with no DNR/DNI or MOLST form in chart. EP called to turn PPM/AICD back on until further discussions are held with family and a more definitive care plan is established. GOC discussions have been held with family/palliative, no definitive care plan as family is very hesitant to make a decision. From the conversations, appears the plan will be to discharge to [facility] with IV abx and wife can request hospice referral before discharge from [facility] or in home evaluation if later wanted. Pt was noticed to have necrotic tissue to left foot/toes, podiatry consulted.. pt with hx of PAD with balloon angioplasty in the past. Xrays reveal osteomyelitis, unlikely that he is a surgical candidate for hallux amputation. Awaiting podiatry follow up.     1. MRSA Bacteremia; ASHELY neg for vegetations however showed thrombus on the pace maker leads. Patient had persistent bacteremia despite adequate antibiotic therapy with vancomycin. Concern for infected pacemaker. Cardiology, ID and EP consulted regarding pace maker extraction  - blood cultures from 11/6, now 8/8 growing MRSA  - repeat blood cultures sent 11/10 - negative for growth. .   - wound cultures from knee + MRSA.   - continue to follow up daily WBC and fever curve - has remained afebrile and without leukocytosis.  - continue Daptomycin 550mg daily as per ID for about 6 weeks. Will need PICC line. Order has been placed, surgery team has been contacted..    - family decided against PPM/AICD extraction surgery today after discussing with EP and Dr. Hanna risks vs benefits. Family now wishing to pursue palliative care for patient comfort, plan is likely DC To [facility].   - defibrillator was prematurely deactivated, reactivated yesterday until a more definitive care plan/advanced directives are established. Pt currently is still a full code    2. Thrombus on pacemaker leads; unsure if these are infected;   - Cardiology and CT surgery following.  - CT surgery was planning PPM extraction in OR. Family has decided against it.    - Still unclear if thrombus on leads is infected.  - Trial with Daptomycin for 6 weeks since no surgery is planned and blood cultures have returned negative.   - Will need anticoagulation. As per cardiology, recommends Eliquis 2.5 BID on discharge     3. Hypernatremia/hyperchloremia: 2/2 IVF  - Patient unable to tolerate liquids as per speech  - Changed IVF to D5W, am Na 142.   - Concern is that pt is unable to tolerate fluids/liquid and therefore will need some intervention in order for patient to receive fluids long term. GI was consulted for evaluation for possible peg.   - Speech attempted nectar thickened liquids which patient had trouble with; family says they use honey thickened liquids.. family instructed to bring in pts food for trial here with family present.      4. Necrotic appearing first toe of the left foot  - podiatry and vascular consulted  - history of PAD in both legs with balloon angioplasty in the past - unlikely that pt is a surgical candidate for hallux amputation.. baseline xrays reveal concern for osteomyelitis. Unable to obtain MRI given pacemaker/AICD.   - follow up podiatry plan    5.  Nonsustained Vtach - resolved; tele showed sinus rhythm with PACs  - Cardiology following.  - Metoprolol dose increased to 50mg BID on 11/9.  - Cardiac monitor discontinued on 11/11    6. Peripheral Vascular Disease:  - Necrotic appearing distal left great toe.  - Left Lateral malleolus ulcer.  - Patient is not a candidate for MRI because of pre-2011 AICD.  - Will continue to monitor.   - Podiatry consult appreciated, see plan above.     7.  Fecal impaction  - stool softeners and bowel regimen  - continue to monitor for BM.    8. Fall:  - may be related to deconditioning and sepsis  - out of bed with assistance to the chair.   - PT recommends [facility]. SW aware.    9. Dysphagia  - Evaluated by speech and swallow, recommends dysphagia diet:  puree NO Liquids;   - will request GI eval for evaluation of possible peg for fluid supplementation     10. DM- uncontrolled  - Diabetic diet and ISS  - Lantus increased from 26 to 28 units recently, BG better controlled   - Continue to monitor glucose.     11. CAD  - Hx of CABG  - On statin, aspirin, and beta blocker    12 HTN  - continue to monitor BP.   - adjust meds as needed     13 Stroke history  - on aspirin and statin; no new focal symptomatology; head CT negative.    14. Prophylactic Measures  - heparin sq BID for DVT prophylaxis  - full code  - case discussed with wife.    15. Palliative encounter  - GOC discussed briefly with family/pt. Appears plan will be for discharge to [facility] with IV abx. Family/pt may contact hospice/request hospice eval prior to discharge from [facility] or at home.     DISPO: Need PICC for IV abx.. surgical team to place PICC. Plan for Daptomycin for 6 weeks.  PT recommends [facility] on discharge. Podiatry plan 67 y/o man with PMH of HTN, DM2, CVA and CAD s/p CABG and PM, was admitted with high fever, generalized weakness and vomiting for the past few days.. Admitted with  Sepsis 2/2 GPC bacteremia; source likely from skin. +Blood cultures with MRSA 8/8 bottles. Started on IV abx as per ID.  Seen by speech and swallow, recommended puree diet with no liquids. Cardio consulted for ASHELY - done 11/9 and neg for vegetation. + Thrombus noted on PM leads.. questionable PM infection though not definitive source of infection. ID recommended pacemaker to be removed however pts family decided against it.  EP and Cardio are following the case.. Pacemaker/AICD was interrogated, prematurely shut off as per cardio yesterday.. pt is still a full code with no DNR/DNI or MOLST form in chart. EP called to turn PPM/AICD back on until further discussions are held with family and a more definitive care plan is established. GOC discussions have been held with family/palliative, no definitive care plan as family is very hesitant to make a decision. From the conversations, appears the plan will be to discharge to [facility] with IV abx and wife can request hospice referral before discharge from [facility] or in home evaluation if later wanted. Will need to delineate long term wishes. Pt was noticed to have necrotic tissue to left foot/toes, podiatry consulted.. pt with hx of PAD with balloon angioplasty in the past. Xrays reveal osteomyelitis, unlikely that he is a surgical candidate for hallux amputation. Awaiting podiatry follow up.     1. MRSA Bacteremia; ASHELY neg for vegetations however showed thrombus on the pace maker leads. Patient had persistent bacteremia despite adequate antibiotic therapy with vancomycin. Concern for infected pacemaker. Cardiology, ID and EP consulted regarding pace maker extraction  - blood cultures from 11/6, now 8/8 growing MRSA  - repeat blood cultures sent 11/10 - negative for growth. .   - wound cultures from knee + MRSA.   - continue to follow up daily WBC and fever curve - has remained afebrile and without leukocytosis.  - continue Daptomycin 550mg daily as per ID for about 6 weeks. Will need PICC line. Order has been placed, surgery team has been contacted..    - family decided against PPM/AICD extraction surgery today after discussing with EP and Dr. Hanna risks vs benefits. Family now wishing to pursue palliative care for patient comfort, plan is likely DC To [facility].     2. Thrombus on pacemaker leads; unsure if these are infected;   - Cardiology and CT surgery following.  - CT surgery was planning PPM extraction in OR. Family has decided against it.    - Still unclear if thrombus on leads is infected.  - Trial with Daptomycin for 6 weeks since no surgery is planned and blood cultures have returned negative.   - Will need anticoagulation. Currently getting heparin for DVT prophylaxis. As per cardiology, recommends Eliquis 2.5 BID on discharge     3. Hypernatremia/hyperchloremia: 2/2 IVF  - Patient unable to tolerate liquids as per speech  - Changed IVF to D5W, am Na 142.   - Concern is that pt is unable to tolerate fluids/liquid and therefore will need some intervention in order for patient to receive fluids long term. GI was consulted for evaluation for possible peg.   - Speech attempted nectar thickened liquids which patient had trouble with; family says they use honey thickened liquids.. family instructed to bring in pts food for trial here with family present.  Will follow up    4. Necrotic appearing first toe of the left foot  - podiatry and vascular consulted  - history of PAD in both legs with balloon angioplasty in the past - unlikely that pt is a surgical candidate for hallux amputation.. baseline xrays reveal concern for osteomyelitis. Unable to obtain MRI given pacemaker/AICD.   - follow up podiatry plan    5.  Nonsustained Vtach - resolved; tele showed sinus rhythm with PACs  - Cardiology following.  - Metoprolol dose increased to 50mg BID on 11/9.  - Cardiac monitor discontinued on 11/11    6. Peripheral Vascular Disease:  - Necrotic appearing distal left great toe.  - Left Lateral malleolus ulcer.  - Patient is not a candidate for MRI because of pre-2011 AICD.  - Will continue to monitor.   - Podiatry consult appreciated, see plan above.     7.  Fecal impaction  - stool softeners and bowel regimen  - continue to monitor for BM.    8. Fall:  - may be related to deconditioning and sepsis  - out of bed with assistance to the chair.   - PT recommends [facility]. SW aware.    9. Dysphagia  - Evaluated by speech and swallow, recommends dysphagia diet:  puree NO Liquids;   - will request GI eval for evaluation of possible peg for fluid supplementation     10. DM- uncontrolled  - Diabetic diet and ISS  - Lantus increased from 26 to 28 units recently, BG better controlled   - Continue to monitor glucose.     11. CAD  - Hx of CABG  - On statin, aspirin, and beta blocker    12 HTN  - continue to monitor BP.   - adjust meds as needed     13 Stroke history  - on aspirin and statin; no new focal symptomatology; head CT negative.    14. Prophylactic Measures  - heparin sq BID for DVT prophylaxis  - full code  - case discussed with wife.    15. Palliative encounter  - GOC discussed briefly with family/pt. Pt remains a full code.  Will need to delineate long term wishes. Appears plan will be for discharge to [facility] with IV abx. Family/pt may contact hospice/request hospice eval prior to discharge from [facility] or at home.     DISPO: Need PICC for IV abx.. surgical team to place PICC. Plan for Daptomycin for 6 weeks.  PT recommends [facility] on discharge. Podiatry plan 69 y/o man with PMH of HTN, DM2, CVA and CAD s/p CABG and PM, was admitted with high fever, generalized weakness and vomiting for the past few days.. Admitted with  Sepsis 2/2 GPC bacteremia; source likely from skin. +Blood cultures with MRSA 8/8 bottles. Started on IV abx as per ID.  Seen by speech and swallow, recommended puree diet with no liquids. Cardio consulted for ASHELY - done 11/9 and neg for vegetation. + Thrombus noted on PM leads.. questionable PM infection though not definitive source of infection. ID recommended pacemaker to be removed however pts family decided against it.  EP and Cardio are following the case.. Pacemaker/AICD was interrogated, prematurely shut off as per cardio yesterday.. pt is still a full code with no DNR/DNI or MOLST form in chart. EP called to turn PPM/AICD back on until further discussions are held with family and a more definitive care plan is established. GOC discussions have been held with family/palliative, no definitive care plan as family is very hesitant to make a decision. From the conversations, appears the plan will be to discharge to [facility] with IV abx and wife can request hospice referral before discharge from [facility] or in home evaluation if later wanted. Will need to delineate long term wishes. Pt was noticed to have necrotic tissue to left foot/toes, podiatry consulted.. pt with hx of PAD with balloon angioplasty in the past. Xrays reveal osteomyelitis, unlikely that he is a surgical candidate for hallux amputation. Awaiting podiatry follow up.     1. MRSA Bacteremia; ASHELY neg for vegetations however showed thrombus on the pace maker leads. Patient had persistent bacteremia despite adequate antibiotic therapy with vancomycin. Concern for infected pacemaker. Cardiology, ID and EP consulted regarding pace maker extraction  - blood cultures from 11/6, now 8/8 growing MRSA  - repeat blood cultures sent 11/10 - negative for growth. .   - wound cultures from knee + MRSA.   - continue to follow up daily WBC and fever curve - has remained afebrile and without leukocytosis.  - continue Daptomycin 550mg daily as per ID for about 6 weeks. Will need PICC line. Order has been placed, surgery team has been contacted..    - family decided against PPM/AICD extraction surgery today after discussing with EP and Dr. Hanna risks vs benefits. Family now wishing to pursue palliative care for patient comfort, plan is likely DC To [facility].     2. Thrombus on pacemaker leads; unsure if these are infected;   - Cardiology and CT surgery following.  - CT surgery was planning PPM extraction in OR. Family has decided against it.    - Still unclear if thrombus on leads is infected.  - Trial with Daptomycin for 6 weeks since no surgery is planned and blood cultures have returned negative.   - Will need anticoagulation. Currently getting heparin for DVT prophylaxis. As per cardiology, recommends Eliquis 2.5 BID on discharge     3. Hypernatremia/hyperchloremia: 2/2 IVF  - Patient unable to tolerate liquids as per speech  - Changed IVF to D5W, am Na 142.   - Concern is that pt is unable to tolerate fluids/liquid and therefore will need some intervention in order for patient to receive fluids long term. GI was consulted for evaluation for possible peg.   - Speech attempted nectar thickened liquids which patient had trouble with; family says they use honey thickened liquids.. family instructed to bring in pts food for trial here with family present.  Will follow up    4. Necrotic appearing first toe of the left foot  - podiatry and vascular consulted  - history of PAD in both legs with balloon angioplasty in the past - unlikely that pt is a surgical candidate for hallux amputation.. baseline xrays reveal concern for osteomyelitis. Unable to obtain MRI given pacemaker/AICD.   - follow up podiatry plan    5.  Nonsustained Vtach - resolved; tele showed sinus rhythm with PACs  - Cardiology following.  - Metoprolol dose increased to 50mg BID on 11/9.  - Cardiac monitor discontinued on 11/11    6. Peripheral Vascular Disease:  - Necrotic appearing distal left great toe.  - Left Lateral malleolus ulcer.  - Patient is not a candidate for MRI because of pre-2011 AICD.  - Will continue to monitor.   - Podiatry consult appreciated, see plan above.     7.  Fecal impaction  - stool softeners and bowel regimen  - continue to monitor for BM.    8. Fall:  - may be related to deconditioning and sepsis  - out of bed with assistance to the chair.   - PT recommends [facility]. SW aware.    9. Dysphagia  - Evaluated by speech and swallow, recommends dysphagia diet:  puree NO Liquids  - will request repeat bedside swallow exam to see if honey thick liquids are tolerated  - will request GI eval for evaluation of possible peg for fluid supplementation given risk of dehydration if no liquids     10. DM- uncontrolled  - Diabetic diet and ISS  - Lantus increased from 26 to 28 units recently, BG better controlled   - Continue to monitor glucose.     11. CAD  - Hx of CABG  - On statin, aspirin, and beta blocker    12 HTN  - continue to monitor BP.   - adjust meds as needed     13 Stroke history  - on aspirin and statin; no new focal symptomatology; head CT negative.    14. Prophylactic Measures  - heparin sq BID for DVT prophylaxis  - full code  - case discussed with wife.    15. Palliative encounter  - GOC discussed briefly with family/pt. Pt remains a full code.  Will need to delineate long term wishes. Appears plan will be for discharge to [facility] with IV abx. Family/pt may contact hospice/request hospice eval prior to discharge from [facility] or at home.     DISPO: Need PICC for IV abx.. surgical team to place PICC. Plan for Daptomycin for 6 weeks.  PT recommends [facility] on discharge. Podiatry plan 67 y/o man with PMH of HTN, DM2, CVA and CAD s/p CABG and PM, was admitted with high fever, generalized weakness and vomiting for the past few days.. Admitted with  Sepsis 2/2 GPC bacteremia; source likely from skin. +Blood cultures with MRSA 8/8 bottles. Started on IV abx as per ID.  Seen by speech and swallow, recommended puree diet with no liquids. Cardio consulted for ASHELY - done 11/9 and neg for vegetation. + Thrombus noted on PM leads.. questionable PM infection though not definitive source of infection. ID recommended pacemaker to be removed however pts family decided against it.  EP and Cardio are following the case.. Pacemaker/AICD was interrogated, prematurely shut off as per cardio yesterday, turned back on. GOC discussion to be held with palliative and family today, need to delineate long term goals. Pt was noticed to have necrotic tissue to left foot/toes, podiatry consulted.. pt with hx of PAD with balloon angioplasty in the past. Xrays reveal osteomyelitis, unlikely that he is a surgical candidate for hallux amputation. Awaiting podiatry follow up.     1. MRSA Bacteremia; ASHELY neg for vegetations however showed thrombus on the pace maker leads. Patient had persistent bacteremia despite adequate antibiotic therapy with vancomycin. Concern for infected pacemaker. Cardiology, ID and EP consulted regarding pace maker extraction  - blood cultures from 11/6, now 8/8 growing MRSA  - repeat blood cultures sent 11/10 - negative for growth. .   - wound cultures from knee + MRSA.   - continue to follow up daily WBC and fever curve - has remained afebrile and without leukocytosis.  - continue Daptomycin 550mg daily as per ID for about 6 weeks. Will need PICC line. Order has been placed, surgery team has been contacted..    - family decided against PPM/AICD extraction surgery today after discussing with EP and Dr. Hanna risks vs benefits. GOC discussion today to follow     2. Thrombus on pacemaker leads; unsure if these are infected;   - Cardiology and CT surgery following.  - CT surgery was planning PPM extraction in OR. Family has decided against it.    - Still unclear if thrombus on leads is infected.  - Trial with Daptomycin for 6 weeks since no surgery is planned and blood cultures have returned negative.   - Will need anticoagulation. Currently getting heparin for DVT prophylaxis. As per cardiology, recommends Eliquis 2.5 BID on discharge     3. Hypernatremia/hyperchloremia: 2/2 IVF  - Patient unable to tolerate liquids as per speech  - Changed IVF to D5W, am Na 142.   - Concern is that pt is unable to tolerate fluids/liquid and therefore will need some intervention in order for patient to receive fluids long term. GI was consulted for evaluation for possible peg.   - Speech attempted nectar thickened liquids which patient had trouble with; family says they use honey thickened liquids.. family instructed to bring in pts food for trial here with family present.  Will follow up    4. Necrotic appearing first toe of the left foot  - podiatry and vascular consulted  - history of PAD in both legs with balloon angioplasty in the past - unlikely that pt is a surgical candidate for hallux amputation.. baseline xrays reveal concern for osteomyelitis. Unable to obtain MRI given pacemaker/AICD.   - follow up podiatry plan    5.  Nonsustained Vtach - resolved; tele showed sinus rhythm with PACs  - Cardiology following.  - Metoprolol dose increased to 50mg BID on 11/9.  - Cardiac monitor discontinued on 11/11    6. Peripheral Vascular Disease:  - Necrotic appearing distal left great toe.  - Left Lateral malleolus ulcer.  - Patient is not a candidate for MRI because of pre-2011 AICD.  - Will continue to monitor.   - Podiatry consult appreciated, see plan above.     7.  Fecal impaction  - stool softeners and bowel regimen  - continue to monitor for BM.    8. Fall:  - may be related to deconditioning and sepsis  - out of bed with assistance to the chair.   - PT recommends [facility]. SW aware.    9. Dysphagia  - Evaluated by speech and swallow, recommends dysphagia diet:  puree NO Liquids  - will request repeat bedside swallow exam to see if honey thick liquids are tolerated  - will request GI eval for evaluation of possible peg for fluid supplementation given risk of dehydration if no liquids     10. DM- uncontrolled  - Diabetic diet and ISS  - Lantus increased from 26 to 28 units recently, BG better controlled   - Continue to monitor glucose.     11. CAD  - Hx of CABG  - On statin, aspirin, and beta blocker    12 HTN  - continue to monitor BP.   - adjust meds as needed     13 Stroke history  - on aspirin and statin; no new focal symptomatology; head CT negative.    14. Prophylactic Measures  - heparin sq BID for DVT prophylaxis  - full code  - case discussed with wife.    15. Palliative encounter  - C discussion to be held today with family/palliatve team.     DISPO: Need PICC for IV abx.. surgical team to place PICC. Plan for Daptomycin for 6 weeks.  PT recommends [facility] on discharge. Podiatry plan 67 y/o man with PMH of HTN, DM2, CVA and CAD s/p CABG and PM, was admitted with high fever, generalized weakness and vomiting for the past few days.. Admitted with  Sepsis 2/2 GPC bacteremia; source likely from skin. +Blood cultures with MRSA 8/8 bottles. Started on IV abx as per ID.  Seen by speech and swallow, recommended puree diet with no liquids. Cardio consulted for ASHELY - done 11/9 and neg for vegetation. + Thrombus noted on PM leads.. questionable PM infection though not definitive source of infection. ID recommended pacemaker to be removed however pts family decided against it.  EP and Cardio are following the case.. Pacemaker/AICD was interrogated, prematurely shut off as per cardio yesterday, turned back on. GOC discussion to be held with palliative and family today, need to delineate long term goals. Pt was noticed to have necrotic tissue to left foot/toes, podiatry consulted.. pt with hx of PAD with balloon angioplasty in the past. Xrays reveal osteomyelitis, unlikely that he is a surgical candidate for hallux amputation. Awaiting podiatry follow up.     1. MRSA Bacteremia; ASHELY neg for vegetations however showed thrombus on the pace maker leads. Patient had persistent bacteremia despite adequate antibiotic therapy with vancomycin. Concern for infected pacemaker. Cardiology, ID and EP consulted regarding pace maker extraction; at this time will not remove per family wishes; will continue with IV antibiotics  - repeat blood cultures sent 11/10 - negative for growth. .   - continue Daptomycin 550mg daily as per ID for about 6 weeks.   - PICC line placed.  - family decided against PPM/AICD extraction surgery today after discussing with EP and Dr. Hanna risks vs benefits.    2. Thrombus on pacemaker leads; unsure if these are infected;   - Trial with Daptomycin for 6 weeks since no surgery is planned and blood cultures have returned negative.   - Will need anticoagulation. Currently getting heparin for DVT prophylaxis. As per cardiology, recommends Eliquis 2.5 BID on discharge     3. Hypernatremia/hyperchloremia: 2/2 IVF  - continue with D5W at 100cc/hour - follow sodium  - holding liquids for now    4. Necrotic appearing first toe of the left foot; now underlying osteomyelitis identified  - podiatry and vascular consulted  - history of PAD in both legs with balloon angioplasty in the past  - podiatry called to review images    5.  Nonsustained Vtach - resolved; tele showed sinus rhythm with PACs  - Cardiology following.  - Metoprolol dose increased to 50mg BID on 11/9.  - Cardiac monitor discontinued on 11/11    6. Peripheral Vascular Disease:  - Necrotic appearing distal left great toe.  - Left Lateral malleolus ulcer.  - Patient is not a candidate for MRI because of pre-2011 AICD.  - Will continue to monitor.   - Podiatry consult appreciated, see plan above.     7.  Fecal impaction  - resolved    8. Fall:  - may be related to deconditioning and frequent past strokes  - out of bed with assistance to the chair.   - PT recommends MIKE. SW aware.    9. Dysphagia  - Evaluated by speech and swallow, recommends dysphagia diet:  puree NO Liquids  - will request repeat bedside swallow exam to see if honey thick liquids are tolerated  - will request GI eval for evaluation of possible peg for fluid supplementation given risk of dehydration if no liquids     10. DM- uncontrolled  - Diabetic diet and ISS  - Lantus increased from 26 to 28 units recently, BG better controlled   - Continue to monitor glucose.     11. CAD  - Hx of CABG  - On statin, aspirin, and beta blocker    12 HTN  - continue to monitor BP.   - adjust meds as needed     13 Stroke history  - on aspirin and statin; no new focal symptomatology; head CT negative.    14. Prophylactic Measures  - heparin sq BID for DVT prophylaxis  - full code  - case discussed with wife.    15. Palliative encounter  - GOC discussion to be held today with family/palliatve team.     DISPO: Need PICC for IV abx.. surgical team to place PICC. Plan for Daptomycin for 6 weeks.  PT recommends MIKE on discharge. Podiatry plan

## 2018-11-16 NOTE — PROGRESS NOTE ADULT - SUBJECTIVE AND OBJECTIVE BOX
CC: MRSA bacteremia    INTERVAL HPI:  Patient was seen and examined by PA and PA student. Case discussed with attending.  Laying comfortably in bed.  Patient awake during exam, not interactive, able to tell me name,  and where he is.   VSS.     Vital Signs Last 24 Hrs  T(C): 36.6 (2018 12:00), Max: 36.9 (2018 05:07)  T(F): 97.9 (2018 12:00), Max: 98.5 (2018 05:07)  HR: 63 (2018 12:00) (62 - 70)  BP: 115/61 (2018 12:00) (103/58 - 152/77)  BP(mean): --  RR: 18 (2018 12:00) (16 - 18)  SpO2: 95% (15 Nov 2018 23:25) (95% - 95%    PHYSICAL EXAM:  GENERAL: No acute distress.  NECK: Supple, No JVD, no cervical lymphadenopathy.   CHEST/LUNG: Clear to auscultation b/l. No rales, rhonchi or wheezing.   HEART: Regular rate and rhythm. Normal S1S2. No murmurs, gallops, or rubs.   ABDOMEN: Soft, nontender, nondistended. Normal bowel sounds in all 4 quadrants.   EXTREMITIES:  1+ radial and DP pulses noted, no clubbing, cyanosis, or edema noted;  FROM x 4. Shiny atrophic appearance on shins b/l with no hair. Necrotic appearing tissue on distal tip of the great toe. Multiple scabbed areas to multiple digits of the foot and ankles bilaterally.   SKIN: + multiple tattoos throughout; scattered skin abrasions from fall at home that have scabbing in different stages.  PSYCH: insight/judgement unable to assess.    LABS:                        11.3   8.3   )-----------( 311      ( 15 Nov 2018 07:20 )             35.6     11-16    142  |  111<H>  |  33.0<H>  ----------------------------<  199<H>  3.7   |  21.0<L>  |  1.82<H>    Ca    8.2<L>      2018 08:49  Phos  2.8     11-15  Mg     2.1     11-15

## 2018-11-16 NOTE — CONSULT NOTE ADULT - SUBJECTIVE AND OBJECTIVE BOX
Patient is a 68y old  Male who presents with a chief complaint of Vomiting and generalized weakness. (16 Nov 2018 13:17)      HPI:  Patient unable to provide any history, all the information taken from the wife and ER notes    This is a 68years old male with PMH of HTN,PM, AICD, PTCA, CM  DM, CVA and CAD brought in to the Er for the evaluation of generalized weakness and vomiting.  Head Ct negative. Recent TIA in 9/2018. Pt found to have MRSA in blood. ECh showed thrombus/ vegetation in on the pacemaker lead. Family does not want lead extraction. Afebrile.  His baseline diet was soft mechanical with honey thick liquid.  11/7 bedside swallow suggested pureed with no liquid. Now blood cultures negative on 11/13, 11/12 and 11/10.      Pt states he is having no dysphagia with pureed diet.        REVIEW OF SYSTEMS:  Constitutional: No fever, weight loss or fatigue  ENMT:  No difficulty hearing, tinnitus, vertigo; No sinus or throat pain  Respiratory: No cough, wheezing, chills or hemoptysis  Cardiovascular: No chest pain, palpitations, dizziness or leg swelling  Gastrointestinal: No abdominal or epigastric pain. No nausea, vomiting or hematemesis; No diarrhea or constipation. No melena or hematochezia.  Skin: No itching, burning, rashes or lesions   Musculoskeletal: No joint pain or swelling; No muscle, back or extremity pain    PAST MEDICAL & SURGICAL HISTORY:  CVA (cerebral vascular accident)  Diabetic neuropathy  DM (diabetes mellitus)  Hyperlipidemia  AICD (automatic cardioverter/defibrillator) present  Pacemaker  Stented coronary artery  HTN (hypertension)  Cardiac pacemaker      FAMILY HISTORY:  No pertinent family history in first degree relatives      SOCIAL HISTORY:  Smoking Status: [ ] Current, [ ] Former, [ ] Never  Pack Years:  [  ] EtOH-no  [  ] IVDA    MEDICATIONS:  MEDICATIONS  (STANDING):  aspirin  chewable 81 milliGRAM(s) Oral daily  atorvastatin 20 milliGRAM(s) Oral at bedtime  chlorhexidine 2% Cloths 1 Application(s) Topical <User Schedule>  clopidogrel Tablet 75 milliGRAM(s) Oral daily  DAPTOmycin IVPB 550 milliGRAM(s) IV Intermittent every 24 hours  dextrose 5%. 1000 milliLiter(s) (100 mL/Hr) IV Continuous <Continuous>  dextrose 5%. 1000 milliLiter(s) (50 mL/Hr) IV Continuous <Continuous>  dextrose 50% Injectable 12.5 Gram(s) IV Push once  dextrose 50% Injectable 25 Gram(s) IV Push once  dextrose 50% Injectable 25 Gram(s) IV Push once  docusate sodium 100 milliGRAM(s) Oral three times a day  heparin  Injectable 5000 Unit(s) SubCutaneous every 12 hours  hydrALAZINE 50 milliGRAM(s) Oral every 8 hours  insulin glargine Injectable (LANTUS) 28 Unit(s) SubCutaneous at bedtime  insulin lispro (HumaLOG) corrective regimen sliding scale   SubCutaneous three times a day before meals  metoprolol tartrate 50 milliGRAM(s) Oral two times a day  saccharomyces boulardii 250 milliGRAM(s) Oral two times a day  sertraline 100 milliGRAM(s) Oral daily    MEDICATIONS  (PRN):  dextrose 40% Gel 15 Gram(s) Oral once PRN Blood Glucose LESS THAN 70 milliGRAM(s)/deciliter  glucagon  Injectable 1 milliGRAM(s) IntraMuscular once PRN Glucose LESS THAN 70 milligrams/deciliter  polyethylene glycol 3350 17 Gram(s) Oral daily PRN Constipation      Allergies    No Known Allergies    Intolerances        Vital Signs Last 24 Hrs  T(C): 36.6 (16 Nov 2018 12:00), Max: 36.9 (16 Nov 2018 05:07)  T(F): 97.9 (16 Nov 2018 12:00), Max: 98.5 (16 Nov 2018 05:07)  HR: 66 (16 Nov 2018 14:10) (62 - 70)  BP: 107/52 (16 Nov 2018 14:10) (103/58 - 152/77)  BP(mean): --  RR: 18 (16 Nov 2018 12:00) (16 - 18)  SpO2: 95% (15 Nov 2018 23:25) (95% - 95%)    11-15 @ 07:01  -  11-16 @ 07:00  --------------------------------------------------------  IN: 1500 mL / OUT: 0 mL / NET: 1500 mL          PHYSICAL EXAM:    General: Well developed; well nourished; in no acute distress  HEENT: MMM, conjunctiva and sclera clear  R- RRR  L- CTA  Gastrointestinal: Soft, non-tender non-distended; Normal bowel sounds; No rebound or guarding- no surgical scars noted  Extremities: Normal range of motion, No clubbing, cyanosis or edema  Neurological: Alert and oriented x3  Skin: Warm and dry. No obvious rash      LABS:                        11.3   8.3   )-----------( 311      ( 15 Nov 2018 07:20 )             35.6     16 Nov 2018 08:49    142    |  111    |  33.0   ----------------------------<  199    3.7     |  21.0   |  1.82     Ca    8.2        16 Nov 2018 08:49  Phos  2.8       15 Nov 2018 07:20  Mg     2.1       15 Nov 2018 07:20                RADIOLOGY & ADDITIONAL STUDIES:

## 2018-11-17 LAB
CULTURE RESULTS: SIGNIFICANT CHANGE UP
CULTURE RESULTS: SIGNIFICANT CHANGE UP
GLUCOSE BLDC GLUCOMTR-MCNC: 116 MG/DL — HIGH (ref 70–99)
GLUCOSE BLDC GLUCOMTR-MCNC: 158 MG/DL — HIGH (ref 70–99)
GLUCOSE BLDC GLUCOMTR-MCNC: 172 MG/DL — HIGH (ref 70–99)
GLUCOSE BLDC GLUCOMTR-MCNC: 225 MG/DL — HIGH (ref 70–99)
SPECIMEN SOURCE: SIGNIFICANT CHANGE UP
SPECIMEN SOURCE: SIGNIFICANT CHANGE UP

## 2018-11-17 PROCEDURE — 99233 SBSQ HOSP IP/OBS HIGH 50: CPT

## 2018-11-17 PROCEDURE — 71045 X-RAY EXAM CHEST 1 VIEW: CPT | Mod: 26

## 2018-11-17 PROCEDURE — 99232 SBSQ HOSP IP/OBS MODERATE 35: CPT

## 2018-11-17 RX ADMIN — Medication 81 MILLIGRAM(S): at 12:08

## 2018-11-17 RX ADMIN — Medication 1: at 12:08

## 2018-11-17 RX ADMIN — Medication 1: at 08:22

## 2018-11-17 RX ADMIN — HEPARIN SODIUM 5000 UNIT(S): 5000 INJECTION INTRAVENOUS; SUBCUTANEOUS at 05:41

## 2018-11-17 RX ADMIN — INSULIN GLARGINE 28 UNIT(S): 100 INJECTION, SOLUTION SUBCUTANEOUS at 21:22

## 2018-11-17 RX ADMIN — Medication 250 MILLIGRAM(S): at 05:40

## 2018-11-17 RX ADMIN — Medication 250 MILLIGRAM(S): at 18:08

## 2018-11-17 RX ADMIN — Medication 100 MILLIGRAM(S): at 13:31

## 2018-11-17 RX ADMIN — CHLORHEXIDINE GLUCONATE 1 APPLICATION(S): 213 SOLUTION TOPICAL at 05:40

## 2018-11-17 RX ADMIN — SODIUM CHLORIDE 100 MILLILITER(S): 9 INJECTION, SOLUTION INTRAVENOUS at 05:40

## 2018-11-17 RX ADMIN — Medication 50 MILLIGRAM(S): at 18:09

## 2018-11-17 RX ADMIN — Medication 50 MILLIGRAM(S): at 21:22

## 2018-11-17 RX ADMIN — SODIUM CHLORIDE 100 MILLILITER(S): 9 INJECTION, SOLUTION INTRAVENOUS at 21:22

## 2018-11-17 RX ADMIN — Medication 100 MILLIGRAM(S): at 21:22

## 2018-11-17 RX ADMIN — CLOPIDOGREL BISULFATE 75 MILLIGRAM(S): 75 TABLET, FILM COATED ORAL at 12:08

## 2018-11-17 RX ADMIN — Medication 50 MILLIGRAM(S): at 05:40

## 2018-11-17 RX ADMIN — HEPARIN SODIUM 5000 UNIT(S): 5000 INJECTION INTRAVENOUS; SUBCUTANEOUS at 18:07

## 2018-11-17 RX ADMIN — SERTRALINE 100 MILLIGRAM(S): 25 TABLET, FILM COATED ORAL at 12:08

## 2018-11-17 RX ADMIN — Medication 250 MILLIGRAM(S): at 18:07

## 2018-11-17 RX ADMIN — SODIUM CHLORIDE 100 MILLILITER(S): 9 INJECTION, SOLUTION INTRAVENOUS at 10:37

## 2018-11-17 RX ADMIN — Medication 2: at 17:49

## 2018-11-17 RX ADMIN — Medication 100 MILLIGRAM(S): at 05:40

## 2018-11-17 RX ADMIN — DAPTOMYCIN 122 MILLIGRAM(S): 500 INJECTION, POWDER, LYOPHILIZED, FOR SOLUTION INTRAVENOUS at 18:05

## 2018-11-17 RX ADMIN — ATORVASTATIN CALCIUM 20 MILLIGRAM(S): 80 TABLET, FILM COATED ORAL at 21:22

## 2018-11-17 NOTE — SWALLOW BEDSIDE ASSESSMENT ADULT - ORAL PHASE
suspect premature spillage of bolus/Delayed oral transit time/Decreased anterior-posterior movement of the bolus +oral holding/Delayed oral transit time/Decreased anterior-posterior movement of the bolus Decreased anterior-posterior movement of the bolus/Delayed oral transit time/+intermittent oral holding

## 2018-11-17 NOTE — PROGRESS NOTE ADULT - SUBJECTIVE AND OBJECTIVE BOX
Patient is a 68y old  Male who presents with a chief complaint of Vomiting and generalized weakness. (16 Nov 2018 15:55)      HPI:  Patient unable to provide any history, all the information taken from the wife and ER notes    This is a 68years old male with PMH of HTN, DM, CVA and CAD brought in to the Er for the evaluation of generalized weakness . Patient is awake and alter, feeding himself breakfast, not complaining of dysphagia.           REVIEW OF SYSTEMS:  Constitutional: No fever, weight loss or fatigue  ENMT:  No difficulty hearing, tinnitus, vertigo; No sinus or throat pain  Respiratory: No cough, wheezing, chills or hemoptysis  Cardiovascular: No chest pain, palpitations, dizziness or leg swelling  Gastrointestinal: No abdominal or epigastric pain. No nausea, vomiting or hematemesis; No diarrhea or constipation. No melena or hematochezia.  Skin: No itching, burning, rashes or lesions   Musculoskeletal: No joint pain or swelling; No muscle, back or extremity pain    PAST MEDICAL & SURGICAL HISTORY:  CVA (cerebral vascular accident)  Diabetic neuropathy  DM (diabetes mellitus)  Hyperlipidemia  AICD (automatic cardioverter/defibrillator) present  Pacemaker  Stented coronary artery  HTN (hypertension)  Cardiac pacemaker      FAMILY HISTORY:  No pertinent family history in first degree relatives      SOCIAL HISTORY:  Smoking Status: [ ] Current, [ ] Former, [ ] Never  Pack Years:  [  ] EtOH-no  [  ] IVDA-no    MEDICATIONS:  MEDICATIONS  (STANDING):  aspirin  chewable 81 milliGRAM(s) Oral daily  atorvastatin 20 milliGRAM(s) Oral at bedtime  chlorhexidine 2% Cloths 1 Application(s) Topical <User Schedule>  ciprofloxacin     Tablet 250 milliGRAM(s) Oral every 12 hours  clopidogrel Tablet 75 milliGRAM(s) Oral daily  DAPTOmycin IVPB 550 milliGRAM(s) IV Intermittent every 24 hours  dextrose 5%. 1000 milliLiter(s) (100 mL/Hr) IV Continuous <Continuous>  dextrose 5%. 1000 milliLiter(s) (50 mL/Hr) IV Continuous <Continuous>  dextrose 50% Injectable 12.5 Gram(s) IV Push once  dextrose 50% Injectable 25 Gram(s) IV Push once  dextrose 50% Injectable 25 Gram(s) IV Push once  docusate sodium 100 milliGRAM(s) Oral three times a day  heparin  Injectable 5000 Unit(s) SubCutaneous every 12 hours  hydrALAZINE 50 milliGRAM(s) Oral every 8 hours  insulin glargine Injectable (LANTUS) 28 Unit(s) SubCutaneous at bedtime  insulin lispro (HumaLOG) corrective regimen sliding scale   SubCutaneous three times a day before meals  metoprolol tartrate 50 milliGRAM(s) Oral two times a day  saccharomyces boulardii 250 milliGRAM(s) Oral two times a day  sertraline 100 milliGRAM(s) Oral daily    MEDICATIONS  (PRN):  dextrose 40% Gel 15 Gram(s) Oral once PRN Blood Glucose LESS THAN 70 milliGRAM(s)/deciliter  glucagon  Injectable 1 milliGRAM(s) IntraMuscular once PRN Glucose LESS THAN 70 milligrams/deciliter  polyethylene glycol 3350 17 Gram(s) Oral daily PRN Constipation      Allergies    No Known Allergies    Intolerances        Vital Signs Last 24 Hrs  T(C): 36.8 (17 Nov 2018 04:48), Max: 37.2 (16 Nov 2018 17:15)  T(F): 98.2 (17 Nov 2018 04:48), Max: 98.9 (16 Nov 2018 17:15)  HR: 65 (17 Nov 2018 04:48) (63 - 71)  BP: 123/65 (17 Nov 2018 04:48) (105/60 - 123/65)  BP(mean): --  RR: 18 (17 Nov 2018 04:48) (18 - 18)  SpO2: 97% (17 Nov 2018 04:48) (95% - 97%)    11-16 @ 07:01  -  11-17 @ 07:00  --------------------------------------------------------  IN: 2800 mL / OUT: 0 mL / NET: 2800 mL          PHYSICAL EXAM:    General: Well developed; well nourished; in no acute distress  HEENT: MMM, conjunctiva and sclera clear  R- RRR  L- CTA  Gastrointestinal: Soft, non-tender non-distended; Normal bowel sounds; No rebound or guarding  Extremities: Normal range of motion, No clubbing, cyanosis or edema  Neurological: Alert and oriented x3  Skin: Warm and dry. No obvious rash      LABS:    16 Nov 2018 08:49    142    |  111    |  33.0   ----------------------------<  199    3.7     |  21.0   |  1.82     Ca    8.2        16 Nov 2018 08:49                RADIOLOGY & ADDITIONAL STUDIES:

## 2018-11-17 NOTE — SWALLOW BEDSIDE ASSESSMENT ADULT - PHARYNGEAL PHASE
Delayed pharyngeal swallow/no overt s/s aspiration Cough post oral intake/Delayed pharyngeal swallow

## 2018-11-17 NOTE — PROGRESS NOTE ADULT - ASSESSMENT
67 y/o man with PMH of HTN, DM2, CVA and CAD s/p CABG and PM, was admitted with high fever, generalized weakness and vomiting for the past few days.. Admitted with  Sepsis 2/2 GPC bacteremia; source likely from skin. +Blood cultures with MRSA 8/8 bottles. Started on IV abx as per ID.  Seen by speech and swallow, recommended puree diet with no liquids. Cardio consulted for ASHELY - done 11/9 and neg for vegetation. + Thrombus noted on PM leads.. questionable PM infection though not definitive source of infection. ID recommended pacemaker to be removed however pts family decided against it.  EP and Cardio are following the case.. Pacemaker/AICD was interrogated, prematurely shut off as per cardio yesterday, turned back on. GOC discussion to be held with palliative and family today, need to delineate long term goals. Pt was noticed to have necrotic tissue to left foot/toes, podiatry consulted. pt with hx of PAD with balloon angioplasty in the past. Xrays of 1st left toe reveal osteomyelitis, unlikely that he is a surgical candidate for hallux amputation. Awaiting podiatry follow up.     MRSA Bacteremia; ASHELY neg for vegetations however showed thrombus on the pace maker leads. Patient had persistent bacteremia despite adequate antibiotic therapy with vancomycin. Concern for infected pacemaker. Cardiology, ID and EP consulted regarding pace maker extraction; at this time will not remove per family wishes; will continue with IV antibiotics  - repeat blood cultures sent 11/10 - negative for growth.  - continue Daptomycin 550mg daily as per ID for about 6 weeks.  - PICC line placed.  - family decided against PPM/AICD extraction surgery today after discussing with EP and Dr. Hanna risks vs benefits.    Thrombus on pacemaker leads; unsure if these are infected;   - Trial with Daptomycin for 6 weeks since no surgery is planned and blood cultures have returned negative.   - Will need anticoagulation. Currently getting heparin for DVT prophylaxis. As per cardiology, recommends Eliquis 2.5 BID on discharge     Hypernatremia/hyperchloremia: 2/2 IVF  - continue with D5W at 100cc/hour - follow sodium  - holding liquids for now    Necrotic appearing first toe of the left foot; now underlying osteomyelitis identified  - podiatry and vascular consulted  - history of PAD in both legs with balloon angioplasty in the past  - podiatry called to review images    Dysphagia  - Evaluated by speech and swallow, recommends dysphagia diet:  puree NO Liquids  - will request repeat bedside swallow exam to see if honey thick liquids are tolerated  - will request GI eval for evaluation of possible peg for fluid supplementation given risk of dehydration if no liquids    Right sided apical pneumothorax commented on CXR done s/p PICC line placement  - will repeat CXR today  - patient without symptoms this morning  - no change in oxygen requirement.    DM- uncontrolled  - Diabetic diet and ISS  - Lantus increased from 26 to 28 units recently, BG better controlled   - Continue to monitor glucose.     CAD  - Hx of CABG  - On statin, aspirin, and beta blocker    HTN  - continue to monitor BP.   - adjust meds as needed     Stroke history  - on aspirin and statin; no new focal symptomatology; head CT negative.    Prophylactic Measures  - heparin sq BID for DVT prophylaxis  - full code  - case discussed with wife.    Palliative Measures  - GOC discussion yesterday - patient DNR/DNI; family doesn't want PEG which will make placement difficult if they elect to do pleasure feeds and treat MRSA infection with IV abx 67 y/o man with PMH of HTN, DM2, CVA and CAD s/p CABG and PM, was admitted with high fever, generalized weakness and vomiting for the past few days.. Admitted with  Sepsis 2/2 GPC bacteremia; source likely from skin. +Blood cultures with MRSA 8/8 bottles. Started on IV abx as per ID.  Seen by speech and swallow, recommended puree diet with no liquids. Cardio consulted for ASHELY - done 11/9 and neg for vegetation. + Thrombus noted on PM leads.. questionable PM infection though not definitive source of infection. ID recommended pacemaker to be removed however pts family decided against it.  EP and Cardio are following the case.. Pacemaker/AICD was interrogated, prematurely shut off as per cardio yesterday, turned back on. GOC discussion to be held with palliative and family today, need to delineate long term goals. Pt was noticed to have necrotic tissue to left foot/toes, podiatry consulted. pt with hx of PAD with balloon angioplasty in the past. Xrays of 1st left toe reveal osteomyelitis, unlikely that he is a surgical candidate for hallux amputation. Awaiting podiatry follow up.     MRSA Bacteremia; ASHELY neg for vegetations however showed thrombus on the pace maker leads. Patient had persistent bacteremia despite adequate antibiotic therapy with vancomycin. Concern for infected pacemaker. Cardiology, ID and EP consulted regarding pace maker extraction; at this time will not remove per family wishes; will continue with IV antibiotics  - repeat blood cultures sent 11/10 - negative for growth.  - continue Daptomycin 550mg daily as per ID for about 6 weeks.  - PICC line placed.  - family decided against PPM/AICD extraction surgery today after discussing with EP and Dr. Hanna risks vs benefits.    Thrombus on pacemaker leads; unsure if these are infected;   - Trial with Daptomycin for 6 weeks since no surgery is planned and blood cultures have returned negative.   - Will need anticoagulation. Currently getting heparin for DVT prophylaxis. As per cardiology, recommends Eliquis 2.5 BID on discharge     Hypernatremia/hyperchloremia: 2/2 IVF  - continue with D5W at 100cc/hour - follow sodium  - holding liquids for now    Necrotic appearing first toe of the left foot; now underlying osteomyelitis identified  - podiatry and vascular consulted  - history of PAD in both legs with balloon angioplasty in the past  - podiatry called to review images    Dysphagia  - Evaluated by speech and swallow, recommends dysphagia diet:  puree NO Liquids  - will request repeat bedside swallow exam to see if honey thick liquids are tolerated  - will request GI eval for evaluation of possible peg for fluid supplementation given risk of dehydration if no liquids    Right sided apical pneumothorax commented on CXR done s/p PICC line placement  - will repeat CXR today  - patient without symptoms this morning  - no change in oxygen requirement.    DM- uncontrolled  - Diabetic diet and ISS  - Lantus increased from 26 to 28 units recently, BG better controlled   - Continue to monitor glucose.     CAD  - Hx of CABG  - On statin, aspirin, and beta blocker    HTN  - continue to monitor BP.   - adjust meds as needed     Stroke history  - on aspirin and statin; no new focal symptomatology; head CT negative.    Prophylactic Measures  - heparin sq BID for DVT prophylaxis  - DNR/DNI    Palliative Measures  - GOC discussion yesterday - patient DNR/DNI; family doesn't want PEG which will make placement difficult if they elect to do pleasure feeds and treat MRSA infection with IV abx

## 2018-11-17 NOTE — PROGRESS NOTE ADULT - SUBJECTIVE AND OBJECTIVE BOX
CC: MRSA bacteremia    INTERVAL HPI:  Patient was seen and examined  Laying comfortably in bed. Ate his entire breakfast, more conversive this morning. No new complaints    ICU Vital Signs Last 24 Hrs  T(C): 36.8 (17 Nov 2018 04:48), Max: 37.2 (16 Nov 2018 17:15)  T(F): 98.2 (17 Nov 2018 04:48), Max: 98.9 (16 Nov 2018 17:15)  HR: 65 (17 Nov 2018 04:48) (63 - 71)  BP: 123/65 (17 Nov 2018 04:48) (105/60 - 123/65)  BP(mean): --  ABP: --  ABP(mean): --  RR: 18 (17 Nov 2018 04:48) (18 - 18)  SpO2: 97% (17 Nov 2018 04:48) (95% - 97%)    PHYSICAL EXAM:  GENERAL: No acute distress.  CHEST/LUNG: Clear to auscultation b/l. No rales, rhonchi or wheezing.   HEART: Regular rate and rhythm. Normal S1S2. No murmurs, gallops, or rubs.   ABDOMEN: Soft, nontender, nondistended. Normal bowel sounds in all 4 quadrants.   EXTREMITIES:  1+ radial and DP pulses noted, no clubbing, cyanosis, or edema noted;  FROM x 4. Shiny atrophic appearance on shins b/l with no hair. Necrotic appearing tissue on distal tip of the great toe. Multiple scabbed areas to multiple digits of the foot and ankles bilaterally.  SKIN: + multiple tattoos throughout; scattered skin abrasions from fall at home that have scabbing in different stages.  PSYCH: insight/judgement unable to assess.    LABS:                        11.3   8.3   )-----------( 311      ( 15 Nov 2018 07:20 )             35.6     11-16    142  |  111<H>  |  33.0<H>  ----------------------------<  199<H>  3.7   |  21.0<L>  |  1.82<H>    Ca    8.2<L>      16 Nov 2018 08:49  Phos  2.8     11-15  Mg     2.1     11-15    MEDICATIONS  (STANDING):  aspirin  chewable 81 milliGRAM(s) Oral daily  atorvastatin 20 milliGRAM(s) Oral at bedtime  chlorhexidine 2% Cloths 1 Application(s) Topical <User Schedule>  ciprofloxacin     Tablet 250 milliGRAM(s) Oral every 12 hours  clopidogrel Tablet 75 milliGRAM(s) Oral daily  DAPTOmycin IVPB 550 milliGRAM(s) IV Intermittent every 24 hours  dextrose 5%. 1000 milliLiter(s) (100 mL/Hr) IV Continuous <Continuous>  dextrose 5%. 1000 milliLiter(s) (50 mL/Hr) IV Continuous <Continuous>  dextrose 50% Injectable 12.5 Gram(s) IV Push once  dextrose 50% Injectable 25 Gram(s) IV Push once  dextrose 50% Injectable 25 Gram(s) IV Push once  docusate sodium 100 milliGRAM(s) Oral three times a day  heparin  Injectable 5000 Unit(s) SubCutaneous every 12 hours  hydrALAZINE 50 milliGRAM(s) Oral every 8 hours  insulin glargine Injectable (LANTUS) 28 Unit(s) SubCutaneous at bedtime  insulin lispro (HumaLOG) corrective regimen sliding scale   SubCutaneous three times a day before meals  metoprolol tartrate 50 milliGRAM(s) Oral two times a day  saccharomyces boulardii 250 milliGRAM(s) Oral two times a day  sertraline 100 milliGRAM(s) Oral daily    MEDICATIONS  (PRN):  dextrose 40% Gel 15 Gram(s) Oral once PRN Blood Glucose LESS THAN 70 milliGRAM(s)/deciliter  glucagon  Injectable 1 milliGRAM(s) IntraMuscular once PRN Glucose LESS THAN 70 milligrams/deciliter  polyethylene glycol 3350 17 Gram(s) Oral daily PRN Constipation    < from: Xray Chest 1 View-PORTABLE IMMEDIATE (11.16.18 @ 17:19) >  IMPRESSION: Tiny right apical pneumothorax.    < end of copied text >

## 2018-11-17 NOTE — SWALLOW BEDSIDE ASSESSMENT ADULT - SWALLOW EVAL: DIAGNOSIS
Mild-moderate oral dysphagia confounded by reduced cognition and marked by reduced oral grading, slow bolus formation/propulsion, and intermittent oral holding 5 - 15 seconds. Suspect pharyngeal dysphagia; +delay in swallow trigger across consistencies, +overt s/s aspiration with mechancial soft and honey liquids. No overt s/s aspiration for puree at this time.

## 2018-11-17 NOTE — SWALLOW BEDSIDE ASSESSMENT ADULT - SLP GENERAL OBSERVATIONS
Pt received A&A in bed, Ox2, minimal verbalization, reduced cognition, +dysarthria, wife and family present at bedside. +thinckened liquids present at bedside. Family informed re:pt's aspiration risk, however requesting to keep at bedside. RN aware
Pt received awake in bed, 0x2, minimal verbalizations, +dysarthria, reduced cognition, ?historian

## 2018-11-17 NOTE — SWALLOW BEDSIDE ASSESSMENT ADULT - SWALLOW EVAL: CRITERIA FOR SKILLED INTERVENTION MET
demonstrates skilled criteria for swallowing intervention
no significant expected improvement in functional status

## 2018-11-17 NOTE — SWALLOW BEDSIDE ASSESSMENT ADULT - ASR SWALLOW ASPIRATION MONITOR
change of breathing pattern/cough/oral hygiene/position upright (90Y)/fever/throat clearing/gurgly voice/pneumonia
throat clearing/change of breathing pattern/position upright (90Y)/cough/gurgly voice/fever/pneumonia/oral hygiene

## 2018-11-17 NOTE — SWALLOW BEDSIDE ASSESSMENT ADULT - SLP PERTINENT HISTORY OF CURRENT PROBLEM
Pt very well known to this service and is s/p numerous swallow evaluations. Current Rx from this service is for puree, NO liquids. MD requesting re-evaluation to determine pt's candidacy for diet upgrade

## 2018-11-18 LAB
ANION GAP SERPL CALC-SCNC: 8 MMOL/L — SIGNIFICANT CHANGE UP (ref 5–17)
BUN SERPL-MCNC: 31 MG/DL — HIGH (ref 8–20)
CALCIUM SERPL-MCNC: 8.2 MG/DL — LOW (ref 8.6–10.2)
CHLORIDE SERPL-SCNC: 104 MMOL/L — SIGNIFICANT CHANGE UP (ref 98–107)
CO2 SERPL-SCNC: 22 MMOL/L — SIGNIFICANT CHANGE UP (ref 22–29)
CREAT SERPL-MCNC: 1.72 MG/DL — HIGH (ref 0.5–1.3)
CULTURE RESULTS: SIGNIFICANT CHANGE UP
CULTURE RESULTS: SIGNIFICANT CHANGE UP
GLUCOSE BLDC GLUCOMTR-MCNC: 126 MG/DL — HIGH (ref 70–99)
GLUCOSE BLDC GLUCOMTR-MCNC: 194 MG/DL — HIGH (ref 70–99)
GLUCOSE BLDC GLUCOMTR-MCNC: 207 MG/DL — HIGH (ref 70–99)
GLUCOSE BLDC GLUCOMTR-MCNC: 67 MG/DL — LOW (ref 70–99)
GLUCOSE BLDC GLUCOMTR-MCNC: 76 MG/DL — SIGNIFICANT CHANGE UP (ref 70–99)
GLUCOSE SERPL-MCNC: 127 MG/DL — HIGH (ref 70–115)
POTASSIUM SERPL-MCNC: 4 MMOL/L — SIGNIFICANT CHANGE UP (ref 3.5–5.3)
POTASSIUM SERPL-SCNC: 4 MMOL/L — SIGNIFICANT CHANGE UP (ref 3.5–5.3)
SODIUM SERPL-SCNC: 134 MMOL/L — LOW (ref 135–145)
SPECIMEN SOURCE: SIGNIFICANT CHANGE UP
SPECIMEN SOURCE: SIGNIFICANT CHANGE UP

## 2018-11-18 PROCEDURE — 99233 SBSQ HOSP IP/OBS HIGH 50: CPT

## 2018-11-18 RX ORDER — INSULIN GLARGINE 100 [IU]/ML
25 INJECTION, SOLUTION SUBCUTANEOUS AT BEDTIME
Qty: 0 | Refills: 0 | Status: DISCONTINUED | OUTPATIENT
Start: 2018-11-18 | End: 2018-11-21

## 2018-11-18 RX ADMIN — SODIUM CHLORIDE 100 MILLILITER(S): 9 INJECTION, SOLUTION INTRAVENOUS at 21:47

## 2018-11-18 RX ADMIN — Medication 250 MILLIGRAM(S): at 05:31

## 2018-11-18 RX ADMIN — Medication 50 MILLIGRAM(S): at 05:32

## 2018-11-18 RX ADMIN — Medication 50 MILLIGRAM(S): at 17:11

## 2018-11-18 RX ADMIN — Medication 100 MILLIGRAM(S): at 05:32

## 2018-11-18 RX ADMIN — CLOPIDOGREL BISULFATE 75 MILLIGRAM(S): 75 TABLET, FILM COATED ORAL at 12:24

## 2018-11-18 RX ADMIN — Medication 250 MILLIGRAM(S): at 17:11

## 2018-11-18 RX ADMIN — SERTRALINE 100 MILLIGRAM(S): 25 TABLET, FILM COATED ORAL at 12:24

## 2018-11-18 RX ADMIN — HEPARIN SODIUM 5000 UNIT(S): 5000 INJECTION INTRAVENOUS; SUBCUTANEOUS at 17:11

## 2018-11-18 RX ADMIN — Medication 1: at 17:10

## 2018-11-18 RX ADMIN — Medication 100 MILLIGRAM(S): at 12:24

## 2018-11-18 RX ADMIN — DAPTOMYCIN 122 MILLIGRAM(S): 500 INJECTION, POWDER, LYOPHILIZED, FOR SOLUTION INTRAVENOUS at 17:12

## 2018-11-18 RX ADMIN — CHLORHEXIDINE GLUCONATE 1 APPLICATION(S): 213 SOLUTION TOPICAL at 05:32

## 2018-11-18 RX ADMIN — INSULIN GLARGINE 25 UNIT(S): 100 INJECTION, SOLUTION SUBCUTANEOUS at 21:47

## 2018-11-18 RX ADMIN — Medication 50 MILLIGRAM(S): at 14:08

## 2018-11-18 RX ADMIN — Medication 100 MILLIGRAM(S): at 21:47

## 2018-11-18 RX ADMIN — Medication 50 MILLIGRAM(S): at 21:47

## 2018-11-18 RX ADMIN — Medication 50 MILLIGRAM(S): at 05:31

## 2018-11-18 RX ADMIN — Medication 250 MILLIGRAM(S): at 05:32

## 2018-11-18 RX ADMIN — Medication 81 MILLIGRAM(S): at 12:24

## 2018-11-18 RX ADMIN — ATORVASTATIN CALCIUM 20 MILLIGRAM(S): 80 TABLET, FILM COATED ORAL at 21:47

## 2018-11-18 RX ADMIN — SODIUM CHLORIDE 100 MILLILITER(S): 9 INJECTION, SOLUTION INTRAVENOUS at 10:17

## 2018-11-18 RX ADMIN — HEPARIN SODIUM 5000 UNIT(S): 5000 INJECTION INTRAVENOUS; SUBCUTANEOUS at 05:31

## 2018-11-18 RX ADMIN — Medication 2: at 12:23

## 2018-11-18 NOTE — PROGRESS NOTE ADULT - SUBJECTIVE AND OBJECTIVE BOX
CC: MRSA bacteremia    INTERVAL HPI:  Patient was seen and examined  Laying comfortably in bed. Ate most of his breakfast, conversive this morning. No new complaints.    ICU Vital Signs Last 24 Hrs  T(C): 36.8 (17 Nov 2018 04:48), Max: 37.2 (16 Nov 2018 17:15)  T(F): 98.2 (17 Nov 2018 04:48), Max: 98.9 (16 Nov 2018 17:15)  HR: 65 (17 Nov 2018 04:48) (63 - 71)  BP: 123/65 (17 Nov 2018 04:48) (105/60 - 123/65)  BP(mean): --  ABP: --  ABP(mean): --  RR: 18 (17 Nov 2018 04:48) (18 - 18)  SpO2: 97% (17 Nov 2018 04:48) (95% - 97%)    PHYSICAL EXAM:  GENERAL: No acute distress.  CHEST/LUNG: Clear to auscultation b/l. No rales, rhonchi or wheezing. non-tender, no crepitus  HEART: Regular rate and rhythm. Normal S1S2. No murmurs, gallops, or rubs.   ABDOMEN: Soft, nontender, nondistended. Normal bowel sounds in all 4 quadrants.   EXTREMITIES:  1+ radial and DP pulses noted, no clubbing, cyanosis, or edema noted;  FROM x 4. Shiny atrophic appearance on shins b/l with no hair. Necrotic appearing tissue on distal tip of the great toe. Multiple scabbed areas to multiple digits of the foot and ankles bilaterally.  SKIN: + multiple tattoos throughout; scattered skin abrasions from fall at home that have scabbing in different stages.  PSYCH: insight/judgement unable to assess.    LABS:                        11.3   8.3   )-----------( 311      ( 15 Nov 2018 07:20 )             35.6     11-16    142  |  111<H>  |  33.0<H>  ----------------------------<  199<H>  3.7   |  21.0<L>  |  1.82<H>    Ca    8.2<L>      16 Nov 2018 08:49  Phos  2.8     11-15  Mg     2.1     11-15    MEDICATIONS  (STANDING):  aspirin  chewable 81 milliGRAM(s) Oral daily  atorvastatin 20 milliGRAM(s) Oral at bedtime  chlorhexidine 2% Cloths 1 Application(s) Topical <User Schedule>  ciprofloxacin     Tablet 250 milliGRAM(s) Oral every 12 hours  clopidogrel Tablet 75 milliGRAM(s) Oral daily  DAPTOmycin IVPB 550 milliGRAM(s) IV Intermittent every 24 hours  dextrose 5%. 1000 milliLiter(s) (100 mL/Hr) IV Continuous <Continuous>  dextrose 5%. 1000 milliLiter(s) (50 mL/Hr) IV Continuous <Continuous>  dextrose 50% Injectable 12.5 Gram(s) IV Push once  dextrose 50% Injectable 25 Gram(s) IV Push once  dextrose 50% Injectable 25 Gram(s) IV Push once  docusate sodium 100 milliGRAM(s) Oral three times a day  heparin  Injectable 5000 Unit(s) SubCutaneous every 12 hours  hydrALAZINE 50 milliGRAM(s) Oral every 8 hours  insulin glargine Injectable (LANTUS) 28 Unit(s) SubCutaneous at bedtime  insulin lispro (HumaLOG) corrective regimen sliding scale   SubCutaneous three times a day before meals  metoprolol tartrate 50 milliGRAM(s) Oral two times a day  saccharomyces boulardii 250 milliGRAM(s) Oral two times a day  sertraline 100 milliGRAM(s) Oral daily    MEDICATIONS  (PRN):  dextrose 40% Gel 15 Gram(s) Oral once PRN Blood Glucose LESS THAN 70 milliGRAM(s)/deciliter  glucagon  Injectable 1 milliGRAM(s) IntraMuscular once PRN Glucose LESS THAN 70 milligrams/deciliter  polyethylene glycol 3350 17 Gram(s) Oral daily PRN Constipation    < from: Xray Chest 1 View-PORTABLE IMMEDIATE (11.16.18 @ 17:19) >  IMPRESSION: Tiny right apical pneumothorax.    < end of copied text >

## 2018-11-18 NOTE — PROGRESS NOTE ADULT - SUBJECTIVE AND OBJECTIVE BOX
This is a 68years old male with PMH of HTN, DM, CVA and CAD seen bedside for left foot ulcerations. Patient non verbal.    PMH: CVA (cerebral vascular accident)  Diabetic neuropathy  DM (diabetes mellitus)  Hyperlipidemia  AICD (automatic cardioverter/defibrillator) present  Pacemaker  Stented coronary artery  HTN (hypertension)    PSH: Cardiac pacemaker    Allergies: No Known Allergies    Labs:         O:   General: Pleasant  male NAD & AOX3.    Integument:  Skin warm, dry and supple bilateral.    Dry stable gangrenous lesions noted to the left foot and ankle, hallux, 2nd and 3rd digit and lateral malleolus with rubor noted to digits 1-5. No drainage, no probe to bone, no erythema, no swelling,. To the 2nd and 3rd digits the dry gangrenous lesions appear superficial. There are no signs of acute inflammation or infection to the foot at this time  Vascular: Dorsalis Pedis and Posterior Tibial pulses 1/4.  Capillary re-fill time less then 3 seconds digits 1-5 bilateral.    Neuro: Protective sensation diminished to the level of the digits bilateral.    A: Left foot ulceration    P:   Chart reviewed and Patient evaluated  Lesions appear dry and stable  Reviewed xrays - there is a possible lytic lesion to distal tuft of the hallux that may be osteomyelitis  Patient to be offloaded at all times in bed to prevent pressure ulcerations. Please apply green boots.   No dressing required to lesions. Goal is to keep the feet dry to prevent wet gangrene.  Possible OM to hallux; Patient is already undergoing 6 weeks IV abx for MRSA with would subsequently treat the bone infection to his toe  Risks of surgery outweigh benefits of putting patient through anesthesia to remove hallux as he has poor blood flow to foot and poor healing potential  When spoken to family members in the past it was their desire to not undergo surgery; We will re attempt to contact family to see if their wishes have changed  Continue with IV antibiotics As Per ID  Weight bearing as tolerated.  Discussed with Attending Dr. Guerrero

## 2018-11-18 NOTE — PROGRESS NOTE ADULT - ATTENDING COMMENTS
Patient was seen bedside with resident.  I reviewed the above assessment and documentation completed by the resident physician.  I verbally discussed the evaluation and treatment plan with resident.  The podiatry team will continue to follow patient while in house.

## 2018-11-18 NOTE — PROGRESS NOTE ADULT - ASSESSMENT
69 y/o man with PMH of HTN, DM2, CVA and CAD s/p CABG and PM, was admitted with high fever, generalized weakness and vomiting for the past few days.. Admitted with  Sepsis 2/2 GPC bacteremia; source likely from skin. +Blood cultures with MRSA 8/8 bottles. Started on IV abx as per ID.  Seen by speech and swallow, recommended puree diet with no liquids. Cardio consulted for ASHELY - done 11/9 and neg for vegetation. + Thrombus noted on PM leads.. questionable PM infection though not definitive source of infection. ID recommended pacemaker to be removed however pts family decided against it.  EP and Cardio are following the case.. Pacemaker/AICD was interrogated, prematurely shut off as per cardio yesterday, turned back on. GOC discussion to be held with palliative and family today, need to delineate long term goals. Pt was noticed to have necrotic tissue to left foot/toes, podiatry consulted. pt with hx of PAD with balloon angioplasty in the past. Xrays of 1st left toe reveal osteomyelitis, unlikely that he is a surgical candidate for hallux amputation. Awaiting podiatry follow up.     MRSA Bacteremia; ASHELY neg for vegetations however showed thrombus on the pace maker leads. Patient had persistent bacteremia despite adequate antibiotic therapy with vancomycin. Concern for infected pacemaker. Cardiology, ID and EP consulted regarding pace maker extraction; at this time will not remove per family wishes; will continue with IV antibiotics  - repeat blood cultures sent 11/10 - negative for growth.  - continue Daptomycin 550mg daily as per ID for about 6 weeks.  - PICC line placed.  - family decided against PPM/AICD extraction surgery today after discussing with EP and Dr. Hanna risks vs benefits.    Thrombus on pacemaker leads; unsure if these are infected;   - Trial with Daptomycin for 6 weeks since no surgery is planned and blood cultures have returned negative.   - Will need anticoagulation. Currently getting heparin for DVT prophylaxis. As per cardiology, recommends Eliquis 2.5 BID on discharge     Hypernatremia/hyperchloremia: 2/2 IVF  - continue with D5W at 100cc/hour - follow sodium  - holding liquids for now  - if sodium closer to 135 can decrease fluids and see if patient's diet will have enough free water since eating better    Necrotic appearing first toe of the left foot; now underlying osteomyelitis identified  - podiatry and vascular consulted  - history of PAD in both legs with balloon angioplasty in the past  - podiatry called to review images  - No contact since Friday; have been paging them.    Dysphagia  - Evaluated by speech and swallow, recommends dysphagia diet:  puree NO Liquids  - at this time family doesn't want PEG  - hopeful that we can titrate down fluids if patient is eating enough; otherwise may offer pleasure feedings with hospice    Right sided apical pneumothorax commented on CXR done s/p PICC line placement  - repeat shows stable  - patient without symptoms this morning  - no change in oxygen requirement.  - broken sternotomy wires noted - less likely to be a cause  - he had broken ribs on the same side however in August; pneumothorax not noted then  - at this time likely an incidental finding    DM- uncontrolled  - Diabetic diet and ISS  - Lantus increased from 26 to 28 units recently, BG better controlled   - Continue to monitor glucose.     CAD  - Hx of CABG  - On statin, aspirin, and beta blocker    HTN  - continue to monitor BP.   - adjust meds as needed     Stroke history  - on aspirin and statin; no new focal symptomatology; head CT negative.    Prophylactic Measures  - heparin sq BID for DVT prophylaxis  - DNR/DNI    Palliative Measures  - GOC discussion yesterday - patient DNR/DNI; family doesn't want PEG which will make placement difficult if they elect to do pleasure feeds and treat MRSA infection with IV abx; if able to titrate off fluids can continue with no liquids if he is eating; if not may offer pleasure feeding with hospice.

## 2018-11-19 LAB
ANION GAP SERPL CALC-SCNC: 12 MMOL/L — SIGNIFICANT CHANGE UP (ref 5–17)
BUN SERPL-MCNC: 31 MG/DL — HIGH (ref 8–20)
CALCIUM SERPL-MCNC: 8.1 MG/DL — LOW (ref 8.6–10.2)
CHLORIDE SERPL-SCNC: 104 MMOL/L — SIGNIFICANT CHANGE UP (ref 98–107)
CO2 SERPL-SCNC: 21 MMOL/L — LOW (ref 22–29)
CREAT SERPL-MCNC: 1.81 MG/DL — HIGH (ref 0.5–1.3)
GLUCOSE BLDC GLUCOMTR-MCNC: 108 MG/DL — HIGH (ref 70–99)
GLUCOSE BLDC GLUCOMTR-MCNC: 159 MG/DL — HIGH (ref 70–99)
GLUCOSE BLDC GLUCOMTR-MCNC: 248 MG/DL — HIGH (ref 70–99)
GLUCOSE BLDC GLUCOMTR-MCNC: 259 MG/DL — HIGH (ref 70–99)
GLUCOSE SERPL-MCNC: 122 MG/DL — HIGH (ref 70–115)
POTASSIUM SERPL-MCNC: 3.9 MMOL/L — SIGNIFICANT CHANGE UP (ref 3.5–5.3)
POTASSIUM SERPL-SCNC: 3.9 MMOL/L — SIGNIFICANT CHANGE UP (ref 3.5–5.3)
SODIUM SERPL-SCNC: 137 MMOL/L — SIGNIFICANT CHANGE UP (ref 135–145)

## 2018-11-19 PROCEDURE — 99233 SBSQ HOSP IP/OBS HIGH 50: CPT

## 2018-11-19 PROCEDURE — 99232 SBSQ HOSP IP/OBS MODERATE 35: CPT

## 2018-11-19 RX ADMIN — HEPARIN SODIUM 5000 UNIT(S): 5000 INJECTION INTRAVENOUS; SUBCUTANEOUS at 05:41

## 2018-11-19 RX ADMIN — ATORVASTATIN CALCIUM 20 MILLIGRAM(S): 80 TABLET, FILM COATED ORAL at 22:07

## 2018-11-19 RX ADMIN — Medication 250 MILLIGRAM(S): at 17:58

## 2018-11-19 RX ADMIN — Medication 50 MILLIGRAM(S): at 05:41

## 2018-11-19 RX ADMIN — SODIUM CHLORIDE 100 MILLILITER(S): 9 INJECTION, SOLUTION INTRAVENOUS at 07:43

## 2018-11-19 RX ADMIN — HEPARIN SODIUM 5000 UNIT(S): 5000 INJECTION INTRAVENOUS; SUBCUTANEOUS at 17:59

## 2018-11-19 RX ADMIN — SERTRALINE 100 MILLIGRAM(S): 25 TABLET, FILM COATED ORAL at 12:09

## 2018-11-19 RX ADMIN — Medication 2: at 17:59

## 2018-11-19 RX ADMIN — CHLORHEXIDINE GLUCONATE 1 APPLICATION(S): 213 SOLUTION TOPICAL at 05:42

## 2018-11-19 RX ADMIN — INSULIN GLARGINE 25 UNIT(S): 100 INJECTION, SOLUTION SUBCUTANEOUS at 22:07

## 2018-11-19 RX ADMIN — DAPTOMYCIN 122 MILLIGRAM(S): 500 INJECTION, POWDER, LYOPHILIZED, FOR SOLUTION INTRAVENOUS at 18:05

## 2018-11-19 RX ADMIN — Medication 100 MILLIGRAM(S): at 13:42

## 2018-11-19 RX ADMIN — Medication 250 MILLIGRAM(S): at 05:41

## 2018-11-19 RX ADMIN — Medication 100 MILLIGRAM(S): at 05:41

## 2018-11-19 RX ADMIN — Medication 100 MILLIGRAM(S): at 22:07

## 2018-11-19 RX ADMIN — Medication 81 MILLIGRAM(S): at 12:09

## 2018-11-19 RX ADMIN — Medication 50 MILLIGRAM(S): at 22:07

## 2018-11-19 RX ADMIN — Medication 1: at 12:09

## 2018-11-19 RX ADMIN — CLOPIDOGREL BISULFATE 75 MILLIGRAM(S): 75 TABLET, FILM COATED ORAL at 12:09

## 2018-11-19 RX ADMIN — Medication 50 MILLIGRAM(S): at 13:42

## 2018-11-19 NOTE — PROGRESS NOTE ADULT - SUBJECTIVE AND OBJECTIVE BOX
CC: follow up GOC  INTERVAL HPI/OVERNIGHT EVENTS:    PRESENT SYMPTOMS: SOURCE:  Patient/Family/Team    PAIN SCALE:  0 = none  1 = mild   2 = moderate  3 = severe    Pain: denies    Dyspnea:  [ ] YES [x ] NO  Anxiety:  [ ] YES [x ] NO  Fatigue: [x ] YES [ ] NO  Nausea: [ ] YES [x ] NO  Loss of Appetite: [ x] YES [ ] NO  Other symptoms: __________    MEDICATIONS  (STANDING):  aspirin  chewable 81 milliGRAM(s) Oral daily  atorvastatin 20 milliGRAM(s) Oral at bedtime  chlorhexidine 2% Cloths 1 Application(s) Topical <User Schedule>  ciprofloxacin     Tablet 250 milliGRAM(s) Oral every 12 hours  clopidogrel Tablet 75 milliGRAM(s) Oral daily  DAPTOmycin IVPB 550 milliGRAM(s) IV Intermittent every 24 hours  dextrose 5%. 1000 milliLiter(s) (50 mL/Hr) IV Continuous <Continuous>  dextrose 50% Injectable 12.5 Gram(s) IV Push once  dextrose 50% Injectable 25 Gram(s) IV Push once  dextrose 50% Injectable 25 Gram(s) IV Push once  docusate sodium 100 milliGRAM(s) Oral three times a day  heparin  Injectable 5000 Unit(s) SubCutaneous every 12 hours  hydrALAZINE 50 milliGRAM(s) Oral every 8 hours  insulin glargine Injectable (LANTUS) 25 Unit(s) SubCutaneous at bedtime  insulin lispro (HumaLOG) corrective regimen sliding scale   SubCutaneous three times a day before meals  metoprolol tartrate 50 milliGRAM(s) Oral two times a day  sertraline 100 milliGRAM(s) Oral daily    MEDICATIONS  (PRN):  dextrose 40% Gel 15 Gram(s) Oral once PRN Blood Glucose LESS THAN 70 milliGRAM(s)/deciliter  glucagon  Injectable 1 milliGRAM(s) IntraMuscular once PRN Glucose LESS THAN 70 milligrams/deciliter  polyethylene glycol 3350 17 Gram(s) Oral daily PRN Constipation      Allergies    No Known Allergies    Intolerances    Karnofsky Performance Score/Palliative Performance Status Version 2:   30 %    Vital Signs Last 24 Hrs  T(C): 37.4 (19 Nov 2018 11:25), Max: 37.4 (19 Nov 2018 11:25)  T(F): 99.4 (19 Nov 2018 11:25), Max: 99.4 (19 Nov 2018 11:25)  HR: 66 (19 Nov 2018 11:25) (63 - 70)  BP: 112/68 (19 Nov 2018 11:25) (111/60 - 127/59)  BP(mean): --  RR: 18 (19 Nov 2018 11:25) (18 - 18)  SpO2: 98% (18 Nov 2018 21:29) (98% - 98%)    PHYSICAL EXAM:    General:  Sleepin awakens to tactile stimuli-  NAD     HEENT: [x ] normal  [ ] dry mouth  [ ] ET tube/trach    Lungs: [ x] comfortable [ ] tachypnea/labored breathing  [ ] excessive secretions    CV: [ x] normal  [ ] tachycardia    GI: [x ] normal  [ ] distended  [ ] tender  [ ] no BS               [ ] PEG/NG/OG tube    : [ ] normal  [x ] incontinent  [ ] oliguria/anuria  [ ] schneider    MSK: [ ] normal  [ x] weakness  [ ] edema             [ ] ambulatory  [ ] bedbound/wheelchair bound    Skin: [ ] normal  [ ] pressure ulcers- Stage_____  [x ] no rash    LABS:    11-19    137  |  104  |  31.0<H>  ----------------------------<  122<H>  3.9   |  21.0<L>  |  1.81<H>    Ca    8.1<L>      19 Nov 2018 07:52          I&O's Summary    18 Nov 2018 07:01  -  19 Nov 2018 07:00  --------------------------------------------------------  IN: 2300 mL / OUT: 0 mL / NET: 2300 mL          Thank you for the opportunity to assist with the care of this patient.   [facility] [phone].

## 2018-11-19 NOTE — PROGRESS NOTE ADULT - SUBJECTIVE AND OBJECTIVE BOX
[facility]  INFECTIOUS DISEASES AND INTERNAL MEDICINE at [place]  =======================================================  Frankie Garcia MD  Diplomates American Board of Internal Medicine and Infectious Diseases  =======================================================    [id number]  AVNI GREEN     Follow up for bacteremia with MRSA, source is skin ulcer in his right mid-back. Afebrile. No compliant. ASHELY done with thrombus around lead.   Family doesn't want any surgical intervention or PPM/lead removal.     PAST MEDICAL & SURGICAL HISTORY:  CVA (cerebral vascular accident)  Diabetic neuropathy  DM (diabetes mellitus)  Hyperlipidemia  AICD (automatic cardioverter/defibrillator) present  Pacemaker  Stented coronary artery  HTN (hypertension)  Cardiac pacemaker    Social Hx: As per him no smoking, ETOH or drugs.     FAMILY HISTORY:  No pertinent family history in first degree relatives    Allergies  No Known Allergies    Antibiotics:  vancomycin      REVIEW OF SYSTEMS:  Afebrile no complaint.     Physical Exam:  Vital Signs Last 24 Hrs  T(C): 37.4 (19 Nov 2018 11:25), Max: 37.4 (19 Nov 2018 11:25)  T(F): 99.4 (19 Nov 2018 11:25), Max: 99.4 (19 Nov 2018 11:25)  HR: 66 (19 Nov 2018 11:25) (63 - 70)  BP: 112/68 (19 Nov 2018 11:25) (111/60 - 127/59)  RR: 18 (19 Nov 2018 11:25) (18 - 18)  SpO2: 98% (18 Nov 2018 21:29) (98% - 98%)  GEN: NAD  HEENT: normocephalic and atraumatic. EOMI. PERRL.    NECK: Supple.  No lymphadenopathy   LUNGS: Clear to auscultation.  HEART: Regular rate and rhythm left upper chest Pacemaker, nontender, no erythema or swelling   ABDOMEN: Soft, nontender, and nondistended.  Positive bowel sounds.    : No CVA tenderness  EXTREMITIES: Right toe with an ulcer, no cellulitis around it.   NEUROLOGIC: grossly intact.  PSYCHIATRIC: Appropriate affect .  SKIN: scratches and superficial wounds in toes/legs, healing wound in his Right mid back, with erythema, minimal discharge     Labs:  11-19    137  |  104  |  31.0<H>  ----------------------------<  122<H>  3.9   |  21.0<L>  |  1.81<H>    Ca    8.1<L>      19 Nov 2018 07:52    RECENT CULTURES:  11-13 @ 07:32 .Blood Blood     No growth at 5 days.    11-12 @ 09:02 .Blood Blood     No growth at 5 days.    11-10 @ 13:42 .Blood Blood     No growth at 5 days.    11-07 @ 11:41 Skin wound Methicillin resistant Staphylococcus aureus    Few Methicillin resistant Staphylococcus aureus (KNOWN)  Numerous Corynebacterium species    11-06 @ 22:42 .Blood Blood Methicillin resistant Staphylococcus aureus    Growth in aerobic and anaerobic bottles: Methicillin resistant  Staphylococcus aureus (KNOWN)  Aerobic Bottle: 14:58 Hours to positivity  Anaerobic Bottle: 15:48 Hours to positivity    11-05 @ 17:11        NotDetec  11-05 @ 16:14 .Urine Clean Catch (Midstream)     No growth    All imaging and other data have been reviewed.    ASHELY:   Summary:   1. Left ventricular ejection fraction, by visual estimation, is 40 to   45%.   2. Technically fair study.   3. Mildly decreased global left ventricular systolic function.   4. The left ventricular diastolic function could not be assessed in this   study.   5. There is no evidence of pericardial effusion.   6. Mild mitral valve regurgitation.   7. Mild tricuspid regurgitation.   8. Trace pulmonic valve regurgitation.   9. There is no vegetation on any of the cardiac valve. Thrombus coated   pacing leads are seen.  10. Clinical correlation is advised.  11. Color flow doppler and intravenous injection of agitated saline   demonstrates the presence of an intact intra atrial septum.  12. No left atrial appendage thrombus.

## 2018-11-19 NOTE — PROGRESS NOTE ADULT - ASSESSMENT
69y/o man with PMH of HTN, DM2, CVA and CAD s/p CABG and PM, was admitted today with high fever and generalized weakness and vomiting for the past few days. Blood cultures with MRSA, possible source is skin since growing the same MRSA.   ASHELY with thrombus around the lead but no vegetation on valves unclear that if this a lead vegetation or just noninfected fibrin stand that can happen in PPM leads frequently Since he has MRSA, the thrombus can get infected as well. Family doesn't want any intervention but agree with at least 6 weeks of IV Daptomycin.   Xray of ulcerated L1st toe showed OM, most likely no intervention will be done.     High fever  MRSA bacteremia   Skin ulcers with MRSA  PPM lead thrombus     -Contact isolation   -MRSA in 4 sets of blood cultures from 11/5 and 11/6 (8 out of 8 bottles)  -Repeat blood culture negative on 11/10  -Follow up daily WBC and fever curve, both normal.   -ASHELY with thrombus around the lead, no intervention per family wish  -PICC line when planning for discharge   -Continue Daptomycin 550mg daily  -Cont cipro 250mg q12h for 6weeks to cover OM in L 1st toe, watch for diarrhea   -Needs evaluation at the end of 6 weeks for possible suppressive oral therapy for MRSA due to retained PPM. .    -Foot ulcers management as per podiatry and wound care.   Will follow while in house.

## 2018-11-19 NOTE — PHARMACOTHERAPY INTERVENTION NOTE - COMMENTS
Stopped Florastor due to contraindication with central lines
Will monitor creatinine kinase weekly while on daptomycin, placed order for 11/15/2018 in chart as per antibiotic stewardship
placed order for creatinine kinase as daptomycin has been started

## 2018-11-19 NOTE — PROGRESS NOTE ADULT - ASSESSMENT
69 y/o man with PMH of HTN, DM2, CVA and CAD s/p CABG and PM, was admitted with high fever, generalized weakness and vomiting for the past few days.. Admitted with  Sepsis 2/2 GPC bacteremia; source likely from skin. +Blood cultures with MRSA 8/8 bottles. Started on IV abx as per ID.  Seen by speech and swallow, recommended puree diet with no liquids. Cardio consulted for ASHELY - done 11/9 and neg for vegetation. + Thrombus noted on PM leads.. questionable PM infection though not definitive source of infection. ID recommended pacemaker to be removed however pts family decided against it.  EP and Cardio are following the case.. Pacemaker/AICD was interrogated, prematurely shut off as per cardio yesterday, turned back on. GOC discussion to be held with palliative and family today, need to delineate long term goals. Pt was noticed to have necrotic tissue to left foot/toes, podiatry consulted. pt with hx of PAD with balloon angioplasty in the past. Xrays of 1st left toe reveal osteomyelitis, unlikely that he is a surgical candidate for hallux amputation. Awaiting podiatry follow up.     MRSA Bacteremia; ASHELY neg for vegetations however showed thrombus on the pace maker leads. Patient had persistent bacteremia despite adequate antibiotic therapy with vancomycin. Concern for infected pacemaker. Cardiology, ID and EP consulted regarding pace maker extraction; at this time will not remove per family wishes; will continue with IV antibiotics  - repeat blood cultures sent 11/10 - negative for growth.  - continue Daptomycin 550mg daily as per ID for about 6 weeks.  - PICC line placed by surgical team  - family decided against PPM/AICD extraction surgery today after discussing with EP and Dr. Hanna risks vs benefits.  - AICD deactivated yesterday     Thrombus on pacemaker leads; unsure if these are infected;   - Trial with Daptomycin for 6 weeks since no surgery is planned and blood cultures have returned negative.   - Will need anticoagulation. Currently getting heparin for DVT prophylaxis. As per cardiology, recommends Eliquis 2.5 BID on discharge     Hypernatremia/hyperchloremia: 2/2 IVF  - currently resolved. am Na level 137  - holding liquids for now as speech/swallow recommends purred with no liquids  - pts appetite has improved and eating more. If concern for lack of appetite can consider adding appetite stimulant    - will continue to trend sodium  - if repeat am sodium tomorrow is stable will feel comfortable discharging patient to [facility]    Necrotic appearing first toe of the left foot; now underlying osteomyelitis identified  - podiatry and vascular consulted  - history of PAD in both legs with balloon angioplasty in the past  - podiatry consult appreciated... As per podiatry,  Patient is already undergoing 6 weeks IV abx for MRSA with would subsequently treat the bone infection to his toe  Risks of surgery outweigh benefits of putting patient through anesthesia to remove hallux as he has poor blood flow to foot and poor healing potential  When spoken to family members in the past it was their desire to not undergo surgery. Podiatry to re-attempt to contact family to delineate their wishes.     Dysphagia  - Evaluated by speech and swallow, recommends dysphagia diet:  puree NO Liquids  - at this time family doesn't want PEG  - fluids have been titrated/discontinued as pt is eating more.. Otherwise may offer pleasure feedings with hospice    Right sided apical pneumothorax commented on CXR done s/p PICC line placement  - repeat shows stable  - patient without symptoms this morning  - no change in oxygen requirement.  - broken sternotomy wires noted - less likely to be a cause  - he had broken ribs on the same side however in August; pneumothorax not noted then  - at this time likely an incidental finding    DM- uncontrolled  - Diabetic diet and ISS  - Lantus increased from 26 to 28 units recently, BG better controlled   - Continue to monitor glucose.     CAD  - Hx of CABG  - On statin, aspirin, and beta blocker    HTN  - continue to monitor BP.   - adjust meds as needed     Stroke history  - on aspirin and statin; no new focal symptomatology; head CT negative.    Prophylactic Measures  - heparin sq BID for DVT prophylaxis  - DNR/DNI    Palliative Measures  - GOC discussion yesterday - patient DNR/DNI; family doesn't want PEG which will make placement difficult if they elect to do pleasure feeds and treat MRSA infection with IV abx; Fluids currently have been discontinued as pt is eating more. Will monitor am sodium level and follow up with podiatry family discussion. Discharge planning has started and plan for DC To [facility] tomorrow pending final family discussions with palliatve/podiatry. 67 y/o man with PMH of HTN, DM2, CVA and CAD s/p CABG and PM, was admitted with high fever, generalized weakness and vomiting for the past few days.. Admitted with  Sepsis 2/2 GPC bacteremia; source likely from skin. +Blood cultures with MRSA 8/8 bottles. Started on IV abx as per ID.  Seen by speech and swallow, recommended puree diet with no liquids. Cardio consulted for ASHELY - done 11/9 and neg for vegetation. + Thrombus noted on PM leads.. questionable PM infection though not definitive source of infection. ID recommended pacemaker to be removed however pts family decided against it.  EP and Cardio are following the case.. Pacemaker/AICD was interrogated, prematurely shut off as per cardio yesterday, turned back on. GOC discussion to be held with palliative and family today, need to delineate long term goals. Pt was noticed to have necrotic tissue to left foot/toes, podiatry consulted. pt with hx of PAD with balloon angioplasty in the past. Xrays of 1st left toe reveal osteomyelitis, unlikely that he is a surgical candidate for hallux amputation. Awaiting podiatry follow up.     MRSA Bacteremia; ASHELY neg for vegetations however showed thrombus on the pace maker leads. Patient had persistent bacteremia despite adequate antibiotic therapy with vancomycin. Concern for infected pacemaker. Cardiology, ID and EP consulted regarding pace maker extraction; at this time will not remove per family wishes; will continue with IV antibiotics  - repeat blood cultures sent 11/10 - negative for growth.  - continue Daptomycin 550mg daily as per ID for about 6 weeks.  - PICC line placed by surgical team  - family decided against PPM/AICD extraction surgery today after discussing with EP and Dr. Hanna risks vs benefits.  - AICD deactivated yesterday     Thrombus on pacemaker leads; unsure if these are infected;   - Trial with Daptomycin for 6 weeks since no surgery is planned and blood cultures have returned negative.   - Will need anticoagulation. Currently getting heparin for DVT prophylaxis. As per cardiology, recommends Eliquis 2.5 BID on discharge     Hypernatremia/hyperchloremia: 2/2 IVF  - currently resolved. am Na level 137  - holding liquids for now as speech/swallow recommends purred with no liquids  - pts appetite has improved and eating more. If concern for lack of appetite can consider adding appetite stimulant    - will continue to trend sodium  - if repeat am sodium tomorrow is stable will feel comfortable discharging patient to [facility]    Necrotic appearing first toe of the left foot; now underlying osteomyelitis identified  - podiatry and vascular consulted  - history of PAD in both legs with balloon angioplasty in the past  - podiatry consult appreciated. Discussed with podiatry today, unlikely acute osteomyelitis likely chronic.  As per podiatry when spoken to family members in the past it was their desire to not undergo surgery and therefore no surgical intervention planned. Patient is already undergoing 6 weeks IV abx for MRSA which would subsequently treat the bone infection to his toe. Pod has signed off.     Dysphagia  - Evaluated by speech and swallow, recommends dysphagia diet:  puree NO Liquids  - at this time family doesn't want PEG  - fluids have been titrated/discontinued as pt is eating more.. Otherwise may offer pleasure feedings with hospice    Right sided apical pneumothorax commented on CXR done s/p PICC line placement  - repeat shows stable  - patient without symptoms this morning  - no change in oxygen requirement.  - broken sternotomy wires noted - less likely to be a cause  - he had broken ribs on the same side however in August; pneumothorax not noted then  - at this time likely an incidental finding    DM- uncontrolled  - Diabetic diet and ISS  - Lantus increased from 26 to 28 units recently, BG better controlled   - Continue to monitor glucose.     CAD  - Hx of CABG  - On statin, aspirin, and beta blocker    HTN  - continue to monitor BP.   - adjust meds as needed     Stroke history  - on aspirin and statin; no new focal symptomatology; head CT negative.    Prophylactic Measures  - heparin sq BID for DVT prophylaxis  - DNR/DNI    Palliative Measures  - GOC discussion yesterday - patient DNR/DNI; family doesn't want PEG which will make placement difficult if they elect to do pleasure feeds and treat MRSA infection with IV abx; Fluids currently have been discontinued as pt is eating more. Will monitor am sodium level. Discharge planning has started and plan for DC To [facility] tomorrow pending final family discussions with palliatve/podiatry. 69 y/o man with PMH of HTN, DM2, CVA and CAD s/p CABG and PM, was admitted with high fever, generalized weakness and vomiting for the past few days.. Admitted with  Sepsis 2/2 GPC bacteremia; source likely from skin. +Blood cultures with MRSA 8/8 bottles. Started on IV abx as per ID.  Seen by speech and swallow, recommended puree diet with no liquids. Cardio consulted for ASHELY - done 11/9 and neg for vegetation. + Thrombus noted on PM leads.. questionable PM infection though not definitive source of infection. ID recommended pacemaker to be removed however pts family decided against it.  EP and Cardio are following the case. AICD was deactivated after GOC discussion with family, DNR/DNI.   Pt with hx of PAD with balloon angioplasty in the past. Xrays of 1st left toe reveal osteomyelitis.. As per podiatry when spoken to family members in the past it was their desire to not undergo surgery and therefore no surgical intervention planned. GOC discussion held with family.. pt currently DNR/DNI, no peg tube, molst completed. Plan is for discharge back to [facility], from there family likely will pursue hospice.     MRSA Bacteremia; ASHELY neg for vegetations however showed thrombus on the pace maker leads. Patient had persistent bacteremia despite adequate antibiotic therapy with vancomycin. Concern for infected pacemaker. Cardiology, ID and EP consulted regarding pace maker extraction; at this time will not remove per family wishes; will continue with IV antibiotics  - repeat blood cultures sent 11/10 - negative for growth.  - continue Daptomycin 550mg daily as per ID for about 6 weeks.  - PICC line placed by surgical team  - family decided against PPM/AICD extraction surgery today after discussing with EP and Dr. Hanna risks vs benefits.  - AICD deactivated yesterday     Thrombus on pacemaker leads; unsure if these are infected;   - Trial with Daptomycin for 6 weeks since no surgery is planned and blood cultures have returned negative.   - Will need anticoagulation. Currently getting heparin for DVT prophylaxis. As per cardiology, recommends Eliquis 2.5 BID on discharge     Hypernatremia/hyperchloremia: 2/2 IVF  - currently resolved. am Na level 137  - holding liquids for now as speech/swallow recommends purred with no liquids  - pts appetite has improved and eating more. If concern for lack of appetite can consider adding appetite stimulant    - will continue to trend sodium  - if repeat am sodium tomorrow is stable will feel comfortable discharging patient to [facility]    Necrotic appearing first toe of the left foot; now underlying osteomyelitis identified  - podiatry and vascular consulted  - history of PAD in both legs with balloon angioplasty in the past  - podiatry consult appreciated. Discussed with podiatry today, unlikely acute osteomyelitis likely chronic.  As per podiatry when spoken to family members in the past it was their desire to not undergo surgery and therefore no surgical intervention planned. Patient is already undergoing 6 weeks IV abx for MRSA which would subsequently treat the bone infection to his toe. Pod has signed off.     Dysphagia  - Evaluated by speech and swallow, recommends dysphagia diet:  puree NO Liquids  - at this time family doesn't want PEG  - fluids have been titrated/discontinued as pt is eating more.. Otherwise may offer pleasure feedings with hospice    Right sided apical pneumothorax commented on CXR done s/p PICC line placement  - repeat shows stable  - patient without symptoms this morning  - no change in oxygen requirement.  - broken sternotomy wires noted - less likely to be a cause  - he had broken ribs on the same side however in August; pneumothorax not noted then  - at this time likely an incidental finding    DM- uncontrolled  - Diabetic diet and ISS  - Lantus increased from 26 to 28 units recently, BG better controlled   - Continue to monitor glucose.     CAD  - Hx of CABG  - On statin, aspirin, and beta blocker    HTN  - continue to monitor BP.   - adjust meds as needed     Stroke history  - on aspirin and statin; no new focal symptomatology; head CT negative.    Prophylactic Measures  - heparin sq BID for DVT prophylaxis  - DNR/DNI    Palliative Measures  - GOC discussion yesterday - patient DNR/DNI; family doesn't want PEG which will make placement difficult if they elect to do pleasure feeds and treat MRSA infection with IV abx; Fluids currently have been discontinued as pt is eating more, will monitor serum Na level, if normal will  plan for DC To [facility] tomorrow.

## 2018-11-19 NOTE — PROGRESS NOTE ADULT - PROBLEM SELECTOR PLAN 5
Met with patient- mainly staring, a few nods, non conversive.   Spoke with wife- she tells me  with first CVA in 2015 and multiple CVA thereafter. Declining functional status, needs assistance with walking and ADLs.  Does not talk much, " just stares"    She was recommended 6 weeks of IV antibiotics by the medical team. Though in the past she did not want rehab, she is interested now as she needs help with his care.  Discussed hospice services after rehab. Patient with multiple CVAs, vasculopath, now with thrombus in pacer leads at risk for infection. Hospice  Information card  given. Wife can request hospice referral before discharge from [facility] or in home evaluation.   Currently  patient with limited capacity with advanced directives decisions. Discussed advance directives with wife, inquired if they ever had such discussions. She replied in the past he had always said would not " want to live this way" , it seems like he has changed his mind.   Will continue with support and further GOC discussions.
Discussed with case management- patient to go to [facility] to complete abx with pleasure feeds.  Will need to discuss wife consideration of NO Rehospitalization on [facility] given high risk for decline from dehydration and or aspiration. Wife has been discussed hospice for which she was interested after [facility].
Podiatry following regarding infected toe.

## 2018-11-19 NOTE — PROGRESS NOTE ADULT - ATTENDING COMMENTS
Attending Attestation:  I personally saw and evaluated the patient and agree with the above assessment and plan  Seen and examined at bedside  No acute distress, lungs remain clear to auscultation. Patient fully alert and eating breakfast  If patient has normal sodium in the morning can be discharged to [facility]

## 2018-11-19 NOTE — PROGRESS NOTE ADULT - ASSESSMENT
68yr man, appears frail,   multiple medical issues  and hospitalizations  hx CVA and CAD s/p CABG, CKD, DM and PM admitted with MRSA bacteremia, ASHELY negative for vegetations, + thrombus on PM leads. Family electing no surgery for removal of PM. Patient at high risk for infection given no surgery to removed PM lead

## 2018-11-20 ENCOUNTER — TRANSCRIPTION ENCOUNTER (OUTPATIENT)
Age: 68
End: 2018-11-20

## 2018-11-20 DIAGNOSIS — M86.9 OSTEOMYELITIS, UNSPECIFIED: ICD-10-CM

## 2018-11-20 LAB
ANION GAP SERPL CALC-SCNC: 10 MMOL/L — SIGNIFICANT CHANGE UP (ref 5–17)
BUN SERPL-MCNC: 32 MG/DL — HIGH (ref 8–20)
CALCIUM SERPL-MCNC: 8.4 MG/DL — LOW (ref 8.6–10.2)
CHLORIDE SERPL-SCNC: 108 MMOL/L — HIGH (ref 98–107)
CO2 SERPL-SCNC: 22 MMOL/L — SIGNIFICANT CHANGE UP (ref 22–29)
CREAT SERPL-MCNC: 2 MG/DL — HIGH (ref 0.5–1.3)
GLUCOSE BLDC GLUCOMTR-MCNC: 136 MG/DL — HIGH (ref 70–99)
GLUCOSE BLDC GLUCOMTR-MCNC: 142 MG/DL — HIGH (ref 70–99)
GLUCOSE BLDC GLUCOMTR-MCNC: 147 MG/DL — HIGH (ref 70–99)
GLUCOSE BLDC GLUCOMTR-MCNC: 171 MG/DL — HIGH (ref 70–99)
GLUCOSE SERPL-MCNC: 163 MG/DL — HIGH (ref 70–115)
POTASSIUM SERPL-MCNC: 4.8 MMOL/L — SIGNIFICANT CHANGE UP (ref 3.5–5.3)
POTASSIUM SERPL-SCNC: 4.8 MMOL/L — SIGNIFICANT CHANGE UP (ref 3.5–5.3)
SODIUM SERPL-SCNC: 140 MMOL/L — SIGNIFICANT CHANGE UP (ref 135–145)

## 2018-11-20 PROCEDURE — 99233 SBSQ HOSP IP/OBS HIGH 50: CPT

## 2018-11-20 PROCEDURE — 99232 SBSQ HOSP IP/OBS MODERATE 35: CPT

## 2018-11-20 RX ORDER — HYDRALAZINE HCL 50 MG
1 TABLET ORAL
Qty: 0 | Refills: 0 | COMMUNITY
Start: 2018-11-20

## 2018-11-20 RX ORDER — METOPROLOL TARTRATE 50 MG
1 TABLET ORAL
Qty: 0 | Refills: 0 | COMMUNITY
Start: 2018-11-20

## 2018-11-20 RX ORDER — CIPROFLOXACIN LACTATE 400MG/40ML
1 VIAL (ML) INTRAVENOUS
Qty: 0 | Refills: 0 | COMMUNITY
Start: 2018-11-20

## 2018-11-20 RX ORDER — CLOPIDOGREL BISULFATE 75 MG/1
1 TABLET, FILM COATED ORAL
Qty: 0 | Refills: 0 | COMMUNITY

## 2018-11-20 RX ORDER — INSULIN GLARGINE 100 [IU]/ML
25 INJECTION, SOLUTION SUBCUTANEOUS
Qty: 0 | Refills: 0 | COMMUNITY
Start: 2018-11-20

## 2018-11-20 RX ORDER — INSULIN DETEMIR 100/ML (3)
25 INSULIN PEN (ML) SUBCUTANEOUS
Qty: 0 | Refills: 0 | COMMUNITY

## 2018-11-20 RX ORDER — DAPTOMYCIN 500 MG/10ML
550 INJECTION, POWDER, LYOPHILIZED, FOR SOLUTION INTRAVENOUS
Qty: 0 | Refills: 0 | COMMUNITY
Start: 2018-11-20

## 2018-11-20 RX ORDER — POLYETHYLENE GLYCOL 3350 17 G/17G
17 POWDER, FOR SOLUTION ORAL
Qty: 0 | Refills: 0 | COMMUNITY
Start: 2018-11-20

## 2018-11-20 RX ORDER — DOCUSATE SODIUM 100 MG
1 CAPSULE ORAL
Qty: 0 | Refills: 0 | COMMUNITY
Start: 2018-11-20

## 2018-11-20 RX ADMIN — Medication 50 MILLIGRAM(S): at 23:08

## 2018-11-20 RX ADMIN — HEPARIN SODIUM 5000 UNIT(S): 5000 INJECTION INTRAVENOUS; SUBCUTANEOUS at 05:59

## 2018-11-20 RX ADMIN — Medication 81 MILLIGRAM(S): at 12:43

## 2018-11-20 RX ADMIN — ATORVASTATIN CALCIUM 20 MILLIGRAM(S): 80 TABLET, FILM COATED ORAL at 23:08

## 2018-11-20 RX ADMIN — Medication 100 MILLIGRAM(S): at 23:08

## 2018-11-20 RX ADMIN — INSULIN GLARGINE 25 UNIT(S): 100 INJECTION, SOLUTION SUBCUTANEOUS at 23:07

## 2018-11-20 RX ADMIN — HEPARIN SODIUM 5000 UNIT(S): 5000 INJECTION INTRAVENOUS; SUBCUTANEOUS at 17:10

## 2018-11-20 RX ADMIN — Medication 100 MILLIGRAM(S): at 17:09

## 2018-11-20 RX ADMIN — Medication 1: at 12:43

## 2018-11-20 RX ADMIN — CHLORHEXIDINE GLUCONATE 1 APPLICATION(S): 213 SOLUTION TOPICAL at 05:57

## 2018-11-20 RX ADMIN — Medication 250 MILLIGRAM(S): at 17:11

## 2018-11-20 RX ADMIN — DAPTOMYCIN 122 MILLIGRAM(S): 500 INJECTION, POWDER, LYOPHILIZED, FOR SOLUTION INTRAVENOUS at 17:11

## 2018-11-20 RX ADMIN — Medication 250 MILLIGRAM(S): at 05:59

## 2018-11-20 RX ADMIN — Medication 50 MILLIGRAM(S): at 17:10

## 2018-11-20 RX ADMIN — CLOPIDOGREL BISULFATE 75 MILLIGRAM(S): 75 TABLET, FILM COATED ORAL at 12:43

## 2018-11-20 RX ADMIN — SERTRALINE 100 MILLIGRAM(S): 25 TABLET, FILM COATED ORAL at 12:43

## 2018-11-20 RX ADMIN — Medication 50 MILLIGRAM(S): at 05:59

## 2018-11-20 RX ADMIN — Medication 100 MILLIGRAM(S): at 05:59

## 2018-11-20 NOTE — PROGRESS NOTE ADULT - PROBLEM SELECTOR PLAN 2
SLP recommends puree no liquids  Wife agreeable to pleasure feeds - puree with honey thick  See discussion below
Remains on puree no liquids.  Discussed with wife - would not want PEG tube.  Discussed consideration for Pleasure feeds.  Would continue to see if patient can pass to liquid consistency.
Reported podiatry spoke to wife- does not want surgery.   Wishes to cont abx
Cont ASA/ statin.

## 2018-11-20 NOTE — PROGRESS NOTE ADULT - PROBLEM SELECTOR PLAN 3
Cont to monitor  Avoid nephrotoxic drugs.
Reported podiatry spoke to wife- does not want surgery.   Wishes to cont abx.
Patient with AICD  Consider shutting off now that patient is DNR
Repeat swallow - puree NO liquids  Wife states would not want feeding tube  Recommend pleasure feeds upon discharge

## 2018-11-20 NOTE — PROGRESS NOTE ADULT - PROBLEM SELECTOR PROBLEM 3
Osteomyelitis of toe of left foot
CAD (coronary artery disease)
Dysphagia
CKD (chronic kidney disease)

## 2018-11-20 NOTE — PROGRESS NOTE ADULT - ASSESSMENT
67 y/o man with PMH of HTN, DM2, CVA and CAD s/p CABG and PM, was admitted with high fever, generalized weakness and vomiting for the past few days.. Admitted with  Sepsis 2/2 GPC bacteremia; source likely from skin. +Blood cultures with MRSA 8/8 bottles. Started on IV abx as per ID.  Seen by speech and swallow, recommended puree diet with no liquids. Cardio consulted for ASHELY - done 11/9 and neg for vegetation. + Thrombus noted on PM leads.. questionable PM infection though not definitive source of infection. ID recommended pacemaker to be removed however pts family decided against it.  EP and Cardio are following the case. AICD was deactivated after GOC discussion with family, DNR/DNI.   Pt with hx of PAD with balloon angioplasty in the past. Xrays of 1st left toe reveal osteomyelitis.. As per podiatry when spoken to family members in the past it was their desire to not undergo surgery and therefore no surgical intervention planned. GOC discussion held with family.. pt currently DNR/DNI, no peg tube, molst completed. Plan is for discharge back to [facility], from there family likely will pursue hospice.     MRSA Bacteremia; ASHELY neg for vegetations however showed thrombus on the pace maker leads. Patient had persistent bacteremia despite adequate antibiotic therapy with vancomycin. Concern for infected pacemaker. Cardiology, ID and EP consulted regarding pace maker extraction; at this time will not remove per family wishes; will continue with IV antibiotics  - repeat blood cultures sent 11/10 - negative for growth.  - continue Daptomycin 550mg daily as per ID for about 6 weeks.  - PICC line placed by surgical team  - family decided against PPM/AICD extraction surgery today after discussing with EP and Dr. Hanna risks vs benefits.  - AICD deactivated    Thrombus on pacemaker leads; unsure if these are infected;   - Trial with Daptomycin for 6 weeks since no surgery is planned and blood cultures have returned negative.   - Will need anticoagulation. Currently getting heparin for DVT prophylaxis. As per cardiology, recommends Eliquis 2.5 BID on discharge     Hypernatremia/hyperchloremia: 2/2 poor PO intake  - currently resolved. am Na level 137  - holding liquids for now as speech/swallow recommends purred with no liquids  - pts appetite has improved and eating more. If concern for lack of appetite can consider adding appetite stimulant    - will continue to trend sodium    Necrotic appearing first toe of the left foot; now underlying osteomyelitis identified  - podiatry and vascular consulted  - history of PAD in both legs with balloon angioplasty in the past  - podiatry consult appreciated. Discussed with podiatry today, unlikely acute osteomyelitis likely chronic.  As per podiatry when spoken to family members in the past it was their desire to not undergo surgery and therefore no surgical intervention planned. Patient is already undergoing 6 weeks IV abx for MRSA which would subsequently treat the bone infection to his toe. Pod has signed off.     Dysphagia  - Evaluated by speech and swallow, recommends dysphagia diet:  puree NO Liquids  - at this time family doesn't want PEG  - fluids have been titrated/discontinued as pt is eating more. pleasure feedings on discharge per family request despite counseling on possible aspiration    Right sided apical pneumothorax commented on CXR done s/p PICC line placement  - repeat shows stable  - patient without symptoms this morning  - no change in oxygen requirement.  - broken sternotomy wires noted - less likely to be a cause  - he had broken ribs on the same side however in August; pneumothorax not noted then  - at this time likely an incidental finding    Epistaxis  - brief episode this morning and overnight  - continue to monitor  - if no events in 24 hours will discharge on eliquis  - plavix on hold    DM- uncontrolled  - Diabetic diet and ISS  - Lantus increased from 26 to 28 units recently, BG better controlled   - Continue to monitor glucose.     CAD  - Hx of CABG  - On statin, aspirin, and beta blocker    HTN  - continue to monitor BP.   - adjust meds as needed     Stroke history  - on aspirin and statin; no new focal symptomatology; head CT negative.    Prophylactic Measures  - heparin sq BID for DVT prophylaxis  - DNR/DNI    Palliative Measures  - GOC discussion yesterday - patient DNR/DNI; family doesn't want PEG which will make placement difficult if they elect to do pleasure feeds and treat MRSA infection with IV abx; Fluids currently have been discontinued as pt is eating more, will monitor serum Na level, today is normal so patient can go in 24 hours if no further nose bleed.

## 2018-11-20 NOTE — PROGRESS NOTE ADULT - PROBLEM SELECTOR PLAN 4
On puree diet, no liquid  Will need to see if patient can advance to liquid  Eventual repeat swallow eval.
Assist with care  Likely component of vascular dementia given hx of multiple strokes.
Discussed with wife, resident Dr. Guevara Blankenship also present.  Wife wishes to continue with pleasure feeds - puree with honey thickened liquids.   Reinformed  risks of aspiration, respiratory failure - risk for rehospitalization.  Consideration of no rehospitalization so as not to burden patient with back and forth to hospital. Wife states she doesn't mind coming to hospital. She has been happy with his care here at [facility].  She knows her  is at risk and has been "prepared " for the last several years of his decline.   Discussed hospice services after [facility]. Wife open to services. Encouraged her to have hospice eval before d/c [facility].  Patient should be d/c with [facility]
Patient with multiple CVAs. Patient with likely vascular dementia given poor mental capacity.

## 2018-11-20 NOTE — DISCHARGE NOTE ADULT - CARE PROVIDER_API CALL
Kelvin Garcia), Infectious Disease; Internal Medicine  [address] [place] [address]  Phone: [phone]  Fax: [phone]

## 2018-11-20 NOTE — DISCHARGE NOTE ADULT - CARE PLAN
Principal Discharge DX:	MRSA bacteremia  Goal:	continue antibiotics course  Assessment and plan of treatment:	Patient family did not want to remove pacemaker after discussing risks/benefits. Want to continue treatment despite poor success rate with abx alone. Daptomycin to continue until end date of 12/22-12/23. Patient to have ID follow up prior to ending antibiotics in December to be evaluated for continued chronic oral suppressive therapy.  Secondary Diagnosis:	Oropharyngeal dysphagia  Assessment and plan of treatment:	patient to start pleasure feeds with puree diet and honey thickened liquids on discharge. Explained to family the risks of this and they understand he is at risk of aspiration and that it could lead to pneumonia and severe infection, even death.  Secondary Diagnosis:	Coronary artery disease involving native coronary artery of native heart without angina pectoris  Assessment and plan of treatment:	continue aspirin, statin, beta blocker  Secondary Diagnosis:	Cerebrovascular accident (CVA), unspecified mechanism  Assessment and plan of treatment:	on aspirin and statin  Secondary Diagnosis:	Osteomyelitis of toe of left foot  Assessment and plan of treatment:	seen by podiatry; family at this time doesn't want surgery. Will continue daptomycin and levaquin for now.  Secondary Diagnosis:	AICD (automatic cardioverter/defibrillator) present  Assessment and plan of treatment:	Patient had AICD turned off since he is now DNR/DNI. Patient has thrombus on leads; cardiology suggested eliquis.  Secondary Diagnosis:	Epistaxis  Assessment and plan of treatment:	resolved prior to discharge. plavix on discontinued. Currently on Aspirin and started on eliquis. Principal Discharge DX:	MRSA bacteremia  Goal:	continue antibiotics course  Assessment and plan of treatment:	Patient family did not want to remove pacemaker after discussing risks/benefits. Want to continue treatment despite poor success rate with abx alone. Daptomycin to continue until end date of 12/21. Patient to have ID follow up prior to ending antibiotics in December to be evaluated for continued chronic oral suppressive therapy.  Secondary Diagnosis:	Oropharyngeal dysphagia  Assessment and plan of treatment:	patient to start pleasure feeds with puree diet and honey thickened liquids on discharge. Explained to family the risks of this and they understand he is at risk of aspiration and that it could lead to pneumonia and severe infection, even death.  Secondary Diagnosis:	Coronary artery disease involving native coronary artery of native heart without angina pectoris  Assessment and plan of treatment:	continue aspirin, statin, beta blocker  Secondary Diagnosis:	Cerebrovascular accident (CVA), unspecified mechanism  Assessment and plan of treatment:	on aspirin and statin  Secondary Diagnosis:	Osteomyelitis of toe of left foot  Assessment and plan of treatment:	seen by podiatry; family at this time doesn't want surgery. Will continue daptomycin and oral Ciprofloxacin 250mg every 12 hours. Both antibiotics to be continued for 6 weeks, both ending 12/21  Secondary Diagnosis:	AICD (automatic cardioverter/defibrillator) present  Assessment and plan of treatment:	Patient had AICD turned off since he is now DNR/DNI. Patient has thrombus on leads; cardiology suggested Eliquis. Pt needs to follow up with ID after course of antibiotics for the thrombus on PPM leads. Contact information provided.  Secondary Diagnosis:	Epistaxis  Assessment and plan of treatment:	resolved prior to discharge. plavix on discontinued. Currently on Aspirin and Eliquis.

## 2018-11-20 NOTE — PROGRESS NOTE ADULT - SUBJECTIVE AND OBJECTIVE BOX
[facility]  INFECTIOUS DISEASES AND INTERNAL MEDICINE [place]  =======================================================  Frankie Garcia MD  Diplomates American Board of Internal Medicine and Infectious Diseases  =======================================================    [id number]  AVNI GREEN     Follow up for bacteremia with MRSA, source is skin ulcer in his right mid-back. Afebrile. No compliant. ASHELY done with thrombus around lead.   Family doesn't want any surgical intervention or PPM/lead removal.   Stable and afebrile, wife in bedside.     PAST MEDICAL & SURGICAL HISTORY:  CVA (cerebral vascular accident)  Diabetic neuropathy  DM (diabetes mellitus)  Hyperlipidemia  AICD (automatic cardioverter/defibrillator) present  Pacemaker  Stented coronary artery  HTN (hypertension)  Cardiac pacemaker    Social Hx: As per him no smoking, ETOH or drugs.     FAMILY HISTORY:  No pertinent family history in first degree relatives    Allergies  No Known Allergies    Antibiotics:  vancomycin    Ciprofloxacin oral     REVIEW OF SYSTEMS:  Afebrile no complaint.     Physical Exam:  Vital Signs Last 24 Hrs  T(C): 36.7 (20 Nov 2018 12:44), Max: 36.9 (19 Nov 2018 22:03)  T(F): 98 (20 Nov 2018 12:44), Max: 98.4 (19 Nov 2018 22:03)  HR: 68 (20 Nov 2018 12:44) (68 - 80)  BP: 124/58 (20 Nov 2018 12:44) (106/53 - 129/67)  RR: 20 (20 Nov 2018 12:44) (18 - 20)  SpO2: 97% (19 Nov 2018 22:03) (97% - 97%)	  GEN: NAD  HEENT: normocephalic and atraumatic. EOMI. PERRL.    NECK: Supple.  No lymphadenopathy   LUNGS: Clear to auscultation.  HEART: Regular rate and rhythm left upper chest Pacemaker, nontender, no erythema or swelling   ABDOMEN: Soft, nontender, and nondistended.  Positive bowel sounds.    : No CVA tenderness  EXTREMITIES:  toe with an ulcer, no cellulitis around it.   NEUROLOGIC: grossly intact.  PSYCHIATRIC: Appropriate affect .  SKIN: scratches and superficial wounds in toes/legs, healing wound in his Right mid back, with erythema, minimal discharge     Labs:  11-20    140  |  108<H>  |  32.0<H>  ----------------------------<  163<H>  4.8   |  22.0  |  2.00<H>    Ca    8.4<L>      20 Nov 2018 06:58    RECENT CULTURES:  11-13 @ 07:32 .Blood Blood     No growth at 5 days.    11-12 @ 09:02 .Blood Blood     No growth at 5 days.    11-10 @ 13:42 .Blood Blood     No growth at 5 days.    11-07 @ 11:41 Skin wound Methicillin resistant Staphylococcus aureus    Few Methicillin resistant Staphylococcus aureus (KNOWN)  Numerous Corynebacterium species    11-06 @ 22:42 .Blood Blood Methicillin resistant Staphylococcus aureus    Growth in aerobic and anaerobic bottles: Methicillin resistant  Staphylococcus aureus (KNOWN)  Aerobic Bottle: 14:58 Hours to positivity  Anaerobic Bottle: 15:48 Hours to positivity      All imaging and other data have been reviewed.    ASHELY:   Summary:   1. Left ventricular ejection fraction, by visual estimation, is 40 to   45%.   2. Technically fair study.   3. Mildly decreased global left ventricular systolic function.   4. The left ventricular diastolic function could not be assessed in this   study.   5. There is no evidence of pericardial effusion.   6. Mild mitral valve regurgitation.   7. Mild tricuspid regurgitation.   8. Trace pulmonic valve regurgitation.   9. There is no vegetation on any of the cardiac valve. Thrombus coated   pacing leads are seen.  10. Clinical correlation is advised.  11. Color flow doppler and intravenous injection of agitated saline   demonstrates the presence of an intact intra atrial septum.  12. No left atrial appendage thrombus.

## 2018-11-20 NOTE — PROGRESS NOTE ADULT - ASSESSMENT
68yr man, appears frail,  multiple medical issues  and hospitalizations  hx CVA and CAD s/p CABG, CKD, DM and PM admitted with MRSA bacteremia, ASHELY negative for vegetations, + thrombus on PM leads. Family electing no surgery for removal of PM. Patient at high risk for infection given no surgery to removed PM lead. Issue of dysphagia persists, wife electing comfort feeds.

## 2018-11-20 NOTE — DISCHARGE NOTE ADULT - PATIENT PORTAL LINK FT
You can access the [facility] Portal, offered by [facility], by registering with the following website: [email or web address]

## 2018-11-20 NOTE — PROGRESS NOTE ADULT - PROBLEM SELECTOR PLAN 1
-   As per palliative care, wife does not think she would want a feeding tube if he fails bedside swallow for liquids.  - Will not actively follow.   - Would suggest repeat bedside swallow to see if pt can take honey thick liquids. If he fails and family wants PEG, then pt would need  ID and cardiac clearance and AS, plavix would need to be held 5 days before peg  - please reconsult as needed
To be d/c on 6 wks Dapto.
ID recommending 6weeks IV Daptomycin, and possible  suppressive therapy thereafter. Family has declined pacer wire leads removed
To be d/c on 6 wks Dapto
ID recommending 6weeks IV Daptomycin, and possible  suppressive therapy thereafter. Family has declined pacer wire leads removed.

## 2018-11-20 NOTE — DISCHARGE NOTE ADULT - MEDICATION SUMMARY - MEDICATIONS TO STOP TAKING
I will STOP taking the medications listed below when I get home from the hospital:    Plavix 75 mg oral tablet  -- 1 tab(s) by mouth once a day    clopidogrel 75 mg oral tablet  -- 1 tab(s) by mouth once a day

## 2018-11-20 NOTE — DISCHARGE NOTE ADULT - SECONDARY DIAGNOSIS.
Oropharyngeal dysphagia Coronary artery disease involving native coronary artery of native heart without angina pectoris Cerebrovascular accident (CVA), unspecified mechanism Osteomyelitis of toe of left foot AICD (automatic cardioverter/defibrillator) present Epistaxis

## 2018-11-20 NOTE — PROGRESS NOTE ADULT - SUBJECTIVE AND OBJECTIVE BOX
CC:  follow up GOC  INTERVAL HPI/OVERNIGHT EVENTS:    PRESENT SYMPTOMS: SOURCE:  Patient/Family/Team    PAIN SCALE:  0 = none  1 = mild   2 = moderate  3 = severe    Pain: denies    Dyspnea:  [ ] YES x[ ] NO  Anxiety:  [ ] YES [ x] NO  Fatigue: [x ] YES [ ] NO  Nausea: [ ] YES [ ] NO  na  Loss of Appetite: [ x] YES [ ] NO  Other symptoms: __________    MEDICATIONS  (STANDING):  aspirin  chewable 81 milliGRAM(s) Oral daily  atorvastatin 20 milliGRAM(s) Oral at bedtime  chlorhexidine 2% Cloths 1 Application(s) Topical <User Schedule>  ciprofloxacin     Tablet 250 milliGRAM(s) Oral every 12 hours  clopidogrel Tablet 75 milliGRAM(s) Oral daily  DAPTOmycin IVPB 550 milliGRAM(s) IV Intermittent every 24 hours  dextrose 5%. 1000 milliLiter(s) (50 mL/Hr) IV Continuous <Continuous>  dextrose 50% Injectable 12.5 Gram(s) IV Push once  dextrose 50% Injectable 25 Gram(s) IV Push once  dextrose 50% Injectable 25 Gram(s) IV Push once  docusate sodium 100 milliGRAM(s) Oral three times a day  heparin  Injectable 5000 Unit(s) SubCutaneous every 12 hours  hydrALAZINE 50 milliGRAM(s) Oral every 8 hours  insulin glargine Injectable (LANTUS) 25 Unit(s) SubCutaneous at bedtime  insulin lispro (HumaLOG) corrective regimen sliding scale   SubCutaneous three times a day before meals  metoprolol tartrate 50 milliGRAM(s) Oral two times a day  sertraline 100 milliGRAM(s) Oral daily    MEDICATIONS  (PRN):  dextrose 40% Gel 15 Gram(s) Oral once PRN Blood Glucose LESS THAN 70 milliGRAM(s)/deciliter  glucagon  Injectable 1 milliGRAM(s) IntraMuscular once PRN Glucose LESS THAN 70 milligrams/deciliter  polyethylene glycol 3350 17 Gram(s) Oral daily PRN Constipation      Allergies    No Known Allergies    Intolerances        Karnofsky Performance Score/Palliative Performance Status Version 2:  30  %    Vital Signs Last 24 Hrs  T(C): 36.7 (20 Nov 2018 12:44), Max: 36.9 (19 Nov 2018 22:03)  T(F): 98 (20 Nov 2018 12:44), Max: 98.4 (19 Nov 2018 22:03)  HR: 68 (20 Nov 2018 12:44) (68 - 80)  BP: 124/58 (20 Nov 2018 12:44) (106/53 - 129/67)  BP(mean): --  RR: 20 (20 Nov 2018 12:44) (18 - 20)  SpO2: 97% (19 Nov 2018 22:03) (97% - 97%)    PHYSICAL EXAM:    General: awake NAD, answers simple questions     HEENT: [x ] normal  [ ] dry mouth  [ ] ET tube/trach    Lungs: [x ] comfortable [ ] tachypnea/labored breathing  [ ] excessive secretions    CV: [ x] normal  [ ] tachycardia    GI: [ x] normal  [ ] distended  [ ] tender  [ ] no BS               [ ] PEG/NG/OG tube    : [ x] normal  [ ] incontinent  [ ] oliguria/anuria  [ ] schneider    MSK: [ ] normal  [ x] weakness  [ ] edema             [ ] ambulatory  [ ] bedbound/wheelchair bound    Skin: [ ] normal  [ ] pressure ulcers- Stage_____  [x ] no rash    LABS:    11-20    140  |  108<H>  |  32.0<H>  ----------------------------<  163<H>  4.8   |  22.0  |  2.00<H>    Ca    8.4<L>      20 Nov 2018 06:58          I&O's Summary    19 Nov 2018 07:01  -  20 Nov 2018 07:00  --------------------------------------------------------  IN: 50 mL / OUT: 0 mL / NET: 50 mL        Thank you for the opportunity to assist with the care of this patient.   [facility] [phone].

## 2018-11-20 NOTE — PROGRESS NOTE ADULT - SUBJECTIVE AND OBJECTIVE BOX
CC: MRSA bacteremia    INTERVAL HPI:  Patient was seen and examined  Laying comfortably in bed. No medical complaints at this time.  able to sit patient up and he was eager to eat  patient had spontaneous bleeding from nose that ended abruptly after a couple drops came out from the left nare.    Vital Signs Last 24 Hrs  T(C): 36.8 (19 Nov 2018 04:36), Max: 37.1 (18 Nov 2018 16:43)  T(F): 98.3 (19 Nov 2018 04:36), Max: 98.7 (18 Nov 2018 16:43)  HR: 63 (19 Nov 2018 04:36) (63 - 70)  BP: 111/60 (19 Nov 2018 04:36) (111/60 - 127/59)  BP(mean): --  RR: 18 (19 Nov 2018 04:36) (18 - 18)  SpO2: 98% (18 Nov 2018 21:29) (98% - 98%)    PHYSICAL EXAM:  GENERAL: No acute distress.  CHEST/LUNG: Clear to auscultation b/l. No rales, rhonchi or wheezing. non-tender, no crepitus  HEART: Regular rate and rhythm. Normal S1S2. No murmurs, gallops, or rubs.   ABDOMEN: Soft, nontender, nondistended. Normal bowel sounds in all 4 quadrants.   EXTREMITIES:  1+ radial and DP pulses noted, no clubbing, cyanosis, or edema noted;  FROM x 4. Shiny atrophic appearance on shins b/l with no hair. Necrotic appearing tissue on distal tip of the great toe. Multiple scabbed areas to multiple digits of the foot and ankles bilaterally.  SKIN: + multiple tattoos throughout; scattered skin abrasions from fall at home that have scabbing in different stages.  PSYCH: insight/judgement unable to assess.    LABS:     11-19    137  |  104  |  31.0<H>  ----------------------------<  122<H>  3.9   |  21.0<L>  |  1.81<H>    Ca    8.1<L>      19 Nov 2018 07:52    MEDICATIONS  (STANDING):  aspirin  chewable 81 milliGRAM(s) Oral daily  atorvastatin 20 milliGRAM(s) Oral at bedtime  chlorhexidine 2% Cloths 1 Application(s) Topical <User Schedule>  ciprofloxacin     Tablet 250 milliGRAM(s) Oral every 12 hours  clopidogrel Tablet 75 milliGRAM(s) Oral daily  DAPTOmycin IVPB 550 milliGRAM(s) IV Intermittent every 24 hours  dextrose 5%. 1000 milliLiter(s) (100 mL/Hr) IV Continuous <Continuous>  dextrose 5%. 1000 milliLiter(s) (50 mL/Hr) IV Continuous <Continuous>  dextrose 50% Injectable 12.5 Gram(s) IV Push once  dextrose 50% Injectable 25 Gram(s) IV Push once  dextrose 50% Injectable 25 Gram(s) IV Push once  docusate sodium 100 milliGRAM(s) Oral three times a day  heparin  Injectable 5000 Unit(s) SubCutaneous every 12 hours  hydrALAZINE 50 milliGRAM(s) Oral every 8 hours  insulin glargine Injectable (LANTUS) 28 Unit(s) SubCutaneous at bedtime  insulin lispro (HumaLOG) corrective regimen sliding scale   SubCutaneous three times a day before meals  metoprolol tartrate 50 milliGRAM(s) Oral two times a day  saccharomyces boulardii 250 milliGRAM(s) Oral two times a day  sertraline 100 milliGRAM(s) Oral daily    MEDICATIONS  (PRN):  dextrose 40% Gel 15 Gram(s) Oral once PRN Blood Glucose LESS THAN 70 milliGRAM(s)/deciliter  glucagon  Injectable 1 milliGRAM(s) IntraMuscular once PRN Glucose LESS THAN 70 milligrams/deciliter  polyethylene glycol 3350 17 Gram(s) Oral daily PRN Constipation    < from: Xray Chest 1 View-PORTABLE IMMEDIATE (11.16.18 @ 17:19) >  IMPRESSION: Tiny right apical pneumothorax.    < end of copied text >

## 2018-11-20 NOTE — DISCHARGE NOTE ADULT - PLAN OF CARE
continue antibiotics course Patient family did not want to remove pacemaker after discussing risks/benefits. Want to continue treatment despite poor success rate with abx alone. Daptomycin to continue until end date of 12/22-12/23. Patient to have ID follow up prior to ending antibiotics in December to be evaluated for continued chronic oral suppressive therapy. patient to start pleasure feeds with puree diet and honey thickened liquids on discharge. Explained to family the risks of this and they understand he is at risk of aspiration and that it could lead to pneumonia and severe infection, even death. continue aspirin, statin, beta blocker on aspirin and statin seen by podiatry; family at this time doesn't want surgery. Will continue daptomycin and levaquin for now. Patient had AICD turned off since he is now DNR/DNI. Patient has thrombus on leads; cardiology suggested eliquis. resolved prior to discharge. plavix on discontinued. Currently on Aspirin and started on eliquis. Patient family did not want to remove pacemaker after discussing risks/benefits. Want to continue treatment despite poor success rate with abx alone. Daptomycin to continue until end date of 12/21. Patient to have ID follow up prior to ending antibiotics in December to be evaluated for continued chronic oral suppressive therapy. seen by podiatry; family at this time doesn't want surgery. Will continue daptomycin and oral Ciprofloxacin 250mg every 12 hours. Both antibiotics to be continued for 6 weeks, both ending 12/21 Patient had AICD turned off since he is now DNR/DNI. Patient has thrombus on leads; cardiology suggested Eliquis. Pt needs to follow up with ID after course of antibiotics for the thrombus on PPM leads. Contact information provided. resolved prior to discharge. plavix on discontinued. Currently on Aspirin and Eliquis.

## 2018-11-20 NOTE — DISCHARGE NOTE ADULT - HOSPITAL COURSE
67 y/o man with PMH of HTN, DM2, CVA and CAD s/p CABG and PM, was admitted with high fever, generalized weakness and vomiting for the past few days. Admitted with Sepsis 2/2 GPC bacteremia; source likely from skin. +Blood cultures with MRSA 8/8 bottles. Started on IV abx as per ID. Seen by speech and swallow, recommended puree diet with no liquids. Cardio consulted for ASHELY - done 11/9 and neg for vegetation. + Thrombus noted on PM leads. Questionable PM infection though not definitive source of infection. ID recommended pacemaker to be removed however pts family decided against it. EP and Cardio are following the case. AICD was deactivated after GOC discussion with family, DNR/DNI. Pt with hx of PAD with balloon angioplasty in the past. Xrays of 1st left toe reveal osteomyelitis. As per podiatry when spoken to family members in the past it was their desire to not undergo surgery and therefore no surgical intervention planned. Pt to be d/c with IV Daptomycin and oral Cipro both for 6 weeks until 12/21 as per ID recommendation. GOC discussion held with family. Pt currently DNR/DNI, no peg tube, molst completed. Plan is for discharge back [facility]. From there, family likely will pursue hospice care after [facility]KE.     Vital Signs Last 24 Hrs  T(C): 36.2 (21 Nov 2018 08:15), Max: 37.3 (20 Nov 2018 17:06)  T(F): 97.1 (21 Nov 2018 08:15), Max: 99.1 (20 Nov 2018 17:06)  HR: 57 (21 Nov 2018 08:15) (57 - 74)  BP: 132/70 (21 Nov 2018 08:15) (106/56 - 132/70)  BP(mean): --  RR: 18 (21 Nov 2018 08:15) (17 - 20)  SpO2: 98% (21 Nov 2018 08:15) (96% - 98%)    PHYSICAL EXAM:  GENERAL: No acute distress, sleeping comfortably in bed  CHEST/LUNG: Clear to auscultation b/l. No rales, rhonchi or wheezing. non-tender, no crepitus  HEART: Regular rate and rhythm. Normal S1S2. No murmurs, gallops, or rubs.   ABDOMEN: Soft, nontender, nondistended. Normal bowel sounds in all 4 quadrants.   EXTREMITIES:  1+ radial and DP pulses noted, no clubbing, cyanosis, or edema noted;  FROM x 4. Shiny atrophic appearance on shins b/l with no hair. Necrotic appearing tissue on distal tip of the left great toe. Multiple scabbed areas to multiple digits of the foot and ankles bilaterally.  SKIN: + multiple tattoos throughout; scattered skin abrasions from fall at home that have scabbing in different stages.  PSYCH: insight/judgement unable to assess

## 2018-11-20 NOTE — PROGRESS NOTE ADULT - ASSESSMENT
69y/o man with PMH of HTN, DM2, CVA and CAD s/p CABG and PM, was admitted today with high fever and generalized weakness and vomiting for the past few days. Blood cultures with MRSA, possible source is skin since growing the same MRSA.   ASHELY with thrombus around the lead but no vegetation on valves unclear that if this a lead vegetation or just noninfected fibrin strand that can happen in PPM leads frequently, but Since he has MRSA, the thrombus can get infected as well. Family doesn't want any intervention but agree with at least 6 weeks of IV Daptomycin.   Xray of ulcerated L1st toe showed OM, most likely no intervention will be done.     High fever  MRSA bacteremia   Skin ulcers with MRSA  PPM lead thrombus     -Contact isolation   -MRSA in 4 sets of blood cultures from 11/5 and 11/6 (8 out of 8 bottles)  -Repeat blood culture negative on 11/10  -Follow up daily WBC and fever curve, both normal.   -ASHELY with thrombus around the lead, no intervention per family wish  -PICC line placed  -Continue Daptomycin 550mg daily  -Cont cipro 250mg q12h for 6weeks to cover OM in L 1st toe, watch for diarrhea   -Both antibiotics until 12/21  -Needs evaluation at the end of 6 weeks for possible suppressive oral therapy for MRSA due to retained PPM. .    -Foot ulcers management as per podiatry and wound care, getting tx for OM.   Will sign off please  call with any question.

## 2018-11-20 NOTE — DISCHARGE NOTE ADULT - MEDICATION SUMMARY - MEDICATIONS TO TAKE
I will START or STAY ON the medications listed below when I get home from the hospital:    aspirin 81 mg oral tablet  -- 1 tab(s) by mouth once a day  -- Indication: For CAD (coronary artery disease)    sertraline 100 mg oral tablet  -- 1 tab(s) by mouth once a day  -- Indication: For Depression    insulin glargine  -- 25 unit(s) subcutaneous once a day (at bedtime)  -- Indication: For DM (diabetes mellitus)    atorvastatin 20 mg oral tablet  -- 1 tab(s) by mouth once a day (at bedtime)  -- Indication: For CAD (coronary artery disease)    metoprolol tartrate 50 mg oral tablet  -- 1 tab(s) by mouth 2 times a day  -- Indication: For CAD (coronary artery disease)    polyethylene glycol 3350 oral powder for reconstitution  -- 17 gram(s) by mouth once a day, As needed, Constipation  -- Indication: For CONSTIPATION    docusate sodium 100 mg oral capsule  -- 1 cap(s) by mouth 3 times a day  -- Indication: For CONSTIPATION    DAPTOmycin 500 mg intravenous injection  -- 550 milligram(s) intravenous every 24 hours  -- Indication: For BACTEREMIA    ciprofloxacin 250 mg oral tablet  -- 1 tab(s) by mouth every 12 hours  -- Indication: For OSTEOMYELITIS    hydrALAZINE 50 mg oral tablet  -- 1 tab(s) by mouth every 8 hours  -- Indication: For HTN (hypertension) I will START or STAY ON the medications listed below when I get home from the hospital:    aspirin 81 mg oral tablet  -- 1 tab(s) by mouth once a day  -- Indication: For CAD (coronary artery disease)    Eliquis 2.5 mg oral tablet  -- 1 tab(s) by mouth 2 times a day   -- Check with your doctor before becoming pregnant.  It is very important that you take or use this exactly as directed.  Do not skip doses or discontinue unless directed by your doctor.  Obtain medical advice before taking any non-prescription drugs as some may affect the action of this medication.    -- Indication: For Pacemaker thrombus leads    sertraline 100 mg oral tablet  -- 1 tab(s) by mouth once a day  -- Indication: For Depression    insulin glargine  -- 25 unit(s) subcutaneous once a day (at bedtime)  -- Indication: For DM (diabetes mellitus)    atorvastatin 20 mg oral tablet  -- 1 tab(s) by mouth once a day (at bedtime)  -- Indication: For CAD (coronary artery disease)    metoprolol tartrate 50 mg oral tablet  -- 1 tab(s) by mouth 2 times a day  -- Indication: For CAD (coronary artery disease)    polyethylene glycol 3350 oral powder for reconstitution  -- 17 gram(s) by mouth once a day, As needed, Constipation  -- Indication: For CONSTIPATION    docusate sodium 100 mg oral capsule  -- 1 cap(s) by mouth 3 times a day  -- Indication: For CONSTIPATION    DAPTOmycin 500 mg intravenous injection  -- 550 milligram(s) intravenous every 24 hours  -- Indication: For BACTEREMIA    ciprofloxacin 250 mg oral tablet  -- 1 tab(s) by mouth every 12 hours  -- Indication: For OSTEOMYELITIS    hydrALAZINE 50 mg oral tablet  -- 1 tab(s) by mouth every 8 hours  -- Indication: For HTN (hypertension)

## 2018-11-21 VITALS
SYSTOLIC BLOOD PRESSURE: 122 MMHG | HEART RATE: 66 BPM | RESPIRATION RATE: 16 BRPM | TEMPERATURE: 99 F | OXYGEN SATURATION: 96 % | DIASTOLIC BLOOD PRESSURE: 58 MMHG

## 2018-11-21 LAB
ANION GAP SERPL CALC-SCNC: 8 MMOL/L — SIGNIFICANT CHANGE UP (ref 5–17)
BUN SERPL-MCNC: 29 MG/DL — HIGH (ref 8–20)
CALCIUM SERPL-MCNC: 8.5 MG/DL — LOW (ref 8.6–10.2)
CHLORIDE SERPL-SCNC: 108 MMOL/L — HIGH (ref 98–107)
CO2 SERPL-SCNC: 23 MMOL/L — SIGNIFICANT CHANGE UP (ref 22–29)
CREAT SERPL-MCNC: 1.91 MG/DL — HIGH (ref 0.5–1.3)
GLUCOSE BLDC GLUCOMTR-MCNC: 105 MG/DL — HIGH (ref 70–99)
GLUCOSE BLDC GLUCOMTR-MCNC: 135 MG/DL — HIGH (ref 70–99)
GLUCOSE SERPL-MCNC: 98 MG/DL — SIGNIFICANT CHANGE UP (ref 70–115)
POTASSIUM SERPL-MCNC: 4.4 MMOL/L — SIGNIFICANT CHANGE UP (ref 3.5–5.3)
POTASSIUM SERPL-SCNC: 4.4 MMOL/L — SIGNIFICANT CHANGE UP (ref 3.5–5.3)
SODIUM SERPL-SCNC: 139 MMOL/L — SIGNIFICANT CHANGE UP (ref 135–145)

## 2018-11-21 PROCEDURE — 99239 HOSP IP/OBS DSCHRG MGMT >30: CPT

## 2018-11-21 RX ORDER — APIXABAN 2.5 MG/1
1 TABLET, FILM COATED ORAL
Qty: 180 | Refills: 0 | OUTPATIENT
Start: 2018-11-21 | End: 2019-01-01

## 2018-11-21 RX ADMIN — Medication 50 MILLIGRAM(S): at 06:29

## 2018-11-21 RX ADMIN — Medication 250 MILLIGRAM(S): at 06:29

## 2018-11-21 RX ADMIN — CHLORHEXIDINE GLUCONATE 1 APPLICATION(S): 213 SOLUTION TOPICAL at 06:28

## 2018-11-21 RX ADMIN — HEPARIN SODIUM 5000 UNIT(S): 5000 INJECTION INTRAVENOUS; SUBCUTANEOUS at 06:29

## 2018-11-21 RX ADMIN — CLOPIDOGREL BISULFATE 75 MILLIGRAM(S): 75 TABLET, FILM COATED ORAL at 12:01

## 2018-11-21 RX ADMIN — Medication 100 MILLIGRAM(S): at 06:29

## 2018-11-21 RX ADMIN — Medication 81 MILLIGRAM(S): at 12:00

## 2018-11-21 RX ADMIN — Medication 50 MILLIGRAM(S): at 13:25

## 2018-11-21 RX ADMIN — SERTRALINE 100 MILLIGRAM(S): 25 TABLET, FILM COATED ORAL at 12:01

## 2018-11-21 RX ADMIN — Medication 100 MILLIGRAM(S): at 13:25

## 2018-11-21 NOTE — PROGRESS NOTE ADULT - REASON FOR ADMISSION
Vomiting and generalized weakness.
fever
Vomiting and generalized weakness.
MRSA bacteremia, infected pacemaker leads, osteomyelitis of the 1st left toe
Vomiting and generalized weakness.
staph bacteremia
Vomiting and generalized weakness.

## 2018-11-21 NOTE — PROGRESS NOTE ADULT - SUBJECTIVE AND OBJECTIVE BOX
67 y/o man with PMH of HTN, DM2, CVA and CAD s/p CABG and PM, was admitted with high fever, generalized weakness and vomiting for the past few days. Admitted with Sepsis 2/2 GPC bacteremia; source likely from skin. +Blood cultures with MRSA 8/8 bottles. Started on IV abx as per ID. Seen by speech and swallow, recommended puree diet with no liquids. Cardio consulted for ASHELY - done 11/9 and neg for vegetation. + Thrombus noted on PM leads. Questionable PM infection though not definitive source of infection. ID recommended pacemaker to be removed however pts family decided against it. EP and Cardio are following the case. AICD was deactivated after GOC discussion with family, DNR/DNI. Pt with hx of PAD with balloon angioplasty in the past. Xrays of 1st left toe reveal osteomyelitis. As per podiatry when spoken to family members in the past it was their desire to not undergo surgery and therefore no surgical intervention planned. Pt to be d/c with IV Daptomycin and oral Cipro both for 6 weeks until 12/21 as per ID recommendation. GOC discussion held with family. Pt currently DNR/DNI, no peg tube, molst completed. Plan is for discharge back to [facility]. From there, family likely will pursue hospice care after [facility]KE.     Pt seen and examined today at bedside by Attending and PA. Pt is lying in bed, not voicing any complaints.     Vital Signs Last 24 Hrs  T(C): 36.2 (21 Nov 2018 08:15), Max: 37.3 (20 Nov 2018 17:06)  T(F): 97.1 (21 Nov 2018 08:15), Max: 99.1 (20 Nov 2018 17:06)  HR: 57 (21 Nov 2018 08:15) (57 - 74)  BP: 132/70 (21 Nov 2018 08:15) (106/56 - 132/70)  BP(mean): --  RR: 18 (21 Nov 2018 08:15) (17 - 20)  SpO2: 98% (21 Nov 2018 08:15) (96% - 98%)    PHYSICAL EXAM:  GENERAL: No acute distress, sleeping comfortably in bed  CHEST/LUNG: Clear to auscultation b/l. No rales, rhonchi or wheezing. non-tender, no crepitus. Respirations unlabored  HEART: Regular rate and rhythm. Normal S1S2. No murmurs, gallops, or rubs.   ABDOMEN: Soft, nontender, nondistended. Normal bowel sounds in all 4 quadrants.   EXTREMITIES:  1+ radial and DP pulses noted, no clubbing, cyanosis, or edema noted;  FROM x 4. Shiny atrophic appearance on shins b/l with no hair. Necrotic appearing tissue on distal tip of the left great toe. Nontender to palpation. Multiple scabbed areas to multiple digits of the foot and ankles bilaterally.  SKIN: + multiple tattoos throughout; scattered skin abrasions from fall at home that have scabbing in different stages.  PSYCH: insight/judgement unable to assess

## 2018-11-21 NOTE — PROGRESS NOTE ADULT - PROVIDER SPECIALTY LIST ADULT
Cardiology
Gastroenterology
Hospitalist
Infectious Disease
Palliative Care
Palliative Care
Podiatry
Cardiology
Infectious Disease
Hospitalist
Hospitalist
Palliative Care
Palliative Care

## 2018-11-21 NOTE — PROGRESS NOTE ADULT - ASSESSMENT
69 y/o man with PMH of HTN, DM2, CVA and CAD s/p CABG and PM, was admitted with high fever, generalized weakness and vomiting for the past few days. Admitted with  Sepsis 2/2 GPC bacteremia; source likely from skin. +Blood cultures with MRSA 8/8 bottles. Started on IV abx as per ID.  Seen by speech and swallow, recommended puree diet with no liquids. Cardio consulted for ASHELY - done 11/9 and neg for vegetation. + Thrombus noted on PM leads. Questionable PM infection though not definitive source of infection. ID recommended pacemaker to be removed however pts family decided against it.  EP and Cardio are following the case. AICD was deactivated after GOC discussion with family, DNR/DNI. Pt with hx of PAD with balloon angioplasty in the past. Xrays of 1st left toe reveal osteomyelitis. As per podiatry when spoken to family members in the past it was their desire to not undergo surgery and therefore no surgical intervention planned. GOC discussion held with family. pt currently DNR/DNI, no peg tube, molst completed. Plan is for discharge back to [facility], from there family likely will pursue hospice.     MRSA Bacteremia; ASHELY neg for vegetations however showed thrombus on the pace maker leads. Patient had persistent bacteremia despite adequate antibiotic therapy with vancomycin. Concern for infected pacemaker. Cardiology, ID and EP consulted regarding pace maker extraction; at this time will not remove per family wishes; will continue with IV antibiotics  - repeat blood cultures sent 11/10 - negative for growth.  - continue Daptomycin 550mg daily as per ID for about 6 weeks until 12/21.  - PICC line placed by surgical team  - family decided against PPM/AICD extraction surgery today after discussing with EP and Dr. Hanna risks vs benefits.  - AICD deactivated    Thrombus on pacemaker leads; unsure if these are infected;   - Trial with Daptomycin for 6 weeks since no surgery is planned and blood cultures have returned negative.   - Will need anticoagulation. Currently getting heparin for DVT prophylaxis. As per cardiology, recommends Eliquis 2.5 BID on discharge   -Will need f/u with ID outpt    Hypernatremia/hyperchloremia: 2/2 poor PO intake  - currently resolved. am Na level 139  - holding liquids for now as speech/swallow recommends purred with no liquids  - pts appetite has improved and eating more. If concern for lack of appetite can consider adding appetite stimulant      Necrotic appearing first toe of the left foot; now underlying osteomyelitis identified  - podiatry and vascular consulted  -currently on PO cipro. Will need to continue until 12/21 as well  - history of PAD in both legs with balloon angioplasty in the past  - podiatry consult appreciated. Discussed with podiatry today, unlikely acute osteomyelitis likely chronic.  As per podiatry when spoken to family members in the past it was their desire to not undergo surgery and therefore no surgical intervention planned. Patient is already undergoing 6 weeks IV abx for MRSA which would subsequently treat the bone infection to his toe. Pod has signed off.     Dysphagia  - Evaluated by speech and swallow, recommends dysphagia diet:  puree NO Liquids  - at this time family doesn't want PEG  - fluids have been titrated/discontinued as pt is eating more. pleasure feedings on discharge per family request despite counseling on possible aspiration    Right sided apical pneumothorax commented on CXR done s/p PICC line placement  - repeat shows stable  - patient without symptoms this morning  - no change in oxygen requirement.  - broken sternotomy wires noted - less likely to be a cause  - he had broken ribs on the same side however in August; pneumothorax not noted then  - at this time likely an incidental finding    Epistaxis  - brief episode this morning and overnight  - continue to monitor  - if no events in 24 hours will discharge on eliquis  - plavix on hold    DM- uncontrolled  - Diabetic diet and ISS  - Lantus increased from 26 to 28 units recently, BG better controlled   - Continue to monitor glucose.     CAD  - Hx of CABG  - On statin, aspirin, and beta blocker    HTN  - BP stable    Stroke history  - on aspirin and statin; no new focal symptomatology; head CT negative.    Prophylactic Measures  - heparin sq BID for DVT prophylaxis  - DNR/DNI    Palliative Measures  - GOC discussion yesterday - patient DNR/DNI; family doesn't want PEG which will make placement difficult if they elect to do pleasure feeds and treat MRSA infection with IV and PO abx; Fluids currently have been discontinued as pt is eating more, serum Na level  today. patient had no further nose bleeds and is medically stable for d/c today to [facility].

## 2018-11-21 NOTE — CHART NOTE - NSCHARTNOTEFT_GEN_A_CORE
Source: Patient [ ]  Family [ ]   other [ x]  EMR    Current Diet: Puree no liquids with ensure pudding BID  As per notes, pt has had improved po intake      PO intake:  < 50% [ ]   50-75%  [ ]   %  [ ]  other :  fluctuates from good to poor    Source for PO intake [ ] Patient [ ] family [x ] chart [ ] staff [ ] other    Enteral /Parenteral Nutrition:     Current Weight: no new weight    % Weight Change     Pertinent Medications: MEDICATIONS  (STANDING):  aspirin  chewable 81 milliGRAM(s) Oral daily  atorvastatin 20 milliGRAM(s) Oral at bedtime  chlorhexidine 2% Cloths 1 Application(s) Topical <User Schedule>  ciprofloxacin     Tablet 250 milliGRAM(s) Oral every 12 hours  clopidogrel Tablet 75 milliGRAM(s) Oral daily  DAPTOmycin IVPB 550 milliGRAM(s) IV Intermittent every 24 hours  dextrose 5%. 1000 milliLiter(s) (50 mL/Hr) IV Continuous <Continuous>  dextrose 50% Injectable 12.5 Gram(s) IV Push once  dextrose 50% Injectable 25 Gram(s) IV Push once  dextrose 50% Injectable 25 Gram(s) IV Push once  docusate sodium 100 milliGRAM(s) Oral three times a day  heparin  Injectable 5000 Unit(s) SubCutaneous every 12 hours  hydrALAZINE 50 milliGRAM(s) Oral every 8 hours  insulin glargine Injectable (LANTUS) 25 Unit(s) SubCutaneous at bedtime  insulin lispro (HumaLOG) corrective regimen sliding scale   SubCutaneous three times a day before meals  metoprolol tartrate 50 milliGRAM(s) Oral two times a day  sertraline 100 milliGRAM(s) Oral daily    MEDICATIONS  (PRN):  dextrose 40% Gel 15 Gram(s) Oral once PRN Blood Glucose LESS THAN 70 milliGRAM(s)/deciliter  glucagon  Injectable 1 milliGRAM(s) IntraMuscular once PRN Glucose LESS THAN 70 milligrams/deciliter  polyethylene glycol 3350 17 Gram(s) Oral daily PRN Constipation    Pertinent Labs: 11-21 Na139 mmol/L Glu 98 mg/dL K+ 4.4 mmol/L Cr  1.91 mg/dL<H> BUN 29.0 mg/dL<H> Phos n/a   Alb n/a   PAB n/a               Skin:     Nutrition focused physical exam conducted - found signs of malnutrition [ ]absent [x ]present    Subcutaneous fat loss: [x ] Orbital fat pads region, [ ]Buccal fat region, [ ]Triceps region,  [ ]Ribs region    Muscle wasting: [x ]Temples region, [ ]Clavicle region, [ ]Shoulder region, [ ]Scapula region, [ ]Interosseous region,  [ ]thigh region, [ ]Calf region    Estimated Needs:   [x ] no change since previous assessment  [ ] recalculated:     Current Nutrition Diagnosis:  Pt with severe chronic malnutrition related to inadequate energy intake in setting of CVA, sepsis, difficulty swallowing as evidenced by 20-30# weight loss over past year, <75% intake >1 mo, need for dysphagia diet, moderate amount of fat/muscle depletion in orbitals, temples.  Pt continues on puree with no liquids taking % some meals. Palliative has been following.     Recommendations: Continue Ensure Pudding TID      Monitoring and Evaluation:   [ x] PO intake [ x] Tolerance to diet prescription [X] Weights  [X] Follow up per protocol [X] Labs:

## 2018-12-20 PROBLEM — Z86.14 HISTORY OF MRSA INFECTION: Status: ACTIVE | Noted: 2018-01-01

## 2019-01-01 ENCOUNTER — APPOINTMENT (OUTPATIENT)
Dept: INTERNAL MEDICINE | Facility: CLINIC | Age: 69
End: 2019-01-01

## 2019-01-01 ENCOUNTER — APPOINTMENT (OUTPATIENT)
Dept: NEPHROLOGY | Facility: CLINIC | Age: 69
End: 2019-01-01

## 2019-01-01 ENCOUNTER — TRANSCRIPTION ENCOUNTER (OUTPATIENT)
Age: 69
End: 2019-01-01

## 2019-01-01 ENCOUNTER — APPOINTMENT (OUTPATIENT)
Dept: INTERNAL MEDICINE | Facility: CLINIC | Age: 69
End: 2019-01-01
Payer: MEDICARE

## 2019-01-01 ENCOUNTER — INPATIENT (INPATIENT)
Facility: HOSPITAL | Age: 69
LOS: 3 days | Discharge: HOME | DRG: 690 | End: 2019-04-30
Attending: HOSPITALIST | Admitting: HOSPITALIST
Payer: MEDICARE

## 2019-01-01 ENCOUNTER — INPATIENT (INPATIENT)
Facility: HOSPITAL | Age: 69
LOS: 7 days | Discharge: HOME | DRG: 199 | End: 2019-10-17
Attending: INTERNAL MEDICINE | Admitting: SURGERY
Payer: MEDICARE

## 2019-01-01 ENCOUNTER — OUTPATIENT (OUTPATIENT)
Dept: OUTPATIENT SERVICES | Facility: HOSPITAL | Age: 69
LOS: 1 days | End: 2019-01-01
Payer: COMMERCIAL

## 2019-01-01 ENCOUNTER — INPATIENT (INPATIENT)
Facility: HOSPITAL | Age: 69
LOS: 4 days | Discharge: HOME | DRG: 689 | End: 2019-04-25
Attending: INTERNAL MEDICINE | Admitting: INTERNAL MEDICINE
Payer: MEDICARE

## 2019-01-01 ENCOUNTER — APPOINTMENT (OUTPATIENT)
Dept: INTERNAL MEDICINE | Facility: CLINIC | Age: 69
End: 2019-01-01
Payer: COMMERCIAL

## 2019-01-01 ENCOUNTER — EMERGENCY (EMERGENCY)
Facility: HOSPITAL | Age: 69
LOS: 1 days | Discharge: HOME | End: 2019-01-01
Attending: EMERGENCY MEDICINE
Payer: MEDICARE

## 2019-01-01 VITALS
OXYGEN SATURATION: 100 % | DIASTOLIC BLOOD PRESSURE: 72 MMHG | HEART RATE: 84 BPM | TEMPERATURE: 98 F | SYSTOLIC BLOOD PRESSURE: 161 MMHG | RESPIRATION RATE: 16 BRPM

## 2019-01-01 VITALS
DIASTOLIC BLOOD PRESSURE: 63 MMHG | RESPIRATION RATE: 16 BRPM | RESPIRATION RATE: 20 BRPM | SYSTOLIC BLOOD PRESSURE: 147 MMHG | HEART RATE: 70 BPM | OXYGEN SATURATION: 100 % | DIASTOLIC BLOOD PRESSURE: 98 MMHG | SYSTOLIC BLOOD PRESSURE: 229 MMHG | TEMPERATURE: 98 F | TEMPERATURE: 98 F | HEART RATE: 68 BPM

## 2019-01-01 VITALS
HEIGHT: 76 IN | WEIGHT: 165 LBS | BODY MASS INDEX: 20.09 KG/M2 | SYSTOLIC BLOOD PRESSURE: 151 MMHG | DIASTOLIC BLOOD PRESSURE: 60 MMHG

## 2019-01-01 VITALS
TEMPERATURE: 98 F | SYSTOLIC BLOOD PRESSURE: 148 MMHG | HEART RATE: 68 BPM | RESPIRATION RATE: 16 BRPM | OXYGEN SATURATION: 99 % | DIASTOLIC BLOOD PRESSURE: 74 MMHG

## 2019-01-01 VITALS
OXYGEN SATURATION: 97 % | SYSTOLIC BLOOD PRESSURE: 150 MMHG | DIASTOLIC BLOOD PRESSURE: 72 MMHG | HEART RATE: 70 BPM | TEMPERATURE: 99 F | RESPIRATION RATE: 18 BRPM

## 2019-01-01 VITALS
WEIGHT: 199.96 LBS | RESPIRATION RATE: 18 BRPM | SYSTOLIC BLOOD PRESSURE: 140 MMHG | TEMPERATURE: 98 F | OXYGEN SATURATION: 99 % | HEIGHT: 72 IN | HEART RATE: 56 BPM | DIASTOLIC BLOOD PRESSURE: 58 MMHG

## 2019-01-01 VITALS
HEART RATE: 61 BPM | OXYGEN SATURATION: 98 % | WEIGHT: 199.96 LBS | HEIGHT: 76 IN | DIASTOLIC BLOOD PRESSURE: 81 MMHG | RESPIRATION RATE: 19 BRPM | SYSTOLIC BLOOD PRESSURE: 196 MMHG | TEMPERATURE: 98 F

## 2019-01-01 VITALS
DIASTOLIC BLOOD PRESSURE: 50 MMHG | HEIGHT: 76 IN | WEIGHT: 165 LBS | SYSTOLIC BLOOD PRESSURE: 100 MMHG | BODY MASS INDEX: 20.09 KG/M2

## 2019-01-01 VITALS
BODY MASS INDEX: 38.36 KG/M2 | SYSTOLIC BLOOD PRESSURE: 122 MMHG | DIASTOLIC BLOOD PRESSURE: 70 MMHG | WEIGHT: 315 LBS | HEIGHT: 76 IN

## 2019-01-01 VITALS — DIASTOLIC BLOOD PRESSURE: 72 MMHG | SYSTOLIC BLOOD PRESSURE: 158 MMHG | HEART RATE: 72 BPM

## 2019-01-01 DIAGNOSIS — Z89.429 ACQUIRED ABSENCE OF OTHER TOE(S), UNSPECIFIED SIDE: Chronic | ICD-10-CM

## 2019-01-01 DIAGNOSIS — N17.9 ACUTE KIDNEY FAILURE, UNSPECIFIED: ICD-10-CM

## 2019-01-01 DIAGNOSIS — G45.9 TRANSIENT CEREBRAL ISCHEMIC ATTACK, UNSPECIFIED: ICD-10-CM

## 2019-01-01 DIAGNOSIS — Z95.5 PRESENCE OF CORONARY ANGIOPLASTY IMPLANT AND GRAFT: ICD-10-CM

## 2019-01-01 DIAGNOSIS — M86.679 OTHER CHRONIC OSTEOMYELITIS, UNSPECIFIED ANKLE AND FOOT: ICD-10-CM

## 2019-01-01 DIAGNOSIS — I10 ESSENTIAL (PRIMARY) HYPERTENSION: ICD-10-CM

## 2019-01-01 DIAGNOSIS — R13.11 DYSPHAGIA, ORAL PHASE: ICD-10-CM

## 2019-01-01 DIAGNOSIS — D63.8 ANEMIA IN OTHER CHRONIC DISEASES CLASSIFIED ELSEWHERE: ICD-10-CM
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
## 2019-01-01 DIAGNOSIS — S22.49XA MULTIPLE FRACTURES OF RIBS, UNSPECIFIED SIDE, INITIAL ENCOUNTER FOR CLOSED FRACTURE: ICD-10-CM

## 2019-01-01 DIAGNOSIS — N18.3 CHRONIC KIDNEY DISEASE, STAGE 3 (MODERATE): ICD-10-CM

## 2019-01-01 DIAGNOSIS — S20.211A CONTUSION OF RIGHT FRONT WALL OF THORAX, INITIAL ENCOUNTER: ICD-10-CM

## 2019-01-01 DIAGNOSIS — Z95.0 PRESENCE OF CARDIAC PACEMAKER: Chronic | ICD-10-CM

## 2019-01-01 DIAGNOSIS — Z95.810 PRESENCE OF AUTOMATIC (IMPLANTABLE) CARDIAC DEFIBRILLATOR: Chronic | ICD-10-CM

## 2019-01-01 DIAGNOSIS — I63.323: ICD-10-CM

## 2019-01-01 DIAGNOSIS — E11.42 TYPE 2 DIABETES MELLITUS WITH DIABETIC POLYNEUROPATHY: ICD-10-CM

## 2019-01-01 DIAGNOSIS — F01.51 VASCULAR DEMENTIA, UNSPECIFIED SEVERITY, WITH BEHAVIORAL DISTURBANCE: ICD-10-CM

## 2019-01-01 DIAGNOSIS — A49.9 BACTERIAL INFECTION, UNSPECIFIED: ICD-10-CM

## 2019-01-01 DIAGNOSIS — I25.810 ATHEROSCLEROSIS OF CORONARY ARTERY BYPASS GRAFT(S) WITHOUT ANGINA PECTORIS: Chronic | ICD-10-CM

## 2019-01-01 LAB
-  AMIKACIN: SIGNIFICANT CHANGE UP
-  AMIKACIN: SIGNIFICANT CHANGE UP
-  AMPICILLIN/SULBACTAM: SIGNIFICANT CHANGE UP
-  AMPICILLIN/SULBACTAM: SIGNIFICANT CHANGE UP
-  AMPICILLIN: SIGNIFICANT CHANGE UP
-  AMPICILLIN: SIGNIFICANT CHANGE UP
-  AZTREONAM: SIGNIFICANT CHANGE UP
-  AZTREONAM: SIGNIFICANT CHANGE UP
-  CEFAZOLIN: SIGNIFICANT CHANGE UP
-  CEFAZOLIN: SIGNIFICANT CHANGE UP
-  CEFEPIME: SIGNIFICANT CHANGE UP
-  CEFEPIME: SIGNIFICANT CHANGE UP
-  CEFOXITIN: SIGNIFICANT CHANGE UP
-  CEFOXITIN: SIGNIFICANT CHANGE UP
-  CEFTRIAXONE: SIGNIFICANT CHANGE UP
-  CEFTRIAXONE: SIGNIFICANT CHANGE UP
-  CIPROFLOXACIN: SIGNIFICANT CHANGE UP
-  CIPROFLOXACIN: SIGNIFICANT CHANGE UP
-  ERTAPENEM: SIGNIFICANT CHANGE UP
-  ERTAPENEM: SIGNIFICANT CHANGE UP
-  GENTAMICIN: SIGNIFICANT CHANGE UP
-  GENTAMICIN: SIGNIFICANT CHANGE UP
-  IMIPENEM: SIGNIFICANT CHANGE UP
-  IMIPENEM: SIGNIFICANT CHANGE UP
-  LEVOFLOXACIN: SIGNIFICANT CHANGE UP
-  LEVOFLOXACIN: SIGNIFICANT CHANGE UP
-  MEROPENEM: SIGNIFICANT CHANGE UP
-  MEROPENEM: SIGNIFICANT CHANGE UP
-  NITROFURANTOIN: SIGNIFICANT CHANGE UP
-  NITROFURANTOIN: SIGNIFICANT CHANGE UP
-  PIPERACILLIN/TAZOBACTAM: SIGNIFICANT CHANGE UP
-  PIPERACILLIN/TAZOBACTAM: SIGNIFICANT CHANGE UP
-  TIGECYCLINE: SIGNIFICANT CHANGE UP
-  TIGECYCLINE: SIGNIFICANT CHANGE UP
-  TOBRAMYCIN: SIGNIFICANT CHANGE UP
-  TOBRAMYCIN: SIGNIFICANT CHANGE UP
-  TRIMETHOPRIM/SULFAMETHOXAZOLE: SIGNIFICANT CHANGE UP
-  TRIMETHOPRIM/SULFAMETHOXAZOLE: SIGNIFICANT CHANGE UP
24R-OH-CALCIDIOL SERPL-MCNC: 7.7 NG/ML — LOW (ref 30–80)
ALBUMIN SERPL ELPH-MCNC: 2.5 G/DL — LOW (ref 3.3–5.2)
ALBUMIN SERPL ELPH-MCNC: 2.5 G/DL — LOW (ref 3.3–5.2)
ALBUMIN SERPL ELPH-MCNC: 2.6 G/DL — LOW (ref 3.3–5.2)
ALBUMIN SERPL ELPH-MCNC: 2.6 G/DL — LOW (ref 3.3–5.2)
ALBUMIN SERPL ELPH-MCNC: 2.7 G/DL — LOW (ref 3.3–5.2)
ALBUMIN SERPL ELPH-MCNC: 2.7 G/DL — LOW (ref 3.3–5.2)
ALBUMIN SERPL ELPH-MCNC: 2.8 G/DL — LOW (ref 3.3–5.2)
ALBUMIN SERPL ELPH-MCNC: 2.9 G/DL
ALBUMIN SERPL ELPH-MCNC: 3.1 G/DL — LOW (ref 3.3–5.2)
ALBUMIN SERPL ELPH-MCNC: 3.6 G/DL — SIGNIFICANT CHANGE UP (ref 3.3–5.2)
ALP BLD-CCNC: 112 U/L
ALP SERPL-CCNC: 104 U/L — SIGNIFICANT CHANGE UP (ref 40–120)
ALP SERPL-CCNC: 116 U/L — SIGNIFICANT CHANGE UP (ref 40–120)
ALP SERPL-CCNC: 69 U/L — SIGNIFICANT CHANGE UP (ref 40–120)
ALP SERPL-CCNC: 77 U/L — SIGNIFICANT CHANGE UP (ref 40–120)
ALP SERPL-CCNC: 78 U/L — SIGNIFICANT CHANGE UP (ref 40–120)
ALP SERPL-CCNC: 87 U/L — SIGNIFICANT CHANGE UP (ref 40–120)
ALT FLD-CCNC: 11 U/L — SIGNIFICANT CHANGE UP
ALT FLD-CCNC: 13 U/L — SIGNIFICANT CHANGE UP
ALT FLD-CCNC: 45 U/L — HIGH
ALT FLD-CCNC: 6 U/L — SIGNIFICANT CHANGE UP
ALT FLD-CCNC: 7 U/L — SIGNIFICANT CHANGE UP
ALT FLD-CCNC: 7 U/L — SIGNIFICANT CHANGE UP
ALT SERPL-CCNC: 10 U/L
ANION GAP SERPL CALC-SCNC: 10 MMOL/L
ANION GAP SERPL CALC-SCNC: 12 MMOL/L — SIGNIFICANT CHANGE UP (ref 5–17)
ANION GAP SERPL CALC-SCNC: 13 MMOL/L — SIGNIFICANT CHANGE UP (ref 5–17)
ANION GAP SERPL CALC-SCNC: 14 MMOL/L — SIGNIFICANT CHANGE UP (ref 5–17)
ANION GAP SERPL CALC-SCNC: 14 MMOL/L — SIGNIFICANT CHANGE UP (ref 5–17)
ANION GAP SERPL CALC-SCNC: 15 MMOL/L — SIGNIFICANT CHANGE UP (ref 5–17)
ANION GAP SERPL CALC-SCNC: 16 MMOL/L — SIGNIFICANT CHANGE UP (ref 5–17)
ANION GAP SERPL CALC-SCNC: 16 MMOL/L — SIGNIFICANT CHANGE UP (ref 5–17)
ANION GAP SERPL CALC-SCNC: 17 MMOL/L — SIGNIFICANT CHANGE UP (ref 5–17)
ANION GAP SERPL CALC-SCNC: 9 MMOL/L — SIGNIFICANT CHANGE UP (ref 5–17)
ANISOCYTOSIS BLD QL: SLIGHT — SIGNIFICANT CHANGE UP
APPEARANCE UR: CLEAR — SIGNIFICANT CHANGE UP
APTT BLD: 33.5 SEC — SIGNIFICANT CHANGE UP (ref 27.5–36.3)
APTT BLD: 33.5 SEC — SIGNIFICANT CHANGE UP (ref 27.5–36.3)
AST SERPL-CCNC: 11 U/L — SIGNIFICANT CHANGE UP
AST SERPL-CCNC: 11 U/L — SIGNIFICANT CHANGE UP
AST SERPL-CCNC: 12 U/L — SIGNIFICANT CHANGE UP
AST SERPL-CCNC: 13 U/L — SIGNIFICANT CHANGE UP
AST SERPL-CCNC: 14 U/L
AST SERPL-CCNC: 17 U/L — SIGNIFICANT CHANGE UP
AST SERPL-CCNC: 42 U/L — HIGH
BACTERIA # UR AUTO: ABNORMAL
BACTERIA # UR AUTO: NEGATIVE — SIGNIFICANT CHANGE UP
BACTERIA SPEC CULT: ABNORMAL
BASE EXCESS BLDA CALC-SCNC: -5.7 MMOL/L — LOW (ref -2–2)
BASOPHILS # BLD AUTO: 0 K/UL — SIGNIFICANT CHANGE UP (ref 0–0.2)
BASOPHILS # BLD AUTO: 0 K/UL — SIGNIFICANT CHANGE UP (ref 0–0.2)
BASOPHILS # BLD AUTO: 0.02 K/UL
BASOPHILS # BLD AUTO: 0.03 K/UL — SIGNIFICANT CHANGE UP (ref 0–0.2)
BASOPHILS NFR BLD AUTO: 0.1 % — SIGNIFICANT CHANGE UP (ref 0–2)
BASOPHILS NFR BLD AUTO: 0.2 %
BASOPHILS NFR BLD AUTO: 0.2 % — SIGNIFICANT CHANGE UP (ref 0–2)
BASOPHILS NFR BLD AUTO: 0.5 % — SIGNIFICANT CHANGE UP (ref 0–2)
BILIRUB DIRECT SERPL-MCNC: 0.1 MG/DL
BILIRUB INDIRECT SERPL-MCNC: 0.3 MG/DL
BILIRUB SERPL-MCNC: 0.2 MG/DL — LOW (ref 0.4–2)
BILIRUB SERPL-MCNC: 0.2 MG/DL — LOW (ref 0.4–2)
BILIRUB SERPL-MCNC: 0.3 MG/DL — LOW (ref 0.4–2)
BILIRUB SERPL-MCNC: 0.4 MG/DL
BILIRUB SERPL-MCNC: 0.5 MG/DL — SIGNIFICANT CHANGE UP (ref 0.4–2)
BILIRUB SERPL-MCNC: <0.2 MG/DL — LOW (ref 0.4–2)
BILIRUB SERPL-MCNC: <0.2 MG/DL — LOW (ref 0.4–2)
BILIRUB UR-MCNC: NEGATIVE — SIGNIFICANT CHANGE UP
BLD GP AB SCN SERPL QL: SIGNIFICANT CHANGE UP
BLOOD GAS COMMENTS ARTERIAL: SIGNIFICANT CHANGE UP
BUN SERPL-MCNC: 30 MG/DL — HIGH (ref 8–20)
BUN SERPL-MCNC: 37 MG/DL — HIGH (ref 8–20)
BUN SERPL-MCNC: 39 MG/DL
BUN SERPL-MCNC: 43 MG/DL — HIGH (ref 8–20)
BUN SERPL-MCNC: 43 MG/DL — HIGH (ref 8–20)
BUN SERPL-MCNC: 44 MG/DL — HIGH (ref 8–20)
BUN SERPL-MCNC: 48 MG/DL — HIGH (ref 8–20)
BUN SERPL-MCNC: 49 MG/DL — HIGH (ref 8–20)
BUN SERPL-MCNC: 52 MG/DL — HIGH (ref 8–20)
BUN SERPL-MCNC: 52 MG/DL — HIGH (ref 8–20)
BUN SERPL-MCNC: 63 MG/DL — HIGH (ref 8–20)
BUN SERPL-MCNC: 65 MG/DL — HIGH (ref 8–20)
BUN SERPL-MCNC: 67 MG/DL — HIGH (ref 8–20)
BUN SERPL-MCNC: 68 MG/DL — HIGH (ref 8–20)
BUN SERPL-MCNC: 71 MG/DL — HIGH (ref 8–20)
BUN SERPL-MCNC: 72 MG/DL — HIGH (ref 8–20)
BUN SERPL-MCNC: 73 MG/DL — HIGH (ref 8–20)
BUN SERPL-MCNC: 74 MG/DL — HIGH (ref 8–20)
BUN SERPL-MCNC: 75 MG/DL — HIGH (ref 8–20)
CALCIUM SERPL-MCNC: 7.7 MG/DL — LOW (ref 8.6–10.2)
CALCIUM SERPL-MCNC: 7.8 MG/DL — LOW (ref 8.6–10.2)
CALCIUM SERPL-MCNC: 7.9 MG/DL — LOW (ref 8.6–10.2)
CALCIUM SERPL-MCNC: 8.1 MG/DL — LOW (ref 8.6–10.2)
CALCIUM SERPL-MCNC: 8.3 MG/DL — LOW (ref 8.6–10.2)
CALCIUM SERPL-MCNC: 8.4 MG/DL — LOW (ref 8.6–10.2)
CALCIUM SERPL-MCNC: 8.4 MG/DL — LOW (ref 8.6–10.2)
CALCIUM SERPL-MCNC: 8.5 MG/DL — LOW (ref 8.6–10.2)
CALCIUM SERPL-MCNC: 8.5 MG/DL — LOW (ref 8.6–10.2)
CALCIUM SERPL-MCNC: 8.6 MG/DL — SIGNIFICANT CHANGE UP (ref 8.6–10.2)
CALCIUM SERPL-MCNC: 8.7 MG/DL
CALCIUM SERPL-MCNC: 8.7 MG/DL — SIGNIFICANT CHANGE UP (ref 8.6–10.2)
CALCIUM SERPL-MCNC: 8.8 MG/DL — SIGNIFICANT CHANGE UP (ref 8.6–10.2)
CALCIUM SERPL-MCNC: 8.9 MG/DL — SIGNIFICANT CHANGE UP (ref 8.6–10.2)
CALCIUM SERPL-MCNC: 8.9 MG/DL — SIGNIFICANT CHANGE UP (ref 8.6–10.2)
CALCIUM SERPL-MCNC: 9 MG/DL — SIGNIFICANT CHANGE UP (ref 8.6–10.2)
CHLORIDE SERPL-SCNC: 102 MMOL/L — SIGNIFICANT CHANGE UP (ref 98–107)
CHLORIDE SERPL-SCNC: 103 MMOL/L
CHLORIDE SERPL-SCNC: 106 MMOL/L — SIGNIFICANT CHANGE UP (ref 98–107)
CHLORIDE SERPL-SCNC: 107 MMOL/L — SIGNIFICANT CHANGE UP (ref 98–107)
CHLORIDE SERPL-SCNC: 107 MMOL/L — SIGNIFICANT CHANGE UP (ref 98–107)
CHLORIDE SERPL-SCNC: 108 MMOL/L — HIGH (ref 98–107)
CHLORIDE SERPL-SCNC: 108 MMOL/L — HIGH (ref 98–107)
CHLORIDE SERPL-SCNC: 110 MMOL/L — HIGH (ref 98–107)
CHLORIDE SERPL-SCNC: 111 MMOL/L — HIGH (ref 98–107)
CHLORIDE SERPL-SCNC: 111 MMOL/L — HIGH (ref 98–107)
CHLORIDE SERPL-SCNC: 112 MMOL/L — HIGH (ref 98–107)
CHLORIDE SERPL-SCNC: 114 MMOL/L — HIGH (ref 98–107)
CHLORIDE SERPL-SCNC: 115 MMOL/L — HIGH (ref 98–107)
CHLORIDE SERPL-SCNC: 115 MMOL/L — HIGH (ref 98–107)
CHLORIDE SERPL-SCNC: 116 MMOL/L — HIGH (ref 98–107)
CHLORIDE SERPL-SCNC: 116 MMOL/L — HIGH (ref 98–107)
CHLORIDE UR-SCNC: 80 MMOL/L — SIGNIFICANT CHANGE UP
CHLORIDE UR-SCNC: <27 MMOL/L — SIGNIFICANT CHANGE UP
CK SERPL-CCNC: 50 U/L — SIGNIFICANT CHANGE UP (ref 30–200)
CK SERPL-CCNC: 82 U/L — SIGNIFICANT CHANGE UP (ref 30–200)
CK SERPL-CCNC: 97 U/L — SIGNIFICANT CHANGE UP (ref 30–200)
CO2 SERPL-SCNC: 15 MMOL/L — LOW (ref 22–29)
CO2 SERPL-SCNC: 16 MMOL/L — LOW (ref 22–29)
CO2 SERPL-SCNC: 16 MMOL/L — LOW (ref 22–29)
CO2 SERPL-SCNC: 17 MMOL/L — LOW (ref 22–29)
CO2 SERPL-SCNC: 18 MMOL/L — LOW (ref 22–29)
CO2 SERPL-SCNC: 19 MMOL/L — LOW (ref 22–29)
CO2 SERPL-SCNC: 20 MMOL/L — LOW (ref 22–29)
CO2 SERPL-SCNC: 21 MMOL/L — LOW (ref 22–29)
CO2 SERPL-SCNC: 22 MMOL/L — SIGNIFICANT CHANGE UP (ref 22–29)
CO2 SERPL-SCNC: 22 MMOL/L — SIGNIFICANT CHANGE UP (ref 22–29)
CO2 SERPL-SCNC: 24 MMOL/L
CO2 SERPL-SCNC: 27 MMOL/L — SIGNIFICANT CHANGE UP (ref 22–29)
COLOR SPEC: YELLOW — SIGNIFICANT CHANGE UP
COMMENT - URINE: SIGNIFICANT CHANGE UP
COMMENT - URINE: SIGNIFICANT CHANGE UP
CREAT ?TM UR-MCNC: 107 MG/DL — SIGNIFICANT CHANGE UP
CREAT ?TM UR-MCNC: 58 MG/DL — SIGNIFICANT CHANGE UP
CREAT SERPL-MCNC: 1.79 MG/DL — HIGH (ref 0.5–1.3)
CREAT SERPL-MCNC: 1.98 MG/DL — HIGH (ref 0.5–1.3)
CREAT SERPL-MCNC: 1.98 MG/DL — HIGH (ref 0.5–1.3)
CREAT SERPL-MCNC: 2.02 MG/DL
CREAT SERPL-MCNC: 2.1 MG/DL — HIGH (ref 0.5–1.3)
CREAT SERPL-MCNC: 2.13 MG/DL — HIGH (ref 0.5–1.3)
CREAT SERPL-MCNC: 2.26 MG/DL — HIGH (ref 0.5–1.3)
CREAT SERPL-MCNC: 2.38 MG/DL — HIGH (ref 0.5–1.3)
CREAT SERPL-MCNC: 2.43 MG/DL — HIGH (ref 0.5–1.3)
CREAT SERPL-MCNC: 2.51 MG/DL — HIGH (ref 0.5–1.3)
CREAT SERPL-MCNC: 2.92 MG/DL — HIGH (ref 0.5–1.3)
CREAT SERPL-MCNC: 2.94 MG/DL — HIGH (ref 0.5–1.3)
CREAT SERPL-MCNC: 2.97 MG/DL — HIGH (ref 0.5–1.3)
CREAT SERPL-MCNC: 3.24 MG/DL — HIGH (ref 0.5–1.3)
CREAT SERPL-MCNC: 3.7 MG/DL — HIGH (ref 0.5–1.3)
CREAT SERPL-MCNC: 3.77 MG/DL — HIGH (ref 0.5–1.3)
CREAT SERPL-MCNC: 4 MG/DL — HIGH (ref 0.5–1.3)
CREAT SERPL-MCNC: 4.18 MG/DL — HIGH (ref 0.5–1.3)
CREAT SERPL-MCNC: 4.23 MG/DL — HIGH (ref 0.5–1.3)
CREAT SERPL-MCNC: 4.23 MG/DL — HIGH (ref 0.5–1.3)
CREAT SERPL-MCNC: 4.39 MG/DL — HIGH (ref 0.5–1.3)
CREAT SERPL-MCNC: 4.58 MG/DL — HIGH (ref 0.5–1.3)
CRP SERPL-MCNC: 2.42 MG/DL
CULTURE RESULTS: NO GROWTH — SIGNIFICANT CHANGE UP
CULTURE RESULTS: SIGNIFICANT CHANGE UP
DIFF PNL FLD: ABNORMAL
EOSINOPHIL # BLD AUTO: 0 K/UL — SIGNIFICANT CHANGE UP (ref 0–0.5)
EOSINOPHIL # BLD AUTO: 0.15 K/UL
EOSINOPHIL # BLD AUTO: 0.2 K/UL — SIGNIFICANT CHANGE UP (ref 0–0.5)
EOSINOPHIL # BLD AUTO: 0.2 K/UL — SIGNIFICANT CHANGE UP (ref 0–0.5)
EOSINOPHIL NFR BLD AUTO: 0 % — SIGNIFICANT CHANGE UP (ref 0–5)
EOSINOPHIL NFR BLD AUTO: 1.8 %
EOSINOPHIL NFR BLD AUTO: 3.1 % — SIGNIFICANT CHANGE UP (ref 0–6)
EOSINOPHIL NFR BLD AUTO: 4.3 % — SIGNIFICANT CHANGE UP (ref 0–5)
EOSINOPHIL NFR URNS MANUAL: PRESENT
EPI CELLS # UR: SIGNIFICANT CHANGE UP
ERYTHROCYTE [SEDIMENTATION RATE] IN BLOOD BY WESTERGREN METHOD: 20 MM/HR
GAS PNL BLDA: SIGNIFICANT CHANGE UP
GLUCOSE BLDC GLUCOMTR-MCNC: 103 MG/DL — HIGH (ref 70–99)
GLUCOSE BLDC GLUCOMTR-MCNC: 146 MG/DL — HIGH (ref 70–99)
GLUCOSE BLDC GLUCOMTR-MCNC: 146 MG/DL — HIGH (ref 70–99)
GLUCOSE BLDC GLUCOMTR-MCNC: 147 MG/DL — HIGH (ref 70–99)
GLUCOSE BLDC GLUCOMTR-MCNC: 148 MG/DL — HIGH (ref 70–99)
GLUCOSE BLDC GLUCOMTR-MCNC: 151 MG/DL — HIGH (ref 70–99)
GLUCOSE BLDC GLUCOMTR-MCNC: 151 MG/DL — HIGH (ref 70–99)
GLUCOSE BLDC GLUCOMTR-MCNC: 154 MG/DL — HIGH (ref 70–99)
GLUCOSE BLDC GLUCOMTR-MCNC: 154 MG/DL — HIGH (ref 70–99)
GLUCOSE BLDC GLUCOMTR-MCNC: 159 MG/DL — HIGH (ref 70–99)
GLUCOSE BLDC GLUCOMTR-MCNC: 164 MG/DL — HIGH (ref 70–99)
GLUCOSE BLDC GLUCOMTR-MCNC: 168 MG/DL — HIGH (ref 70–99)
GLUCOSE BLDC GLUCOMTR-MCNC: 168 MG/DL — HIGH (ref 70–99)
GLUCOSE BLDC GLUCOMTR-MCNC: 175 MG/DL — HIGH (ref 70–99)
GLUCOSE BLDC GLUCOMTR-MCNC: 176 MG/DL — HIGH (ref 70–99)
GLUCOSE BLDC GLUCOMTR-MCNC: 177 MG/DL — HIGH (ref 70–99)
GLUCOSE BLDC GLUCOMTR-MCNC: 179 MG/DL — HIGH (ref 70–99)
GLUCOSE BLDC GLUCOMTR-MCNC: 181 MG/DL — HIGH (ref 70–99)
GLUCOSE BLDC GLUCOMTR-MCNC: 181 MG/DL — HIGH (ref 70–99)
GLUCOSE BLDC GLUCOMTR-MCNC: 182 MG/DL — HIGH (ref 70–99)
GLUCOSE BLDC GLUCOMTR-MCNC: 191 MG/DL — HIGH (ref 70–99)
GLUCOSE BLDC GLUCOMTR-MCNC: 194 MG/DL — HIGH (ref 70–99)
GLUCOSE BLDC GLUCOMTR-MCNC: 201 MG/DL — HIGH (ref 70–99)
GLUCOSE BLDC GLUCOMTR-MCNC: 203 MG/DL — HIGH (ref 70–99)
GLUCOSE BLDC GLUCOMTR-MCNC: 209 MG/DL — HIGH (ref 70–99)
GLUCOSE BLDC GLUCOMTR-MCNC: 218 MG/DL — HIGH (ref 70–99)
GLUCOSE BLDC GLUCOMTR-MCNC: 220 MG/DL — HIGH (ref 70–99)
GLUCOSE BLDC GLUCOMTR-MCNC: 231 MG/DL — HIGH (ref 70–99)
GLUCOSE BLDC GLUCOMTR-MCNC: 232 MG/DL — HIGH (ref 70–99)
GLUCOSE BLDC GLUCOMTR-MCNC: 235 MG/DL — HIGH (ref 70–99)
GLUCOSE BLDC GLUCOMTR-MCNC: 240 MG/DL — HIGH (ref 70–99)
GLUCOSE BLDC GLUCOMTR-MCNC: 243 MG/DL — HIGH (ref 70–99)
GLUCOSE BLDC GLUCOMTR-MCNC: 251 MG/DL — HIGH (ref 70–99)
GLUCOSE BLDC GLUCOMTR-MCNC: 253 MG/DL — HIGH (ref 70–99)
GLUCOSE BLDC GLUCOMTR-MCNC: 270 MG/DL — HIGH (ref 70–99)
GLUCOSE BLDC GLUCOMTR-MCNC: 272 MG/DL — HIGH (ref 70–99)
GLUCOSE BLDC GLUCOMTR-MCNC: 285 MG/DL — HIGH (ref 70–99)
GLUCOSE BLDC GLUCOMTR-MCNC: 291 MG/DL — HIGH (ref 70–99)
GLUCOSE BLDC GLUCOMTR-MCNC: 296 MG/DL — HIGH (ref 70–99)
GLUCOSE BLDC GLUCOMTR-MCNC: 303 MG/DL — HIGH (ref 70–99)
GLUCOSE BLDC GLUCOMTR-MCNC: 303 MG/DL — HIGH (ref 70–99)
GLUCOSE BLDC GLUCOMTR-MCNC: 320 MG/DL — HIGH (ref 70–99)
GLUCOSE BLDC GLUCOMTR-MCNC: 327 MG/DL — HIGH (ref 70–99)
GLUCOSE BLDC GLUCOMTR-MCNC: 328 MG/DL — HIGH (ref 70–99)
GLUCOSE BLDC GLUCOMTR-MCNC: 334 MG/DL — HIGH (ref 70–99)
GLUCOSE BLDC GLUCOMTR-MCNC: 344 MG/DL — HIGH (ref 70–99)
GLUCOSE BLDC GLUCOMTR-MCNC: 350 MG/DL — HIGH (ref 70–99)
GLUCOSE BLDC GLUCOMTR-MCNC: 359 MG/DL — HIGH (ref 70–99)
GLUCOSE BLDC GLUCOMTR-MCNC: 370 MG/DL — HIGH (ref 70–99)
GLUCOSE BLDC GLUCOMTR-MCNC: 373 MG/DL — HIGH (ref 70–99)
GLUCOSE BLDC GLUCOMTR-MCNC: 375 MG/DL — HIGH (ref 70–99)
GLUCOSE BLDC GLUCOMTR-MCNC: 378 MG/DL — HIGH (ref 70–99)
GLUCOSE BLDC GLUCOMTR-MCNC: 380 MG/DL — HIGH (ref 70–99)
GLUCOSE BLDC GLUCOMTR-MCNC: 385 MG/DL — HIGH (ref 70–99)
GLUCOSE BLDC GLUCOMTR-MCNC: 405 MG/DL — HIGH (ref 70–99)
GLUCOSE BLDC GLUCOMTR-MCNC: 407 MG/DL — HIGH (ref 70–99)
GLUCOSE BLDC GLUCOMTR-MCNC: 407 MG/DL — HIGH (ref 70–99)
GLUCOSE BLDC GLUCOMTR-MCNC: 412 MG/DL — HIGH (ref 70–99)
GLUCOSE BLDC GLUCOMTR-MCNC: 416 MG/DL — HIGH (ref 70–99)
GLUCOSE BLDC GLUCOMTR-MCNC: 424 MG/DL — HIGH (ref 70–99)
GLUCOSE BLDC GLUCOMTR-MCNC: 424 MG/DL — HIGH (ref 70–99)
GLUCOSE BLDC GLUCOMTR-MCNC: 426 MG/DL — HIGH (ref 70–99)
GLUCOSE BLDC GLUCOMTR-MCNC: 435 MG/DL — HIGH (ref 70–99)
GLUCOSE BLDC GLUCOMTR-MCNC: 445 MG/DL — HIGH (ref 70–99)
GLUCOSE BLDC GLUCOMTR-MCNC: 459 MG/DL — CRITICAL HIGH (ref 70–99)
GLUCOSE BLDC GLUCOMTR-MCNC: 49 MG/DL — LOW (ref 70–99)
GLUCOSE BLDC GLUCOMTR-MCNC: 83 MG/DL — SIGNIFICANT CHANGE UP (ref 70–99)
GLUCOSE BLDC GLUCOMTR-MCNC: 90 MG/DL — SIGNIFICANT CHANGE UP (ref 70–99)
GLUCOSE SERPL-MCNC: 155 MG/DL — HIGH (ref 70–115)
GLUCOSE SERPL-MCNC: 167 MG/DL — HIGH (ref 70–115)
GLUCOSE SERPL-MCNC: 167 MG/DL — HIGH (ref 70–115)
GLUCOSE SERPL-MCNC: 171 MG/DL — HIGH (ref 70–115)
GLUCOSE SERPL-MCNC: 171 MG/DL — HIGH (ref 70–115)
GLUCOSE SERPL-MCNC: 201 MG/DL — HIGH (ref 70–115)
GLUCOSE SERPL-MCNC: 210 MG/DL — HIGH (ref 70–115)
GLUCOSE SERPL-MCNC: 229 MG/DL — HIGH (ref 70–115)
GLUCOSE SERPL-MCNC: 245 MG/DL — HIGH (ref 70–115)
GLUCOSE SERPL-MCNC: 255 MG/DL — HIGH (ref 70–115)
GLUCOSE SERPL-MCNC: 256 MG/DL — HIGH (ref 70–115)
GLUCOSE SERPL-MCNC: 268 MG/DL — HIGH (ref 70–115)
GLUCOSE SERPL-MCNC: 294 MG/DL
GLUCOSE SERPL-MCNC: 295 MG/DL — HIGH (ref 70–115)
GLUCOSE SERPL-MCNC: 307 MG/DL — HIGH (ref 70–115)
GLUCOSE SERPL-MCNC: 324 MG/DL — HIGH (ref 70–115)
GLUCOSE SERPL-MCNC: 350 MG/DL — HIGH (ref 70–115)
GLUCOSE SERPL-MCNC: 365 MG/DL — HIGH (ref 70–115)
GLUCOSE SERPL-MCNC: 398 MG/DL — HIGH (ref 70–115)
GLUCOSE SERPL-MCNC: 409 MG/DL — HIGH (ref 70–115)
GLUCOSE SERPL-MCNC: 441 MG/DL — HIGH (ref 70–115)
GLUCOSE SERPL-MCNC: 92 MG/DL — SIGNIFICANT CHANGE UP (ref 70–115)
GLUCOSE UR QL: 100 MG/DL
GLUCOSE UR QL: 250 MG/DL
GLUCOSE UR QL: 250 MG/DL
GLUCOSE UR QL: NEGATIVE MG/DL — SIGNIFICANT CHANGE UP
HBA1C BLD-MCNC: 7.1 % — HIGH (ref 4–5.6)
HBA1C BLD-MCNC: 7.2 % — HIGH (ref 4–5.6)
HCO3 BLDA-SCNC: 20 MMOL/L — SIGNIFICANT CHANGE UP (ref 20–26)
HCT VFR BLD CALC: 26 % — LOW (ref 42–52)
HCT VFR BLD CALC: 26.5 % — LOW (ref 42–52)
HCT VFR BLD CALC: 27 % — LOW (ref 42–52)
HCT VFR BLD CALC: 28.5 % — LOW (ref 42–52)
HCT VFR BLD CALC: 28.6 % — LOW (ref 42–52)
HCT VFR BLD CALC: 28.6 % — LOW (ref 42–52)
HCT VFR BLD CALC: 28.9 % — LOW (ref 42–52)
HCT VFR BLD CALC: 28.9 % — LOW (ref 42–52)
HCT VFR BLD CALC: 34.7 %
HCT VFR BLD CALC: 37.5 % — LOW (ref 39–50)
HCT VFR BLD CALC: 37.7 % — LOW (ref 39–50)
HCT VFR BLD CALC: 38.2 % — LOW (ref 39–50)
HCT VFR BLD CALC: 39.2 % — SIGNIFICANT CHANGE UP (ref 39–50)
HCT VFR BLD CALC: 40.2 % — SIGNIFICANT CHANGE UP (ref 39–50)
HCV AB S/CO SERPL IA: 0.11 S/CO — SIGNIFICANT CHANGE UP (ref 0–0.99)
HCV AB SERPL-IMP: SIGNIFICANT CHANGE UP
HGB BLD-MCNC: 10.7 G/DL
HGB BLD-MCNC: 11.1 G/DL — LOW (ref 13–17)
HGB BLD-MCNC: 11.4 G/DL — LOW (ref 13–17)
HGB BLD-MCNC: 11.5 G/DL — LOW (ref 13–17)
HGB BLD-MCNC: 11.6 G/DL — LOW (ref 13–17)
HGB BLD-MCNC: 12.2 G/DL — LOW (ref 13–17)
HGB BLD-MCNC: 8 G/DL — LOW (ref 14–18)
HGB BLD-MCNC: 8.4 G/DL — LOW (ref 14–18)
HGB BLD-MCNC: 8.5 G/DL — LOW (ref 14–18)
HGB BLD-MCNC: 8.8 G/DL — LOW (ref 14–18)
HGB BLD-MCNC: 8.9 G/DL — LOW (ref 14–18)
HGB BLD-MCNC: 9.1 G/DL — LOW (ref 14–18)
HGB BLD-MCNC: 9.2 G/DL — LOW (ref 14–18)
HGB BLD-MCNC: 9.3 G/DL — LOW (ref 14–18)
HOROWITZ INDEX BLDA+IHG-RTO: SIGNIFICANT CHANGE UP
IMM GRANULOCYTES NFR BLD AUTO: 0.2 %
IMM GRANULOCYTES NFR BLD AUTO: 0.2 % — SIGNIFICANT CHANGE UP (ref 0–1.5)
INR BLD: 1.01 RATIO — SIGNIFICANT CHANGE UP (ref 0.88–1.16)
INR BLD: 1.29 RATIO — HIGH (ref 0.88–1.16)
INR BLD: 1.41 RATIO — HIGH (ref 0.88–1.16)
KETONES UR-MCNC: ABNORMAL
KETONES UR-MCNC: NEGATIVE — SIGNIFICANT CHANGE UP
LACTATE BLDV-MCNC: 1.6 MMOL/L — SIGNIFICANT CHANGE UP (ref 0.5–2)
LEUKOCYTE ESTERASE UR-ACNC: ABNORMAL
LEUKOCYTE ESTERASE UR-ACNC: NEGATIVE — SIGNIFICANT CHANGE UP
LYMPHOCYTES # BLD AUTO: 0.8 K/UL — LOW (ref 1–4.8)
LYMPHOCYTES # BLD AUTO: 1 K/UL — SIGNIFICANT CHANGE UP (ref 1–4.8)
LYMPHOCYTES # BLD AUTO: 1.17 K/UL — SIGNIFICANT CHANGE UP (ref 1–3.3)
LYMPHOCYTES # BLD AUTO: 1.22 K/UL
LYMPHOCYTES # BLD AUTO: 18.3 % — SIGNIFICANT CHANGE UP (ref 13–44)
LYMPHOCYTES # BLD AUTO: 19.8 % — LOW (ref 20–55)
LYMPHOCYTES # BLD AUTO: 3 % — LOW (ref 20–55)
LYMPHOCYTES # BLD AUTO: 5.4 % — LOW (ref 20–55)
LYMPHOCYTES NFR BLD AUTO: 14.3 %
MAGNESIUM SERPL-MCNC: 1.9 MG/DL — SIGNIFICANT CHANGE UP (ref 1.6–2.6)
MAGNESIUM SERPL-MCNC: 2 MG/DL — SIGNIFICANT CHANGE UP (ref 1.6–2.6)
MAGNESIUM SERPL-MCNC: 2.1 MG/DL — SIGNIFICANT CHANGE UP (ref 1.6–2.6)
MAN DIFF?: NORMAL
MCHC RBC-ENTMCNC: 25.2 PG — LOW (ref 27–34)
MCHC RBC-ENTMCNC: 25.4 PG — LOW (ref 27–34)
MCHC RBC-ENTMCNC: 25.5 PG — LOW (ref 27–34)
MCHC RBC-ENTMCNC: 25.6 PG — LOW (ref 27–34)
MCHC RBC-ENTMCNC: 25.7 PG — LOW (ref 27–34)
MCHC RBC-ENTMCNC: 26.8 PG — LOW (ref 27–31)
MCHC RBC-ENTMCNC: 27.1 PG — SIGNIFICANT CHANGE UP (ref 27–31)
MCHC RBC-ENTMCNC: 27.4 PG — SIGNIFICANT CHANGE UP (ref 27–31)
MCHC RBC-ENTMCNC: 27.5 PG — SIGNIFICANT CHANGE UP (ref 27–31)
MCHC RBC-ENTMCNC: 27.6 PG — SIGNIFICANT CHANGE UP (ref 27–31)
MCHC RBC-ENTMCNC: 27.7 PG — SIGNIFICANT CHANGE UP (ref 27–31)
MCHC RBC-ENTMCNC: 27.8 PG
MCHC RBC-ENTMCNC: 28.1 PG — SIGNIFICANT CHANGE UP (ref 27–31)
MCHC RBC-ENTMCNC: 28.8 PG — SIGNIFICANT CHANGE UP (ref 27–31)
MCHC RBC-ENTMCNC: 29.6 GM/DL — LOW (ref 32–36)
MCHC RBC-ENTMCNC: 29.6 GM/DL — LOW (ref 32–36)
MCHC RBC-ENTMCNC: 30.1 GM/DL — LOW (ref 32–36)
MCHC RBC-ENTMCNC: 30.2 GM/DL — LOW (ref 32–36)
MCHC RBC-ENTMCNC: 30.3 GM/DL — LOW (ref 32–36)
MCHC RBC-ENTMCNC: 30.8 G/DL — LOW (ref 32–36)
MCHC RBC-ENTMCNC: 30.8 GM/DL
MCHC RBC-ENTMCNC: 30.9 G/DL — LOW (ref 32–36)
MCHC RBC-ENTMCNC: 31.1 G/DL — LOW (ref 32–36)
MCHC RBC-ENTMCNC: 31.5 G/DL — LOW (ref 32–36)
MCHC RBC-ENTMCNC: 31.5 G/DL — LOW (ref 32–36)
MCHC RBC-ENTMCNC: 31.7 G/DL — LOW (ref 32–36)
MCHC RBC-ENTMCNC: 31.8 G/DL — LOW (ref 32–36)
MCHC RBC-ENTMCNC: 32.5 G/DL — SIGNIFICANT CHANGE UP (ref 32–36)
MCV RBC AUTO: 83.9 FL — SIGNIFICANT CHANGE UP (ref 80–100)
MCV RBC AUTO: 85 FL — SIGNIFICANT CHANGE UP (ref 80–100)
MCV RBC AUTO: 85.1 FL — SIGNIFICANT CHANGE UP (ref 80–100)
MCV RBC AUTO: 85.1 FL — SIGNIFICANT CHANGE UP (ref 80–100)
MCV RBC AUTO: 85.8 FL — SIGNIFICANT CHANGE UP (ref 80–100)
MCV RBC AUTO: 86.5 FL — SIGNIFICANT CHANGE UP (ref 80–94)
MCV RBC AUTO: 86.9 FL — SIGNIFICANT CHANGE UP (ref 80–94)
MCV RBC AUTO: 87.7 FL — SIGNIFICANT CHANGE UP (ref 80–94)
MCV RBC AUTO: 88 FL — SIGNIFICANT CHANGE UP (ref 80–94)
MCV RBC AUTO: 88.1 FL — SIGNIFICANT CHANGE UP (ref 80–94)
MCV RBC AUTO: 88.1 FL — SIGNIFICANT CHANGE UP (ref 80–94)
MCV RBC AUTO: 88.5 FL — SIGNIFICANT CHANGE UP (ref 80–94)
MCV RBC AUTO: 88.6 FL — SIGNIFICANT CHANGE UP (ref 80–94)
MCV RBC AUTO: 90.1 FL
METHOD TYPE: SIGNIFICANT CHANGE UP
METHOD TYPE: SIGNIFICANT CHANGE UP
MONOCYTES # BLD AUTO: 0.38 K/UL
MONOCYTES # BLD AUTO: 0.41 K/UL — SIGNIFICANT CHANGE UP (ref 0–0.9)
MONOCYTES # BLD AUTO: 0.5 K/UL — SIGNIFICANT CHANGE UP (ref 0–0.8)
MONOCYTES # BLD AUTO: 0.9 K/UL — HIGH (ref 0–0.8)
MONOCYTES NFR BLD AUTO: 4.5 %
MONOCYTES NFR BLD AUTO: 5 % — SIGNIFICANT CHANGE UP (ref 3–10)
MONOCYTES NFR BLD AUTO: 6.3 % — SIGNIFICANT CHANGE UP (ref 3–10)
MONOCYTES NFR BLD AUTO: 6.4 % — SIGNIFICANT CHANGE UP (ref 2–14)
MONOCYTES NFR BLD AUTO: 9.4 % — SIGNIFICANT CHANGE UP (ref 3–10)
MRSA PCR RESULT.: DETECTED
MRSA PCR RESULT.: SIGNIFICANT CHANGE UP
MYOGLOBIN SERPL-MCNC: 131 NG/ML — HIGH (ref 28–72)
NEUTROPHILS # BLD AUTO: 12.2 K/UL — HIGH (ref 1.8–8)
NEUTROPHILS # BLD AUTO: 3.4 K/UL — SIGNIFICANT CHANGE UP (ref 1.8–8)
NEUTROPHILS # BLD AUTO: 4.57 K/UL — SIGNIFICANT CHANGE UP (ref 1.8–7.4)
NEUTROPHILS # BLD AUTO: 6.74 K/UL
NEUTROPHILS NFR BLD AUTO: 66.1 % — SIGNIFICANT CHANGE UP (ref 37–73)
NEUTROPHILS NFR BLD AUTO: 71.5 % — SIGNIFICANT CHANGE UP (ref 43–77)
NEUTROPHILS NFR BLD AUTO: 79 %
NEUTROPHILS NFR BLD AUTO: 87.1 % — HIGH (ref 37–73)
NEUTROPHILS NFR BLD AUTO: 88 % — HIGH (ref 37–73)
NEUTS BAND # BLD: 4 % — SIGNIFICANT CHANGE UP (ref 0–8)
NITRITE UR-MCNC: NEGATIVE — SIGNIFICANT CHANGE UP
NITRITE UR-MCNC: NEGATIVE — SIGNIFICANT CHANGE UP
NITRITE UR-MCNC: POSITIVE
NITRITE UR-MCNC: POSITIVE
ORGANISM # SPEC MICROSCOPIC CNT: SIGNIFICANT CHANGE UP
OSMOLALITY UR: 382 MOSM/KG — SIGNIFICANT CHANGE UP (ref 300–1000)
OSMOLALITY UR: 453 MOSM/KG — SIGNIFICANT CHANGE UP (ref 300–1000)
OVALOCYTES BLD QL SMEAR: SLIGHT — SIGNIFICANT CHANGE UP
PCO2 BLDA: 36 MMHG — SIGNIFICANT CHANGE UP (ref 35–45)
PH BLDA: 7.34 — LOW (ref 7.35–7.45)
PH UR: 5 — SIGNIFICANT CHANGE UP (ref 5–8)
PH UR: 6 — SIGNIFICANT CHANGE UP (ref 5–8)
PHOSPHATE SERPL-MCNC: 4.5 MG/DL — SIGNIFICANT CHANGE UP (ref 2.4–4.7)
PHOSPHATE SERPL-MCNC: 4.8 MG/DL — HIGH (ref 2.4–4.7)
PHOSPHATE SERPL-MCNC: 4.9 MG/DL — HIGH (ref 2.4–4.7)
PHOSPHATE SERPL-MCNC: 4.9 MG/DL — HIGH (ref 2.4–4.7)
PHOSPHATE SERPL-MCNC: 5 MG/DL — HIGH (ref 2.4–4.7)
PLAT MORPH BLD: NORMAL — SIGNIFICANT CHANGE UP
PLATELET # BLD AUTO: 166 K/UL — SIGNIFICANT CHANGE UP (ref 150–400)
PLATELET # BLD AUTO: 167 K/UL — SIGNIFICANT CHANGE UP (ref 150–400)
PLATELET # BLD AUTO: 180 K/UL — SIGNIFICANT CHANGE UP (ref 150–400)
PLATELET # BLD AUTO: 183 K/UL — SIGNIFICANT CHANGE UP (ref 150–400)
PLATELET # BLD AUTO: 186 K/UL — SIGNIFICANT CHANGE UP (ref 150–400)
PLATELET # BLD AUTO: 207 K/UL — SIGNIFICANT CHANGE UP (ref 150–400)
PLATELET # BLD AUTO: 207 K/UL — SIGNIFICANT CHANGE UP (ref 150–400)
PLATELET # BLD AUTO: 208 K/UL — SIGNIFICANT CHANGE UP (ref 150–400)
PLATELET # BLD AUTO: 221 K/UL — SIGNIFICANT CHANGE UP (ref 150–400)
PLATELET # BLD AUTO: 234 K/UL — SIGNIFICANT CHANGE UP (ref 150–400)
PLATELET # BLD AUTO: 239 K/UL — SIGNIFICANT CHANGE UP (ref 150–400)
PLATELET # BLD AUTO: 258 K/UL — SIGNIFICANT CHANGE UP (ref 150–400)
PLATELET # BLD AUTO: 259 K/UL — SIGNIFICANT CHANGE UP (ref 150–400)
PLATELET # BLD AUTO: 313 K/UL
PO2 BLDA: 92 MMHG — SIGNIFICANT CHANGE UP (ref 83–108)
POTASSIUM SERPL-MCNC: 4.2 MMOL/L — SIGNIFICANT CHANGE UP (ref 3.5–5.3)
POTASSIUM SERPL-MCNC: 4.3 MMOL/L — SIGNIFICANT CHANGE UP (ref 3.5–5.3)
POTASSIUM SERPL-MCNC: 4.4 MMOL/L — SIGNIFICANT CHANGE UP (ref 3.5–5.3)
POTASSIUM SERPL-MCNC: 4.4 MMOL/L — SIGNIFICANT CHANGE UP (ref 3.5–5.3)
POTASSIUM SERPL-MCNC: 4.5 MMOL/L — SIGNIFICANT CHANGE UP (ref 3.5–5.3)
POTASSIUM SERPL-MCNC: 4.6 MMOL/L — SIGNIFICANT CHANGE UP (ref 3.5–5.3)
POTASSIUM SERPL-MCNC: 4.7 MMOL/L — SIGNIFICANT CHANGE UP (ref 3.5–5.3)
POTASSIUM SERPL-MCNC: 4.8 MMOL/L — SIGNIFICANT CHANGE UP (ref 3.5–5.3)
POTASSIUM SERPL-MCNC: 4.9 MMOL/L — SIGNIFICANT CHANGE UP (ref 3.5–5.3)
POTASSIUM SERPL-MCNC: 4.9 MMOL/L — SIGNIFICANT CHANGE UP (ref 3.5–5.3)
POTASSIUM SERPL-MCNC: 5.2 MMOL/L — SIGNIFICANT CHANGE UP (ref 3.5–5.3)
POTASSIUM SERPL-MCNC: 5.2 MMOL/L — SIGNIFICANT CHANGE UP (ref 3.5–5.3)
POTASSIUM SERPL-MCNC: 5.3 MMOL/L — SIGNIFICANT CHANGE UP (ref 3.5–5.3)
POTASSIUM SERPL-MCNC: 5.8 MMOL/L — HIGH (ref 3.5–5.3)
POTASSIUM SERPL-SCNC: 4.2 MMOL/L — SIGNIFICANT CHANGE UP (ref 3.5–5.3)
POTASSIUM SERPL-SCNC: 4.3 MMOL/L — SIGNIFICANT CHANGE UP (ref 3.5–5.3)
POTASSIUM SERPL-SCNC: 4.4 MMOL/L — SIGNIFICANT CHANGE UP (ref 3.5–5.3)
POTASSIUM SERPL-SCNC: 4.4 MMOL/L — SIGNIFICANT CHANGE UP (ref 3.5–5.3)
POTASSIUM SERPL-SCNC: 4.5 MMOL/L — SIGNIFICANT CHANGE UP (ref 3.5–5.3)
POTASSIUM SERPL-SCNC: 4.6 MMOL/L — SIGNIFICANT CHANGE UP (ref 3.5–5.3)
POTASSIUM SERPL-SCNC: 4.7 MMOL/L — SIGNIFICANT CHANGE UP (ref 3.5–5.3)
POTASSIUM SERPL-SCNC: 4.8 MMOL/L — SIGNIFICANT CHANGE UP (ref 3.5–5.3)
POTASSIUM SERPL-SCNC: 4.9 MMOL/L — SIGNIFICANT CHANGE UP (ref 3.5–5.3)
POTASSIUM SERPL-SCNC: 4.9 MMOL/L — SIGNIFICANT CHANGE UP (ref 3.5–5.3)
POTASSIUM SERPL-SCNC: 5.2 MMOL/L — SIGNIFICANT CHANGE UP (ref 3.5–5.3)
POTASSIUM SERPL-SCNC: 5.2 MMOL/L — SIGNIFICANT CHANGE UP (ref 3.5–5.3)
POTASSIUM SERPL-SCNC: 5.3 MMOL/L — SIGNIFICANT CHANGE UP (ref 3.5–5.3)
POTASSIUM SERPL-SCNC: 5.5 MMOL/L
POTASSIUM SERPL-SCNC: 5.8 MMOL/L — HIGH (ref 3.5–5.3)
POTASSIUM UR-SCNC: 22 MMOL/L — SIGNIFICANT CHANGE UP
PROT ?TM UR-MCNC: 130 MG/DL — HIGH (ref 0–12)
PROT SERPL-MCNC: 5.6 G/DL — LOW (ref 6.6–8.7)
PROT SERPL-MCNC: 5.8 G/DL — LOW (ref 6.6–8.7)
PROT SERPL-MCNC: 5.8 G/DL — LOW (ref 6.6–8.7)
PROT SERPL-MCNC: 6.2 G/DL — LOW (ref 6.6–8.7)
PROT SERPL-MCNC: 6.3 G/DL
PROT SERPL-MCNC: 6.8 G/DL — SIGNIFICANT CHANGE UP (ref 6.6–8.7)
PROT SERPL-MCNC: 7.3 G/DL — SIGNIFICANT CHANGE UP (ref 6.6–8.7)
PROT UR-MCNC: 100 MG/DL
PROT UR-MCNC: 100 MG/DL
PROT UR-MCNC: 500 MG/DL
PROT UR-MCNC: 500 MG/DL
PROT/CREAT UR-RTO: 2.2 RATIO — HIGH
PROTHROM AB SERPL-ACNC: 11.6 SEC — SIGNIFICANT CHANGE UP (ref 10–12.9)
PROTHROM AB SERPL-ACNC: 15 SEC — HIGH (ref 10–12.9)
PROTHROM AB SERPL-ACNC: 16.4 SEC — HIGH (ref 10–12.9)
RBC # BLD: 2.95 M/UL — LOW (ref 4.6–6.2)
RBC # BLD: 2.99 M/UL — LOW (ref 4.6–6.2)
RBC # BLD: 3.08 M/UL — LOW (ref 4.6–6.2)
RBC # BLD: 3.23 M/UL — LOW (ref 4.6–6.2)
RBC # BLD: 3.25 M/UL — LOW (ref 4.6–6.2)
RBC # BLD: 3.28 M/UL — LOW (ref 4.6–6.2)
RBC # BLD: 3.28 M/UL — LOW (ref 4.6–6.2)
RBC # BLD: 3.34 M/UL — LOW (ref 4.6–6.2)
RBC # BLD: 3.85 M/UL
RBC # BLD: 4.37 M/UL — SIGNIFICANT CHANGE UP (ref 4.2–5.8)
RBC # BLD: 4.43 M/UL — SIGNIFICANT CHANGE UP (ref 4.2–5.8)
RBC # BLD: 4.49 M/UL — SIGNIFICANT CHANGE UP (ref 4.2–5.8)
RBC # BLD: 4.61 M/UL — SIGNIFICANT CHANGE UP (ref 4.2–5.8)
RBC # BLD: 4.79 M/UL — SIGNIFICANT CHANGE UP (ref 4.2–5.8)
RBC # FLD: 13.6 % — SIGNIFICANT CHANGE UP (ref 11–15.6)
RBC # FLD: 13.8 % — SIGNIFICANT CHANGE UP (ref 11–15.6)
RBC # FLD: 14 % — SIGNIFICANT CHANGE UP (ref 11–15.6)
RBC # FLD: 14.1 % — SIGNIFICANT CHANGE UP (ref 11–15.6)
RBC # FLD: 14.2 % — SIGNIFICANT CHANGE UP (ref 11–15.6)
RBC # FLD: 14.2 % — SIGNIFICANT CHANGE UP (ref 11–15.6)
RBC # FLD: 14.4 % — SIGNIFICANT CHANGE UP (ref 11–15.6)
RBC # FLD: 14.5 % — SIGNIFICANT CHANGE UP (ref 11–15.6)
RBC # FLD: 15 %
RBC # FLD: 15.1 % — HIGH (ref 10.3–14.5)
RBC # FLD: 15.2 % — HIGH (ref 10.3–14.5)
RBC # FLD: 15.8 % — HIGH (ref 10.3–14.5)
RBC BLD AUTO: SIGNIFICANT CHANGE UP
RBC CASTS # UR COMP ASSIST: ABNORMAL /HPF (ref 0–4)
S AUREUS DNA NOSE QL NAA+PROBE: DETECTED
S AUREUS DNA NOSE QL NAA+PROBE: SIGNIFICANT CHANGE UP
SAO2 % BLDA: 98 % — SIGNIFICANT CHANGE UP (ref 95–99)
SODIUM SERPL-SCNC: 136 MMOL/L — SIGNIFICANT CHANGE UP (ref 135–145)
SODIUM SERPL-SCNC: 137 MMOL/L
SODIUM SERPL-SCNC: 137 MMOL/L — SIGNIFICANT CHANGE UP (ref 135–145)
SODIUM SERPL-SCNC: 138 MMOL/L — SIGNIFICANT CHANGE UP (ref 135–145)
SODIUM SERPL-SCNC: 139 MMOL/L — SIGNIFICANT CHANGE UP (ref 135–145)
SODIUM SERPL-SCNC: 139 MMOL/L — SIGNIFICANT CHANGE UP (ref 135–145)
SODIUM SERPL-SCNC: 141 MMOL/L — SIGNIFICANT CHANGE UP (ref 135–145)
SODIUM SERPL-SCNC: 141 MMOL/L — SIGNIFICANT CHANGE UP (ref 135–145)
SODIUM SERPL-SCNC: 142 MMOL/L — SIGNIFICANT CHANGE UP (ref 135–145)
SODIUM SERPL-SCNC: 143 MMOL/L — SIGNIFICANT CHANGE UP (ref 135–145)
SODIUM SERPL-SCNC: 143 MMOL/L — SIGNIFICANT CHANGE UP (ref 135–145)
SODIUM SERPL-SCNC: 144 MMOL/L — SIGNIFICANT CHANGE UP (ref 135–145)
SODIUM SERPL-SCNC: 144 MMOL/L — SIGNIFICANT CHANGE UP (ref 135–145)
SODIUM SERPL-SCNC: 146 MMOL/L — HIGH (ref 135–145)
SODIUM SERPL-SCNC: 147 MMOL/L — HIGH (ref 135–145)
SODIUM SERPL-SCNC: 150 MMOL/L — HIGH (ref 135–145)
SODIUM UR-SCNC: 40 MMOL/L — SIGNIFICANT CHANGE UP
SODIUM UR-SCNC: 92 MMOL/L — SIGNIFICANT CHANGE UP
SP GR SPEC: 1.01 — SIGNIFICANT CHANGE UP (ref 1.01–1.02)
SPECIMEN SOURCE: SIGNIFICANT CHANGE UP
TROPONIN T SERPL-MCNC: 0.11 NG/ML — HIGH (ref 0–0.06)
TROPONIN T SERPL-MCNC: 0.16 NG/ML — HIGH (ref 0–0.06)
TROPONIN T SERPL-MCNC: 0.17 NG/ML — HIGH (ref 0–0.06)
TROPONIN T SERPL-MCNC: 0.22 NG/ML — HIGH (ref 0–0.06)
TROPONIN T SERPL-MCNC: 0.22 NG/ML — HIGH (ref 0–0.06)
TROPONIN T SERPL-MCNC: 0.24 NG/ML — HIGH (ref 0–0.06)
TSH SERPL-MCNC: 1.91 UIU/ML — SIGNIFICANT CHANGE UP (ref 0.27–4.2)
TSH SERPL-MCNC: 2.32 UIU/ML — SIGNIFICANT CHANGE UP (ref 0.27–4.2)
TYPE + AB SCN PNL BLD: SIGNIFICANT CHANGE UP
UROBILINOGEN FLD QL: NEGATIVE MG/DL — SIGNIFICANT CHANGE UP
VIT B12 SERPL-MCNC: 467 PG/ML — SIGNIFICANT CHANGE UP (ref 232–1245)
WBC # BLD: 14 K/UL — HIGH (ref 4.8–10.8)
WBC # BLD: 15.5 K/UL — HIGH (ref 4.8–10.8)
WBC # BLD: 4.3 K/UL — LOW (ref 4.8–10.8)
WBC # BLD: 4.7 K/UL — SIGNIFICANT CHANGE UP (ref 3.8–10.5)
WBC # BLD: 5.1 K/UL — SIGNIFICANT CHANGE UP (ref 4.8–10.8)
WBC # BLD: 5.2 K/UL — SIGNIFICANT CHANGE UP (ref 4.8–10.8)
WBC # BLD: 5.32 K/UL — SIGNIFICANT CHANGE UP (ref 3.8–10.5)
WBC # BLD: 5.4 K/UL — SIGNIFICANT CHANGE UP (ref 4.8–10.8)
WBC # BLD: 5.5 K/UL — SIGNIFICANT CHANGE UP (ref 4.8–10.8)
WBC # BLD: 5.66 K/UL — SIGNIFICANT CHANGE UP (ref 3.8–10.5)
WBC # BLD: 6.39 K/UL — SIGNIFICANT CHANGE UP (ref 3.8–10.5)
WBC # BLD: 6.9 K/UL — SIGNIFICANT CHANGE UP (ref 4.8–10.8)
WBC # BLD: 7.63 K/UL — SIGNIFICANT CHANGE UP (ref 3.8–10.5)
WBC # FLD AUTO: 14 K/UL — HIGH (ref 4.8–10.8)
WBC # FLD AUTO: 15.5 K/UL — HIGH (ref 4.8–10.8)
WBC # FLD AUTO: 4.3 K/UL — LOW (ref 4.8–10.8)
WBC # FLD AUTO: 4.7 K/UL — SIGNIFICANT CHANGE UP (ref 3.8–10.5)
WBC # FLD AUTO: 5.1 K/UL — SIGNIFICANT CHANGE UP (ref 4.8–10.8)
WBC # FLD AUTO: 5.2 K/UL — SIGNIFICANT CHANGE UP (ref 4.8–10.8)
WBC # FLD AUTO: 5.32 K/UL — SIGNIFICANT CHANGE UP (ref 3.8–10.5)
WBC # FLD AUTO: 5.4 K/UL — SIGNIFICANT CHANGE UP (ref 4.8–10.8)
WBC # FLD AUTO: 5.5 K/UL — SIGNIFICANT CHANGE UP (ref 4.8–10.8)
WBC # FLD AUTO: 5.66 K/UL — SIGNIFICANT CHANGE UP (ref 3.8–10.5)
WBC # FLD AUTO: 6.39 K/UL — SIGNIFICANT CHANGE UP (ref 3.8–10.5)
WBC # FLD AUTO: 6.9 K/UL — SIGNIFICANT CHANGE UP (ref 4.8–10.8)
WBC # FLD AUTO: 7.63 K/UL — SIGNIFICANT CHANGE UP (ref 3.8–10.5)
WBC # FLD AUTO: 8.53 K/UL
WBC UR QL: >50
WBC UR QL: >50
WBC UR QL: ABNORMAL
WBC UR QL: SIGNIFICANT CHANGE UP

## 2019-01-01 PROCEDURE — 72125 CT NECK SPINE W/O DYE: CPT | Mod: 26

## 2019-01-01 PROCEDURE — 99223 1ST HOSP IP/OBS HIGH 75: CPT

## 2019-01-01 PROCEDURE — 87086 URINE CULTURE/COLONY COUNT: CPT

## 2019-01-01 PROCEDURE — 99222 1ST HOSP IP/OBS MODERATE 55: CPT | Mod: AI

## 2019-01-01 PROCEDURE — 74230 X-RAY XM SWLNG FUNCJ C+: CPT

## 2019-01-01 PROCEDURE — 76775 US EXAM ABDO BACK WALL LIM: CPT

## 2019-01-01 PROCEDURE — 99285 EMERGENCY DEPT VISIT HI MDM: CPT

## 2019-01-01 PROCEDURE — 92611 MOTION FLUOROSCOPY/SWALLOW: CPT

## 2019-01-01 PROCEDURE — 86803 HEPATITIS C AB TEST: CPT

## 2019-01-01 PROCEDURE — 36415 COLL VENOUS BLD VENIPUNCTURE: CPT

## 2019-01-01 PROCEDURE — 93880 EXTRACRANIAL BILAT STUDY: CPT

## 2019-01-01 PROCEDURE — 84133 ASSAY OF URINE POTASSIUM: CPT

## 2019-01-01 PROCEDURE — 86901 BLOOD TYPING SEROLOGIC RH(D): CPT

## 2019-01-01 PROCEDURE — 72125 CT NECK SPINE W/O DYE: CPT

## 2019-01-01 PROCEDURE — 99213 OFFICE O/P EST LOW 20 MIN: CPT

## 2019-01-01 PROCEDURE — 82550 ASSAY OF CK (CPK): CPT

## 2019-01-01 PROCEDURE — 82040 ASSAY OF SERUM ALBUMIN: CPT

## 2019-01-01 PROCEDURE — 81001 URINALYSIS AUTO W/SCOPE: CPT

## 2019-01-01 PROCEDURE — 85027 COMPLETE CBC AUTOMATED: CPT

## 2019-01-01 PROCEDURE — 80048 BASIC METABOLIC PNL TOTAL CA: CPT

## 2019-01-01 PROCEDURE — 80053 COMPREHEN METABOLIC PANEL: CPT

## 2019-01-01 PROCEDURE — 99233 SBSQ HOSP IP/OBS HIGH 50: CPT

## 2019-01-01 PROCEDURE — 84100 ASSAY OF PHOSPHORUS: CPT

## 2019-01-01 PROCEDURE — 85610 PROTHROMBIN TIME: CPT

## 2019-01-01 PROCEDURE — 99232 SBSQ HOSP IP/OBS MODERATE 35: CPT | Mod: GC

## 2019-01-01 PROCEDURE — 99239 HOSP IP/OBS DSCHRG MGMT >30: CPT

## 2019-01-01 PROCEDURE — 99222 1ST HOSP IP/OBS MODERATE 55: CPT

## 2019-01-01 PROCEDURE — 76775 US EXAM ABDO BACK WALL LIM: CPT | Mod: 26

## 2019-01-01 PROCEDURE — 97163 PT EVAL HIGH COMPLEX 45 MIN: CPT

## 2019-01-01 PROCEDURE — 70450 CT HEAD/BRAIN W/O DYE: CPT

## 2019-01-01 PROCEDURE — 97116 GAIT TRAINING THERAPY: CPT

## 2019-01-01 PROCEDURE — 96365 THER/PROPH/DIAG IV INF INIT: CPT

## 2019-01-01 PROCEDURE — 85730 THROMBOPLASTIN TIME PARTIAL: CPT

## 2019-01-01 PROCEDURE — 84443 ASSAY THYROID STIM HORMONE: CPT

## 2019-01-01 PROCEDURE — 92523 SPEECH SOUND LANG COMPREHEN: CPT

## 2019-01-01 PROCEDURE — 87640 STAPH A DNA AMP PROBE: CPT

## 2019-01-01 PROCEDURE — 74177 CT ABD & PELVIS W/CONTRAST: CPT | Mod: 26

## 2019-01-01 PROCEDURE — 93880 EXTRACRANIAL BILAT STUDY: CPT | Mod: 26

## 2019-01-01 PROCEDURE — 84300 ASSAY OF URINE SODIUM: CPT

## 2019-01-01 PROCEDURE — 71045 X-RAY EXAM CHEST 1 VIEW: CPT | Mod: 26

## 2019-01-01 PROCEDURE — 83036 HEMOGLOBIN GLYCOSYLATED A1C: CPT

## 2019-01-01 PROCEDURE — 97110 THERAPEUTIC EXERCISES: CPT

## 2019-01-01 PROCEDURE — 83605 ASSAY OF LACTIC ACID: CPT

## 2019-01-01 PROCEDURE — 93005 ELECTROCARDIOGRAM TRACING: CPT

## 2019-01-01 PROCEDURE — 82962 GLUCOSE BLOOD TEST: CPT

## 2019-01-01 PROCEDURE — 99213 OFFICE O/P EST LOW 20 MIN: CPT | Mod: 25

## 2019-01-01 PROCEDURE — 71250 CT THORAX DX C-: CPT | Mod: 26

## 2019-01-01 PROCEDURE — 70450 CT HEAD/BRAIN W/O DYE: CPT | Mod: 26

## 2019-01-01 PROCEDURE — 87186 SC STD MICRODIL/AGAR DIL: CPT

## 2019-01-01 PROCEDURE — 92610 EVALUATE SWALLOWING FUNCTION: CPT

## 2019-01-01 PROCEDURE — 83935 ASSAY OF URINE OSMOLALITY: CPT

## 2019-01-01 PROCEDURE — 83735 ASSAY OF MAGNESIUM: CPT

## 2019-01-01 PROCEDURE — 74177 CT ABD & PELVIS W/CONTRAST: CPT

## 2019-01-01 PROCEDURE — 83874 ASSAY OF MYOGLOBIN: CPT

## 2019-01-01 PROCEDURE — 71045 X-RAY EXAM CHEST 1 VIEW: CPT

## 2019-01-01 PROCEDURE — 73080 X-RAY EXAM OF ELBOW: CPT

## 2019-01-01 PROCEDURE — 99285 EMERGENCY DEPT VISIT HI MDM: CPT | Mod: 25

## 2019-01-01 PROCEDURE — 99223 1ST HOSP IP/OBS HIGH 75: CPT | Mod: GC

## 2019-01-01 PROCEDURE — 99231 SBSQ HOSP IP/OBS SF/LOW 25: CPT

## 2019-01-01 PROCEDURE — 99284 EMERGENCY DEPT VISIT MOD MDM: CPT

## 2019-01-01 PROCEDURE — 93010 ELECTROCARDIOGRAM REPORT: CPT

## 2019-01-01 PROCEDURE — 97167 OT EVAL HIGH COMPLEX 60 MIN: CPT

## 2019-01-01 PROCEDURE — 99232 SBSQ HOSP IP/OBS MODERATE 35: CPT

## 2019-01-01 PROCEDURE — 99497 ADVNCD CARE PLAN 30 MIN: CPT | Mod: 25

## 2019-01-01 PROCEDURE — 86900 BLOOD TYPING SEROLOGIC ABO: CPT

## 2019-01-01 PROCEDURE — 87205 SMEAR GRAM STAIN: CPT

## 2019-01-01 PROCEDURE — 80069 RENAL FUNCTION PANEL: CPT

## 2019-01-01 PROCEDURE — 93306 TTE W/DOPPLER COMPLETE: CPT

## 2019-01-01 PROCEDURE — 82607 VITAMIN B-12: CPT

## 2019-01-01 PROCEDURE — 72170 X-RAY EXAM OF PELVIS: CPT

## 2019-01-01 PROCEDURE — 84156 ASSAY OF PROTEIN URINE: CPT

## 2019-01-01 PROCEDURE — 72170 X-RAY EXAM OF PELVIS: CPT | Mod: 26

## 2019-01-01 PROCEDURE — 93306 TTE W/DOPPLER COMPLETE: CPT | Mod: 26

## 2019-01-01 PROCEDURE — 99223 1ST HOSP IP/OBS HIGH 75: CPT | Mod: AI

## 2019-01-01 PROCEDURE — 84484 ASSAY OF TROPONIN QUANT: CPT

## 2019-01-01 PROCEDURE — 87040 BLOOD CULTURE FOR BACTERIA: CPT

## 2019-01-01 PROCEDURE — 82570 ASSAY OF URINE CREATININE: CPT

## 2019-01-01 PROCEDURE — 73080 X-RAY EXAM OF ELBOW: CPT | Mod: 26,RT

## 2019-01-01 PROCEDURE — 82436 ASSAY OF URINE CHLORIDE: CPT

## 2019-01-01 PROCEDURE — 82803 BLOOD GASES ANY COMBINATION: CPT

## 2019-01-01 PROCEDURE — 71250 CT THORAX DX C-: CPT

## 2019-01-01 PROCEDURE — 82306 VITAMIN D 25 HYDROXY: CPT

## 2019-01-01 PROCEDURE — 74230 X-RAY XM SWLNG FUNCJ C+: CPT | Mod: 26

## 2019-01-01 PROCEDURE — 99233 SBSQ HOSP IP/OBS HIGH 50: CPT | Mod: GC

## 2019-01-01 PROCEDURE — 86850 RBC ANTIBODY SCREEN: CPT

## 2019-01-01 PROCEDURE — 99497 ADVNCD CARE PLAN 30 MIN: CPT

## 2019-01-01 RX ORDER — SODIUM CHLORIDE 9 MG/ML
1000 INJECTION INTRAMUSCULAR; INTRAVENOUS; SUBCUTANEOUS
Qty: 0 | Refills: 0 | Status: DISCONTINUED | OUTPATIENT
Start: 2019-01-01 | End: 2019-01-01

## 2019-01-01 RX ORDER — SERTRALINE 25 MG/1
100 TABLET, FILM COATED ORAL DAILY
Refills: 0 | Status: DISCONTINUED | OUTPATIENT
Start: 2019-01-01 | End: 2019-01-01

## 2019-01-01 RX ORDER — CELECOXIB 200 MG/1
200 CAPSULE ORAL
Refills: 0 | Status: DISCONTINUED | OUTPATIENT
Start: 2019-01-01 | End: 2019-01-01

## 2019-01-01 RX ORDER — MUPIROCIN 20 MG/G
1 OINTMENT TOPICAL
Qty: 0 | Refills: 0 | Status: DISCONTINUED | OUTPATIENT
Start: 2019-01-01 | End: 2019-01-01

## 2019-01-01 RX ORDER — CARVEDILOL PHOSPHATE 80 MG/1
12.5 CAPSULE, EXTENDED RELEASE ORAL EVERY 12 HOURS
Qty: 0 | Refills: 0 | Status: DISCONTINUED | OUTPATIENT
Start: 2019-01-01 | End: 2019-01-01

## 2019-01-01 RX ORDER — AMLODIPINE BESYLATE 2.5 MG/1
10 TABLET ORAL DAILY
Refills: 0 | Status: DISCONTINUED | OUTPATIENT
Start: 2019-01-01 | End: 2019-01-01

## 2019-01-01 RX ORDER — ISOSORBIDE MONONITRATE 60 MG/1
1 TABLET, EXTENDED RELEASE ORAL
Qty: 0 | Refills: 0 | DISCHARGE

## 2019-01-01 RX ORDER — DEXTROSE 50 % IN WATER 50 %
15 SYRINGE (ML) INTRAVENOUS ONCE
Qty: 0 | Refills: 0 | Status: DISCONTINUED | OUTPATIENT
Start: 2019-01-01 | End: 2019-01-01

## 2019-01-01 RX ORDER — HYDRALAZINE HCL 50 MG
5 TABLET ORAL ONCE
Refills: 0 | Status: DISCONTINUED | OUTPATIENT
Start: 2019-01-01 | End: 2019-01-01

## 2019-01-01 RX ORDER — INSULIN DETEMIR 100/ML (3)
20 INSULIN PEN (ML) SUBCUTANEOUS
Qty: 10 | Refills: 0
Start: 2019-01-01 | End: 2019-01-01

## 2019-01-01 RX ORDER — FERROUS SULFATE 325(65) MG
325 TABLET ORAL DAILY
Refills: 0 | Status: DISCONTINUED | OUTPATIENT
Start: 2019-01-01 | End: 2019-01-01

## 2019-01-01 RX ORDER — DEXTROSE 50 % IN WATER 50 %
25 SYRINGE (ML) INTRAVENOUS ONCE
Qty: 0 | Refills: 0 | Status: DISCONTINUED | OUTPATIENT
Start: 2019-01-01 | End: 2019-01-01

## 2019-01-01 RX ORDER — FUROSEMIDE 40 MG
40 TABLET ORAL ONCE
Refills: 0 | Status: DISCONTINUED | OUTPATIENT
Start: 2019-01-01 | End: 2019-01-01

## 2019-01-01 RX ORDER — FUROSEMIDE 40 MG
40 TABLET ORAL ONCE
Qty: 0 | Refills: 0 | Status: COMPLETED | OUTPATIENT
Start: 2019-01-01 | End: 2019-01-01

## 2019-01-01 RX ORDER — HEPARIN SODIUM 5000 [USP'U]/ML
5000 INJECTION INTRAVENOUS; SUBCUTANEOUS EVERY 8 HOURS
Refills: 0 | Status: DISCONTINUED | OUTPATIENT
Start: 2019-01-01 | End: 2019-01-01

## 2019-01-01 RX ORDER — SODIUM CHLORIDE 9 MG/ML
1000 INJECTION INTRAMUSCULAR; INTRAVENOUS; SUBCUTANEOUS
Refills: 0 | Status: DISCONTINUED | OUTPATIENT
Start: 2019-01-01 | End: 2019-01-01

## 2019-01-01 RX ORDER — CHLORHEXIDINE GLUCONATE 213 G/1000ML
1 SOLUTION TOPICAL DAILY
Refills: 0 | Status: DISCONTINUED | OUTPATIENT
Start: 2019-01-01 | End: 2019-01-01

## 2019-01-01 RX ORDER — DEXTROSE 50 % IN WATER 50 %
15 SYRINGE (ML) INTRAVENOUS ONCE
Qty: 0 | Refills: 0 | Status: COMPLETED | OUTPATIENT
Start: 2019-01-01 | End: 2019-01-01

## 2019-01-01 RX ORDER — HYDRALAZINE HCL 50 MG
25 TABLET ORAL THREE TIMES A DAY
Qty: 0 | Refills: 0 | Status: DISCONTINUED | OUTPATIENT
Start: 2019-01-01 | End: 2019-01-01

## 2019-01-01 RX ORDER — HYDRALAZINE HCL 50 MG
25 TABLET ORAL DAILY
Qty: 0 | Refills: 0 | Status: DISCONTINUED | OUTPATIENT
Start: 2019-01-01 | End: 2019-01-01

## 2019-01-01 RX ORDER — ATORVASTATIN CALCIUM 80 MG/1
40 TABLET, FILM COATED ORAL AT BEDTIME
Qty: 0 | Refills: 0 | Status: DISCONTINUED | OUTPATIENT
Start: 2019-01-01 | End: 2019-01-01

## 2019-01-01 RX ORDER — INSULIN LISPRO 100 [IU]/ML
100 INJECTION, SOLUTION INTRAVENOUS; SUBCUTANEOUS
Refills: 0 | Status: ACTIVE | COMMUNITY

## 2019-01-01 RX ORDER — GABAPENTIN 400 MG/1
300 CAPSULE ORAL THREE TIMES A DAY
Refills: 0 | Status: DISCONTINUED | OUTPATIENT
Start: 2019-01-01 | End: 2019-01-01

## 2019-01-01 RX ORDER — ASPIRIN 81 MG
81 TABLET, DELAYED RELEASE (ENTERIC COATED) ORAL DAILY
Refills: 0 | Status: ACTIVE | COMMUNITY

## 2019-01-01 RX ORDER — SODIUM CHLORIDE 9 MG/ML
1000 INJECTION, SOLUTION INTRAVENOUS
Qty: 0 | Refills: 0 | Status: DISCONTINUED | OUTPATIENT
Start: 2019-01-01 | End: 2019-01-01

## 2019-01-01 RX ORDER — SILVER 2" X 2"
0.9 BANDAGE TOPICAL
Refills: 0 | Status: ACTIVE | COMMUNITY

## 2019-01-01 RX ORDER — HEPARIN SODIUM 5000 [USP'U]/ML
5000 INJECTION INTRAVENOUS; SUBCUTANEOUS EVERY 12 HOURS
Qty: 0 | Refills: 0 | Status: DISCONTINUED | OUTPATIENT
Start: 2019-01-01 | End: 2019-01-01

## 2019-01-01 RX ORDER — CARVEDILOL PHOSPHATE 80 MG/1
1 CAPSULE, EXTENDED RELEASE ORAL
Qty: 0 | Refills: 0 | DISCHARGE

## 2019-01-01 RX ORDER — SODIUM CHLORIDE 9 MG/ML
1000 INJECTION INTRAMUSCULAR; INTRAVENOUS; SUBCUTANEOUS ONCE
Qty: 0 | Refills: 0 | Status: COMPLETED | OUTPATIENT
Start: 2019-01-01 | End: 2019-01-01

## 2019-01-01 RX ORDER — FUROSEMIDE 40 MG
20 TABLET ORAL ONCE
Refills: 0 | Status: COMPLETED | OUTPATIENT
Start: 2019-01-01 | End: 2019-01-01

## 2019-01-01 RX ORDER — SODIUM CHLORIDE 9 MG/ML
500 INJECTION, SOLUTION INTRAVENOUS ONCE
Refills: 0 | Status: COMPLETED | OUTPATIENT
Start: 2019-01-01 | End: 2019-01-01

## 2019-01-01 RX ORDER — LIDOCAINE 4 G/100G
2 CREAM TOPICAL
Qty: 0 | Refills: 0 | DISCHARGE
Start: 2019-01-01

## 2019-01-01 RX ORDER — SERTRALINE 25 MG/1
1 TABLET, FILM COATED ORAL
Qty: 0 | Refills: 0 | COMMUNITY

## 2019-01-01 RX ORDER — SACCHAROMYCES BOULARDII 250 MG
250 POWDER IN PACKET (EA) ORAL
Qty: 0 | Refills: 0 | Status: DISCONTINUED | OUTPATIENT
Start: 2019-01-01 | End: 2019-01-01

## 2019-01-01 RX ORDER — ISOSORBIDE MONONITRATE 60 MG/1
20 TABLET, EXTENDED RELEASE ORAL DAILY
Qty: 0 | Refills: 0 | Status: DISCONTINUED | OUTPATIENT
Start: 2019-01-01 | End: 2019-01-01

## 2019-01-01 RX ORDER — DEXTROSE 50 % IN WATER 50 %
25 SYRINGE (ML) INTRAVENOUS ONCE
Refills: 0 | Status: DISCONTINUED | OUTPATIENT
Start: 2019-01-01 | End: 2019-01-01

## 2019-01-01 RX ORDER — SACCHAROMYCES BOULARDII 250 MG
1 POWDER IN PACKET (EA) ORAL
Qty: 14 | Refills: 0 | OUTPATIENT
Start: 2019-01-01 | End: 2019-01-01

## 2019-01-01 RX ORDER — HYDRALAZINE HCL 50 MG
2 TABLET ORAL
Qty: 180 | Refills: 0 | OUTPATIENT
Start: 2019-01-01 | End: 2019-01-01

## 2019-01-01 RX ORDER — LISINOPRIL 2.5 MG/1
2.5 TABLET ORAL DAILY
Qty: 0 | Refills: 0 | Status: DISCONTINUED | OUTPATIENT
Start: 2019-01-01 | End: 2019-01-01

## 2019-01-01 RX ORDER — ASPIRIN/CALCIUM CARB/MAGNESIUM 324 MG
81 TABLET ORAL DAILY
Qty: 0 | Refills: 0 | Status: DISCONTINUED | OUTPATIENT
Start: 2019-01-01 | End: 2019-01-01

## 2019-01-01 RX ORDER — INSULIN LISPRO 100/ML
5 VIAL (ML) SUBCUTANEOUS ONCE
Qty: 0 | Refills: 0 | Status: COMPLETED | OUTPATIENT
Start: 2019-01-01 | End: 2019-01-01

## 2019-01-01 RX ORDER — CLOPIDOGREL 75 MG/1
75 TABLET, FILM COATED ORAL DAILY
Refills: 0 | Status: ACTIVE | COMMUNITY

## 2019-01-01 RX ORDER — ONDANSETRON 8 MG/1
4 TABLET, FILM COATED ORAL ONCE
Qty: 0 | Refills: 0 | Status: DISCONTINUED | OUTPATIENT
Start: 2019-01-01 | End: 2019-01-01

## 2019-01-01 RX ORDER — SENNA PLUS 8.6 MG/1
1 TABLET ORAL DAILY
Refills: 0 | Status: DISCONTINUED | OUTPATIENT
Start: 2019-01-01 | End: 2019-01-01

## 2019-01-01 RX ORDER — INSULIN DETEMIR 100/ML (3)
20 INSULIN PEN (ML) SUBCUTANEOUS
Qty: 0 | Refills: 0 | COMMUNITY

## 2019-01-01 RX ORDER — INSULIN LISPRO 100/ML
VIAL (ML) SUBCUTANEOUS
Refills: 0 | Status: DISCONTINUED | OUTPATIENT
Start: 2019-01-01 | End: 2019-01-01

## 2019-01-01 RX ORDER — FUROSEMIDE 40 MG
40 TABLET ORAL ONCE
Refills: 0 | Status: COMPLETED | OUTPATIENT
Start: 2019-01-01 | End: 2019-01-01

## 2019-01-01 RX ORDER — HYDRALAZINE HCL 50 MG
75 TABLET ORAL EVERY 8 HOURS
Qty: 0 | Refills: 0 | Status: DISCONTINUED | OUTPATIENT
Start: 2019-01-01 | End: 2019-01-01

## 2019-01-01 RX ORDER — DEXTROSE 50 % IN WATER 50 %
12.5 SYRINGE (ML) INTRAVENOUS ONCE
Refills: 0 | Status: DISCONTINUED | OUTPATIENT
Start: 2019-01-01 | End: 2019-01-01

## 2019-01-01 RX ORDER — CLOPIDOGREL BISULFATE 75 MG/1
1 TABLET, FILM COATED ORAL
Qty: 0 | Refills: 0 | COMMUNITY

## 2019-01-01 RX ORDER — METHOCARBAMOL 500 MG/1
750 TABLET, FILM COATED ORAL EVERY 6 HOURS
Refills: 0 | Status: DISCONTINUED | OUTPATIENT
Start: 2019-01-01 | End: 2019-01-01

## 2019-01-01 RX ORDER — SERTRALINE HYDROCHLORIDE 100 MG/1
100 TABLET, FILM COATED ORAL DAILY
Refills: 0 | Status: ACTIVE | COMMUNITY

## 2019-01-01 RX ORDER — INSULIN DETEMIR 100/ML (3)
15 INSULIN PEN (ML) SUBCUTANEOUS DAILY
Refills: 0 | Status: DISCONTINUED | OUTPATIENT
Start: 2019-01-01 | End: 2019-01-01

## 2019-01-01 RX ORDER — CARVEDILOL PHOSPHATE 80 MG/1
12.5 CAPSULE, EXTENDED RELEASE ORAL EVERY 12 HOURS
Refills: 0 | Status: DISCONTINUED | OUTPATIENT
Start: 2019-01-01 | End: 2019-01-01

## 2019-01-01 RX ORDER — INSULIN LISPRO 100/ML
2 VIAL (ML) SUBCUTANEOUS ONCE
Qty: 0 | Refills: 0 | Status: COMPLETED | OUTPATIENT
Start: 2019-01-01 | End: 2019-01-01

## 2019-01-01 RX ORDER — INSULIN DETEMIR 100/ML (3)
10 INSULIN PEN (ML) SUBCUTANEOUS
Qty: 0 | Refills: 0 | DISCHARGE
Start: 2019-01-01

## 2019-01-01 RX ORDER — SERTRALINE 25 MG/1
100 TABLET, FILM COATED ORAL DAILY
Qty: 0 | Refills: 0 | Status: DISCONTINUED | OUTPATIENT
Start: 2019-01-01 | End: 2019-01-01

## 2019-01-01 RX ORDER — CLOPIDOGREL BISULFATE 75 MG/1
75 TABLET, FILM COATED ORAL DAILY
Qty: 0 | Refills: 0 | Status: DISCONTINUED | OUTPATIENT
Start: 2019-01-01 | End: 2019-01-01

## 2019-01-01 RX ORDER — DOCUSATE SODIUM 100 MG
100 CAPSULE ORAL DAILY
Refills: 0 | Status: DISCONTINUED | OUTPATIENT
Start: 2019-01-01 | End: 2019-01-01

## 2019-01-01 RX ORDER — SODIUM CHLORIDE 9 MG/ML
500 INJECTION INTRAMUSCULAR; INTRAVENOUS; SUBCUTANEOUS ONCE
Qty: 0 | Refills: 0 | Status: COMPLETED | OUTPATIENT
Start: 2019-01-01 | End: 2019-01-01

## 2019-01-01 RX ORDER — ASPIRIN/CALCIUM CARB/MAGNESIUM 324 MG
1 TABLET ORAL
Qty: 0 | Refills: 0 | DISCHARGE

## 2019-01-01 RX ORDER — INSULIN LISPRO 100/ML
10 VIAL (ML) SUBCUTANEOUS
Qty: 0 | Refills: 0 | COMMUNITY

## 2019-01-01 RX ORDER — OXYCODONE HYDROCHLORIDE 5 MG/1
5 TABLET ORAL EVERY 4 HOURS
Refills: 0 | Status: DISCONTINUED | OUTPATIENT
Start: 2019-01-01 | End: 2019-01-01

## 2019-01-01 RX ORDER — INSULIN DETEMIR 100/ML (3)
20 INSULIN PEN (ML) SUBCUTANEOUS
Qty: 0 | Refills: 0 | DISCHARGE

## 2019-01-01 RX ORDER — HYDRALAZINE HYDROCHLORIDE 50 MG/1
50 TABLET ORAL 3 TIMES DAILY
Refills: 0 | Status: ACTIVE | COMMUNITY

## 2019-01-01 RX ORDER — ATORVASTATIN CALCIUM 80 MG/1
40 TABLET, FILM COATED ORAL DAILY
Refills: 0 | Status: DISCONTINUED | OUTPATIENT
Start: 2019-01-01 | End: 2019-01-01

## 2019-01-01 RX ORDER — DOCUSATE SODIUM 100 MG
1 CAPSULE ORAL
Qty: 0 | Refills: 0 | DISCHARGE
Start: 2019-01-01

## 2019-01-01 RX ORDER — ACETAMINOPHEN 325 MG/1
325 TABLET ORAL EVERY 6 HOURS
Refills: 0 | Status: ACTIVE | COMMUNITY

## 2019-01-01 RX ORDER — LISINOPRIL 2.5 MG/1
1 TABLET ORAL
Qty: 30 | Refills: 0
Start: 2019-01-01 | End: 2019-01-01

## 2019-01-01 RX ORDER — ASPIRIN/CALCIUM CARB/MAGNESIUM 324 MG
300 TABLET ORAL DAILY
Qty: 0 | Refills: 0 | Status: DISCONTINUED | OUTPATIENT
Start: 2019-01-01 | End: 2019-01-01

## 2019-01-01 RX ORDER — METOPROLOL TARTRATE 50 MG
50 TABLET ORAL ONCE
Qty: 0 | Refills: 0 | Status: COMPLETED | OUTPATIENT
Start: 2019-01-01 | End: 2019-01-01

## 2019-01-01 RX ORDER — HYDRALAZINE HCL 50 MG
10 TABLET ORAL EVERY 6 HOURS
Qty: 0 | Refills: 0 | Status: DISCONTINUED | OUTPATIENT
Start: 2019-01-01 | End: 2019-01-01

## 2019-01-01 RX ORDER — CLOPIDOGREL BISULFATE 75 MG/1
75 TABLET, FILM COATED ORAL DAILY
Refills: 0 | Status: DISCONTINUED | OUTPATIENT
Start: 2019-01-01 | End: 2019-01-01

## 2019-01-01 RX ORDER — LIDOCAINE 4 G/100G
2 CREAM TOPICAL EVERY 24 HOURS
Refills: 0 | Status: DISCONTINUED | OUTPATIENT
Start: 2019-01-01 | End: 2019-01-01

## 2019-01-01 RX ORDER — ATORVASTATIN CALCIUM 80 MG/1
1 TABLET, FILM COATED ORAL
Qty: 0 | Refills: 0 | COMMUNITY

## 2019-01-01 RX ORDER — HYDRALAZINE HCL 50 MG
50 TABLET ORAL ONCE
Qty: 0 | Refills: 0 | Status: COMPLETED | OUTPATIENT
Start: 2019-01-01 | End: 2019-01-01

## 2019-01-01 RX ORDER — DEXTROSE 50 % IN WATER 50 %
15 SYRINGE (ML) INTRAVENOUS ONCE
Refills: 0 | Status: DISCONTINUED | OUTPATIENT
Start: 2019-01-01 | End: 2019-01-01

## 2019-01-01 RX ORDER — SENNA PLUS 8.6 MG/1
1 TABLET ORAL
Qty: 0 | Refills: 0 | DISCHARGE
Start: 2019-01-01

## 2019-01-01 RX ORDER — DEXTROSE 50 % IN WATER 50 %
12.5 SYRINGE (ML) INTRAVENOUS ONCE
Qty: 0 | Refills: 0 | Status: DISCONTINUED | OUTPATIENT
Start: 2019-01-01 | End: 2019-01-01

## 2019-01-01 RX ORDER — AMLODIPINE BESYLATE 2.5 MG/1
5 TABLET ORAL DAILY
Qty: 0 | Refills: 0 | Status: DISCONTINUED | OUTPATIENT
Start: 2019-01-01 | End: 2019-01-01

## 2019-01-01 RX ORDER — ERTAPENEM SODIUM 1 G/1
1000 INJECTION, POWDER, LYOPHILIZED, FOR SOLUTION INTRAMUSCULAR; INTRAVENOUS EVERY 24 HOURS
Qty: 0 | Refills: 0 | Status: COMPLETED | OUTPATIENT
Start: 2019-01-01 | End: 2019-01-01

## 2019-01-01 RX ORDER — FERROUS SULFATE 325(65) MG
325 TABLET ORAL DAILY
Qty: 0 | Refills: 0 | Status: DISCONTINUED | OUTPATIENT
Start: 2019-01-01 | End: 2019-01-01

## 2019-01-01 RX ORDER — DEXTROSE MONOHYDRATE, SODIUM CHLORIDE, AND POTASSIUM CHLORIDE 50; .745; 4.5 G/1000ML; G/1000ML; G/1000ML
1000 INJECTION, SOLUTION INTRAVENOUS
Qty: 0 | Refills: 0 | Status: DISCONTINUED | OUTPATIENT
Start: 2019-01-01 | End: 2019-01-01

## 2019-01-01 RX ORDER — AMLODIPINE BESYLATE 2.5 MG/1
1 TABLET ORAL
Qty: 0 | Refills: 0 | DISCHARGE

## 2019-01-01 RX ORDER — COLLAGENASE CLOSTRIDIUM HIST. 250 UNIT/G
1 OINTMENT (GRAM) TOPICAL DAILY
Refills: 0 | Status: DISCONTINUED | OUTPATIENT
Start: 2019-01-01 | End: 2019-01-01

## 2019-01-01 RX ORDER — GABAPENTIN 400 MG/1
1 CAPSULE ORAL
Qty: 0 | Refills: 0 | DISCHARGE
Start: 2019-01-01

## 2019-01-01 RX ORDER — SODIUM BICARBONATE 1 MEQ/ML
0.08 SYRINGE (ML) INTRAVENOUS
Qty: 75 | Refills: 0 | Status: DISCONTINUED | OUTPATIENT
Start: 2019-01-01 | End: 2019-01-01

## 2019-01-01 RX ORDER — APIXABAN 2.5 MG/1
2.5 TABLET, FILM COATED ORAL
Refills: 0 | Status: ACTIVE | COMMUNITY

## 2019-01-01 RX ORDER — SODIUM CHLORIDE 9 MG/ML
1000 INJECTION, SOLUTION INTRAVENOUS
Refills: 0 | Status: DISCONTINUED | OUTPATIENT
Start: 2019-01-01 | End: 2019-01-01

## 2019-01-01 RX ORDER — MEROPENEM 1 G/30ML
1000 INJECTION INTRAVENOUS EVERY 8 HOURS
Qty: 0 | Refills: 0 | Status: DISCONTINUED | OUTPATIENT
Start: 2019-01-01 | End: 2019-01-01

## 2019-01-01 RX ORDER — BISACODYL 10 MG/1
10 SUPPOSITORY RECTAL DAILY
Refills: 0 | Status: ACTIVE | COMMUNITY

## 2019-01-01 RX ORDER — GLUCAGON INJECTION, SOLUTION 0.5 MG/.1ML
1 INJECTION, SOLUTION SUBCUTANEOUS ONCE
Qty: 0 | Refills: 0 | Status: DISCONTINUED | OUTPATIENT
Start: 2019-01-01 | End: 2019-01-01

## 2019-01-01 RX ORDER — SODIUM BICARBONATE 1 MEQ/ML
650 SYRINGE (ML) INTRAVENOUS THREE TIMES A DAY
Refills: 0 | Status: DISCONTINUED | OUTPATIENT
Start: 2019-01-01 | End: 2019-01-01

## 2019-01-01 RX ORDER — ACETAMINOPHEN 500 MG
2 TABLET ORAL
Qty: 0 | Refills: 0 | DISCHARGE
Start: 2019-01-01

## 2019-01-01 RX ORDER — CEFTRIAXONE 500 MG/1
INJECTION, POWDER, FOR SOLUTION INTRAMUSCULAR; INTRAVENOUS
Qty: 0 | Refills: 0 | Status: DISCONTINUED | OUTPATIENT
Start: 2019-01-01 | End: 2019-01-01

## 2019-01-01 RX ORDER — CEFTRIAXONE 500 MG/1
1 INJECTION, POWDER, FOR SOLUTION INTRAMUSCULAR; INTRAVENOUS ONCE
Qty: 0 | Refills: 0 | Status: COMPLETED | OUTPATIENT
Start: 2019-01-01 | End: 2019-01-01

## 2019-01-01 RX ORDER — INSULIN DETEMIR 100/ML (3)
20 INSULIN PEN (ML) SUBCUTANEOUS DAILY
Refills: 0 | Status: DISCONTINUED | OUTPATIENT
Start: 2019-01-01 | End: 2019-01-01

## 2019-01-01 RX ORDER — INSULIN DETEMIR 100/ML (3)
5 INSULIN PEN (ML) SUBCUTANEOUS DAILY
Refills: 0 | Status: DISCONTINUED | OUTPATIENT
Start: 2019-01-01 | End: 2019-01-01

## 2019-01-01 RX ORDER — FERROUS SULFATE 325(65) MG
325 (65 FE) TABLET ORAL DAILY
Refills: 0 | Status: ACTIVE | COMMUNITY

## 2019-01-01 RX ORDER — HYDRALAZINE HCL 50 MG
25 TABLET ORAL EVERY 8 HOURS
Refills: 0 | Status: DISCONTINUED | OUTPATIENT
Start: 2019-01-01 | End: 2019-01-01

## 2019-01-01 RX ORDER — ENOXAPARIN SODIUM 100 MG/ML
40 INJECTION SUBCUTANEOUS DAILY
Qty: 0 | Refills: 0 | Status: DISCONTINUED | OUTPATIENT
Start: 2019-01-01 | End: 2019-01-01

## 2019-01-01 RX ORDER — INSULIN LISPRO 100/ML
5 VIAL (ML) SUBCUTANEOUS ONCE
Refills: 0 | Status: COMPLETED | OUTPATIENT
Start: 2019-01-01 | End: 2019-01-01

## 2019-01-01 RX ORDER — CARVEDILOL PHOSPHATE 80 MG/1
12.5 CAPSULE, EXTENDED RELEASE ORAL
Refills: 0 | Status: DISCONTINUED | OUTPATIENT
Start: 2019-01-01 | End: 2019-01-01

## 2019-01-01 RX ORDER — ONDANSETRON 8 MG/1
4 TABLET, FILM COATED ORAL EVERY 6 HOURS
Refills: 0 | Status: DISCONTINUED | OUTPATIENT
Start: 2019-01-01 | End: 2019-01-01

## 2019-01-01 RX ORDER — FERROUS SULFATE 325(65) MG
1 TABLET ORAL
Qty: 30 | Refills: 0 | OUTPATIENT
Start: 2019-01-01 | End: 2019-01-01

## 2019-01-01 RX ORDER — METHOCARBAMOL 500 MG/1
2 TABLET, FILM COATED ORAL
Qty: 0 | Refills: 0 | DISCHARGE
Start: 2019-01-01

## 2019-01-01 RX ORDER — INSULIN LISPRO 100/ML
VIAL (ML) SUBCUTANEOUS
Qty: 0 | Refills: 0 | Status: DISCONTINUED | OUTPATIENT
Start: 2019-01-01 | End: 2019-01-01

## 2019-01-01 RX ORDER — HYDRALAZINE HCL 50 MG
50 TABLET ORAL THREE TIMES A DAY
Qty: 0 | Refills: 0 | Status: DISCONTINUED | OUTPATIENT
Start: 2019-01-01 | End: 2019-01-01

## 2019-01-01 RX ORDER — GLUCAGON INJECTION, SOLUTION 0.5 MG/.1ML
1 INJECTION, SOLUTION SUBCUTANEOUS ONCE
Refills: 0 | Status: DISCONTINUED | OUTPATIENT
Start: 2019-01-01 | End: 2019-01-01

## 2019-01-01 RX ORDER — CEFTRIAXONE 500 MG/1
1 INJECTION, POWDER, FOR SOLUTION INTRAMUSCULAR; INTRAVENOUS EVERY 24 HOURS
Qty: 0 | Refills: 0 | Status: DISCONTINUED | OUTPATIENT
Start: 2019-01-01 | End: 2019-01-01

## 2019-01-01 RX ORDER — SODIUM BICARBONATE 1 MEQ/ML
0.15 SYRINGE (ML) INTRAVENOUS
Qty: 150 | Refills: 0 | Status: DISCONTINUED | OUTPATIENT
Start: 2019-01-01 | End: 2019-01-01

## 2019-01-01 RX ORDER — LORATADINE 5 MG
17 TABLET,CHEWABLE ORAL DAILY
Refills: 0 | Status: ACTIVE | COMMUNITY

## 2019-01-01 RX ORDER — ASPIRIN/CALCIUM CARB/MAGNESIUM 324 MG
81 TABLET ORAL DAILY
Refills: 0 | Status: DISCONTINUED | OUTPATIENT
Start: 2019-01-01 | End: 2019-01-01

## 2019-01-01 RX ORDER — ISOSORBIDE MONONITRATE 60 MG/1
1 TABLET, EXTENDED RELEASE ORAL
Qty: 0 | Refills: 0 | DISCHARGE
Start: 2019-01-01

## 2019-01-01 RX ORDER — INSULIN DETEMIR 100/ML (3)
10 INSULIN PEN (ML) SUBCUTANEOUS DAILY
Refills: 0 | Status: DISCONTINUED | OUTPATIENT
Start: 2019-01-01 | End: 2019-01-01

## 2019-01-01 RX ORDER — INSULIN GLARGINE 100 [IU]/ML
100 INJECTION, SOLUTION SUBCUTANEOUS
Refills: 0 | Status: ACTIVE | COMMUNITY

## 2019-01-01 RX ORDER — INSULIN LISPRO 100/ML
10 VIAL (ML) SUBCUTANEOUS ONCE
Refills: 0 | Status: COMPLETED | OUTPATIENT
Start: 2019-01-01 | End: 2019-01-01

## 2019-01-01 RX ORDER — SACCHAROMYCES BOULARDII 250 MG
1 POWDER IN PACKET (EA) ORAL
Qty: 0 | Refills: 0 | DISCHARGE

## 2019-01-01 RX ORDER — SACCHAROMYCES BOULARDII 250 MG
250 POWDER IN PACKET (EA) ORAL
Refills: 0 | Status: DISCONTINUED | OUTPATIENT
Start: 2019-01-01 | End: 2019-01-01

## 2019-01-01 RX ORDER — INSULIN LISPRO 100/ML
8 VIAL (ML) SUBCUTANEOUS ONCE
Refills: 0 | Status: DISCONTINUED | OUTPATIENT
Start: 2019-01-01 | End: 2019-01-01

## 2019-01-01 RX ORDER — LISINOPRIL 2.5 MG/1
2.5 TABLET ORAL DAILY
Refills: 0 | Status: ACTIVE | COMMUNITY

## 2019-01-01 RX ORDER — LISINOPRIL 2.5 MG/1
1 TABLET ORAL
Qty: 0 | Refills: 0 | COMMUNITY

## 2019-01-01 RX ORDER — ATORVASTATIN CALCIUM 80 MG/1
1 TABLET, FILM COATED ORAL
Qty: 0 | Refills: 0 | DISCHARGE

## 2019-01-01 RX ORDER — DOCUSATE SODIUM 100 MG/1
100 CAPSULE ORAL 3 TIMES DAILY
Refills: 0 | Status: ACTIVE | COMMUNITY

## 2019-01-01 RX ORDER — CALCIUM GLUCONATE 100 MG/ML
1 VIAL (ML) INTRAVENOUS ONCE
Refills: 0 | Status: COMPLETED | OUTPATIENT
Start: 2019-01-01 | End: 2019-01-01

## 2019-01-01 RX ORDER — METOPROLOL TARTRATE 50 MG/1
50 TABLET, FILM COATED ORAL
Refills: 0 | Status: ACTIVE | COMMUNITY

## 2019-01-01 RX ORDER — HYDRALAZINE HCL 50 MG
3 TABLET ORAL
Qty: 270 | Refills: 0 | OUTPATIENT
Start: 2019-01-01 | End: 2019-01-01

## 2019-01-01 RX ORDER — MEROPENEM 1 G/30ML
1000 INJECTION INTRAVENOUS ONCE
Qty: 0 | Refills: 0 | Status: COMPLETED | OUTPATIENT
Start: 2019-01-01 | End: 2019-01-01

## 2019-01-01 RX ORDER — SODIUM POLYSTYRENE SULFONATE 4.1 MEQ/G
15 POWDER, FOR SUSPENSION ORAL ONCE
Refills: 0 | Status: DISCONTINUED | OUTPATIENT
Start: 2019-01-01 | End: 2019-01-01

## 2019-01-01 RX ORDER — INSULIN GLARGINE 100 [IU]/ML
20 INJECTION, SOLUTION SUBCUTANEOUS AT BEDTIME
Qty: 0 | Refills: 0 | Status: DISCONTINUED | OUTPATIENT
Start: 2019-01-01 | End: 2019-01-01

## 2019-01-01 RX ORDER — SODIUM BICARBONATE 1 MEQ/ML
1 SYRINGE (ML) INTRAVENOUS
Qty: 0 | Refills: 0 | DISCHARGE
Start: 2019-01-01

## 2019-01-01 RX ORDER — ACETAMINOPHEN 500 MG
650 TABLET ORAL EVERY 6 HOURS
Refills: 0 | Status: DISCONTINUED | OUTPATIENT
Start: 2019-01-01 | End: 2019-01-01

## 2019-01-01 RX ORDER — HYDRALAZINE HCL 50 MG
5 TABLET ORAL ONCE
Refills: 0 | Status: COMPLETED | OUTPATIENT
Start: 2019-01-01 | End: 2019-01-01

## 2019-01-01 RX ORDER — CHLORHEXIDINE GLUCONATE 213 G/1000ML
1 SOLUTION TOPICAL
Qty: 0 | Refills: 0 | Status: DISCONTINUED | OUTPATIENT
Start: 2019-01-01 | End: 2019-01-01

## 2019-01-01 RX ADMIN — Medication 10 UNIT(S): at 12:03

## 2019-01-01 RX ADMIN — HEPARIN SODIUM 5000 UNIT(S): 5000 INJECTION INTRAVENOUS; SUBCUTANEOUS at 21:46

## 2019-01-01 RX ADMIN — SERTRALINE 100 MILLIGRAM(S): 25 TABLET, FILM COATED ORAL at 11:51

## 2019-01-01 RX ADMIN — Medication 250 MILLIGRAM(S): at 05:44

## 2019-01-01 RX ADMIN — Medication 100 MILLIGRAM(S): at 17:07

## 2019-01-01 RX ADMIN — Medication 650 MILLIGRAM(S): at 17:03

## 2019-01-01 RX ADMIN — Medication 325 MILLIGRAM(S): at 11:33

## 2019-01-01 RX ADMIN — Medication 650 MILLIGRAM(S): at 01:23

## 2019-01-01 RX ADMIN — SERTRALINE 100 MILLIGRAM(S): 25 TABLET, FILM COATED ORAL at 14:15

## 2019-01-01 RX ADMIN — Medication 650 MILLIGRAM(S): at 23:46

## 2019-01-01 RX ADMIN — METHOCARBAMOL 750 MILLIGRAM(S): 500 TABLET, FILM COATED ORAL at 05:40

## 2019-01-01 RX ADMIN — ATORVASTATIN CALCIUM 40 MILLIGRAM(S): 80 TABLET, FILM COATED ORAL at 11:51

## 2019-01-01 RX ADMIN — HEPARIN SODIUM 5000 UNIT(S): 5000 INJECTION INTRAVENOUS; SUBCUTANEOUS at 05:58

## 2019-01-01 RX ADMIN — SODIUM CHLORIDE 100 MILLILITER(S): 9 INJECTION, SOLUTION INTRAVENOUS at 23:56

## 2019-01-01 RX ADMIN — Medication 81 MILLIGRAM(S): at 15:41

## 2019-01-01 RX ADMIN — SENNA PLUS 1 TABLET(S): 8.6 TABLET ORAL at 12:44

## 2019-01-01 RX ADMIN — ATORVASTATIN CALCIUM 40 MILLIGRAM(S): 80 TABLET, FILM COATED ORAL at 21:21

## 2019-01-01 RX ADMIN — HEPARIN SODIUM 5000 UNIT(S): 5000 INJECTION INTRAVENOUS; SUBCUTANEOUS at 18:12

## 2019-01-01 RX ADMIN — CLOPIDOGREL BISULFATE 75 MILLIGRAM(S): 75 TABLET, FILM COATED ORAL at 12:05

## 2019-01-01 RX ADMIN — Medication 650 MILLIGRAM(S): at 12:46

## 2019-01-01 RX ADMIN — HEPARIN SODIUM 5000 UNIT(S): 5000 INJECTION INTRAVENOUS; SUBCUTANEOUS at 22:57

## 2019-01-01 RX ADMIN — ONDANSETRON 4 MILLIGRAM(S): 8 TABLET, FILM COATED ORAL at 12:52

## 2019-01-01 RX ADMIN — Medication 25 MILLIGRAM(S): at 05:58

## 2019-01-01 RX ADMIN — LIDOCAINE 2 PATCH: 4 CREAM TOPICAL at 18:09

## 2019-01-01 RX ADMIN — CARVEDILOL PHOSPHATE 12.5 MILLIGRAM(S): 80 CAPSULE, EXTENDED RELEASE ORAL at 17:16

## 2019-01-01 RX ADMIN — Medication 650 MILLIGRAM(S): at 06:03

## 2019-01-01 RX ADMIN — ATORVASTATIN CALCIUM 40 MILLIGRAM(S): 80 TABLET, FILM COATED ORAL at 12:50

## 2019-01-01 RX ADMIN — HEPARIN SODIUM 5000 UNIT(S): 5000 INJECTION INTRAVENOUS; SUBCUTANEOUS at 21:59

## 2019-01-01 RX ADMIN — AMLODIPINE BESYLATE 10 MILLIGRAM(S): 2.5 TABLET ORAL at 05:26

## 2019-01-01 RX ADMIN — LISINOPRIL 2.5 MILLIGRAM(S): 2.5 TABLET ORAL at 18:12

## 2019-01-01 RX ADMIN — MEROPENEM 100 MILLIGRAM(S): 1 INJECTION INTRAVENOUS at 07:13

## 2019-01-01 RX ADMIN — Medication 250 MILLIGRAM(S): at 17:32

## 2019-01-01 RX ADMIN — Medication 8: at 08:16

## 2019-01-01 RX ADMIN — SODIUM CHLORIDE 100 MILLILITER(S): 9 INJECTION, SOLUTION INTRAVENOUS at 08:49

## 2019-01-01 RX ADMIN — Medication 6: at 12:30

## 2019-01-01 RX ADMIN — HEPARIN SODIUM 5000 UNIT(S): 5000 INJECTION INTRAVENOUS; SUBCUTANEOUS at 17:38

## 2019-01-01 RX ADMIN — SERTRALINE 100 MILLIGRAM(S): 25 TABLET, FILM COATED ORAL at 12:53

## 2019-01-01 RX ADMIN — MEROPENEM 100 MILLIGRAM(S): 1 INJECTION INTRAVENOUS at 13:05

## 2019-01-01 RX ADMIN — Medication 1 APPLICATION(S): at 15:44

## 2019-01-01 RX ADMIN — Medication 2: at 17:30

## 2019-01-01 RX ADMIN — CARVEDILOL PHOSPHATE 12.5 MILLIGRAM(S): 80 CAPSULE, EXTENDED RELEASE ORAL at 07:13

## 2019-01-01 RX ADMIN — GABAPENTIN 300 MILLIGRAM(S): 400 CAPSULE ORAL at 05:26

## 2019-01-01 RX ADMIN — GABAPENTIN 300 MILLIGRAM(S): 400 CAPSULE ORAL at 06:13

## 2019-01-01 RX ADMIN — Medication 650 MILLIGRAM(S): at 01:30

## 2019-01-01 RX ADMIN — Medication 325 MILLIGRAM(S): at 14:14

## 2019-01-01 RX ADMIN — GABAPENTIN 300 MILLIGRAM(S): 400 CAPSULE ORAL at 06:16

## 2019-01-01 RX ADMIN — METHOCARBAMOL 750 MILLIGRAM(S): 500 TABLET, FILM COATED ORAL at 23:46

## 2019-01-01 RX ADMIN — CLOPIDOGREL BISULFATE 75 MILLIGRAM(S): 75 TABLET, FILM COATED ORAL at 18:11

## 2019-01-01 RX ADMIN — Medication 100 MILLIGRAM(S): at 05:03

## 2019-01-01 RX ADMIN — Medication 75 MILLIGRAM(S): at 22:15

## 2019-01-01 RX ADMIN — Medication 25 MILLIGRAM(S): at 05:44

## 2019-01-01 RX ADMIN — LIDOCAINE 2 PATCH: 4 CREAM TOPICAL at 17:12

## 2019-01-01 RX ADMIN — SODIUM CHLORIDE 80 MILLILITER(S): 9 INJECTION, SOLUTION INTRAVENOUS at 13:08

## 2019-01-01 RX ADMIN — CARVEDILOL PHOSPHATE 12.5 MILLIGRAM(S): 80 CAPSULE, EXTENDED RELEASE ORAL at 05:31

## 2019-01-01 RX ADMIN — Medication 650 MILLIGRAM(S): at 01:01

## 2019-01-01 RX ADMIN — GABAPENTIN 300 MILLIGRAM(S): 400 CAPSULE ORAL at 05:58

## 2019-01-01 RX ADMIN — Medication 2: at 07:59

## 2019-01-01 RX ADMIN — ERTAPENEM SODIUM 120 MILLIGRAM(S): 1 INJECTION, POWDER, LYOPHILIZED, FOR SOLUTION INTRAMUSCULAR; INTRAVENOUS at 14:36

## 2019-01-01 RX ADMIN — HEPARIN SODIUM 5000 UNIT(S): 5000 INJECTION INTRAVENOUS; SUBCUTANEOUS at 17:57

## 2019-01-01 RX ADMIN — Medication 81 MILLIGRAM(S): at 11:45

## 2019-01-01 RX ADMIN — HEPARIN SODIUM 5000 UNIT(S): 5000 INJECTION INTRAVENOUS; SUBCUTANEOUS at 23:01

## 2019-01-01 RX ADMIN — Medication 50 MILLIGRAM(S): at 17:11

## 2019-01-01 RX ADMIN — METHOCARBAMOL 750 MILLIGRAM(S): 500 TABLET, FILM COATED ORAL at 05:59

## 2019-01-01 RX ADMIN — Medication 650 MILLIGRAM(S): at 05:26

## 2019-01-01 RX ADMIN — SERTRALINE 100 MILLIGRAM(S): 25 TABLET, FILM COATED ORAL at 11:12

## 2019-01-01 RX ADMIN — CELECOXIB 200 MILLIGRAM(S): 200 CAPSULE ORAL at 06:30

## 2019-01-01 RX ADMIN — Medication 250 MILLIGRAM(S): at 06:16

## 2019-01-01 RX ADMIN — Medication 20 MILLIGRAM(S): at 08:28

## 2019-01-01 RX ADMIN — SODIUM CHLORIDE 500 MILLILITER(S): 9 INJECTION, SOLUTION INTRAVENOUS at 16:03

## 2019-01-01 RX ADMIN — CARVEDILOL PHOSPHATE 12.5 MILLIGRAM(S): 80 CAPSULE, EXTENDED RELEASE ORAL at 05:00

## 2019-01-01 RX ADMIN — Medication 25 MILLIGRAM(S): at 05:31

## 2019-01-01 RX ADMIN — Medication 650 MILLIGRAM(S): at 12:53

## 2019-01-01 RX ADMIN — Medication 25 MILLIGRAM(S): at 22:57

## 2019-01-01 RX ADMIN — Medication 650 MILLIGRAM(S): at 00:23

## 2019-01-01 RX ADMIN — ERTAPENEM SODIUM 120 MILLIGRAM(S): 1 INJECTION, POWDER, LYOPHILIZED, FOR SOLUTION INTRAMUSCULAR; INTRAVENOUS at 14:14

## 2019-01-01 RX ADMIN — Medication 4: at 08:48

## 2019-01-01 RX ADMIN — SENNA PLUS 1 TABLET(S): 8.6 TABLET ORAL at 12:53

## 2019-01-01 RX ADMIN — HEPARIN SODIUM 5000 UNIT(S): 5000 INJECTION INTRAVENOUS; SUBCUTANEOUS at 14:18

## 2019-01-01 RX ADMIN — CLOPIDOGREL BISULFATE 75 MILLIGRAM(S): 75 TABLET, FILM COATED ORAL at 11:49

## 2019-01-01 RX ADMIN — CLOPIDOGREL BISULFATE 75 MILLIGRAM(S): 75 TABLET, FILM COATED ORAL at 14:18

## 2019-01-01 RX ADMIN — Medication 4: at 08:04

## 2019-01-01 RX ADMIN — CELECOXIB 200 MILLIGRAM(S): 200 CAPSULE ORAL at 05:39

## 2019-01-01 RX ADMIN — GABAPENTIN 300 MILLIGRAM(S): 400 CAPSULE ORAL at 21:21

## 2019-01-01 RX ADMIN — Medication 250 MILLIGRAM(S): at 05:27

## 2019-01-01 RX ADMIN — ATORVASTATIN CALCIUM 40 MILLIGRAM(S): 80 TABLET, FILM COATED ORAL at 12:04

## 2019-01-01 RX ADMIN — Medication 81 MILLIGRAM(S): at 12:13

## 2019-01-01 RX ADMIN — Medication 650 MILLIGRAM(S): at 12:50

## 2019-01-01 RX ADMIN — Medication 100 MILLIGRAM(S): at 17:02

## 2019-01-01 RX ADMIN — Medication 650 MILLIGRAM(S): at 18:31

## 2019-01-01 RX ADMIN — Medication 2: at 08:29

## 2019-01-01 RX ADMIN — Medication 650 MILLIGRAM(S): at 07:35

## 2019-01-01 RX ADMIN — Medication 650 MILLIGRAM(S): at 18:05

## 2019-01-01 RX ADMIN — CEFTRIAXONE 100 GRAM(S): 500 INJECTION, POWDER, FOR SOLUTION INTRAMUSCULAR; INTRAVENOUS at 11:46

## 2019-01-01 RX ADMIN — Medication 325 MILLIGRAM(S): at 12:53

## 2019-01-01 RX ADMIN — Medication 100 MILLIGRAM(S): at 18:27

## 2019-01-01 RX ADMIN — HEPARIN SODIUM 5000 UNIT(S): 5000 INJECTION INTRAVENOUS; SUBCUTANEOUS at 05:39

## 2019-01-01 RX ADMIN — Medication 20 MILLIGRAM(S): at 13:10

## 2019-01-01 RX ADMIN — Medication 250 MILLIGRAM(S): at 18:12

## 2019-01-01 RX ADMIN — Medication 650 MILLIGRAM(S): at 17:11

## 2019-01-01 RX ADMIN — ATORVASTATIN CALCIUM 40 MILLIGRAM(S): 80 TABLET, FILM COATED ORAL at 20:08

## 2019-01-01 RX ADMIN — MEROPENEM 100 MILLIGRAM(S): 1 INJECTION INTRAVENOUS at 23:54

## 2019-01-01 RX ADMIN — Medication 250 MILLIGRAM(S): at 05:28

## 2019-01-01 RX ADMIN — Medication 4: at 17:53

## 2019-01-01 RX ADMIN — SODIUM CHLORIDE 1000 MILLILITER(S): 9 INJECTION INTRAMUSCULAR; INTRAVENOUS; SUBCUTANEOUS at 17:00

## 2019-01-01 RX ADMIN — Medication 2: at 12:05

## 2019-01-01 RX ADMIN — Medication 100 MILLIGRAM(S): at 17:57

## 2019-01-01 RX ADMIN — CLOPIDOGREL BISULFATE 75 MILLIGRAM(S): 75 TABLET, FILM COATED ORAL at 11:45

## 2019-01-01 RX ADMIN — CELECOXIB 200 MILLIGRAM(S): 200 CAPSULE ORAL at 05:59

## 2019-01-01 RX ADMIN — ATORVASTATIN CALCIUM 40 MILLIGRAM(S): 80 TABLET, FILM COATED ORAL at 23:13

## 2019-01-01 RX ADMIN — CARVEDILOL PHOSPHATE 12.5 MILLIGRAM(S): 80 CAPSULE, EXTENDED RELEASE ORAL at 18:00

## 2019-01-01 RX ADMIN — SODIUM CHLORIDE 100 MILLILITER(S): 9 INJECTION, SOLUTION INTRAVENOUS at 07:25

## 2019-01-01 RX ADMIN — CARVEDILOL PHOSPHATE 12.5 MILLIGRAM(S): 80 CAPSULE, EXTENDED RELEASE ORAL at 18:10

## 2019-01-01 RX ADMIN — Medication 81 MILLIGRAM(S): at 12:05

## 2019-01-01 RX ADMIN — Medication 2: at 08:01

## 2019-01-01 RX ADMIN — Medication 650 MILLIGRAM(S): at 17:24

## 2019-01-01 RX ADMIN — HEPARIN SODIUM 5000 UNIT(S): 5000 INJECTION INTRAVENOUS; SUBCUTANEOUS at 23:46

## 2019-01-01 RX ADMIN — Medication 250 MILLIGRAM(S): at 16:55

## 2019-01-01 RX ADMIN — METHOCARBAMOL 750 MILLIGRAM(S): 500 TABLET, FILM COATED ORAL at 23:42

## 2019-01-01 RX ADMIN — Medication 250 MILLIGRAM(S): at 17:17

## 2019-01-01 RX ADMIN — CLOPIDOGREL BISULFATE 75 MILLIGRAM(S): 75 TABLET, FILM COATED ORAL at 12:50

## 2019-01-01 RX ADMIN — CLOPIDOGREL BISULFATE 75 MILLIGRAM(S): 75 TABLET, FILM COATED ORAL at 13:33

## 2019-01-01 RX ADMIN — Medication 100 MILLIGRAM(S): at 05:28

## 2019-01-01 RX ADMIN — Medication 100 MILLIGRAM(S): at 11:51

## 2019-01-01 RX ADMIN — CARVEDILOL PHOSPHATE 12.5 MILLIGRAM(S): 80 CAPSULE, EXTENDED RELEASE ORAL at 05:25

## 2019-01-01 RX ADMIN — CEFTRIAXONE 100 GRAM(S): 500 INJECTION, POWDER, FOR SOLUTION INTRAMUSCULAR; INTRAVENOUS at 13:03

## 2019-01-01 RX ADMIN — CELECOXIB 200 MILLIGRAM(S): 200 CAPSULE ORAL at 23:01

## 2019-01-01 RX ADMIN — INSULIN GLARGINE 20 UNIT(S): 100 INJECTION, SOLUTION SUBCUTANEOUS at 20:51

## 2019-01-01 RX ADMIN — Medication 2: at 08:10

## 2019-01-01 RX ADMIN — CARVEDILOL PHOSPHATE 12.5 MILLIGRAM(S): 80 CAPSULE, EXTENDED RELEASE ORAL at 06:09

## 2019-01-01 RX ADMIN — Medication 250 MILLIGRAM(S): at 05:40

## 2019-01-01 RX ADMIN — CELECOXIB 200 MILLIGRAM(S): 200 CAPSULE ORAL at 06:03

## 2019-01-01 RX ADMIN — Medication 650 MILLIGRAM(S): at 18:30

## 2019-01-01 RX ADMIN — LIDOCAINE 2 PATCH: 4 CREAM TOPICAL at 06:56

## 2019-01-01 RX ADMIN — SODIUM CHLORIDE 100 MILLILITER(S): 9 INJECTION INTRAMUSCULAR; INTRAVENOUS; SUBCUTANEOUS at 02:08

## 2019-01-01 RX ADMIN — Medication 20 MILLIGRAM(S): at 21:17

## 2019-01-01 RX ADMIN — Medication 81 MILLIGRAM(S): at 12:04

## 2019-01-01 RX ADMIN — Medication 650 MILLIGRAM(S): at 23:01

## 2019-01-01 RX ADMIN — SERTRALINE 100 MILLIGRAM(S): 25 TABLET, FILM COATED ORAL at 12:05

## 2019-01-01 RX ADMIN — SODIUM CHLORIDE 100 MILLILITER(S): 9 INJECTION, SOLUTION INTRAVENOUS at 03:14

## 2019-01-01 RX ADMIN — ERTAPENEM SODIUM 120 MILLIGRAM(S): 1 INJECTION, POWDER, LYOPHILIZED, FOR SOLUTION INTRAMUSCULAR; INTRAVENOUS at 13:28

## 2019-01-01 RX ADMIN — Medication 81 MILLIGRAM(S): at 11:21

## 2019-01-01 RX ADMIN — Medication 100 MILLIGRAM(S): at 05:31

## 2019-01-01 RX ADMIN — HEPARIN SODIUM 5000 UNIT(S): 5000 INJECTION INTRAVENOUS; SUBCUTANEOUS at 13:10

## 2019-01-01 RX ADMIN — Medication 100 MILLIGRAM(S): at 15:41

## 2019-01-01 RX ADMIN — Medication 4: at 17:38

## 2019-01-01 RX ADMIN — CLOPIDOGREL BISULFATE 75 MILLIGRAM(S): 75 TABLET, FILM COATED ORAL at 11:51

## 2019-01-01 RX ADMIN — HEPARIN SODIUM 5000 UNIT(S): 5000 INJECTION INTRAVENOUS; SUBCUTANEOUS at 17:17

## 2019-01-01 RX ADMIN — LIDOCAINE 2 PATCH: 4 CREAM TOPICAL at 19:53

## 2019-01-01 RX ADMIN — Medication 6: at 22:47

## 2019-01-01 RX ADMIN — Medication 650 MILLIGRAM(S): at 05:44

## 2019-01-01 RX ADMIN — MUPIROCIN 1 APPLICATION(S): 20 OINTMENT TOPICAL at 18:27

## 2019-01-01 RX ADMIN — Medication 100 MILLIGRAM(S): at 12:53

## 2019-01-01 RX ADMIN — SODIUM CHLORIDE 80 MILLILITER(S): 9 INJECTION, SOLUTION INTRAVENOUS at 22:14

## 2019-01-01 RX ADMIN — Medication 5 UNIT(S): at 14:24

## 2019-01-01 RX ADMIN — METHOCARBAMOL 750 MILLIGRAM(S): 500 TABLET, FILM COATED ORAL at 18:09

## 2019-01-01 RX ADMIN — Medication 81 MILLIGRAM(S): at 11:12

## 2019-01-01 RX ADMIN — Medication 110 MILLIGRAM(S): at 21:07

## 2019-01-01 RX ADMIN — Medication 250 MILLIGRAM(S): at 05:31

## 2019-01-01 RX ADMIN — CHLORHEXIDINE GLUCONATE 1 APPLICATION(S): 213 SOLUTION TOPICAL at 12:51

## 2019-01-01 RX ADMIN — HEPARIN SODIUM 5000 UNIT(S): 5000 INJECTION INTRAVENOUS; SUBCUTANEOUS at 18:26

## 2019-01-01 RX ADMIN — ATORVASTATIN CALCIUM 40 MILLIGRAM(S): 80 TABLET, FILM COATED ORAL at 21:33

## 2019-01-01 RX ADMIN — Medication 325 MILLIGRAM(S): at 12:05

## 2019-01-01 RX ADMIN — Medication 250 MILLIGRAM(S): at 07:13

## 2019-01-01 RX ADMIN — HEPARIN SODIUM 5000 UNIT(S): 5000 INJECTION INTRAVENOUS; SUBCUTANEOUS at 05:26

## 2019-01-01 RX ADMIN — HEPARIN SODIUM 5000 UNIT(S): 5000 INJECTION INTRAVENOUS; SUBCUTANEOUS at 16:44

## 2019-01-01 RX ADMIN — SERTRALINE 100 MILLIGRAM(S): 25 TABLET, FILM COATED ORAL at 14:19

## 2019-01-01 RX ADMIN — Medication 81 MILLIGRAM(S): at 11:33

## 2019-01-01 RX ADMIN — Medication 250 MILLIGRAM(S): at 06:09

## 2019-01-01 RX ADMIN — Medication 650 MILLIGRAM(S): at 06:09

## 2019-01-01 RX ADMIN — METHOCARBAMOL 750 MILLIGRAM(S): 500 TABLET, FILM COATED ORAL at 13:32

## 2019-01-01 RX ADMIN — HEPARIN SODIUM 5000 UNIT(S): 5000 INJECTION INTRAVENOUS; SUBCUTANEOUS at 12:52

## 2019-01-01 RX ADMIN — Medication 25 MILLIGRAM(S): at 17:26

## 2019-01-01 RX ADMIN — ERTAPENEM SODIUM 120 MILLIGRAM(S): 1 INJECTION, POWDER, LYOPHILIZED, FOR SOLUTION INTRAMUSCULAR; INTRAVENOUS at 13:34

## 2019-01-01 RX ADMIN — AMLODIPINE BESYLATE 10 MILLIGRAM(S): 2.5 TABLET ORAL at 05:58

## 2019-01-01 RX ADMIN — CELECOXIB 200 MILLIGRAM(S): 200 CAPSULE ORAL at 20:08

## 2019-01-01 RX ADMIN — Medication 650 MILLIGRAM(S): at 14:18

## 2019-01-01 RX ADMIN — Medication 650 MILLIGRAM(S): at 23:48

## 2019-01-01 RX ADMIN — Medication 650 MILLIGRAM(S): at 00:45

## 2019-01-01 RX ADMIN — AMLODIPINE BESYLATE 5 MILLIGRAM(S): 2.5 TABLET ORAL at 06:58

## 2019-01-01 RX ADMIN — HEPARIN SODIUM 5000 UNIT(S): 5000 INJECTION INTRAVENOUS; SUBCUTANEOUS at 06:20

## 2019-01-01 RX ADMIN — Medication 40 MILLIGRAM(S): at 15:40

## 2019-01-01 RX ADMIN — Medication 50 MILLIGRAM(S): at 13:09

## 2019-01-01 RX ADMIN — HEPARIN SODIUM 5000 UNIT(S): 5000 INJECTION INTRAVENOUS; SUBCUTANEOUS at 15:45

## 2019-01-01 RX ADMIN — CARVEDILOL PHOSPHATE 12.5 MILLIGRAM(S): 80 CAPSULE, EXTENDED RELEASE ORAL at 17:38

## 2019-01-01 RX ADMIN — Medication 81 MILLIGRAM(S): at 12:56

## 2019-01-01 RX ADMIN — AMLODIPINE BESYLATE 5 MILLIGRAM(S): 2.5 TABLET ORAL at 05:00

## 2019-01-01 RX ADMIN — Medication 40 MILLIGRAM(S): at 13:41

## 2019-01-01 RX ADMIN — Medication 50 MILLIGRAM(S): at 05:28

## 2019-01-01 RX ADMIN — Medication 650 MILLIGRAM(S): at 12:04

## 2019-01-01 RX ADMIN — CARVEDILOL PHOSPHATE 12.5 MILLIGRAM(S): 80 CAPSULE, EXTENDED RELEASE ORAL at 05:28

## 2019-01-01 RX ADMIN — CHLORHEXIDINE GLUCONATE 1 APPLICATION(S): 213 SOLUTION TOPICAL at 12:06

## 2019-01-01 RX ADMIN — CELECOXIB 200 MILLIGRAM(S): 200 CAPSULE ORAL at 17:31

## 2019-01-01 RX ADMIN — Medication 20 MILLIGRAM(S): at 15:44

## 2019-01-01 RX ADMIN — Medication 2: at 16:19

## 2019-01-01 RX ADMIN — HEPARIN SODIUM 5000 UNIT(S): 5000 INJECTION INTRAVENOUS; SUBCUTANEOUS at 06:58

## 2019-01-01 RX ADMIN — HEPARIN SODIUM 5000 UNIT(S): 5000 INJECTION INTRAVENOUS; SUBCUTANEOUS at 21:17

## 2019-01-01 RX ADMIN — SODIUM CHLORIDE 100 MILLILITER(S): 9 INJECTION, SOLUTION INTRAVENOUS at 11:18

## 2019-01-01 RX ADMIN — Medication 25 MILLIGRAM(S): at 05:03

## 2019-01-01 RX ADMIN — Medication 8: at 18:02

## 2019-01-01 RX ADMIN — CHLORHEXIDINE GLUCONATE 1 APPLICATION(S): 213 SOLUTION TOPICAL at 05:28

## 2019-01-01 RX ADMIN — Medication 650 MILLIGRAM(S): at 00:17

## 2019-01-01 RX ADMIN — Medication 325 MILLIGRAM(S): at 15:42

## 2019-01-01 RX ADMIN — CEFTRIAXONE 100 GRAM(S): 500 INJECTION, POWDER, FOR SOLUTION INTRAMUSCULAR; INTRAVENOUS at 11:12

## 2019-01-01 RX ADMIN — Medication 250 MILLIGRAM(S): at 05:00

## 2019-01-01 RX ADMIN — CLOPIDOGREL BISULFATE 75 MILLIGRAM(S): 75 TABLET, FILM COATED ORAL at 15:41

## 2019-01-01 RX ADMIN — GABAPENTIN 300 MILLIGRAM(S): 400 CAPSULE ORAL at 21:15

## 2019-01-01 RX ADMIN — Medication 5: at 11:21

## 2019-01-01 RX ADMIN — Medication 100 MILLIGRAM(S): at 05:57

## 2019-01-01 RX ADMIN — Medication 4: at 18:26

## 2019-01-01 RX ADMIN — Medication 2: at 12:26

## 2019-01-01 RX ADMIN — Medication 2: at 13:33

## 2019-01-01 RX ADMIN — Medication 650 MILLIGRAM(S): at 23:17

## 2019-01-01 RX ADMIN — Medication 325 MILLIGRAM(S): at 12:44

## 2019-01-01 RX ADMIN — SODIUM CHLORIDE 80 MILLILITER(S): 9 INJECTION, SOLUTION INTRAVENOUS at 11:48

## 2019-01-01 RX ADMIN — Medication 650 MILLIGRAM(S): at 05:58

## 2019-01-01 RX ADMIN — MUPIROCIN 1 APPLICATION(S): 20 OINTMENT TOPICAL at 17:08

## 2019-01-01 RX ADMIN — SODIUM CHLORIDE 83 MILLILITER(S): 9 INJECTION INTRAMUSCULAR; INTRAVENOUS; SUBCUTANEOUS at 07:58

## 2019-01-01 RX ADMIN — Medication 250 MILLIGRAM(S): at 20:08

## 2019-01-01 RX ADMIN — Medication 650 MILLIGRAM(S): at 03:43

## 2019-01-01 RX ADMIN — SENNA PLUS 1 TABLET(S): 8.6 TABLET ORAL at 15:45

## 2019-01-01 RX ADMIN — CLOPIDOGREL BISULFATE 75 MILLIGRAM(S): 75 TABLET, FILM COATED ORAL at 12:04

## 2019-01-01 RX ADMIN — CLOPIDOGREL BISULFATE 75 MILLIGRAM(S): 75 TABLET, FILM COATED ORAL at 16:54

## 2019-01-01 RX ADMIN — INSULIN GLARGINE 20 UNIT(S): 100 INJECTION, SOLUTION SUBCUTANEOUS at 23:53

## 2019-01-01 RX ADMIN — Medication 325 MILLIGRAM(S): at 14:19

## 2019-01-01 RX ADMIN — Medication 1 APPLICATION(S): at 11:58

## 2019-01-01 RX ADMIN — Medication 25 MILLIGRAM(S): at 06:09

## 2019-01-01 RX ADMIN — CELECOXIB 200 MILLIGRAM(S): 200 CAPSULE ORAL at 07:35

## 2019-01-01 RX ADMIN — Medication 650 MILLIGRAM(S): at 13:32

## 2019-01-01 RX ADMIN — Medication 250 MILLIGRAM(S): at 05:59

## 2019-01-01 RX ADMIN — CARVEDILOL PHOSPHATE 12.5 MILLIGRAM(S): 80 CAPSULE, EXTENDED RELEASE ORAL at 17:03

## 2019-01-01 RX ADMIN — HEPARIN SODIUM 5000 UNIT(S): 5000 INJECTION INTRAVENOUS; SUBCUTANEOUS at 05:01

## 2019-01-01 RX ADMIN — Medication 25 MILLIGRAM(S): at 13:54

## 2019-01-01 RX ADMIN — ISOSORBIDE MONONITRATE 20 MILLIGRAM(S): 60 TABLET, EXTENDED RELEASE ORAL at 16:49

## 2019-01-01 RX ADMIN — CHLORHEXIDINE GLUCONATE 1 APPLICATION(S): 213 SOLUTION TOPICAL at 12:27

## 2019-01-01 RX ADMIN — AMLODIPINE BESYLATE 10 MILLIGRAM(S): 2.5 TABLET ORAL at 13:32

## 2019-01-01 RX ADMIN — CARVEDILOL PHOSPHATE 12.5 MILLIGRAM(S): 80 CAPSULE, EXTENDED RELEASE ORAL at 07:24

## 2019-01-01 RX ADMIN — GABAPENTIN 300 MILLIGRAM(S): 400 CAPSULE ORAL at 12:53

## 2019-01-01 RX ADMIN — Medication 2 UNIT(S): at 22:53

## 2019-01-01 RX ADMIN — Medication 50 MILLIGRAM(S): at 22:45

## 2019-01-01 RX ADMIN — ISOSORBIDE MONONITRATE 20 MILLIGRAM(S): 60 TABLET, EXTENDED RELEASE ORAL at 11:45

## 2019-01-01 RX ADMIN — HEPARIN SODIUM 5000 UNIT(S): 5000 INJECTION INTRAVENOUS; SUBCUTANEOUS at 13:34

## 2019-01-01 RX ADMIN — HEPARIN SODIUM 5000 UNIT(S): 5000 INJECTION INTRAVENOUS; SUBCUTANEOUS at 06:10

## 2019-01-01 RX ADMIN — Medication 20 MILLIGRAM(S): at 06:09

## 2019-01-01 RX ADMIN — CARVEDILOL PHOSPHATE 12.5 MILLIGRAM(S): 80 CAPSULE, EXTENDED RELEASE ORAL at 05:56

## 2019-01-01 RX ADMIN — LIDOCAINE 2 PATCH: 4 CREAM TOPICAL at 17:30

## 2019-01-01 RX ADMIN — CARVEDILOL PHOSPHATE 12.5 MILLIGRAM(S): 80 CAPSULE, EXTENDED RELEASE ORAL at 17:11

## 2019-01-01 RX ADMIN — CARVEDILOL PHOSPHATE 12.5 MILLIGRAM(S): 80 CAPSULE, EXTENDED RELEASE ORAL at 06:58

## 2019-01-01 RX ADMIN — CLOPIDOGREL BISULFATE 75 MILLIGRAM(S): 75 TABLET, FILM COATED ORAL at 12:44

## 2019-01-01 RX ADMIN — Medication 5 MILLIGRAM(S): at 17:24

## 2019-01-01 RX ADMIN — SODIUM CHLORIDE 75 MILLILITER(S): 9 INJECTION INTRAMUSCULAR; INTRAVENOUS; SUBCUTANEOUS at 05:45

## 2019-01-01 RX ADMIN — SODIUM CHLORIDE 83 MILLILITER(S): 9 INJECTION INTRAMUSCULAR; INTRAVENOUS; SUBCUTANEOUS at 22:31

## 2019-01-01 RX ADMIN — AMLODIPINE BESYLATE 10 MILLIGRAM(S): 2.5 TABLET ORAL at 17:29

## 2019-01-01 RX ADMIN — Medication 650 MILLIGRAM(S): at 23:41

## 2019-01-01 RX ADMIN — HEPARIN SODIUM 5000 UNIT(S): 5000 INJECTION INTRAVENOUS; SUBCUTANEOUS at 17:32

## 2019-01-01 RX ADMIN — SERTRALINE 100 MILLIGRAM(S): 25 TABLET, FILM COATED ORAL at 13:33

## 2019-01-01 RX ADMIN — CARVEDILOL PHOSPHATE 12.5 MILLIGRAM(S): 80 CAPSULE, EXTENDED RELEASE ORAL at 20:07

## 2019-01-01 RX ADMIN — SENNA PLUS 1 TABLET(S): 8.6 TABLET ORAL at 11:51

## 2019-01-01 RX ADMIN — ISOSORBIDE MONONITRATE 20 MILLIGRAM(S): 60 TABLET, EXTENDED RELEASE ORAL at 11:49

## 2019-01-01 RX ADMIN — HEPARIN SODIUM 5000 UNIT(S): 5000 INJECTION INTRAVENOUS; SUBCUTANEOUS at 06:01

## 2019-01-01 RX ADMIN — HEPARIN SODIUM 5000 UNIT(S): 5000 INJECTION INTRAVENOUS; SUBCUTANEOUS at 05:28

## 2019-01-01 RX ADMIN — Medication 5: at 11:07

## 2019-01-01 RX ADMIN — LIDOCAINE 2 PATCH: 4 CREAM TOPICAL at 05:59

## 2019-01-01 RX ADMIN — Medication 650 MILLIGRAM(S): at 06:16

## 2019-01-01 RX ADMIN — Medication 250 MILLIGRAM(S): at 17:03

## 2019-01-01 RX ADMIN — Medication 2: at 16:45

## 2019-01-01 RX ADMIN — Medication 81 MILLIGRAM(S): at 14:18

## 2019-01-01 RX ADMIN — Medication 75 MILLIGRAM(S): at 13:02

## 2019-01-01 RX ADMIN — ATORVASTATIN CALCIUM 40 MILLIGRAM(S): 80 TABLET, FILM COATED ORAL at 22:15

## 2019-01-01 RX ADMIN — Medication 325 MILLIGRAM(S): at 12:51

## 2019-01-01 RX ADMIN — CHLORHEXIDINE GLUCONATE 1 APPLICATION(S): 213 SOLUTION TOPICAL at 12:53

## 2019-01-01 RX ADMIN — CARVEDILOL PHOSPHATE 12.5 MILLIGRAM(S): 80 CAPSULE, EXTENDED RELEASE ORAL at 17:07

## 2019-01-01 RX ADMIN — LIDOCAINE 2 PATCH: 4 CREAM TOPICAL at 06:03

## 2019-01-01 RX ADMIN — LIDOCAINE 2 PATCH: 4 CREAM TOPICAL at 17:27

## 2019-01-01 RX ADMIN — SERTRALINE 100 MILLIGRAM(S): 25 TABLET, FILM COATED ORAL at 11:19

## 2019-01-01 RX ADMIN — Medication 10: at 12:03

## 2019-01-01 RX ADMIN — HEPARIN SODIUM 5000 UNIT(S): 5000 INJECTION INTRAVENOUS; SUBCUTANEOUS at 05:44

## 2019-01-01 RX ADMIN — Medication 81 MILLIGRAM(S): at 13:33

## 2019-01-01 RX ADMIN — Medication 250 MILLIGRAM(S): at 17:57

## 2019-01-01 RX ADMIN — Medication 12: at 11:49

## 2019-01-01 RX ADMIN — Medication 250 MILLIGRAM(S): at 17:24

## 2019-01-01 RX ADMIN — Medication 650 MILLIGRAM(S): at 20:27

## 2019-01-01 RX ADMIN — SENNA PLUS 1 TABLET(S): 8.6 TABLET ORAL at 13:33

## 2019-01-01 RX ADMIN — CELECOXIB 200 MILLIGRAM(S): 200 CAPSULE ORAL at 18:10

## 2019-01-01 RX ADMIN — METHOCARBAMOL 750 MILLIGRAM(S): 500 TABLET, FILM COATED ORAL at 20:09

## 2019-01-01 RX ADMIN — CHLORHEXIDINE GLUCONATE 1 APPLICATION(S): 213 SOLUTION TOPICAL at 15:54

## 2019-01-01 RX ADMIN — Medication 20 MILLIGRAM(S): at 13:34

## 2019-01-01 RX ADMIN — HEPARIN SODIUM 5000 UNIT(S): 5000 INJECTION INTRAVENOUS; SUBCUTANEOUS at 05:31

## 2019-01-01 RX ADMIN — HEPARIN SODIUM 5000 UNIT(S): 5000 INJECTION INTRAVENOUS; SUBCUTANEOUS at 13:01

## 2019-01-01 RX ADMIN — Medication 2: at 18:09

## 2019-01-01 RX ADMIN — Medication 4: at 08:57

## 2019-01-01 RX ADMIN — MUPIROCIN 1 APPLICATION(S): 20 OINTMENT TOPICAL at 05:56

## 2019-01-01 RX ADMIN — GABAPENTIN 300 MILLIGRAM(S): 400 CAPSULE ORAL at 16:44

## 2019-01-01 RX ADMIN — Medication 3: at 08:04

## 2019-01-01 RX ADMIN — Medication 100 MILLIGRAM(S): at 13:32

## 2019-01-01 RX ADMIN — CARVEDILOL PHOSPHATE 12.5 MILLIGRAM(S): 80 CAPSULE, EXTENDED RELEASE ORAL at 05:44

## 2019-01-01 RX ADMIN — ISOSORBIDE MONONITRATE 20 MILLIGRAM(S): 60 TABLET, EXTENDED RELEASE ORAL at 11:19

## 2019-01-01 RX ADMIN — Medication 650 MILLIGRAM(S): at 00:25

## 2019-01-01 RX ADMIN — AMLODIPINE BESYLATE 10 MILLIGRAM(S): 2.5 TABLET ORAL at 05:27

## 2019-01-01 RX ADMIN — Medication 250 MILLIGRAM(S): at 05:03

## 2019-01-01 RX ADMIN — SODIUM CHLORIDE 80 MILLILITER(S): 9 INJECTION, SOLUTION INTRAVENOUS at 05:28

## 2019-01-01 RX ADMIN — HEPARIN SODIUM 5000 UNIT(S): 5000 INJECTION INTRAVENOUS; SUBCUTANEOUS at 07:13

## 2019-01-01 RX ADMIN — METHOCARBAMOL 750 MILLIGRAM(S): 500 TABLET, FILM COATED ORAL at 05:26

## 2019-01-01 RX ADMIN — Medication 81 MILLIGRAM(S): at 11:51

## 2019-01-01 RX ADMIN — CEFTRIAXONE 100 GRAM(S): 500 INJECTION, POWDER, FOR SOLUTION INTRAMUSCULAR; INTRAVENOUS at 11:41

## 2019-01-01 RX ADMIN — Medication 25 MILLIGRAM(S): at 21:17

## 2019-01-01 RX ADMIN — GABAPENTIN 300 MILLIGRAM(S): 400 CAPSULE ORAL at 13:34

## 2019-01-01 RX ADMIN — CARVEDILOL PHOSPHATE 12.5 MILLIGRAM(S): 80 CAPSULE, EXTENDED RELEASE ORAL at 05:58

## 2019-01-01 RX ADMIN — LIDOCAINE 2 PATCH: 4 CREAM TOPICAL at 19:00

## 2019-01-01 RX ADMIN — ATORVASTATIN CALCIUM 40 MILLIGRAM(S): 80 TABLET, FILM COATED ORAL at 14:18

## 2019-01-01 RX ADMIN — Medication 5: at 11:52

## 2019-01-01 RX ADMIN — Medication 6: at 07:43

## 2019-01-01 RX ADMIN — METHOCARBAMOL 750 MILLIGRAM(S): 500 TABLET, FILM COATED ORAL at 17:32

## 2019-01-01 RX ADMIN — Medication 650 MILLIGRAM(S): at 12:44

## 2019-01-01 RX ADMIN — Medication 110 MILLIGRAM(S): at 07:51

## 2019-01-01 RX ADMIN — Medication 650 MILLIGRAM(S): at 13:55

## 2019-01-01 RX ADMIN — Medication 81 MILLIGRAM(S): at 12:53

## 2019-01-01 RX ADMIN — CLOPIDOGREL BISULFATE 75 MILLIGRAM(S): 75 TABLET, FILM COATED ORAL at 12:52

## 2019-01-01 RX ADMIN — GABAPENTIN 300 MILLIGRAM(S): 400 CAPSULE ORAL at 13:01

## 2019-01-01 RX ADMIN — Medication 25 MILLIGRAM(S): at 17:05

## 2019-01-01 RX ADMIN — CELECOXIB 200 MILLIGRAM(S): 200 CAPSULE ORAL at 18:31

## 2019-01-01 RX ADMIN — SODIUM CHLORIDE 500 MILLILITER(S): 9 INJECTION INTRAMUSCULAR; INTRAVENOUS; SUBCUTANEOUS at 04:08

## 2019-01-01 RX ADMIN — LIDOCAINE 2 PATCH: 4 CREAM TOPICAL at 17:03

## 2019-01-01 RX ADMIN — Medication 90 MEQ/KG/HR: at 15:43

## 2019-01-01 RX ADMIN — Medication 4: at 11:33

## 2019-01-01 RX ADMIN — Medication 325 MILLIGRAM(S): at 13:32

## 2019-01-01 RX ADMIN — Medication 250 MILLIGRAM(S): at 18:05

## 2019-01-01 RX ADMIN — Medication 81 MILLIGRAM(S): at 12:44

## 2019-01-01 RX ADMIN — Medication 250 MILLIGRAM(S): at 18:10

## 2019-01-01 RX ADMIN — SODIUM CHLORIDE 100 MILLILITER(S): 9 INJECTION, SOLUTION INTRAVENOUS at 13:07

## 2019-01-01 RX ADMIN — LIDOCAINE 2 PATCH: 4 CREAM TOPICAL at 20:11

## 2019-01-01 RX ADMIN — Medication 250 MILLIGRAM(S): at 17:11

## 2019-01-01 RX ADMIN — SERTRALINE 100 MILLIGRAM(S): 25 TABLET, FILM COATED ORAL at 12:50

## 2019-01-01 RX ADMIN — LIDOCAINE 2 PATCH: 4 CREAM TOPICAL at 16:46

## 2019-01-01 RX ADMIN — ISOSORBIDE MONONITRATE 20 MILLIGRAM(S): 60 TABLET, EXTENDED RELEASE ORAL at 11:13

## 2019-01-01 RX ADMIN — Medication 75 MILLIGRAM(S): at 05:57

## 2019-01-01 RX ADMIN — METHOCARBAMOL 750 MILLIGRAM(S): 500 TABLET, FILM COATED ORAL at 00:45

## 2019-01-01 RX ADMIN — GABAPENTIN 300 MILLIGRAM(S): 400 CAPSULE ORAL at 22:57

## 2019-01-01 RX ADMIN — SERTRALINE 100 MILLIGRAM(S): 25 TABLET, FILM COATED ORAL at 16:49

## 2019-01-01 RX ADMIN — CARVEDILOL PHOSPHATE 12.5 MILLIGRAM(S): 80 CAPSULE, EXTENDED RELEASE ORAL at 18:18

## 2019-01-01 RX ADMIN — Medication 250 MILLIGRAM(S): at 18:26

## 2019-01-01 RX ADMIN — Medication 4: at 17:22

## 2019-01-01 RX ADMIN — Medication 15 GRAM(S): at 08:21

## 2019-01-01 RX ADMIN — Medication 650 MILLIGRAM(S): at 13:50

## 2019-01-01 RX ADMIN — Medication 650 MILLIGRAM(S): at 15:30

## 2019-01-01 RX ADMIN — Medication 8: at 08:29

## 2019-01-01 RX ADMIN — Medication 250 MILLIGRAM(S): at 17:38

## 2019-01-01 RX ADMIN — AMLODIPINE BESYLATE 5 MILLIGRAM(S): 2.5 TABLET ORAL at 13:34

## 2019-01-01 RX ADMIN — CARVEDILOL PHOSPHATE 12.5 MILLIGRAM(S): 80 CAPSULE, EXTENDED RELEASE ORAL at 16:48

## 2019-01-01 RX ADMIN — METHOCARBAMOL 750 MILLIGRAM(S): 500 TABLET, FILM COATED ORAL at 06:15

## 2019-01-01 RX ADMIN — Medication 25 MILLIGRAM(S): at 13:34

## 2019-01-01 RX ADMIN — CARVEDILOL PHOSPHATE 12.5 MILLIGRAM(S): 80 CAPSULE, EXTENDED RELEASE ORAL at 05:03

## 2019-01-01 RX ADMIN — Medication 650 MILLIGRAM(S): at 13:53

## 2019-01-01 RX ADMIN — CHLORHEXIDINE GLUCONATE 1 APPLICATION(S): 213 SOLUTION TOPICAL at 13:09

## 2019-01-01 RX ADMIN — Medication 90 MEQ/KG/HR: at 06:09

## 2019-01-01 RX ADMIN — Medication 2: at 17:16

## 2019-01-01 RX ADMIN — Medication 2: at 12:43

## 2019-01-01 RX ADMIN — GABAPENTIN 300 MILLIGRAM(S): 400 CAPSULE ORAL at 13:32

## 2019-01-01 RX ADMIN — Medication 650 MILLIGRAM(S): at 06:31

## 2019-01-01 RX ADMIN — Medication 12: at 17:15

## 2019-01-01 RX ADMIN — CELECOXIB 200 MILLIGRAM(S): 200 CAPSULE ORAL at 06:16

## 2019-01-01 RX ADMIN — CHLORHEXIDINE GLUCONATE 1 APPLICATION(S): 213 SOLUTION TOPICAL at 05:54

## 2019-01-01 RX ADMIN — Medication 6: at 16:57

## 2019-01-01 RX ADMIN — LIDOCAINE 2 PATCH: 4 CREAM TOPICAL at 18:03

## 2019-01-01 RX ADMIN — Medication 81 MILLIGRAM(S): at 11:49

## 2019-01-01 RX ADMIN — GABAPENTIN 300 MILLIGRAM(S): 400 CAPSULE ORAL at 05:39

## 2019-01-01 RX ADMIN — Medication 25 MILLIGRAM(S): at 21:59

## 2019-01-01 RX ADMIN — Medication 200 GRAM(S): at 02:08

## 2019-01-01 RX ADMIN — GABAPENTIN 300 MILLIGRAM(S): 400 CAPSULE ORAL at 21:46

## 2019-01-01 RX ADMIN — Medication 25 MILLIGRAM(S): at 13:01

## 2019-01-01 RX ADMIN — GABAPENTIN 300 MILLIGRAM(S): 400 CAPSULE ORAL at 15:45

## 2019-01-01 RX ADMIN — MEROPENEM 1000 MILLIGRAM(S): 1 INJECTION INTRAVENOUS at 13:38

## 2019-01-01 RX ADMIN — Medication 250 MILLIGRAM(S): at 05:26

## 2019-01-01 RX ADMIN — Medication 250 MILLIGRAM(S): at 06:58

## 2019-01-01 RX ADMIN — Medication 325 MILLIGRAM(S): at 13:33

## 2019-01-01 RX ADMIN — Medication 2: at 08:06

## 2019-01-01 RX ADMIN — GABAPENTIN 300 MILLIGRAM(S): 400 CAPSULE ORAL at 23:02

## 2019-01-01 RX ADMIN — GABAPENTIN 300 MILLIGRAM(S): 400 CAPSULE ORAL at 23:46

## 2019-01-01 RX ADMIN — Medication 650 MILLIGRAM(S): at 05:39

## 2019-01-01 RX ADMIN — GABAPENTIN 300 MILLIGRAM(S): 400 CAPSULE ORAL at 05:44

## 2019-01-01 RX ADMIN — Medication 300 MILLIGRAM(S): at 21:08

## 2019-01-01 RX ADMIN — Medication 100 MILLIGRAM(S): at 12:05

## 2019-01-01 RX ADMIN — LIDOCAINE 2 PATCH: 4 CREAM TOPICAL at 19:37

## 2019-01-01 RX ADMIN — Medication 650 MILLIGRAM(S): at 06:40

## 2019-01-01 RX ADMIN — SERTRALINE 100 MILLIGRAM(S): 25 TABLET, FILM COATED ORAL at 13:32

## 2019-01-01 RX ADMIN — SODIUM CHLORIDE 75 MILLILITER(S): 9 INJECTION INTRAMUSCULAR; INTRAVENOUS; SUBCUTANEOUS at 10:57

## 2019-01-01 RX ADMIN — Medication 25 MILLIGRAM(S): at 21:46

## 2019-01-01 RX ADMIN — METHOCARBAMOL 750 MILLIGRAM(S): 500 TABLET, FILM COATED ORAL at 12:44

## 2019-01-01 RX ADMIN — SERTRALINE 100 MILLIGRAM(S): 25 TABLET, FILM COATED ORAL at 11:45

## 2019-01-01 RX ADMIN — HEPARIN SODIUM 5000 UNIT(S): 5000 INJECTION INTRAVENOUS; SUBCUTANEOUS at 05:03

## 2019-01-01 RX ADMIN — Medication 650 MILLIGRAM(S): at 11:51

## 2019-01-01 RX ADMIN — GABAPENTIN 300 MILLIGRAM(S): 400 CAPSULE ORAL at 14:19

## 2019-01-01 RX ADMIN — Medication 5: at 08:31

## 2019-01-01 RX ADMIN — HEPARIN SODIUM 5000 UNIT(S): 5000 INJECTION INTRAVENOUS; SUBCUTANEOUS at 05:00

## 2019-01-01 RX ADMIN — Medication 100 MILLIGRAM(S): at 12:43

## 2019-01-01 RX ADMIN — CARVEDILOL PHOSPHATE 12.5 MILLIGRAM(S): 80 CAPSULE, EXTENDED RELEASE ORAL at 18:26

## 2019-01-01 RX ADMIN — Medication 20 MILLIGRAM(S): at 05:26

## 2019-01-01 RX ADMIN — Medication 6: at 17:06

## 2019-01-01 RX ADMIN — SERTRALINE 100 MILLIGRAM(S): 25 TABLET, FILM COATED ORAL at 20:08

## 2019-01-01 RX ADMIN — SODIUM CHLORIDE 100 MILLILITER(S): 9 INJECTION, SOLUTION INTRAVENOUS at 20:08

## 2019-01-01 RX ADMIN — Medication 10 UNIT(S): at 08:29

## 2019-01-01 RX ADMIN — Medication 250 MILLIGRAM(S): at 05:57

## 2019-01-01 RX ADMIN — Medication 2: at 08:19

## 2019-01-01 RX ADMIN — Medication 6: at 17:03

## 2019-01-01 RX ADMIN — SENNA PLUS 1 TABLET(S): 8.6 TABLET ORAL at 12:05

## 2019-01-01 RX ADMIN — Medication 650 MILLIGRAM(S): at 15:42

## 2019-01-01 RX ADMIN — Medication 10 UNIT(S): at 13:11

## 2019-01-01 RX ADMIN — CLOPIDOGREL BISULFATE 75 MILLIGRAM(S): 75 TABLET, FILM COATED ORAL at 14:14

## 2019-01-01 RX ADMIN — Medication 650 MILLIGRAM(S): at 14:00

## 2019-01-01 RX ADMIN — Medication 250 MILLIGRAM(S): at 05:58

## 2019-01-01 RX ADMIN — Medication 650 MILLIGRAM(S): at 06:26

## 2019-01-01 RX ADMIN — CARVEDILOL PHOSPHATE 12.5 MILLIGRAM(S): 80 CAPSULE, EXTENDED RELEASE ORAL at 18:05

## 2019-01-01 RX ADMIN — Medication 650 MILLIGRAM(S): at 17:54

## 2019-01-01 RX ADMIN — Medication 650 MILLIGRAM(S): at 06:30

## 2019-01-01 RX ADMIN — ENOXAPARIN SODIUM 40 MILLIGRAM(S): 100 INJECTION SUBCUTANEOUS at 12:13

## 2019-01-01 RX ADMIN — Medication 5: at 11:43

## 2019-01-01 RX ADMIN — ATORVASTATIN CALCIUM 40 MILLIGRAM(S): 80 TABLET, FILM COATED ORAL at 22:45

## 2019-01-01 RX ADMIN — Medication 650 MILLIGRAM(S): at 05:47

## 2019-01-01 RX ADMIN — Medication 650 MILLIGRAM(S): at 17:31

## 2019-01-01 RX ADMIN — SERTRALINE 100 MILLIGRAM(S): 25 TABLET, FILM COATED ORAL at 11:49

## 2019-01-01 RX ADMIN — CELECOXIB 200 MILLIGRAM(S): 200 CAPSULE ORAL at 05:26

## 2019-01-01 RX ADMIN — SERTRALINE 100 MILLIGRAM(S): 25 TABLET, FILM COATED ORAL at 15:44

## 2019-01-01 RX ADMIN — CLOPIDOGREL BISULFATE 75 MILLIGRAM(S): 75 TABLET, FILM COATED ORAL at 11:33

## 2019-01-01 RX ADMIN — Medication 250 MILLIGRAM(S): at 17:07

## 2019-01-01 RX ADMIN — ATORVASTATIN CALCIUM 40 MILLIGRAM(S): 80 TABLET, FILM COATED ORAL at 15:41

## 2019-01-01 RX ADMIN — Medication 25 MILLIGRAM(S): at 15:42

## 2019-01-01 RX ADMIN — HEPARIN SODIUM 5000 UNIT(S): 5000 INJECTION INTRAVENOUS; SUBCUTANEOUS at 05:27

## 2019-01-01 RX ADMIN — AMLODIPINE BESYLATE 10 MILLIGRAM(S): 2.5 TABLET ORAL at 06:09

## 2019-01-01 RX ADMIN — CLOPIDOGREL BISULFATE 75 MILLIGRAM(S): 75 TABLET, FILM COATED ORAL at 11:12

## 2019-01-01 RX ADMIN — SERTRALINE 100 MILLIGRAM(S): 25 TABLET, FILM COATED ORAL at 11:33

## 2019-01-01 RX ADMIN — HEPARIN SODIUM 5000 UNIT(S): 5000 INJECTION INTRAVENOUS; SUBCUTANEOUS at 17:08

## 2019-01-01 RX ADMIN — AMLODIPINE BESYLATE 10 MILLIGRAM(S): 2.5 TABLET ORAL at 05:44

## 2019-01-01 RX ADMIN — SODIUM CHLORIDE 100 MILLILITER(S): 9 INJECTION, SOLUTION INTRAVENOUS at 16:44

## 2019-01-01 RX ADMIN — Medication 1 APPLICATION(S): at 12:06

## 2019-01-01 RX ADMIN — Medication 650 MILLIGRAM(S): at 18:09

## 2019-01-01 RX ADMIN — Medication 25 MILLIGRAM(S): at 13:10

## 2019-01-01 RX ADMIN — Medication 75 MILLIGRAM(S): at 13:35

## 2019-01-01 RX ADMIN — CEFTRIAXONE 100 GRAM(S): 500 INJECTION, POWDER, FOR SOLUTION INTRAMUSCULAR; INTRAVENOUS at 12:13

## 2019-01-01 RX ADMIN — Medication 25 MILLIGRAM(S): at 23:13

## 2019-01-01 RX ADMIN — METHOCARBAMOL 750 MILLIGRAM(S): 500 TABLET, FILM COATED ORAL at 12:52

## 2019-01-01 RX ADMIN — CARVEDILOL PHOSPHATE 12.5 MILLIGRAM(S): 80 CAPSULE, EXTENDED RELEASE ORAL at 17:31

## 2019-01-01 RX ADMIN — Medication 25 MILLIGRAM(S): at 05:26

## 2019-01-01 RX ADMIN — CARVEDILOL PHOSPHATE 12.5 MILLIGRAM(S): 80 CAPSULE, EXTENDED RELEASE ORAL at 17:32

## 2019-01-01 RX ADMIN — Medication 650 MILLIGRAM(S): at 13:45

## 2019-01-01 RX ADMIN — MUPIROCIN 1 APPLICATION(S): 20 OINTMENT TOPICAL at 05:28

## 2019-01-01 RX ADMIN — LIDOCAINE 2 PATCH: 4 CREAM TOPICAL at 20:27

## 2019-01-01 RX ADMIN — Medication 81 MILLIGRAM(S): at 14:14

## 2019-01-01 RX ADMIN — Medication 650 MILLIGRAM(S): at 20:06

## 2019-01-01 RX ADMIN — Medication 325 MILLIGRAM(S): at 11:51

## 2019-01-01 RX ADMIN — Medication 20 MILLIGRAM(S): at 21:46

## 2019-01-01 RX ADMIN — CLOPIDOGREL BISULFATE 75 MILLIGRAM(S): 75 TABLET, FILM COATED ORAL at 11:19

## 2019-01-01 RX ADMIN — HEPARIN SODIUM 5000 UNIT(S): 5000 INJECTION INTRAVENOUS; SUBCUTANEOUS at 21:21

## 2019-01-01 RX ADMIN — ATORVASTATIN CALCIUM 40 MILLIGRAM(S): 80 TABLET, FILM COATED ORAL at 12:53

## 2019-01-01 RX ADMIN — GABAPENTIN 300 MILLIGRAM(S): 400 CAPSULE ORAL at 21:59

## 2019-01-01 RX ADMIN — Medication 5 UNIT(S): at 23:05

## 2019-01-01 RX ADMIN — CELECOXIB 200 MILLIGRAM(S): 200 CAPSULE ORAL at 20:27

## 2019-01-23 NOTE — PHYSICAL EXAM
[General Appearance - Alert] : alert [Sclera] : the sclera and conjunctiva were normal [PERRL With Normal Accommodation] : pupils were equal in size, round, reactive to light [Extraocular Movements] : extraocular movements were intact [Outer Ear] : the ears and nose were normal in appearance [Oropharynx] : the oropharynx was normal with no thrush [Neck Appearance] : the appearance of the neck was normal [Neck Cervical Mass (___cm)] : no neck mass was observed [Jugular Venous Distention Increased] : there was no jugular-venous distention [Thyroid Diffuse Enlargement] : the thyroid was not enlarged [Auscultation Breath Sounds / Voice Sounds] : lungs were clear to auscultation bilaterally [Heart Rate And Rhythm] : heart rate was normal and rhythm regular [Heart Sounds] : normal S1 and S2 [Heart Sounds Gallop] : no gallops [Bowel Sounds] : normal bowel sounds [Abdomen Soft] : soft [Abdomen Tenderness] : non-tender [] : no hepato-splenomegaly [Abdomen Mass (___ Cm)] : no abdominal mass palpated [Costovertebral Angle Tenderness] : no CVA tenderness [No Palpable Adenopathy] : no palpable adenopathy [Musculoskeletal - Swelling] : no joint swelling [Nail Clubbing] : no clubbing  or cyanosis of the fingernails [Motor Tone] : muscle strength and tone were normal [FreeTextEntry1] : Not oriented

## 2019-01-23 NOTE — ASSESSMENT
[FreeTextEntry1] : Left 1st toe Osteomyelitis, growing pseudomonas R to oral cipro and levaquin\par \par - Will start him on Aztreonam 1gm q8h, his baseline Creat is around 2\par - Needs 6 weeks of treatment\par - Will do CBC, CMP, ESR and CRP weekly\par - Podiatry follow up for possible amputation or wound care, depending to his family's wish. \par - Will see him in 2 weeks. \par - Discussed with NH on the phone  [Treatment Adherence] : treatment adherence [Rx Dose / Side Effects] : Rx dose/side effects [Disclosure of Diagnosis] : disclosure of diagnosis [Anticipatory Guidance] : anticipatory guidance

## 2019-01-23 NOTE — HISTORY OF PRESENT ILLNESS
[FreeTextEntry1] : 67y/o man with PMH of HTN, DM2, CVA and CAD s/p CABG and PPM, was admitted to [facility] in 11/2018\par with high fever and generalized weakness and vomiting for few days.\par Blood cultures with MRSA, source was mid back ulcer since growing the same MRSA.\par ASHELY with thrombus around the lead but no vegetation on valves unclear that if\par it was a lead vegetation or just noninfected fibrin strand that can happen in PPM\par leads frequently, but Since he had MRSA, and the thrombus could get infected he was treated for 6 weeks with Daptomycin (family declined explantation of PPM). \par \par He was discharged to [facility] with cipro as well to complete 6 weeks course along with Daptomycin.\par \par Now he comes for follow up. Has a left toe ulcer and infection. Culture from NH grew pseudomonas R to cipro. and Xray showed destruction of distal phalanx. \par \par He has appt with podiatry in [facility] as well. \par No fever or pain in foot. \par \par Xray of ulcerated L1st toe showed OM, most likely no intervention will be done.aspirin 81 mg oral tablet\par -- 1 tab(s) by mouth once a day\par \par Eliquis 2.5 mg oral tablet\par -- 1 tab(s) by mouth 2 times a day\par \par sertraline 100 mg oral tablet\par -- 1 tab(s) by mouth once a day\par \par insulin glargine\par -- 25 unit(s) subcutaneous once a day (at bedtime)\par \par atorvastatin 20 mg oral tablet\par -- 1 tab(s) by mouth once a day (at bedtime)\par \par metoprolol tartrate 50 mg oral tablet\par -- 1 tab(s) by mouth 2 times a day\par \par docusate sodium 100 mg oral capsule\par -- 1 cap(s) by mouth 3 times a day\par \par DAPTOmycin 500 mg intravenous injection\par -- 550 milligram(s) intravenous every 24 hours\par \par ciprofloxacin 250 mg oral tablet\par -- 1 tab(s) by mouth every 12 hours\par \par hydrALAZINE 50 mg oral tablet\par -- 1 tab(s) by mouth every 8 hours\par

## 2019-02-06 NOTE — HISTORY OF PRESENT ILLNESS
[FreeTextEntry1] : 69y/o man with PMH of HTN, DM2, CVA and CAD s/p CABG and PPM, was admitted to [facility] in 11/2018\par with high fever and generalized weakness and vomiting for few days.\par Blood cultures with MRSA, source was mid back ulcer since growing the same MRSA.\par ASHELY with thrombus around the lead but no vegetation on valves unclear that if\par it was a lead vegetation or just noninfected fibrin strand that can happen in PPM \par leads frequently, but Since he had MRSA, and the thrombus could get infected he was treated for 6 weeks with Daptomycin (family declined explantation of PPM). \par \par He was discharged to [facility] with cipro as well to complete 6 weeks course along with Daptomycin.\par \par Now he comes for follow up. Has a left toe ulcer and infection. Culture from NH grew pseudomonas R to cipro. and Xray showed destruction of distal phalanx. I did culture 2 weeks ago grew the same pseudomonas. So aztreonam 1gm q8h was started 2 weeks ago. Wound is dry now with a significant improvement. Will need 4 more weeks. \par He is going home on 2/20 so we will arrange for Home infusion for 2 more weeks by his wife. \par \par

## 2019-02-06 NOTE — PHYSICAL EXAM
[General Appearance - Alert] : alert [Sclera] : the sclera and conjunctiva were normal [PERRL With Normal Accommodation] : pupils were equal in size, round, reactive to light [Extraocular Movements] : extraocular movements were intact [Outer Ear] : the ears and nose were normal in appearance [Oropharynx] : the oropharynx was normal with no thrush [Neck Appearance] : the appearance of the neck was normal [Neck Cervical Mass (___cm)] : no neck mass was observed [Jugular Venous Distention Increased] : there was no jugular-venous distention [Thyroid Diffuse Enlargement] : the thyroid was not enlarged [Auscultation Breath Sounds / Voice Sounds] : lungs were clear to auscultation bilaterally [Heart Rate And Rhythm] : heart rate was normal and rhythm regular [Heart Sounds] : normal S1 and S2 [Heart Sounds Gallop] : no gallops [Full Pulse] : the pedal pulses are present [Edema] : there was no peripheral edema [Bowel Sounds] : normal bowel sounds [Abdomen Soft] : soft [Abdomen Tenderness] : non-tender [] : no hepato-splenomegaly [Abdomen Mass (___ Cm)] : no abdominal mass palpated [Costovertebral Angle Tenderness] : no CVA tenderness [No Palpable Adenopathy] : no palpable adenopathy [Musculoskeletal - Swelling] : no joint swelling [Nail Clubbing] : no clubbing  or cyanosis of the fingernails [Motor Tone] : muscle strength and tone were normal [FreeTextEntry1] : Not oriented

## 2019-02-06 NOTE — ASSESSMENT
[FreeTextEntry1] : - All labs are reviewed from last time. Informed the wife that K and Creat are abnormal, NH done labs as well. \par - Will continue aztreonam 1gm q8h for 4 more weeks.\par - Arrange for home infusion from 2/20 for 2 more weeks since going home from NH. \par - Will do weekly ESR, CRP, BMP and CBC.  [Treatment Education] : treatment education [Treatment Adherence] : treatment adherence [Rx Dose / Side Effects] : Rx dose/side effects [Drug Interactions / Side Effects] : drug interactions/side effects [Disclosure of Diagnosis] : disclosure of diagnosis [Anticipatory Guidance] : anticipatory guidance

## 2019-02-06 NOTE — REVIEW OF SYSTEMS
[Negative] : Heme/Lymph [FreeTextEntry9] : left 1st toe ulcer.  [de-identified] : left foot crusted ulcer, no active infection, no discharge, erythema or swelling.

## 2019-03-06 PROBLEM — M86.679 CHRONIC OSTEOMYELITIS OF FOOT: Status: ACTIVE | Noted: 2018-01-01

## 2019-03-06 NOTE — HISTORY OF PRESENT ILLNESS
[FreeTextEntry1] : 67y/o man with PMH of HTN, DM2, CVA and CAD s/p CABG and PPM, was admitted to [facility] in 11/2018\par with high fever and generalized weakness and vomiting for few days.\par Blood cultures with MRSA, source was mid back ulcer since growing the same MRSA.\par ASHELY with thrombus around the lead but no vegetation on valves unclear that if\par it was a lead vegetation or just noninfected fibrin strand that can happen in PPM \par leads frequently, but Since he had MRSA, and the thrombus could get infected he was treated for 6 weeks with Daptomycin (family declined explantation of PPM). \par \par He was discharged to [facility] with cipro as well to complete 6 weeks course along with Daptomycin.\par \par Later he had infection in left 1st toe, Culture from NH grew pseudomonas R to cipro. and Xray showed destruction of distal phalanx. I did culture as well, grew the same pseudomonas. So aztreonam 1gm q8h was started that he is taking regullary,. Wound is dry now with a significant improvement. Will need 4 more weeks. \par He went home on 2/28 and since then his wife is giving his meds. \par Toe looks dry and not infected. No discharge. healed well. \par 3more days of aztreonam for OM remaining. \par Follows with podaitry regulalry. \par Appetite is better, no fever. Doing great. \par Only complaint as per wife, since switched plavix to eliquis, has some rash on hands that look like ecchymosis. \par \par

## 2019-03-06 NOTE — ASSESSMENT
[FreeTextEntry1] : - Will complete the course of ABx for OM with 6weeks of aztreonam in 3 days.\par - The line will be removed in 3 days. \par - No side effects. \par - will follow up with cardiology for the rash on hands. \par - Will follow up with podiatry regularly. \par  [Treatment Education] : treatment education [Treatment Adherence] : treatment adherence [Rx Dose / Side Effects] : Rx dose/side effects [Disclosure of Diagnosis] : disclosure of diagnosis [Anticipatory Guidance] : anticipatory guidance

## 2019-03-06 NOTE — PHYSICAL EXAM
[General Appearance - Alert] : alert [Sclera] : the sclera and conjunctiva were normal [PERRL With Normal Accommodation] : pupils were equal in size, round, reactive to light [Extraocular Movements] : extraocular movements were intact [Outer Ear] : the ears and nose were normal in appearance [Oropharynx] : the oropharynx was normal with no thrush [Neck Appearance] : the appearance of the neck was normal [Neck Cervical Mass (___cm)] : no neck mass was observed [Jugular Venous Distention Increased] : there was no jugular-venous distention [Thyroid Diffuse Enlargement] : the thyroid was not enlarged [Auscultation Breath Sounds / Voice Sounds] : lungs were clear to auscultation bilaterally [Heart Rate And Rhythm] : heart rate was normal and rhythm regular [Heart Sounds] : normal S1 and S2 [Heart Sounds Gallop] : no gallops [Full Pulse] : the pedal pulses are present [Edema] : there was no peripheral edema [Bowel Sounds] : normal bowel sounds [Abdomen Soft] : soft [Abdomen Tenderness] : non-tender [] : no hepato-splenomegaly [Abdomen Mass (___ Cm)] : no abdominal mass palpated [Costovertebral Angle Tenderness] : no CVA tenderness [No Palpable Adenopathy] : no palpable adenopathy [Musculoskeletal - Swelling] : no joint swelling [FreeTextEntry1] : Not oriented

## 2019-03-14 NOTE — ED PROVIDER NOTE - OBJECTIVE STATEMENT
This patient is a 68 year old man s/p fall on coumadin.  Patient denies LOC.  Trauma activated in the field.  No hypotension reported by EMS on arrival.  Patient has no complaints.

## 2019-03-14 NOTE — H&P ADULT - NSHPPHYSICALEXAM_GEN_ALL_CORE
HEENT: Normocephalic, atraumatic, ADAMA, EOM wnl, no otorrhea or hemotympanum b/l, no epistaxis or d/c b/l nares, no craniofacial bony pathology or tenderness b/l  Neck: Pt in hard cervical collar at time of exam. No crepitus, no ecchymosis, no hematoma, to exam, no JVD, no tracheal deviation  Cspine/thoracolumbrosacral spine: no gross bony pathology or tenderness to exam  Cardiovascular: S1S2 Present  Chest: no gross rib pathology or tenderness to exam. No sternal pathology or tenderness to exam. No crepitus, no ecchymosis, no hematoma. No penetrating thorcoabdominal trauma  Respiratory: Rate is 18; Respiratory Effort normal; no wheezes, rales or rhonchi to exam  ABD: bowel sounds (+), soft, nontender, non distended, no rebound, no gaurding, no rigidity, no skin changes to exam. No pelvic instability to exam, no skin changes  Musculoskeletal: Pt has palpable b/l radial, femoral, dorsalis pedis pulses. All digits are warm and well perfused. No gross long bone pathology or tenderness to exam. Pt demonstrates grossly intact sensoromotor function. Pt has good capillary refill to digits, no calf edema or tenderness to exam.  Skin: no lesions or rashes to exam

## 2019-03-14 NOTE — ED PROVIDER NOTE - CLINICAL SUMMARY MEDICAL DECISION MAKING FREE TEXT BOX
68 year old fall on coumadin.  Patient with [facility] trauma team at bedside.  CT negative for ICH.  Patient noted to be hypertensive but did not take his home morning medications today.  He was given his blood pressure medications with moderate improvement of the blood pressure.  Patient instructed to follow-up with PMD.

## 2019-03-14 NOTE — H&P ADULT - ATTENDING COMMENTS
Pt seen and examined in ED at 1430.      Same level fall.  No complaints.     No external signs of trauma.  Per report at baseline mentation.      Given anticoagulant use CT of head to r/o ICH and as fails [place] C-spine rules based on age CT c-spine to r/o cervical fx/subluxation.      Have reviewed imaging.  No obvious traumatic findings.  Awaiting official radiology reports.  If confirmed neg then needs tertiary survey, brief ED obs to ensure stable mentation.  If remains stable and neg tertiary survey trauma will sign off.

## 2019-03-14 NOTE — H&P ADULT - ASSESSMENT
69yo male s/p fall on Eliquis and ASA    Plan:  -Trauma Labs ordered  - CT Cspine and Head ordered 69yo male s/p fall on Eliquis and ASA    Plan:  - Trauma Labs ordered  - CT Cspine and Head ordered  - If radiology and laboratory studies negative, C collar may be removed.   - Consider possible syncope workup for mechanism of fall

## 2019-03-14 NOTE — H&P ADULT - NSICDXPASTMEDICALHX_GEN_ALL_CORE_FT
PAST MEDICAL HISTORY:  AICD (automatic cardioverter/defibrillator) present     CVA (cerebral vascular accident)     Diabetic neuropathy     DM (diabetes mellitus)     HTN (hypertension)     Hyperlipidemia     Pacemaker     Stented coronary artery

## 2019-03-14 NOTE — H&P ADULT - HISTORY OF PRESENT ILLNESS
67yo male presents to ED after unwitnessed fall on ASA and Eliquis. Unsure LOC.     Primary Survey;  A-airway intact  B-Breath Sounds heard bilaterally  C-Central and peripheral pulse 2+, bilaterally  D-GCS 15  E-No stepoffs or bony deformities    A-Denies allergies  M-See MAR  P-HTN, DM, CVA and CAD  L-Unknown last meal 67yo male presents to ED after unwitnessed fall on ASA and Eliquis at [employer]. Unsure LOC. Per EMS, pt did fall several days ago    Primary Survey;  A-airway intact  B-Breath Sounds heard bilaterally  C-Central and peripheral pulse 2+, bilaterally  D-GCS 15  E-No stepoffs or bony deformities    A-Denies allergies  M-See MAR  P-HTN, DM, CVA and CAD  L-Unknown last meal

## 2019-04-20 NOTE — ED ADULT TRIAGE NOTE - CHIEF COMPLAINT QUOTE
comes to ed from home; family called for facial droop that is now resolved. patient last known well 1430. has hx of 7 strokes; aphasic and nonverbal from previous strokes but can write. dr rivers to bedside 1525 for eval.

## 2019-04-20 NOTE — ED ADULT NURSE NOTE - OBJECTIVE STATEMENT
Patient CODE STROKE.  Patient had right facial droop which started at 230 and lasted until 254 when EMS arrived.  As EMS was arriving facial droop resolved;  patient has a hx of 7 strokes with aphasia as deficit, patient able to nod head and write on paper.

## 2019-04-20 NOTE — H&P ADULT - NSHPPHYSICALEXAM_GEN_ALL_CORE
T(C): 36.8 (04-20-19 @ 15:27), Max: 36.8 (04-20-19 @ 15:27)  HR: 68 (04-20-19 @ 15:27) (68 - 68)  BP: 148/74 (04-20-19 @ 15:27) (148/74 - 148/74)  RR: 16 (04-20-19 @ 15:27) (16 - 16)  SpO2: 99% (04-20-19 @ 15:27) (99% - 99%)    GEN - appears age appropriate. thin. pleasant. no distress.   HEENT - NCAT, EOMI, ADAMA  RESP - CTA BL, no wheeze/stridor/rhonchi/crackles. not on supplemental O2.  CARDIO - NS1S2, RRR. No murmurs/rubs/gallops.  ABD - Soft/Non tender/Non distended. Normal BS x4 quadrants.   Ext - No CHRISTIANO. no signs of venous/arterial stasis ulcers. sp LT great toe amp, wound appears to be healing well  MSK - full ROM of BL upper and lower extremities without pain or restriction. BL 5/5 strength on upper and lower extremities.   Neuro - cn 2-12 grossly intact. cerebellar function intact. no visible seizure activity appreciated. no tremor. gait not observed.   Skin - clean, dry, intact. no rashes or lesions.    Psych- AAOx3. no suicidal/homicidal ideation. appropriate behaviour. attentive. normal affect. T(C): 36.8 (04-20-19 @ 15:27), Max: 36.8 (04-20-19 @ 15:27)  HR: 68 (04-20-19 @ 15:27) (68 - 68)  BP: 148/74 (04-20-19 @ 15:27) (148/74 - 148/74)  RR: 16 (04-20-19 @ 15:27) (16 - 16)  SpO2: 99% (04-20-19 @ 15:27) (99% - 99%)    GEN - appears age appropriate. thin. pleasant. no distress. limited speech, occ can phonate but did not during my exam, nodded yes and no to questioning  HEENT - NCAT, EOMI, ADAMA  RESP - CTA BL, no wheeze/stridor/rhonchi/crackles. not on supplemental O2.  CARDIO - NS1S2, RRR. No murmurs/rubs/gallops.  ABD - Soft/Non tender/Non distended. Normal BS x4 quadrants.   Ext - No CHRISTIANO. no signs of venous/arterial stasis ulcers. sp LT great toe amp, wound appears to be healing well, sutures in place, yellowing of all remaining LT toe nails noted.   MSK - full ROM of BL upper and lower extremities without pain or restriction. BL 5/5 strength on lower ext, 4/5 LUE strength, 5/5 RUE strength.   Neuro - cn 2-12 grossly intact. cerebellar function with some dysmetria, worse on LT side than RT. no visible seizure activity appreciated. no tremor but occ RT 2nd digit spasm. gait not observed. no sensory deficits  Skin - clean, dry, intact. wound as above  Psych- AAOx3. no suicidal/homicidal ideation. appropriate behaviour. attentive. normal affect.

## 2019-04-20 NOTE — H&P ADULT - NSHPOUTPATIENTPROVIDERS_GEN_ALL_CORE
Dr An Payne  Cardio Dr Mark Duarte  Endo Dr Althea Macias  Nephro Dr Km Jarvis  Urology Dr Cristóbal Cortez

## 2019-04-20 NOTE — H&P ADULT - ASSESSMENT
68yoM w/ PMH CVA x7 (most recent 2017) w/ residual dysphagia + aphasia, CAD sp stent x12, (most recently w/ stent x5 05/2013) + 3v CABG, PAD sp ballooning to BL legs, PPM, CRYSTAL sp CEA, htn, dm2 on insulin, MRSA bacteremia w/ ppm lead infection, osteomyelitis sp recent LT great toe amp on 04/05/2019 presenting w/ complaints of facial droop, admitted for TIA r/o CVA    #TIA r/o CVA.  stable  nih 3 on arrival sec to resiudal aphasia, no acute findings  facial droop resolved  CTH w/o acute infarct, PPM not MR compatible per wife, card reviewed, repeat CTH in am  tele, no hx of afib, not on AC  npo until slp eval  neuro eval pending  neuro checks q4    #NSTEMI  asymptomatic  may be in setting of renal dysfunction  trop 0.22, trend x2  [place] [facility] cs pending  rectal asa until tolerating po    #Leukocystosis  no clear s/s of acute/active infection  cont doxycycline, 1wk remaining for course prescribed outpt after toe amp  trend cbc  pending cxr + UA    #ALLISON on CKD3  unclear etiology  baseline cr 1.8-2  ivf, serial bmp    #HTN  controlled  holding home meds given npo  permissive htn x48hrs or until acute cva ruled out, hydralazine prn sbp > 220 + dbp > 120  resume normal htn guidelines 4/22 in am    #DM2 on insulin therapy complicated by asymptomatic hyperglycemia  stable  a1c in am  iss + fs q6 while npo  1/2 dose home long acting insulin while npo, titrate up as needed    #LT great toe amp  stable  daily wound care  appt planned w/ sx @ [facility] who did procedure on 4/22, may need to be rescheduled  doxycycline x1wk as prescribed outpt    #dvt ppx. lovenox    #dispo. dc after cva + nstemi w/u completed, pending PT eval. undetermined date of dc    #Goals of Care discussed at length with the patient. This conversation included current condition, prognosis, and options for therapy. Discussed desires by patient for further care and wishes were expressed - curative measures desired . Advanced directives discussed, FULL CODE. Family present at the bedside, wife. Length of Conversation ~35min.

## 2019-04-20 NOTE — ED PROVIDER NOTE - OBJECTIVE STATEMENT
Pt is a 68yoM with h/o HTN, DM, CAD, AICD, CVA x 7 in the past, with residual aphasia presenting with transient R facial droop which started at 230 and lasted until 254 when EMS arrived;  just as EMS was arriving facial droop resolved;  Pt states he did notice his face felt week; denies any other weakness, numbness, paresthesias or residual symptoms.  Otherwise has no complaints. Pt made stroke CODe per ED policy however no motor/ sensory/visual deficits noted at this time ; will workup for TIA Pt is a 68yoM with h/o HTN, DM, CAD, AICD, CVA x 7 in the past, with residual expressive aphasia presenting with transient R facial droop which started at 230 and lasted until 254 when EMS arrived;  just as EMS was arriving facial droop resolved;  Pt states he did notice his face felt week; denies any other weakness, numbness, paresthesias or residual symptoms.  Otherwise has no complaints. Pt made stroke CODe per ED policy however no motor/ sensory/visual deficits noted at this time ; will workup for TIA

## 2019-04-20 NOTE — H&P ADULT - HISTORY OF PRESENT ILLNESS
68yoM w/ PMH CVA x7 (most recent 2017) w/ residual dysphagia + aphasia, CAD sp stent x12, (most recently w/ stent x5 05/2013) + 3v CABG, PAD sp ballooning to BL legs, PPM, CRYSTAL sp CEA, htn, dm2 on insulin, MRSA bacteremia w/ ppm lead infection, osteomyelitis sp recent LT great toe amp on 04/05/2019 presenting w/ complaints of facial droop. pt awake + alert but given verbal difficulties story obtained from wife @ bedside. states that she was in the home with pt when he was noted to have slight LT sided facial droop around 0730 in AM, present intermittently, she stated it was "going and coming". pt's children also noted and prompted her to call EMS. Upon arrival droop still initially present but then resolved, pt wife states that she has noticed it a couple of times but that overall it is much better. Also recalls him being w/ generalized weakness today, like "jello" but notes that his strength has recovered. Pt denies having any symptoms, feels normal.     Of note, pt also w/ recent ep of diarrhea. Per wife, states he has been having foul smelling diarrhea, 2-3 ep intermittently per day since discharge from [facility] after toe amputation. Describes it as solid but large volume each time requiring her to place him in several diapers.

## 2019-04-20 NOTE — ED ADULT NURSE REASSESSMENT NOTE - NS ED NURSE REASSESS COMMENT FT1
Handoff received from offgoing RN. Pt. presents AxOx3, resting comfortably, in no apparent distress, in NSR on cardiac monitor. Vital signs stable and as charted. Medicated as ordered. Incontinence care provided. Educated pt on plan of care, pt verbalized understanding. IV's patent. Neuro check as documented, pt. maintains an NIH of 3. Handoff received from offgoing RN. Pt. received AxOx3, resting comfortably, in no apparent distress, in NSR on cardiac monitor. Vital signs stable and as charted. Medicated as ordered. Incontinence care provided. Urine x 1, BM x 1, soft, brown, formed. Educated pt on plan of care, pt verbalized understanding. IV's patent. Neuro check as documented, pt. maintains an NIH of 3.

## 2019-04-20 NOTE — H&P ADULT - NSHPREVIEWOFSYSTEMS_GEN_ALL_CORE
He denies altered mental status, LOC, dizziness, blurry vision/visual disturbances, nasal congestion, sneezing, hearing loss, chest pain, palpitations, shortness of breath, cough, wheezing/stridor, fatigue, abdominal pain/cramping, nausea, vomiting,

## 2019-04-20 NOTE — ED PROVIDER NOTE - CLINICAL SUMMARY MEDICAL DECISION MAKING FREE TEXT BOX
68yoM with h/o multiple previous CVS in the past presenting with transient R facial droop, now resolved. No other acute deficits per exam/ patient; will obtain blood work, d/w neurology and likely admit

## 2019-04-20 NOTE — H&P ADULT - NSHPLABSRESULTS_GEN_ALL_CORE
04-20    144  |  112<H>  |  43.0<H>  ----------------------------<  268<H>  4.9   |  18.0<L>  |  2.38<H>    Ca    9.0      20 Apr 2019 15:47  Mg     2.0     04-20    TPro  6.8  /  Alb  3.1<L>  /  TBili  0.5  /  DBili  x   /  AST  11  /  ALT  7   /  AlkPhos  87  04-20                          9.3    15.5  )-----------( 259      ( 20 Apr 2019 15:47 )             28.6     LIVER FUNCTIONS - ( 20 Apr 2019 15:47 )  Alb: 3.1 g/dL / Pro: 6.8 g/dL / ALK PHOS: 87 U/L / ALT: 7 U/L / AST: 11 U/L / GGT: x           PT/INR - ( 20 Apr 2019 15:47 )   PT: 15.0 sec;   INR: 1.29 ratio         PTT - ( 20 Apr 2019 15:47 )  PTT:33.5 sec

## 2019-04-20 NOTE — ED PROVIDER NOTE - PROGRESS NOTE DETAILS
Pt resting comfortably in bed without complaints. Denies any chest pain previously or at present.  He denies taking eliquis; states " Aspirin/ plavix"  asked to page Dr. Waldrop's group for neurology. Dr. Brito called back and I discussed case w/ him. he will follow.  [facility] paged.

## 2019-04-21 NOTE — SWALLOW BEDSIDE ASSESSMENT ADULT - DIET PRIOR TO ADMI
Pt known to  service from prior admission with last swallow eval completed 11/17/18, RX puree, no lix. Radiology report noted for MBS 1/30/19 however results/diet rx unavailable for review (no SLP report noted). Per [facility] nectar thick liquids at home, unknown food consistency. Per pt, regular diet with thin liquids at home (questionable historian given pt history)

## 2019-04-21 NOTE — SWALLOW BEDSIDE ASSESSMENT ADULT - SLP PERTINENT HISTORY OF CURRENT PROBLEM
68yoM w/ PMH CVA x7 (most recent 2017) w/ residual dysphagia + aphasia, CAD sp stent x12, (most recently w/ stent x5 05/2013) + 3v CABG, PAD sp ballooning to BL legs, PPM, CRYSTAL sp CEA, htn, dm2 on insulin, MRSA bacteremia w/ ppm lead infection, osteomyelitis sp recent LT great toe amp on 04/05/2019 presenting w/ complaints of facial droop.

## 2019-04-21 NOTE — ED ADULT NURSE REASSESSMENT NOTE - NS ED NURSE REASSESS COMMENT FT1
Received pt from [facility]. PT resting comfortably at this time in stretcher NAD noted PT offers no c/o pain. CTM

## 2019-04-21 NOTE — PROGRESS NOTE ADULT - ASSESSMENT
68yoM w/ PMH CVA x7 (most recent 2017) w/ residual dysphagia + aphasia, CAD sp stent x12, (most recently w/ stent x5 05/2013) + 3v CABG, PAD sp ballooning to BL legs, PPM, CRYSTAL sp CEA, htn, dm2 on insulin, MRSA bacteremia w/ ppm lead infection, osteomyelitis s/p recent LT hallux amputation on 04/22/19 on doxy who presented with worsening weakness, transient left facial droop. Facial droop resolved on admission, weakness persists. CT head negative for any new infarction x 2, prior old infarcts noted. Noted with UA positive likely all sx attributed to UTI. Empirically placed on rocephin. Also noted with elevated troponin in the setting of acute on chronic renal failure likely secondary to ckd. Cardio consulted and pt pending tte.     Acute metabolic encephalopathy/ weakness 2/2 UTI   - Pts mentations improving to baseline   - weakness improving   - c/w tx for UTI   - no evidence of CVA     UTI   - pt afebrile  -leukocytosis trended down from 15 to 14   - UA positive  - f/u U cx awaiting sensitivity and susceptibility   - c/w rocephin 1 G IVPB QD D#1   - c/w florastor 250mg po bid     Elevated troponin in the setting of CKD   -Asymptomatic  -may be in setting of renal dysfunction  -trop 0.22, trend x2  - c/w asa, atorvastatin and coreg   - f/u TTE   - f/u carotid US   -cardio consult noted and appreciated     Acute on chronic renal failure - CKD3  - likely secondary to decreased po intake/ current infection   - baseline cr 1.8-2   -currently 2.38 down trending to 2.26   - c/w IVF x 24 hours   - c/w monitoring bun/cr  - avoid nephrotoxic medications   - holding lisinopril until creatine back to baseline 2 and below   - pt follows with outpt nephrologist Dr. Barbour     HTN  - BP elevated   - reinstated coreg 12.5 mg po bid, imdur 20mg po qd (as per wife takes 30mg po bid- very high dose for ckd 3 patient), hydralzine 25mg po qd and norvasc 10mg po qd   - pt also takes lisinopril 2.5mg po qd given ALLISON on ckd (if normalizes back to 2 and below will restart.   permissive htn x48hrs or until acute cva ruled out, hydralazine prn sbp > 220 + dbp > 120  resume normal htn guidelines 4/22 in am    Hx CVA  - likely current episode of transient weakness is related to infection not a TIA or CVA   - c/w asa, plavix and atorvastatin   - At baseline pt eats pureed diet and gets thickened water   - ct head negative x 2; prior lacunar infarcts noted   - unable to do MRI given ICD   - neurology consulted on admission but has not seen the patient; neurologist not needed     S/p left foot hallux amputation  - Recently performed sx removal on 4/22/19  - healing well   - c/w doxycycline for 6 more days   - c/w local wound care   - pt to f/u as an outpt with podiatrist Dr. Lofton     DM2  - HBA1C: 7.2   - fs elevated   - c/w accuchecks ACHS TID  - c/w lantus 10 u sq qhs   - c/w HSS   - (at home pt takes levemir 20 u sq qhs and HSS)     DVT ppx  - c/w  heparin sq     Advanced Directives: DNR/DNI  - MOLST in chart from 11/16/18   -Next of kin: Wife -  Any   Updated the wife Any in regards to the current tx plan, also reiterated prior goals of care conversation that the pts wife had with the palliative care team. Initially as per admitting hospitalist stated pt is a full code. D/w the wife that MOLST from 11/18/19 was completed and stated pt was DNR/DNI and would not want a feeding tube. Upon further discussion with the wife she states yes the MOLST was completed and that she would want to respect the MOLST.   Advance care discussion took 31 minutes     Dispo: Pt lives at home and wife is the primary caretaker.

## 2019-04-21 NOTE — SWALLOW BEDSIDE ASSESSMENT ADULT - SWALLOW EVAL: RECOMMENDED FEEDING/EATING TECHNIQUES
position upright (90 degrees)/allow for swallow between intakes/crush medication (when feasible)/oral hygiene

## 2019-04-21 NOTE — ED ADULT NURSE REASSESSMENT NOTE - NS ED NURSE REASSESS COMMENT FT1
assisted pt with meals and supervised, no issues, 50% meal complete for dinner. Also Dr Tellez of neurology here assessing, I also completed left foot wound care for toe amputation

## 2019-04-21 NOTE — SWALLOW BEDSIDE ASSESSMENT ADULT - ASR SWALLOW ASPIRATION MONITOR
fever/pneumonia/throat clearing/upper respiratory infection/oral hygiene/change of breathing pattern/position upright (90Y)/gurgly voice/cough

## 2019-04-21 NOTE — SWALLOW BEDSIDE ASSESSMENT ADULT - ORAL PHASE
Decreased anterior-posterior movement of the bolus/Oral transit time 3-4 seconds/Delayed oral transit time Delayed oral transit time/Decreased anterior-posterior movement of the bolus/+oral holding of bolus

## 2019-04-21 NOTE — ED ADULT NURSE REASSESSMENT NOTE - NS ED NURSE REASSESS COMMENT FT1
Contacted HOA Jain concerning pt. pending UA. Pt. NPO and only receiving 80mL/h IVF at this time. As per PA, to give 500mL NS bolus and attempt UA collection. Pt. /77, PA aware, as per PA no interventions needed at this time. Contacted HOA Jain concerning pt. pending UA. Pt. NPO and receiving 83mL/h IVF at this time. Pt. reports that he does not feel he can urinate at this time. As per PA, to give 500mL NS bolus and attempt UA collection. Pt. /77, PA aware, as per PA no interventions needed at this time.

## 2019-04-21 NOTE — CHART NOTE - NSCHARTNOTEFT_GEN_A_CORE
Rn notified provider of critical troponin  of 0.24 (prior was 0.22) NSTEMi on admission dx.  Cardiology consult pending.  Plan, continue current care.

## 2019-04-21 NOTE — ED ADULT NURSE REASSESSMENT NOTE - NS ED NURSE REASSESS COMMENT FT1
Neuro check as documented, pt. maintains NIH score of 3. Pt. sleeping, easily arousable to voice and touch. Warm blankets provided. Vital signs stable and as charted. Pt. in NSR on cardiac monitor. Pt. safety and comfort measures maintained.

## 2019-04-21 NOTE — ED ADULT NURSE REASSESSMENT NOTE - NS ED NURSE REASSESS COMMENT FT1
assumed pt care this am. pt is alert, aware of NPO till speech evaluation, Neurological checks as ordered. Pt is alert. Chart and medication reviewed.

## 2019-04-21 NOTE — PHYSICAL THERAPY INITIAL EVALUATION ADULT - GAIT PATTERN USED, PT EVAL
unsteadiness throughout requiring assist for all mobility, decreased gait velocity and activity tolerance, verbal cues for sequencing

## 2019-04-21 NOTE — PHYSICAL THERAPY INITIAL EVALUATION ADULT - CRITERIA FOR SKILLED THERAPEUTIC INTERVENTIONS
functional limitations in following categories/anticipated discharge recommendation/rehab potential/impairments found/therapy frequency/predicted duration of therapy intervention

## 2019-04-22 NOTE — ED ADULT NURSE REASSESSMENT NOTE - NS ED NURSE REASSESS COMMENT FT1
RN fed pt purred food at this time. Pt tolerated well. vital signs remain stable. pt still awaiting bed. pt safety maintained.

## 2019-04-22 NOTE — ED ADULT NURSE REASSESSMENT NOTE - FACIAL SYMMETRY
symmetrical
left facial droop
symmetrical
symmetrical

## 2019-04-22 NOTE — ED ADULT NURSE REASSESSMENT NOTE - NS ED NURSE REASSESS COMMENT FT1
Pt A&Ox2, resting comfortably in bed no apparent distress noted. Wife and son are at bedside. VSS. Pt is right bundle branch block on cardiac monitor. Pt repositioned. Medications given as per orders. pt wife educated on plan of care, pt wife able to successfully teach back plan of care to RN, RN will continue to reeducate pt during hospital stay.

## 2019-04-22 NOTE — ED ADULT NURSE REASSESSMENT NOTE - NS ED NURSE REASSESS COMMENT FT1
Pt in no apparent distress. Family at bedside, meal tray provided. Plan of care explained to family.

## 2019-04-22 NOTE — ED ADULT NURSE REASSESSMENT NOTE - NURSING NEURO LEVEL OF CONSCIOUSNESS
alert and awake
alert and awake/follows commands
alert and awake/follows commands
follows commands/alert and awake
alert and awake
follows commands/alert and awake

## 2019-04-22 NOTE — ED ADULT NURSE REASSESSMENT NOTE - NS ED NURSE REASSESS COMMENT FT1
RN paged MD Obhesion regarding blood sugars, awaiting further orders. Pt medicated as per sliding scale.

## 2019-04-22 NOTE — ED ADULT NURSE REASSESSMENT NOTE - NEURO GAIT
requires assistance/unsteady
unsteady/requires assistance
unsteady
unsteady

## 2019-04-22 NOTE — ED ADULT NURSE REASSESSMENT NOTE - NEURO SENSATION
sensory intact

## 2019-04-22 NOTE — ED ADULT NURSE REASSESSMENT NOTE - NURSING NEURO ORIENTATION
disoriented to time/situation
disoriented to time/situation
oriented to person, place and time
disoriented to time/situation
disoriented to time/situation

## 2019-04-22 NOTE — ED ADULT NURSE REASSESSMENT NOTE - NS ED NURSE REASSESS COMMENT FT1
RN spoke to Trang in ECHO regarding portable ECHO. MD Edwards advised RN to have ECHO changed to portable. Trang from ECHO refused test to be portable and advised RN she will have to take pt down. RN is unable to leave the floor at this time. MD Edwards advised and told to contact echo.

## 2019-04-22 NOTE — ED ADULT NURSE REASSESSMENT NOTE - GLASGOW COMA SCALE: BEST MOTOR RESPONSE
(M6) obeys commands

## 2019-04-22 NOTE — ED ADULT NURSE REASSESSMENT NOTE - NS ED NURSE REASSESS COMMENT FT1
MD Ohebsion at bedside for evaluation, advised of blood glucose and sliding scale medication given. MD states he will review chart and advise RN of orders. As per MD, pt to get portable echo at bedside. Pt remains in stable condition.

## 2019-04-22 NOTE — PROGRESS NOTE ADULT - ASSESSMENT
68yoM w/ PMH CVA x7 (most recent 2017) w/ residual dysphagia + aphasia, CAD sp stent x12, (most recently w/ stent x5 05/2013) + 3v CABG, PAD sp ballooning to BL legs, PPM, CRYSTAL sp CEA, htn, dm2 on insulin, MRSA bacteremia w/ ppm lead infection, osteomyelitis s/p recent LT hallux amputation on 04/22/19 on doxy who presented with worsening weakness, transient left facial droop. Facial droop resolved on admission, weakness persists. CT head negative for any new infarction x 2, prior old infarcts noted. Noted with UA positive likely all sx attributed to UTI. Empirically placed on rocephin. Also noted with elevated troponin in the setting of acute on chronic renal failure likely secondary to ckd. Cardio consulted and pt pending tte.       1) Acute metabolic encephalopathy/ weakness 2/2 UTI   - c/w tx for UTI   - no evidence of CVA     2) UTI   - UA positive    - urine cx ordered   - c/w rocephin 1 G IVPB QD D#2  - c/w florastor 250mg po bid     3) Elevated troponin in the setting of CKD   -may be in setting of renal dysfunction  - c/w asa, atorvastatin and coreg   - TTE still pending  -cardio followoing    4) Acute on chronic renal failure - CKD3  - baseline cr 1.8-2    - start LR at 100cc/he and repeat level in am  - avoid nephrotoxic medications  - continue to hold lisinopril until creatine back to baseline 2 and below   - pt follows with outpt nephrologist Dr. Barbour  - if levels start to worsen then will consult renal     5) HTN  - c/w coreg and imdur   - hydralazine increased to 25mg TID for better BP control  - pt also takes lisinopril 2.5mg po qd given ALLISON on ckd (if normalizes back to 2 and below will restart)  - monitor     6) Hx CVA  - likely current episode of transient weakness is related to infection not a TIA or CVA   - c/w asa, plavix and atorvastatin   - ct head negative x 2; prior lacunar infarcts noted   - unable to do MRI given ICD   - neurology consulted    7) S/p left foot hallux amputation  - Recently performed sx removal on 4/22/19  - c/w doxycycline for 5 more days   - c/w local wound care   - pt to f/u as an outpt with podiatrist Dr. Lofton     8) DM2  - c/w accuchecks ACHS TID  - c/w lantus 10 u sq qhs   - c/w HSS   - (at home pt takes levemir 20 u sq qhs and HSS)     9) Prophylactic measure  - DVT ppx: heparin sq     Dispo: Pt lives at home and wife is the primary caretaker. once echo done and cleared by cardio/neuro d/c home

## 2019-04-22 NOTE — ED ADULT NURSE REASSESSMENT NOTE - COMFORT CARE
repositioned/assisted to bedpan/warm blanket provided/meal provided/incontinent care provided, linens changed.
warm blanket provided/incontinent care provided, linens changed.
meal provided
meal provided/plan of care explained/repositioned
repositioned/plan of care explained/side rails up

## 2019-04-22 NOTE — ED ADULT NURSE REASSESSMENT NOTE - NIH STROKE SCALE: 4. FACIAL PALSY, QM
(1) Minor paralysis (flattened nasolabial fold, asymmetry on smiling)

## 2019-04-22 NOTE — ED ADULT NURSE REASSESSMENT NOTE - GLASGOW COMA SCALE: EYE OPENING
(E4) spontaneous

## 2019-04-22 NOTE — ED ADULT NURSE REASSESSMENT NOTE - NS ED NURSE REASSESS COMMENT FT1
Pt care received at 0720, in no apparent distress at this time. Pt A&Ox1, sleeping in stretcher. Pt has a right bundle branch block on cardiac monitor. Pt denies any complaints at this time. VSS. Pt safety maintained.

## 2019-04-23 NOTE — DISCHARGE NOTE NURSING/CASE MANAGEMENT/SOCIAL WORK - NSDCPEPTSTRK_GEN_ALL_CORE
Stroke education booklet/Stroke support groups for patients, families, and friends/Stroke warning signs and symptoms/Need for follow up after discharge/Risk factors for stroke/Prescribed medications/Call 911 for stroke/Signs and symptoms of stroke

## 2019-04-23 NOTE — CONSULT NOTE ADULT - SUBJECTIVE AND OBJECTIVE BOX
[facility]                                                    [address], [address], [place] [address]                                                         PHONE: [phone]    FAX: [phone] [address], [address], [place] [address]                                                 PHONE: [phone]    FAX: [phone]  *******************************************************************************    Reason for Consult: TIA vs CVA    HPI:  AVNI GREEN is a 68y Male (poor historian, h/o was taken from the records) with PMH of CAD sp stent x12, (most recently w/ stent x5 05/2013) + 3v CABG, HTN, CVA x7 (most recent 2017) w/ residual dysphagia + aphasia,  PAD sp ballooning to BL legs, ICD, CRYSTAL sp CEA, DM2 on insulin, MRSA bacteremia w/ ppm lead infection, osteomyelitis sp recent LT great toe amp on 04/05/2019 presenting w/ complaints of facial droop. pt awake + alert but given verbal difficulties story obtained from wife @ bedside. states that she was in the home with pt when he was noted to have slight LT sided facial droop around 0730 in AM, present intermittently, she stated it was "going and coming". pt's children also noted and prompted her to call EMS. Upon arrival droop still initially present but then resolved, pt wife states that she has noticed it a couple of times but that overall it is much better. Also recalls him being w/ generalized weakness today, like "jello" but notes that his strength has recovered. Pt denies having any symptoms, feels normal. Pt also w/ recent ep of diarrhea. Per wife, states he has been having foul smelling diarrhea, 2-3 ep intermittently per day since discharge from [facility] after toe amputation. Describes it as solid but large volume each time requiring her to place him in several diapers.      PAST MEDICAL & SURGICAL HISTORY:  CVA (cerebral vascular accident)  Diabetic neuropathy  DM (diabetes mellitus)  Hyperlipidemia  AICD (automatic cardioverter/defibrillator) present  Pacemaker  Stented coronary artery  HTN (hypertension)  History of amputation of toe  Cardiac pacemaker      No Known Allergies      MEDICATIONS  (STANDING):  aspirin  chewable 81 milliGRAM(s) Oral daily  cefTRIAXone   IVPB      cefTRIAXone   IVPB 1 Gram(s) IV Intermittent once  doxycycline IVPB 100 milliGRAM(s) IV Intermittent every 12 hours  enoxaparin Injectable 40 milliGRAM(s) SubCutaneous daily  sodium chloride 0.9%. 1000 milliLiter(s) (83 mL/Hr) IV Continuous <Continuous>    MEDICATIONS  (PRN):  hydrALAZINE Injectable 10 milliGRAM(s) IV Push every 6 hours PRN sbp > 220 or dbp > 120      Social History: no active tobacco / EtOH / IVDA    Family History: No pertinent family history in first degree relatives  No pertinent family history in first degree relatives      ROS: As noted above, otherwise unremarkable.    Vital Signs Last 24 Hrs  T(C): 36.1 (21 Apr 2019 11:22), Max: 37.3 (20 Apr 2019 21:00)  T(F): 97 (21 Apr 2019 11:22), Max: 99.1 (20 Apr 2019 21:00)  HR: 64 (21 Apr 2019 11:22) (64 - 89)  BP: 160/72 (21 Apr 2019 11:22) (136/61 - 183/77)  BP(mean): --  RR: 20 (21 Apr 2019 11:22) (16 - 20)  SpO2: 97% (21 Apr 2019 11:22) (97% - 99%)    I&O's Detail    I&O's Summary      PHYSICAL EXAM:   General: Appears ill-appearing male no acute distress  HEENT: Head: normocephalic, atraumatic  Eyes: Pupils equal and reactive  Neck: Supple, left CEA, bilateral carotid bruits, no JVD, no HJR  CARDIOVASCULAR: Normal S1 and S2, soft HSM  LUNGS: Clear to auscultation bilaterally, no rales, rhonchi or wheeze  ABDOMEN: Soft, nontender, non-distended, positive bowel sounds, no mass or bruit  EXTREMITIES: No edema, distal pulses reduced, bandage on anterior left shin  SKIN: Warm and dry with normal turgor  NEURO: Alert, nonverbal, moves all extremities  PSYCH: nounable to assess affect as nonverbal  LABS:                        9.1    14.0  )-----------( 221      ( 21 Apr 2019 07:42 )             28.9     04-21    143  |  112<H>  |  44.0<H>  ----------------------------<  324<H>  4.7   |  18.0<L>  |  2.26<H>    Ca    8.9      21 Apr 2019 05:41  Mg     2.0     04-20    TPro  6.8  /  Alb  3.1<L>  /  TBili  0.5  /  DBili  x   /  AST  11  /  ALT  7   /  AlkPhos  87  04-20    CARDIAC MARKERS ( 21 Apr 2019 05:41 )  x     / 0.22 ng/mL / x     / x     / x      CARDIAC MARKERS ( 21 Apr 2019 00:50 )  x     / 0.24 ng/mL / x     / x     / x      CARDIAC MARKERS ( 20 Apr 2019 15:47 )  x     / 0.22 ng/mL / x     / x     / x          PT/INR - ( 21 Apr 2019 07:42 )   PT: 16.4 sec;   INR: 1.41 ratio         PTT - ( 20 Apr 2019 15:47 )  PTT:33.5 sec    RADIOLOGY & ADDITIONAL STUDIES:    ECG: SR with V pacing in 70s     ECHO:   < from: ASHELY Echo Doppler (11.09.18 @ 12:54) >  Summary:   1. Left ventricular ejection fraction, by visual estimation, is 40 to   45%.   2. Technically fair study.   3. Mildly decreased global left ventricular systolic function.   4. The left ventricular diastolic function could not be assessed in this   study.   5. There is no evidence of pericardial effusion.   6. Mild mitral valve regurgitation.   7. Mild tricuspid regurgitation.   8. Trace pulmonic valve regurgitation.   9. There is no vegetation on any of the cardiac valve. Thrombus coated   pacing leads are seen.  10. Clinical correlation is advised.  11. Color flow doppler and intravenous injection of agitated saline   demonstrates the presence of an intact intra atrial septum.  12. No left atrial appendage thrombus.      Assessment and Plan:  In summary, AVNI GREEN is a 68y Male with past medical history significant for with PMH of CAD sp stent x12, (most recently w/ stent x5 05/2013) + 3v CABG, HTN, CRI, CVA x7 (most recent 2017) w/ residual dysphagia + aphasia,  PAD sp ballooning to BL legs, ICD, CRYSTAL sp CEA, DM2 on insulin, MRSA bacteremia w/ ICD lead infection, osteomyelitis sp recent LT great toe amp on 04/05/2019 presenting w/ complaints of facial droop, verbal difficulties, admitted with TIA rule out CVA. Borderline elevated CE in the setting of CRI are nonspecific, trending down. No clinical signs of overt CHF, cardiac ischemia.      - Monitor on telemetry  - Echocardiogram  - Carotid Duplex Scan  - No evidence of ischemia or CHF clinically, eventual ischemic evaluation (likely as outpatient)  - Agree with ASA, add statin regimen if can swallow   - Further work-up & testing (MRI, MRA, etc.) per neurology  - Rhythm/hemodynamics stable = continue current doses for now and titrate PRN    We will follow with you.  Thank you for allowing me to participate in the care of your patient.      Sincerely,
CHIEF COMPLAINT:    HPI: 68yMale  68yoM w/ PMH CVA x7 (most recent 2017) w/ residual dysphagia + aphasia, CAD sp stent x12, (most recently w/ stent x5 05/2013) + 3v CABG, PAD sp ballooning to BL legs, PPM, CRYSTAL sp CEA, htn, dm2 on insulin, MRSA bacteremia w/ ppm lead infection, osteomyelitis sp recent LT great toe amp on 04/05/2019 presenting w/ complaints of facial droop. pt awake + alert but given verbal difficulties story obtained from wife @ bedside. states that she was in the home with pt when he was noted to have slight LT sided facial droop around 0730 in AM, present intermittently, she stated it was "going and coming". pt's children also noted and prompted her to call EMS. Upon arrival droop still initially present but then resolved, pt wife states that she has noticed it a couple of times but that overall it is much better. Also recalls him being w/ generalized weakness today, like "jello" but notes that his strength has recovered. Pt denies having any symptoms, feels normal.     Of note, pt also w/ recent ep of diarrhea. Per wife, states he has been having foul smelling diarrhea, 2-3 ep intermittently per day since discharge from [facility] after toe amputation. Describes it as solid but large volume each time requiring her to place him in several diapers.       PAST MEDICAL & SURGICAL HISTORY:  CVA (cerebral vascular accident)  Diabetic neuropathy  DM (diabetes mellitus)  Hyperlipidemia  AICD (automatic cardioverter/defibrillator) present  Pacemaker  Stented coronary artery  HTN (hypertension)  History of amputation of toe  Cardiac pacemaker    MEDICATIONS  (STANDING):  aspirin  chewable 81 milliGRAM(s) Oral daily  atorvastatin 40 milliGRAM(s) Oral at bedtime  carvedilol 12.5 milliGRAM(s) Oral every 12 hours  cefTRIAXone   IVPB      clopidogrel Tablet 75 milliGRAM(s) Oral daily  dextrose 5%. 1000 milliLiter(s) (50 mL/Hr) IV Continuous <Continuous>  dextrose 50% Injectable 12.5 Gram(s) IV Push once  dextrose 50% Injectable 25 Gram(s) IV Push once  dextrose 50% Injectable 25 Gram(s) IV Push once  doxycycline hyclate Capsule 100 milliGRAM(s) Oral every 12 hours  hydrALAZINE 25 milliGRAM(s) Oral daily  insulin lispro (HumaLOG) corrective regimen sliding scale   SubCutaneous three times a day before meals  isosorbide    mononitrate Tablet (ISMO) 20 milliGRAM(s) Oral daily  saccharomyces boulardii 250 milliGRAM(s) Oral two times a day  sertraline 100 milliGRAM(s) Oral daily    MEDICATIONS  (PRN):  dextrose 40% Gel 15 Gram(s) Oral once PRN Blood Glucose LESS THAN 70 milliGRAM(s)/deciliter  glucagon  Injectable 1 milliGRAM(s) IntraMuscular once PRN Glucose LESS THAN 70 milligrams/deciliter  hydrALAZINE Injectable 10 milliGRAM(s) IV Push every 6 hours PRN sbp > 220 or dbp > 120    Allergies    No Known Allergies    Intolerances        FAMILY HISTORY:  No pertinent family history in first degree relatives          SOCIAL HISTORY:    Tobacco:  no  Alcohol:  no  Drugs:  no        REVIEW OF SYSTEMS:    Relevant systems are negative except as noted in the chart, HPI, and PMH      VITAL SIGNS:  Vital Signs Last 24 Hrs  T(C): 36.1 (21 Apr 2019 11:22), Max: 37.3 (20 Apr 2019 21:00)  T(F): 97 (21 Apr 2019 11:22), Max: 99.1 (20 Apr 2019 21:00)  HR: 64 (21 Apr 2019 11:22) (64 - 89)  BP: 160/72 (21 Apr 2019 11:22) (136/61 - 183/77)  BP(mean): --  RR: 20 (21 Apr 2019 11:22) (16 - 20)  SpO2: 97% (21 Apr 2019 11:22) (97% - 99%)    PHYSICAL EXAMINATION:    General: Well-developed, well nourished, in no acute distress.  Cardiac:  Regular rate and rhythm. No carotid bruits appreciated.  Eyes: Fundoscopic examination was deferred.  Neurologic:  - Mental Status:  Alert, awake, oriented to person, place, and time; Speech is fluent. Language is normal. Follows commands well.  Insight and knowledge appear appropriate.  Cranial Nerves II-XII:    II:  Visual acuity is normal for age ; Visual fields are full to confrontation; Pupils are equal, round, and reactive to light.  III, IV, VI:  Extraocular movements are intact without nystagmus.  V:  Facial sensation is intact in the V1-V3 distribution bilaterally.  VII:  Face is symmetric with normal eye closure and smile  VIII:  Hearing is grossly intact  IX, X, XII:  speech is clear  XI:  Head turning and shoulder shrug are intact.  - Motor:  Strength is 5/5 x 4.   There is no pronator drift. .  - Reflexes:  2+ and symmetric at the knees.  Plantar responses flexor.  - Sensory:  Symmetric to light touch  - Coordination:  Finger-nose-finger is normal. Rapid alternating hand and foot  movements are intact. Dexterity appears normal      LABS:                          9.1    14.0  )-----------( 221      ( 21 Apr 2019 07:42 )             28.9     21 Apr 2019 05:41    143    |  112    |  44.0   ----------------------------<  324    4.7     |  18.0   |  2.26     Ca    8.9        21 Apr 2019 05:41  Mg     2.0       20 Apr 2019 15:47    TPro  6.8    /  Alb  3.1    /  TBili  0.5    /  DBili  x      /  AST  11     /  ALT  7      /  AlkPhos  87     20 Apr 2019 15:47    LIVER FUNCTIONS - ( 20 Apr 2019 15:47 )  Alb: 3.1 g/dL / Pro: 6.8 g/dL / ALK PHOS: 87 U/L / ALT: 7 U/L / AST: 11 U/L / GGT: x           PT/INR - ( 21 Apr 2019 07:42 )   PT: 16.4 sec;   INR: 1.41 ratio         PTT - ( 20 Apr 2019 15:47 )  PTT:33.5 sec      RADIOLOGY & ADDITIONAL STUDIES:    < from: CT Head No Cont (04.21.19 @ 08:23) >  FINDINGS: There is notable change. Review of prior CT scan dated   4/28/2019 shows a stable LEFT superior cerebellar nonhemorrhagic infarct   with cytotoxic edema  and prior CT scandated 9/7/2018 and a 20/8/2018,   confirming chronicity. .   Moderate cerebral volume loss is present with secondary proportional   prominence of the sulci and ventricles.    The posterior fossa structures and basal cisterns appear normal.    There is no intracranial hemorrhage.     There is no intracranial mass or midline shift.    There is no sulcal effacement to suggest acute stroke.    There are scattered white matter hypodensities consistent with small   vessel disease.    Bilateral basal ganglia chronic lacunar infarcts noted. The visualized   portions of the optic globes and paranasal sinuses are normal.    There are no skull fractures.    IMPRESSION:  No interval change.    Moderate atrophy and chronic white matter microvascular ischemic changes   as described.   If acute stroke is of clinical concern, MRI with diffusion-weighted   images would be helpful for further characterization.    < end of copied text >    < from: US Duplex Carotid Arteries Complete, Bilateral (09.07.18 @ 11:19) >    Mild to moderate bilateral plaque as described.    No elevated velocities to suggest hemodynamically significant stenosis.    Measurement of carotid stenosis is based on velocity parameters that   correlate the residual internal carotid diameter with that of the more   distal vessel in accordance with a method such as the [place] American   Symptomatic Carotid Endarterectomy Trial (NASCET).    < end of copied text >    IMPRESSION:    Patient with severe microvascular disease presents with report of facial droop. No asymmetry at present  Carotid duplex 9/18 showed not significant stenosis.    Suspect TIA or more likely re-emergence of neurologic deficits in setting of medical decompensation    He is already on maximum medical therapy ASA/plavix. No indication for anticoagulation unless there is clear established cardioembolic cause     PLAN:  1. Medical and Cardiac evaluation and treatment as indicated  2. Antiplatelet therapy as prescribed.  3. Cerebrovascular risk factor assessment and management as prescribed  4. Carotid duplex ordered by medical team.    5.
[facility] DIVISION OF KIDNEY DISEASES AND HYPERTENSION -- INITIAL CONSULT NOTE  --------------------------------------------------------------------------------  HPI:  67yo M w/ PMH CVA x 7 (most recent 2017) w/ residual dysphagia + aphasia, CAD sp stent x12, (most recent  Stent x5 05/2013) + 3v CABG, PAD sp ballooning to BL legs, PPM, CRYSTAL sp CEA, htn, dm2 on insulin, MRSA bacteremia w/ ppm lead infection, osteomyelitis sp recent LT great toe amp on 04/05/2019 presenting w/ complaints of facial droop. pt awake + alert but given verbal difficulties story obtained from wife @ bedside. states that she was in the home with pt when he was noted to have slight LT sided facial droop around 0730 in AM, present intermittently, she stated it was "going and coming". pt's children also noted and prompted her to call EMS. Upon arrival droop still initially present but then resolved, pt wife states that she has noticed it a couple of times but that overall it is much better. Also recalls him being w/ generalized weakness today, like "jello" but notes that his strength has recovered. Pt denies having any symptoms, feels normal.     Of note, pt also w/ recent ep of diarrhea. Per wife, states he has been having foul smelling diarrhea, 2-3 ep intermittently per day since discharge from [facility] after toe amputation. Describes it as solid but large volume each time requiring her to place him in several diapers.     PAST HISTORY  --------------------------------------------------------------------------------  PAST MEDICAL & SURGICAL HISTORY:  CVA (cerebral vascular accident)  Diabetic neuropathy  DM (diabetes mellitus)  Hyperlipidemia,    AICD (automatic cardioverter/defibrillator) present  Stented coronary artery  HTN (hypertension)  History of amputation of toe  Cardiac pacemaker    FAMILY HISTORY:  No pertinent family history in first degree relatives    PAST SOCIAL HISTORY: No ETOH,    ALLERGIES & MEDICATIONS  --------------------------------------------------------------------------------  Allergies    No Known Allergies    Standing Inpatient Medications  aspirin  chewable 81 milliGRAM(s) Oral daily  atorvastatin 40 milliGRAM(s) Oral at bedtime  carvedilol 12.5 milliGRAM(s) Oral every 12 hours  cefTRIAXone   IVPB      cefTRIAXone   IVPB 1 Gram(s) IV Intermittent every 24 hours  clopidogrel Tablet 75 milliGRAM(s) Oral daily  dextrose 5%. 1000 milliLiter(s) IV Continuous <Continuous>  dextrose 50% Injectable 12.5 Gram(s) IV Push once  dextrose 50% Injectable 25 Gram(s) IV Push once  dextrose 50% Injectable 25 Gram(s) IV Push once  doxycycline hyclate Capsule 100 milliGRAM(s) Oral every 12 hours  heparin  Injectable 5000 Unit(s) SubCutaneous every 12 hours  hydrALAZINE 25 milliGRAM(s) Oral three times a day  insulin lispro (HumaLOG) corrective regimen sliding scale   SubCutaneous three times a day before meals  isosorbide    mononitrate Tablet (ISMO) 20 milliGRAM(s) Oral daily  lactated ringers. 1000 milliLiter(s) IV Continuous <Continuous>  saccharomyces boulardii 250 milliGRAM(s) Oral two times a day  sertraline 100 milliGRAM(s) Oral daily  sodium chloride 0.45% 1000 milliLiter(s) IV Continuous <Continuous>    PRN Inpatient Medications  dextrose 40% Gel 15 Gram(s) Oral once PRN  glucagon  Injectable 1 milliGRAM(s) IntraMuscular once PRN  hydrALAZINE Injectable 10 milliGRAM(s) IV Push every 6 hours PRN    REVIEW OF SYSTEMS  --------------------------------------------------------------------------------  Gen: No weight changes, fatigue, fevers/chills, weakness  Skin: No rashes  Head/Eyes/Ears/Mouth: No headache; Normal hearing; Normal vision w/o blurriness; No sinus pain/discomfort, sore throat  Respiratory: No dyspnea, cough, wheezing, hemoptysis  CV: No chest pain, PND, orthopnea  GI: No abdominal pain, diarrhea, constipation, nausea, vomiting, melena, hematochezia  : No increased frequency, dysuria, hematuria, nocturia  MSK: No joint pain/swelling; no back pain; no edema  Neuro: No dizziness/lightheadedness, weakness, seizures, numbness, tingling  Heme: No easy bruising or bleeding  Endo: No heat/cold intolerance  Psych: No significant nervousness, anxiety, stress, depression    All other systems were reviewed and are negative, except as noted.    VITALS/PHYSICAL EXAM  --------------------------------------------------------------------------------  T(C): 37.1 (04-23-19 @ 08:00), Max: 37.1 (04-23-19 @ 00:00)  HR: 70 (04-23-19 @ 08:36) (62 - 70)  BP: 161/65 (04-23-19 @ 08:36) (105/56 - 178/77)  RR: 15 (04-23-19 @ 08:36) (15 - 23)  SpO2: 98% (04-23-19 @ 08:36) (97% - 98%)    04-22-19 @ 07:01  -  04-23-19 @ 07:00  --------------------------------------------------------  IN: 100 mL / OUT: 0 mL / NET: 100 mL    04-23-19 @ 07:01  -  04-23-19 @ 11:25  --------------------------------------------------------  IN: 300 mL / OUT: 0 mL / NET: 300 mL    Physical Exam:  	Gen: NAD, Pale, ill-appearing  	HEENT: PERRL, supple neck,   	Pulm: CTA B/L  	CV: RRR, S1S2; no rub  	Back: No spinal or CVA tenderness; no sacral edema  	Abd: +BS, soft, nontender/nondistended  	: No suprapubic tenderness  	UE: Warm, FROM, no clubbing, intact strength; no edema; no asterixis  	LE: Warm, FROM, no clubbing, intact strength; no edema  	Neuro: No focal deficits,  	Psych: Normal affect and mood  	Skin: Warm, without rashes  	Vascular access:    LABS/STUDIES  --------------------------------------------------------------------------------              8.4    6.9   >-----------<  207      [04-23-19 @ 05:23]              26.5     146  |  116  |  52.0  ----------------------------<  365      [04-23-19 @ 05:23]  4.3   |  17.0  |  2.51        Ca     8.6     [04-23-19 @ 05:23]      Mg     2.1     [04-23-19 @ 05:23]    Troponin 0.11      [04-23-19 @ 05:23]  CK 82      [04-23-19 @ 05:23]    Creatinine Trend:  SCr 2.51 [04-23 @ 05:23]  SCr 2.43 [04-22 @ 05:46]  SCr 2.26 [04-21 @ 05:41]  SCr 2.38 [04-20 @ 15:47]    Urinalysis - [04-21-19 @ 06:53]      Color Yellow / Appearance Clear / SG 1.015 / pH 5.0      Gluc 250 / Ketone Trace  / Bili Negative / Urobili Negative       Blood Large / Protein 500 / Leuk Est Moderate / Nitrite Positive      RBC 11-25 / WBC >50 / Hyaline  / Gran  / Sq Epi  / Non Sq Epi Few / Bacteria Moderate      PTH -- (Ca 8.6)      [08-30-18 @ 18:22]   77  Vitamin D (25OH) 11.2      [08-30-18 @ 18:22]  HbA1c 7.2      [04-21-19 @ 07:43]  Lipid: chol 137, , HDL 36, LDL 57      [09-07-18 @ 02:52]    HCV 0.11, Nonreact      [04-21-19 @ 14:21]    PROCEDURE DATE:  04/20/2019      INTERPRETATION:  Portable chest radiograph        Portable chest radiograph        CLINICAL INFORMATION:   Transient ischemic attack.  Chest x-ray ordered   as part of standard stroke protocol /altered mental status workup at   [facility] emergency department .    TECHNIQUE:  Portable  AP view of the chest was obtained.    COMPARISON: No previous examinations are available for review.    FINDINGS:   The lungs  are clear.  No pleural abnormality is seen.         The  heart is enlarged in transverse diameter. No hilar mass. Trachea   midline.       . Status post coronary artery bypass graft procedure. Cardiac device wire   lead is within right ventricle.    Visualized osseous structures are intact.    IMPRESSION:   No evidence of active chest disease.

## 2019-04-23 NOTE — PROGRESS NOTE ADULT - ASSESSMENT
68yoM w/ PMH CVA x7 (most recent 2017) w/ residual dysphagia + aphasia, CAD sp stent x12, (most recently w/ stent x5 05/2013) + 3v CABG, PAD sp ballooning to BL legs, PPM, CRYSTAL sp CEA, htn, dm2 on insulin, MRSA bacteremia w/ ppm lead infection, osteomyelitis s/p recent LT hallux amputation on 04/22/19 on doxy who presented with worsening weakness, transient left facial droop. Facial droop resolved on admission, weakness persists. CT head negative for any new infarction x 2, prior old infarcts noted. Noted with UA positive likely all sx attributed to UTI. Empirically placed on rocephin. Also noted with elevated troponin in the setting of acute on chronic renal failure likely secondary to ckd. Cardio consulted and pt pending tte. Nephrology consulted for worsening renal failure.       1) Acute metabolic encephalopathy/ weakness 2/2 UTI   - c/w tx for UTI   - no evidence of CVA   -f/u urine cx     2) UTI   - UA positive    - urine cx ordered   - c/w rocephin 1 G IVPB QD D#3  - c/w florastor 250mg po bid     3) Elevated troponin in the setting of CKD   -may be in setting of renal dysfunction  - c/w asa, atorvastatin and coreg   - TTE still pending  -cardio following    4) Acute on chronic renal failure - CKD3  - baseline cr 1.8-2    - nephrology consult appreciated   - IVF changed to sodium bicarb  - f/u am labs  - urine studies  - avoid nephrotoxic medications  - continue to hold lisinopril until creatine back to baseline 2 and below   - pt follows with outpt nephrologist Dr. Barbour    5) HTN  - c/w coreg and imdur   - pt also takes lisinopril 2.5mg po qd given ALLISON on ckd (if normalizes back to 2 and below will restart)  - BP still elevated- increased Hydralazine to 50 mg tid  - cont to monitor    6) Hx CVA  - likely current episode of transient weakness is related to infection not a TIA or CVA   - c/w asa, plavix and atorvastatin   - ct head negative x 2; prior lacunar infarcts noted   - unable to do MRI given ICD   - carotid doppler w < 50% stenosis  - neurology consulted- recommending medical management for small vessel disease  - cleared by neurology for discharge    7) S/p left foot hallux amputation  - Recently performed sx removal on 4/22/19  - c/w doxycycline for 5 more days   - c/w local wound care   - pt to f/u as an outpt with podiatrist Dr. Lofton     8) DM2  - c/w accuchecks ACHS TID  - c/w lantus 10 u sq qhs   - c/w HSS   - (at home pt takes levemir 20 u sq qhs and HSS)   - diet changed to CCD     9) Prophylactic measure  - DVT ppx: heparin sq     Dispo: Pt lives at home and wife is the primary caretaker. once TTE done and cleared by cardio/nephrology then d/c home 68yoM w/ PMH CVA x7 (most recent 2017) w/ residual dysphagia + aphasia, CAD sp stent x12, (most recently w/ stent x5 05/2013) + 3v CABG, PAD sp ballooning to BL legs, PPM, CRYSTAL sp CEA, htn, dm2 on insulin, MRSA bacteremia w/ ppm lead infection, osteomyelitis s/p recent LT hallux amputation on 04/22/19 on doxy who presented with worsening weakness, transient left facial droop. Facial droop resolved on admission, weakness persists. CT head negative for any new infarction x 2, prior old infarcts noted. Noted with UA positive likely all sx attributed to UTI. Empirically placed on rocephin. Also noted with elevated troponin in the setting of acute on chronic renal failure likely secondary to ckd. Cardio consulted and pt pending tte. Nephrology consulted for worsening renal failure.       1) Acute metabolic encephalopathy/ weakness 2/2 UTI   - c/w tx for UTI   - no evidence of CVA   - f/u urine cx     2) UTI   - UA positive    - urine cx ordered   - c/w rocephin 1 G IVPB QD D#3  - c/w florastor 250mg po bid     3) Elevated troponin in the setting of CKD   -may be in setting of renal dysfunction  - c/w asa, atorvastatin and coreg   - TTE still pending  -cardio following    4) Acute on chronic renal failure - CKD3  - baseline cr 1.8-2    - nephrology consult appreciated   - IVF changed to sodium bicarb  - f/u am labs  - urine studies  - avoid nephrotoxic medications  - continue to hold lisinopril until creatine back to baseline 2 and below   - pt follows with outpt nephrologist Dr. Barbour    5) HTN  - c/w coreg and imdur   - pt also takes lisinopril 2.5mg po qd given ALLISON on ckd (if normalizes back to 2 and below will restart)  - BP still elevated- increased Hydralazine to 50 mg tid  - cont to monitor    6) Hx CVA  - likely current episode of transient weakness is related to infection not a TIA or CVA   - c/w asa, plavix and atorvastatin   - ct head negative x 2; prior lacunar infarcts noted   - unable to do MRI given ICD   - carotid doppler w < 50% stenosis  - neurology consulted- recommending medical management for small vessel disease  - cleared by neurology for discharge    7) S/p left foot hallux amputation  - Recently performed at [facility]  - was scheduled for suture removal on 4/22/19, wife advised to reschedule for the end of this week  - c/w doxycycline for 5 more days   - c/w local wound care   - pt to f/u as an outpt with podiatrist Dr. Lofton     8) DM2  - c/w accuchecks ACHS TID  - c/w lantus 10 u sq qhs   - c/w HSS   - (at home pt takes levemir 20 u sq qhs and HSS)   - diet changed to CCD     9) Prophylactic measure  - DVT ppx: heparin sq     Dispo: Pt lives at home and wife is the primary caretaker. once TTE done and cleared by cardio/nephrology then d/c home

## 2019-04-23 NOTE — CONSULT NOTE ADULT - ASSESSMENT
68yoM w/ PMH CVA x7 (most recent 2017) w/ residual dysphagia + aphasia, CAD sp stent x12, (most recently w/ stent x5 05/2013) + 3v CABG, PAD sp ballooning to BL legs, PPM, CRYSTAL sp CEA, htn, dm2 on insulin, MRSA bacteremia w/ ppm lead infection, osteomyelitis sp recent LT great toe amp on 04/05/2019 presenting w/ complaints of facial droop, admitted for TIA r/o CVA    #TIA r/o CVA.  stable  nih 3 on arrival sec to resiudal aphasia, no acute findings  facial droop resolved  CTH w/o acute infarct, PPM not MR compatible per wife, card reviewed, repeat CTH in am  tele, no hx of afib, not on AC  npo until slp eval  neuro eval pending  neuro checks q4    #NSTEMI  asymptomatic , Related to CKD,   trop 0.22, trend x 2    #ALLISON on CKD3  Baseline cr 1.8-2  ivf, Trend Scr., UO , Control of Hyperglycemia,     #DM2 on insulin therapy complicated by asymptomatic hyperglycemia    per HM,

## 2019-04-23 NOTE — DISCHARGE NOTE NURSING/CASE MANAGEMENT/SOCIAL WORK - NSDCDPATPORTLINK_GEN_ALL_CORE
You can access the [facility] Patient Portal, offered by [facility], by registering with the following website: [email or web address]

## 2019-04-24 NOTE — PROVIDER CONTACT NOTE (OTHER) - ASSESSMENT
Pt is alert and follows commands. Expressive aphasia. VSS. He denies chest pain or SOB. He does not show any s/s of distress or discomfort.

## 2019-04-24 NOTE — PROGRESS NOTE ADULT - ASSESSMENT
ASSESSMENT/PLAN: AVNI GREEN is a 68y Male with past medical history significant for with PMH of CAD s/p stent x12, (most recently w/ stent x5 05/2013), s/p CABGx3, HTN, CRI, CVA x7 (most recent 2017) w/ residual dysphagia + aphasia,  PAD s/p PTCA to BL legs, ICD, CRYSTAL s/p CEA, DM2 on insulin, MRSA bacteremia w/ ICD lead infection, osteomyelitis sp recent LT great toe amp on 04/05/2019 presenting w/ complaints of facial droop, verbal difficulties, admitted with ?TIA rule out CVA.     - Monitor on telemetry, has been SR with V pacing, rare ventricular couplets  - Mild troponin elevation without rise/fall is not consistent with ACS.  Nonspecific in setting of decreased renal function and absence of CP.  - Echocardiogram 11/9/18 noted above.  Repeat pending.  - Carotid Duplex Scan pending  - No evidence of ischemia or CHF clinically, consider eventual ischemic evaluation (as outpatient)  - Agree with ASA, Plavix and statin  - Further work-up & testing per neurology  - BP elevated at times.  Continue Coreg 12.5mg BID, Imdur, and increase Hydralazine to 25mg TID; titrate prn  - DNR status is noted.  - Stable CV status at this time, will follow PRN, thank you.

## 2019-04-24 NOTE — PROGRESS NOTE ADULT - ASSESSMENT
68yoM w/ PMH CVA x7 (most recent 2017) w/ residual dysphagia + aphasia, CAD sp stent x12, (most recently w/ stent x5 05/2013) + 3v CABG, PAD sp ballooning to BL legs, PPM, CRYSTAL sp CEA, htn, dm2 on insulin, MRSA bacteremia w/ ppm lead infection, osteomyelitis s/p recent LT hallux amputation on 04/22/19 on doxy who presented with worsening weakness, transient left facial droop. Facial droop resolved on admission, weakness persists. CT head negative for any new infarction x 2, prior old infarcts noted. Noted with UA positive likely all sx attributed to UTI. Empirically placed on rocephin. Also noted with elevated troponin in the setting of acute on chronic renal failure likely secondary to ckd. Cardio consulted and Nephrology consulted for worsening renal failure.       1) Acute metabolic encephalopathy/ weakness 2/2 UTI   - c/w tx for UTI   - no evidence of CVA   - seems to be at baseline     2) UTI -   - UA positive    - c/w rocephin 1 G IVPB QD D#4  - c/w florastor 250mg po bid     3) Elevated troponin in the setting of CKD   -may be in setting of renal dysfunction  - c/w asa, atorvastatin and coreg   - TTE shows slighlty reduced ef  -cardio following  --> c,w asa, coreg, lipitor  --> not on ace-I due to CKD    4) Acute on chronic renal failure - CKD3  - baseline cr 1.8-2    - nephrology following  - improved  - continue to hold lisinopril until creatine back to baseline 2 and below   - pt follows with outpt nephrologist Dr. Barbour    5) HTN  - c/w coreg and imdur   - pt also takes lisinopril 2.5mg po qd given ALLISON on ckd (if normalizes back to 2 and below will restart)  - cont to monitor    6) Hx CVA  - likely current episode of transient weakness is related to infection not a TIA or CVA   - c/w asa, plavix and atorvastatin   - ct head negative x 2; prior lacunar infarcts noted   - unable to do MRI given ICD   - carotid doppler w < 50% stenosis  - neurology consulted- recommending medical management for small vessel disease  - cleared by neurology for discharge    7) S/p left foot hallux amputation  - Recently performed at [facility]  - was scheduled for suture removal on 4/22/19, wife advised to reschedule for the end of this week  - c/w doxycycline for 4 more days   - c/w local wound care   - pt to f/u as an outpt with podiatrist Dr. Lofton     8) DM2  - c/w accuchecks ACHS TID  - c/w lantus 10 u sq qhs   - c/w HSS   - (at home pt takes levemir 20 u sq qhs and HSS)   - diet changed to CCD     9) Prophylactic measure  - DVT ppx: heparin sq     Dispo: Pt lives at home and wife is the primary caretaker  --> lowell xie tomorrow

## 2019-04-24 NOTE — PROGRESS NOTE ADULT - ASSESSMENT
1. ALLISON on CKD IV  2. NSTEMI  3. TIA r/o CVA  4. DM II    SCr improved to 2.1 today  Baseline SCr 1.8 - 2  IV Bicarb  Trend Scr., UO  TIA/CVA - stable  Asymptomatic NSTEMI, r/t CKD   trop 0.22, trend x 2  FSBS w/ISS  Control of Hyperglycemia   Continue current management

## 2019-04-25 NOTE — DISCHARGE NOTE PROVIDER - NSDCCPCAREPLAN_GEN_ALL_CORE_FT
PRINCIPAL DISCHARGE DIAGNOSIS  Diagnosis: Acute UTI  Assessment and Plan of Treatment: s/p completion of abx      SECONDARY DISCHARGE DIAGNOSES  Diagnosis: Metabolic encephalopathy  Assessment and Plan of Treatment: due to UTI: resolved

## 2019-04-25 NOTE — DISCHARGE NOTE PROVIDER - PROVIDER TOKENS
PROVIDER:[TOKEN:[[id number]]],PROVIDER:[TOKEN:[[id number]]],FREE:[LAST:[PCP],PHONE:[(   )    -],FAX:[(   )    -]]

## 2019-04-25 NOTE — PROGRESS NOTE ADULT - REASON FOR ADMISSION
facial droop

## 2019-04-25 NOTE — DISCHARGE NOTE PROVIDER - CARE PROVIDER_API CALL
Kvng Tinsley)  Internal Medicine; Nephrology  [address] [place] [address]  Phone: [phone]  Fax: [phone]  Follow Up Time:     Desmond Ahumada)  Cardiovascular Disease; Internal Medicine  [address], [address]  [place] [address]  Phone: [phone]  Fax: [phone]  Follow Up Time:     PCP,   Phone: (   )    -  Fax: (   )    -  Follow Up Time:

## 2019-04-25 NOTE — PROGRESS NOTE ADULT - SUBJECTIVE AND OBJECTIVE BOX
[facility], [facility]                                          [address] [facility], [address], [place] [address]                                               PHONE: [phone]    FAX: [phone] [address], [address], [place] [address]                                       PHONE: [phone]    FAX: [phone]  *******************************************************************************    Overnight events/Subjective Assessment: No cardiac complaints.  Denies CP/palp/SOB    INTERPRETATION OF TELEMETRY (personally reviewed):  SR with V pacing    No Known Allergies    MEDICATIONS  (STANDING):  aspirin  chewable 81 milliGRAM(s) Oral daily  atorvastatin 40 milliGRAM(s) Oral at bedtime  carvedilol 12.5 milliGRAM(s) Oral every 12 hours  cefTRIAXone   IVPB      cefTRIAXone   IVPB 1 Gram(s) IV Intermittent every 24 hours  clopidogrel Tablet 75 milliGRAM(s) Oral daily  dextrose 5%. 1000 milliLiter(s) (50 mL/Hr) IV Continuous <Continuous>  dextrose 50% Injectable 12.5 Gram(s) IV Push once  dextrose 50% Injectable 25 Gram(s) IV Push once  dextrose 50% Injectable 25 Gram(s) IV Push once  doxycycline hyclate Capsule 100 milliGRAM(s) Oral every 12 hours  heparin  Injectable 5000 Unit(s) SubCutaneous every 12 hours  hydrALAZINE 25 milliGRAM(s) Oral daily  insulin lispro (HumaLOG) corrective regimen sliding scale   SubCutaneous three times a day before meals  isosorbide    mononitrate Tablet (ISMO) 20 milliGRAM(s) Oral daily  saccharomyces boulardii 250 milliGRAM(s) Oral two times a day  sertraline 100 milliGRAM(s) Oral daily    MEDICATIONS  (PRN):  dextrose 40% Gel 15 Gram(s) Oral once PRN Blood Glucose LESS THAN 70 milliGRAM(s)/deciliter  glucagon  Injectable 1 milliGRAM(s) IntraMuscular once PRN Glucose LESS THAN 70 milligrams/deciliter  hydrALAZINE Injectable 10 milliGRAM(s) IV Push every 6 hours PRN sbp > 220 or dbp > 120      Vital Signs Last 24 Hrs  T(C): 36.7 (22 Apr 2019 07:22), Max: 37.2 (21 Apr 2019 19:33)  T(F): 98.1 (22 Apr 2019 07:22), Max: 98.9 (21 Apr 2019 19:33)  HR: 56 (22 Apr 2019 07:22) (56 - 72)  BP: 135/60 (22 Apr 2019 07:22) (135/60 - 180/79)  BP(mean): --  RR: 16 (22 Apr 2019 07:22) (16 - 20)  SpO2: 96% (22 Apr 2019 07:22) (96% - 99%)    I&O's Detail    I&O's Summary          PHYSICAL EXAM:  General: Appears chronically ill-appearing, NAD  HEENT: Head: normocephalic, atraumatic  Eyes: Pupils equal and reactive  Neck: Supple, no carotid bruit, no JVD, no HJR  CARDIOVASCULAR: Normal S1 and S2, no murmur, rub, or gallop  LUNGS: Clear to auscultation bilaterally, no rales, rhonchi or wheeze  ABDOMEN: Soft, nontender, non-distended, positive bowel sounds, no mass or bruit  EXTREMITIES: No edema, distal pulses WNL  left LE in dressing.  SKIN: Warm and dry with normal turgor  NEURO: Alert, nonverbal, moving all ext  PSYCH: unable to assess        LABS:                        9.1    14.0  )-----------( 221      ( 21 Apr 2019 07:42 )             28.9     04-22    146<H>  |  115<H>  |  52.0<H>  ----------------------------<  441<H>  4.8   |  19.0<L>  |  2.43<H>    Ca    8.9      22 Apr 2019 05:46  Mg     2.0     04-20    TPro  6.8  /  Alb  3.1<L>  /  TBili  0.5  /  DBili  x   /  AST  11  /  ALT  7   /  AlkPhos  87  04-20    CARDIAC MARKERS ( 21 Apr 2019 05:41 )  x     / 0.22 ng/mL / x     / x     / x      CARDIAC MARKERS ( 21 Apr 2019 00:50 )  x     / 0.24 ng/mL / x     / x     / x      CARDIAC MARKERS ( 20 Apr 2019 15:47 )  x     / 0.22 ng/mL / x     / x     / x          PT/INR - ( 21 Apr 2019 07:42 )   PT: 16.4 sec;   INR: 1.41 ratio         PTT - ( 20 Apr 2019 15:47 )  PTT:33.5 sec  serum  Lipids:   Hemoglobin A1C, Whole Blood: 7.2 % (04-21 @ 07:43)        RADIOLOGY & ADDITIONAL STUDIES:  < from: CT Head No Cont (04.21.19 @ 08:23) >  IMPRESSION:  No interval change.    Moderate atrophy and chronic white matter microvascular ischemic changes   as described.   If acute stroke is of clinical concern, MRI with diffusion-weighted   images would be helpful for further characterization.    < end of copied text >    ECHO:   < from: [facility] Echo Doppler (11.09.18 @ 12:54) >  Summary:   1. Left ventricular ejection fraction, by visual estimation, is 40 to   45%.   2. Technically fair study.   3. Mildly decreased global left ventricular systolic function.   4. The left ventricular diastolic function could not be assessed in this   study.   5. There is no evidence of pericardial effusion.   6. Mild mitral valve regurgitation.   7. Mild tricuspid regurgitation.   8. Trace pulmonic valve regurgitation.   9. There is no vegetation on any of the cardiac valve. Thrombus coated   pacing leads are seen.  10. Clinical correlation is advised.  11. Color flow doppler and intravenous injection of agitated saline   demonstrates the presence of an intact intra atrial septum.  12. No left atrial appendage thrombus.      ASSESSMENT AND PLAN:  AVNI GREEN is a 68y Male with past medical history significant for with PMH of CAD s/p stent x12, (most recently w/ stent x5 05/2013), s/p CABGx3, HTN, CRI, CVA x7 (most recent 2017) w/ residual dysphagia + aphasia,  PAD s/p PTCA to BL legs, ICD, CRYSTAL s/p CEA, DM2 on insulin, MRSA bacteremia w/ ICD lead infection, osteomyelitis sp recent LT great toe amp on 04/05/2019 presenting w/ complaints of facial droop, verbal difficulties, admitted with ?TIA rule out CVA.     - Monitor on telemetry, has been SR with V pacing  - Mild troponin elevation without rise/fall is not consistent with ACS.  Nonspecific in setting of decreased renal function and absence of CP.  - Echocardiogram 11/9/18 noted above.  Repeat pending.  - Carotid Duplex Scan pending  - No evidence of ischemia or CHF clinically, consider eventual ischemic evaluation (as outpatient)  - Agree with ASA, Plavix and statin  - Further work-up & testing per neurology  - BP elevated at times.  Continue Coreg 12.5mg BID, Imdur, and increase Hydralazine to 25mg TID; titrate prn  - DNR status is noted.      Desmond Jennings MD
[facility] DIVISION OF KIDNEY DISEASES AND HYPERTENSION -- FOLLOW UP NOTE  --------------------------------------------------------------------------------  Chief Complaint:  ALLISON on CKD IV    24 hour events/subjective:  Pt seen and examined  NAD  Bicarb gtt      PAST HISTORY  --------------------------------------------------------------------------------  No significant changes to PMH, PSH, FHx, SHx, unless otherwise noted    ALLERGIES & MEDICATIONS  --------------------------------------------------------------------------------  Allergies    No Known Allergies    Intolerances      Standing Inpatient Medications  aspirin  chewable 81 milliGRAM(s) Oral daily  atorvastatin 40 milliGRAM(s) Oral at bedtime  carvedilol 12.5 milliGRAM(s) Oral every 12 hours  cefTRIAXone   IVPB      cefTRIAXone   IVPB 1 Gram(s) IV Intermittent every 24 hours  chlorhexidine 2% Cloths 1 Application(s) Topical <User Schedule>  clopidogrel Tablet 75 milliGRAM(s) Oral daily  dextrose 5%. 1000 milliLiter(s) IV Continuous <Continuous>  dextrose 50% Injectable 12.5 Gram(s) IV Push once  dextrose 50% Injectable 25 Gram(s) IV Push once  dextrose 50% Injectable 25 Gram(s) IV Push once  doxycycline hyclate Capsule 100 milliGRAM(s) Oral every 12 hours  heparin  Injectable 5000 Unit(s) SubCutaneous every 12 hours  hydrALAZINE 75 milliGRAM(s) Oral every 8 hours  insulin lispro (HumaLOG) corrective regimen sliding scale   SubCutaneous three times a day before meals  isosorbide    mononitrate Tablet (ISMO) 20 milliGRAM(s) Oral daily  mupirocin 2% Ointment 1 Application(s) Topical two times a day  saccharomyces boulardii 250 milliGRAM(s) Oral two times a day  sertraline 100 milliGRAM(s) Oral daily  sodium chloride 0.45% 1000 milliLiter(s) IV Continuous <Continuous>    PRN Inpatient Medications  dextrose 40% Gel 15 Gram(s) Oral once PRN  glucagon  Injectable 1 milliGRAM(s) IntraMuscular once PRN  hydrALAZINE Injectable 10 milliGRAM(s) IV Push every 6 hours PRN      REVIEW OF SYSTEMS  --------------------------------------------------------------------------------  Gen: No weight changes, fatigue, fevers/chills, weakness  Skin: No rashes  Head/Eyes/Ears/Mouth: No headache; Normal hearing; Normal vision w/o blurriness; No sinus pain/discomfort, sore throat  Respiratory: No dyspnea, cough, wheezing, hemoptysis  CV: No chest pain, PND, orthopnea  GI: No abdominal pain, diarrhea, constipation, nausea, vomiting, melena, hematochezia  : No increased frequency, dysuria, hematuria, nocturia  MSK: No joint pain/swelling; no back pain; no edema  Neuro: No dizziness/lightheadedness, weakness, seizures, numbness, tingling  Heme: No easy bruising or bleeding  Endo: No heat/cold intolerance  Psych: No significant nervousness, anxiety, stress, depression    All other systems were reviewed and are negative, except as noted.    VITALS/PHYSICAL EXAM  --------------------------------------------------------------------------------  T(C): 36.9 (04-24-19 @ 10:32), Max: 37.1 (04-23-19 @ 14:27)  HR: 74 (04-24-19 @ 10:32) (62 - 74)  BP: 160/64 (04-24-19 @ 10:32) (149/62 - 161/68)  RR: 20 (04-24-19 @ 10:32) (16 - 20)  SpO2: 90% (04-24-19 @ 04:05) (90% - 98%)  Wt(kg): --        04-23-19 @ 07:01  -  04-24-19 @ 07:00  --------------------------------------------------------  IN: 1340 mL / OUT: 0 mL / NET: 1340 mL      Physical Exam:  	Gen: NAD, ill-appearing, pale  	HEENT: PERRL, supple neck, clear oropharynx  	Pulm: CTA B/L  	CV: RRR, S1S2; no rub  	Back: No spinal or CVA tenderness; no sacral edema  	Abd: +BS, soft, nontender/nondistended  	: No suprapubic tenderness  	UE: Warm, FROM, no clubbing, intact strength; no edema; no asterixis  	LE: Warm, FROM, no clubbing, intact strength; no edema  	Neuro: No focal deficits, intact gait  	Psych: Normal affect and mood  	Skin: Warm, without rashes  	Vascular access:  N/A    LABS/STUDIES  --------------------------------------------------------------------------------              8.0    5.5   >-----------<  207      [04-24-19 @ 07:24]              26.0     146  |  115  |  49.0  ----------------------------<  295      [04-24-19 @ 07:24]  4.2   |  19.0  |  2.13        Ca     8.3     [04-24-19 @ 07:24]      Mg     1.9     [04-24-19 @ 07:24]    TPro  5.8  /  Alb  2.6  /  TBili  <0.2  /  DBili  x   /  AST  12  /  ALT  6   /  AlkPhos  69  [04-24-19 @ 07:24]        Troponin 0.11      [04-23-19 @ 05:23]  CK 82      [04-23-19 @ 05:23]    Creatinine Trend:  SCr 2.13 [04-24 @ 07:24]  SCr 2.51 [04-23 @ 05:23]  SCr 2.43 [04-22 @ 05:46]  SCr 2.26 [04-21 @ 05:41]  SCr 2.38 [04-20 @ 15:47]    Urinalysis - [04-23-19 @ 21:56]      Color Yellow / Appearance Clear / SG 1.015 / pH 6.0      Gluc 250 / Ketone Negative  / Bili Negative / Urobili Negative       Blood Large / Protein 100 / Leuk Est Small / Nitrite Negative      RBC 6-10 / WBC 11-25 / Hyaline  / Gran  / Sq Epi  / Non Sq Epi Occasional / Bacteria Occasional    Urine Creatinine 58      [04-23-19 @ 21:55]  Urine Protein 130.0      [04-23-19 @ 21:55]  Urine Sodium 92      [04-23-19 @ 21:55]  Urine Potassium 22      [04-23-19 @ 21:55]  Urine Chloride 80      [04-23-19 @ 21:55]  Urine Osmolality 453      [04-23-19 @ 21:56]    PTH -- (Ca 8.6)      [08-30-18 @ 18:22]   77  Vitamin D (25OH) 11.2      [08-30-18 @ 18:22]  HbA1c 7.2      [04-21-19 @ 07:43]  TSH 2.32      [04-24-19 @ 07:24]  Lipid: chol 137, , HDL 36, LDL 57      [09-07-18 @ 02:52]    HCV 0.11, Nonreact      [04-21-19 @ 14:21]
AVNI GREEN    [id number]    68y      Male    CC: facial droop    INTERVAL HPI/OVERNIGHT EVENTS:  68yoM w/ PMH CVA x7 (most recent 2017) w/ residual dysphagia + aphasia, CAD sp stent x12, (most recently w/ stent x5 2013) + 3v CABG, PAD sp ballooning to BL legs, PPM, CRYSTAL sp CEA, htn, dm2 on insulin, MRSA bacteremia w/ ppm lead infection, osteomyelitis s/p recent LT hallux amputation on 19 on doxy who presented with worsening weakness, transient left facial droop. Facial droop resolved on admission, weakness persists. CT head negative for any new infarction x 2, prior old infarcts noted. Noted with UA positive likely all sx attributed to UTI. Empirically placed on Rocephin Also noted with elevated troponin in the setting of acute on chronic renal failure likely secondary to ckd. Cardio consulted and pt pending tte.     pt denies any pain. no sob no chest pain no new events    REVIEW OF SYSTEMS:    CONSTITUTIONAL: No fever or fatigue  RESPIRATORY: No cough, wheezing, hemoptysis; No shortness of breath  CARDIOVASCULAR: No chest pain, palpitations  GASTROINTESTINAL: No abdominal or epigastric pain. No nausea, vomiting      Vital Signs Last 24 Hrs  T(C): 36.3 (2019 13:51), Max: 37.2 (2019 19:33)  T(F): 97.4 (2019 13:51), Max: 98.9 (2019 19:33)  HR: 64 (2019 13:51) (56 - 72)  BP: 152/67 (2019 13:51) (135/60 - 180/79)  BP(mean): --  RR: 18 (2019 13:51) (16 - 20)  SpO2: 97% (2019 13:51) (96% - 99%)    PHYSICAL EXAM:    GENERAL: NAD, well-groomed  HEENT: PERRL, +EOMI  CHEST/LUNG: Clear to auscultation bilaterally; No wheezing  HEART: S1S2+, Regular rate and rhythm; No murmurs  ABDOMEN: Soft, Nontender, Nondistended; Bowel sounds present  EXTREMITIES:  2+ Peripheral Pulses, No clubbing, cyanosis, or edema      LABS:                        9.1    14.0  )-----------( 221      ( 2019 07:42 )             28.9     04-22    146<H>  |  115<H>  |  52.0<H>  ----------------------------<  441<H>  4.8   |  19.0<L>  |  2.43<H>    Ca    8.9      2019 05:46  Mg     2.0     04-20    TPro  6.8  /  Alb  3.1<L>  /  TBili  0.5  /  DBili  x   /  AST  11  /  ALT  7   /  AlkPhos  87  04-20    PT/INR - ( 2019 07:42 )   PT: 16.4 sec;   INR: 1.41 ratio         PTT - ( 2019 15:47 )  PTT:33.5 sec  Urinalysis Basic - ( 2019 06:53 )    Color: Yellow / Appearance: Clear / S.015 / pH: x  Gluc: x / Ketone: Trace  / Bili: Negative / Urobili: Negative mg/dL   Blood: x / Protein: 500 mg/dL / Nitrite: Positive   Leuk Esterase: Moderate / RBC: 11-25 /HPF / WBC >50   Sq Epi: x / Non Sq Epi: Few / Bacteria: Moderate          MEDICATIONS  (STANDING):  aspirin  chewable 81 milliGRAM(s) Oral daily  atorvastatin 40 milliGRAM(s) Oral at bedtime  carvedilol 12.5 milliGRAM(s) Oral every 12 hours  cefTRIAXone   IVPB      cefTRIAXone   IVPB 1 Gram(s) IV Intermittent every 24 hours  clopidogrel Tablet 75 milliGRAM(s) Oral daily  dextrose 5%. 1000 milliLiter(s) (50 mL/Hr) IV Continuous <Continuous>  dextrose 50% Injectable 12.5 Gram(s) IV Push once  dextrose 50% Injectable 25 Gram(s) IV Push once  dextrose 50% Injectable 25 Gram(s) IV Push once  doxycycline hyclate Capsule 100 milliGRAM(s) Oral every 12 hours  heparin  Injectable 5000 Unit(s) SubCutaneous every 12 hours  hydrALAZINE 25 milliGRAM(s) Oral three times a day  insulin lispro (HumaLOG) corrective regimen sliding scale   SubCutaneous three times a day before meals  isosorbide    mononitrate Tablet (ISMO) 20 milliGRAM(s) Oral daily  saccharomyces boulardii 250 milliGRAM(s) Oral two times a day  sertraline 100 milliGRAM(s) Oral daily    MEDICATIONS  (PRN):  dextrose 40% Gel 15 Gram(s) Oral once PRN Blood Glucose LESS THAN 70 milliGRAM(s)/deciliter  glucagon  Injectable 1 milliGRAM(s) IntraMuscular once PRN Glucose LESS THAN 70 milligrams/deciliter  hydrALAZINE Injectable 10 milliGRAM(s) IV Push every 6 hours PRN sbp > 220 or dbp > 120
AVNI GREEN    [id number]    68y      Male    INTERVAL HPI/OVERNIGHT EVENTS:    patient being seen for acute metabolic encephalopathy, uti and med management. Patient seen at bedside and is more awake and alert.     REVIEW OF SYSTEMS:    CONSTITUTIONAL: No fever, weight loss, or fatigue  RESPIRATORY: No cough, wheezing, hemoptysis; No shortness of breath  CARDIOVASCULAR: No chest pain, palpitations  GASTROINTESTINAL: No abdominal or epigastric pain. No nausea, vomiting  NEUROLOGICAL: No headaches, memory loss, loss of strength.  MISCELLANEOUS:      Vital Signs Last 24 Hrs  T(C): 36.9 (2019 10:32), Max: 37.1 (2019 14:27)  T(F): 98.4 (2019 10:32), Max: 98.7 (2019 14:27)  HR: 74 (2019 10:32) (62 - 74)  BP: 160/64 (2019 10:32) (149/62 - 161/68)  BP(mean): --  RR: 20 (2019 10:32) (16 - 20)  SpO2: 90% (2019 04:05) (90% - 98%)    PHYSICAL EXAM:    GENERAL: NAD, well-groomed  HEENT: PERRL, +EOMI  CHEST/LUNG: Clear to auscultation bilaterally; No wheezing  HEART: S1S2+, Regular rate and rhythm; No murmurs  ABDOMEN: Soft, Nontender, Nondistended; Bowel sounds present  EXTREMITIES:  2+ Peripheral Pulses, No clubbing, cyanosis, or edema        LABS:                        8.0    5.5   )-----------( 207      ( 2019 07:24 )             26.0     04-24    146<H>  |  115<H>  |  49.0<H>  ----------------------------<  295<H>  4.2   |  19.0<L>  |  2.13<H>    Ca    8.3<L>      2019 07:24  Mg     1.9     04-24    TPro  5.8<L>  /  Alb  2.6<L>  /  TBili  <0.2<L>  /  DBili  x   /  AST  12  /  ALT  6   /  AlkPhos  69  24      Urinalysis Basic - ( 2019 21:56 )    Color: Yellow / Appearance: Clear / S.015 / pH: x  Gluc: x / Ketone: Negative  / Bili: Negative / Urobili: Negative mg/dL   Blood: x / Protein: 100 mg/dL / Nitrite: Negative   Leuk Esterase: Small / RBC: 6-10 /HPF / WBC 11-25   Sq Epi: x / Non Sq Epi: Occasional / Bacteria: Occasional          MEDICATIONS  (STANDING):  aspirin  chewable 81 milliGRAM(s) Oral daily  atorvastatin 40 milliGRAM(s) Oral at bedtime  carvedilol 12.5 milliGRAM(s) Oral every 12 hours  cefTRIAXone   IVPB      cefTRIAXone   IVPB 1 Gram(s) IV Intermittent every 24 hours  chlorhexidine 2% Cloths 1 Application(s) Topical <User Schedule>  clopidogrel Tablet 75 milliGRAM(s) Oral daily  dextrose 5%. 1000 milliLiter(s) (50 mL/Hr) IV Continuous <Continuous>  dextrose 50% Injectable 12.5 Gram(s) IV Push once  dextrose 50% Injectable 25 Gram(s) IV Push once  dextrose 50% Injectable 25 Gram(s) IV Push once  doxycycline hyclate Capsule 100 milliGRAM(s) Oral every 12 hours  heparin  Injectable 5000 Unit(s) SubCutaneous every 12 hours  hydrALAZINE 75 milliGRAM(s) Oral every 8 hours  insulin lispro (HumaLOG) corrective regimen sliding scale   SubCutaneous three times a day before meals  isosorbide    mononitrate Tablet (ISMO) 20 milliGRAM(s) Oral daily  mupirocin 2% Ointment 1 Application(s) Topical two times a day  saccharomyces boulardii 250 milliGRAM(s) Oral two times a day  sertraline 100 milliGRAM(s) Oral daily  sodium chloride 0.45% 1000 milliLiter(s) (80 mL/Hr) IV Continuous <Continuous>    MEDICATIONS  (PRN):  dextrose 40% Gel 15 Gram(s) Oral once PRN Blood Glucose LESS THAN 70 milliGRAM(s)/deciliter  glucagon  Injectable 1 milliGRAM(s) IntraMuscular once PRN Glucose LESS THAN 70 milligrams/deciliter  hydrALAZINE Injectable 10 milliGRAM(s) IV Push every 6 hours PRN sbp > 220 or dbp > 120      RADIOLOGY & ADDITIONAL TESTS:
Carotid doppler reveals mild to moderate disease.  No evidence of severe disease.
INTERVAL HISTORY:  Feels well, denies CP, SOB  	  MEDICATIONS:  carvedilol 12.5 milliGRAM(s) Oral every 12 hours  hydrALAZINE 75 milliGRAM(s) Oral every 8 hours  hydrALAZINE Injectable 10 milliGRAM(s) IV Push every 6 hours PRN  isosorbide    mononitrate Tablet (ISMO) 20 milliGRAM(s) Oral daily  cefTRIAXone   IVPB      cefTRIAXone   IVPB 1 Gram(s) IV Intermittent every 24 hours  doxycycline hyclate Capsule 100 milliGRAM(s) Oral every 12 hours  sertraline 100 milliGRAM(s) Oral daily  atorvastatin 40 milliGRAM(s) Oral at bedtime  dextrose 40% Gel 15 Gram(s) Oral once PRN  dextrose 50% Injectable 12.5 Gram(s) IV Push once  dextrose 50% Injectable 25 Gram(s) IV Push once  dextrose 50% Injectable 25 Gram(s) IV Push once  glucagon  Injectable 1 milliGRAM(s) IntraMuscular once PRN  insulin lispro (HumaLOG) corrective regimen sliding scale   SubCutaneous three times a day before meals  aspirin  chewable 81 milliGRAM(s) Oral daily  chlorhexidine 2% Cloths 1 Application(s) Topical <User Schedule>  clopidogrel Tablet 75 milliGRAM(s) Oral daily  dextrose 5%. 1000 milliLiter(s) IV Continuous <Continuous>  heparin  Injectable 5000 Unit(s) SubCutaneous every 12 hours  mupirocin 2% Ointment 1 Application(s) Topical two times a day  sodium chloride 0.45% 1000 milliLiter(s) IV Continuous <Continuous>        PHYSICAL EXAM:  T(C): 36.4 (04-24-19 @ 04:05), Max: 37.1 (04-23-19 @ 14:27)  HR: 62 (04-24-19 @ 04:05) (62 - 89)  BP: 149/62 (04-24-19 @ 04:05) (149/62 - 163/91)  RR: 17 (04-24-19 @ 04:05) (15 - 17)  SpO2: 90% (04-24-19 @ 04:05) (90% - 99%)  Wt(kg): --  I&O's Summary    23 Apr 2019 07:01  -  24 Apr 2019 07:00  --------------------------------------------------------  IN: 1340 mL / OUT: 0 mL / NET: 1340 mL          Appearance: Normal	  HEENT:   Normal oral mucosa  Cardiovascular: Normal S1 S2, No JVD, No murmurs, No edema  Respiratory: Lungs clear to auscultation	  Psychiatry: A & O x 3, Mood & affect appropriate  Gastrointestinal:  Soft, Non-tender, + BS	  Skin: No rashes, No ecchymoses, No cyanosis  Neurologic: Non-focal  Extremities: Normal range of motion, No clubbing, cyanosis or edema  Vascular: Peripheral pulses palpable 2+ bilaterally    TELEMETRY: SR	PVC   	  LABS:	 	                  8.0    5.5   )-----------( 207      ( 24 Apr 2019 07:24 )             26.0     04-23    146<H>  |  116<H>  |  52.0<H>  ----------------------------<  365<H>  4.3   |  17.0<L>  |  2.51<H>    Ca    8.6      23 Apr 2019 05:23  Mg     2.1     04-23
INTERVAL HISTORY:  feels fine, no interval changes      VITAL SIGNS:  Vital Signs Last 24 Hrs  T(C): 36.7 (22 Apr 2019 07:22), Max: 37.2 (21 Apr 2019 19:33)  T(F): 98.1 (22 Apr 2019 07:22), Max: 98.9 (21 Apr 2019 19:33)  HR: 56 (22 Apr 2019 07:22) (56 - 72)  BP: 135/60 (22 Apr 2019 07:22) (135/60 - 180/79)  BP(mean): --  RR: 16 (22 Apr 2019 07:22) (16 - 20)  SpO2: 96% (22 Apr 2019 07:22) (96% - 99%)    PHYSICAL EXAMINATION:    Mentation:  awake and cooperative. oriented to name   Language/Speech: fluent but does not talk much  CN: no facial droop  .speech clear. EOMI  Visual Fields: full to threat  Motor: no gross weakness.  moves 4 limbs. Perhaps slightly weaker LUE  Sensory:  DTR:  Babinski:      MEDS:  MEDICATIONS  (STANDING):  aspirin  chewable 81 milliGRAM(s) Oral daily  atorvastatin 40 milliGRAM(s) Oral at bedtime  carvedilol 12.5 milliGRAM(s) Oral every 12 hours  cefTRIAXone   IVPB      cefTRIAXone   IVPB 1 Gram(s) IV Intermittent every 24 hours  clopidogrel Tablet 75 milliGRAM(s) Oral daily  dextrose 5%. 1000 milliLiter(s) (50 mL/Hr) IV Continuous <Continuous>  dextrose 50% Injectable 12.5 Gram(s) IV Push once  dextrose 50% Injectable 25 Gram(s) IV Push once  dextrose 50% Injectable 25 Gram(s) IV Push once  doxycycline hyclate Capsule 100 milliGRAM(s) Oral every 12 hours  heparin  Injectable 5000 Unit(s) SubCutaneous every 12 hours  hydrALAZINE 25 milliGRAM(s) Oral three times a day  insulin lispro (HumaLOG) corrective regimen sliding scale   SubCutaneous three times a day before meals  isosorbide    mononitrate Tablet (ISMO) 20 milliGRAM(s) Oral daily  saccharomyces boulardii 250 milliGRAM(s) Oral two times a day  sertraline 100 milliGRAM(s) Oral daily    MEDICATIONS  (PRN):  dextrose 40% Gel 15 Gram(s) Oral once PRN Blood Glucose LESS THAN 70 milliGRAM(s)/deciliter  glucagon  Injectable 1 milliGRAM(s) IntraMuscular once PRN Glucose LESS THAN 70 milligrams/deciliter  hydrALAZINE Injectable 10 milliGRAM(s) IV Push every 6 hours PRN sbp > 220 or dbp > 120      LABS:                          9.1    14.0  )-----------( 221      ( 21 Apr 2019 07:42 )             28.9     04-22    146<H>  |  115<H>  |  52.0<H>  ----------------------------<  441<H>  4.8   |  19.0<L>  |  2.43<H>    Ca    8.9      22 Apr 2019 05:46  Mg     2.0     04-20    TPro  6.8  /  Alb  3.1<L>  /  TBili  0.5  /  DBili  x   /  AST  11  /  ALT  7   /  AlkPhos  87  04-20    LIVER FUNCTIONS - ( 20 Apr 2019 15:47 )  Alb: 3.1 g/dL / Pro: 6.8 g/dL / ALK PHOS: 87 U/L / ALT: 7 U/L / AST: 11 U/L / GGT: x               RADIOLOGY & ADDITIONAL STUDIES:      IMPRESSION & PLAN:    Advanced cerebrovascular/ microvascular disease.  Reports of 7 strokes in the past  There is no evidence on previous studies of large vessel disease that could warrant intervention  Would continue with maximum medical therapy as is current  Carotid duplex ordered by medical team- was neg in September 2018.  Dispo planning  He is cleared for discharge unless the carotid duplex demonstrates actionable findings
INTERVAL HISTORY: Denies CP,SOB  	  MEDICATIONS:  carvedilol 12.5 milliGRAM(s) Oral every 12 hours  hydrALAZINE 25 milliGRAM(s) Oral three times a day  hydrALAZINE Injectable 10 milliGRAM(s) IV Push every 6 hours PRN  isosorbide    mononitrate Tablet (ISMO) 20 milliGRAM(s) Oral daily  cefTRIAXone   IVPB      cefTRIAXone   IVPB 1 Gram(s) IV Intermittent every 24 hours  doxycycline hyclate Capsule 100 milliGRAM(s) Oral every 12 hours  sertraline 100 milliGRAM(s) Oral daily  atorvastatin 40 milliGRAM(s) Oral at bedtime  dextrose 40% Gel 15 Gram(s) Oral once PRN  dextrose 50% Injectable 12.5 Gram(s) IV Push once  dextrose 50% Injectable 25 Gram(s) IV Push once  dextrose 50% Injectable 25 Gram(s) IV Push once  glucagon  Injectable 1 milliGRAM(s) IntraMuscular once PRN  insulin lispro (HumaLOG) corrective regimen sliding scale   SubCutaneous three times a day before meals  aspirin  chewable 81 milliGRAM(s) Oral daily  clopidogrel Tablet 75 milliGRAM(s) Oral daily  dextrose 5%. 1000 milliLiter(s) IV Continuous <Continuous>  heparin  Injectable 5000 Unit(s) SubCutaneous every 12 hours  lactated ringers. 1000 milliLiter(s) IV Continuous <Continuous>        PHYSICAL EXAM:  T(C): 37.1 (04-23-19 @ 08:00), Max: 37.1 (04-23-19 @ 00:00)  HR: 70 (04-23-19 @ 08:36) (62 - 70)  BP: 161/65 (04-23-19 @ 08:36) (105/56 - 178/77)  RR: 15 (04-23-19 @ 08:36) (15 - 23)  SpO2: 98% (04-23-19 @ 08:36) (97% - 98%)  Wt(kg): --  I&O's Summary        Appearance: Normal	  HEENT:   Normal oral mucosa  Cardiovascular: Normal S1 S2, No JVD, No murmurs, No edema  Respiratory: Lungs clear to auscultation	  Psychiatry: A & O x 3, Mood & affect appropriate  Gastrointestinal:  Soft, Non-tender, + BS	  Skin: No rashes, No ecchymoses, No cyanosis  Extremities: Normal range of motion, No clubbing, cyanosis or edema  Vascular: Peripheral pulses palpable 2+ bilaterally    TELEMETRY: SR PVCs ventricular couplets	    	    LABS:	 	                        8.4    6.9   )-----------( 207      ( 23 Apr 2019 05:23 )             26.5     04-23    146<H>  |  116<H>  |  52.0<H>  ----------------------------<  365<H>  4.3   |  17.0<L>  |  2.51<H>    Ca    8.6      23 Apr 2019 05:23  Mg     2.1     04-23      ASSESSMENT/PLAN: AVNI GREEN is a 68y Male with past medical history significant for with PMH of CAD s/p stent x12, (most recently w/ stent x5 05/2013), s/p CABGx3, HTN, CRI, CVA x7 (most recent 2017) w/ residual dysphagia + aphasia,  PAD s/p PTCA to BL legs, ICD, CRYSTAL s/p CEA, DM2 on insulin, MRSA bacteremia w/ ICD lead infection, osteomyelitis sp recent LT great toe amp on 04/05/2019 presenting w/ complaints of facial droop, verbal difficulties, admitted with ?TIA rule out CVA.     - Monitor on telemetry, has been SR with V pacing, rare ventricular couplets  - Mild troponin elevation without rise/fall is not consistent with ACS.  Nonspecific in setting of decreased renal function and absence of CP.  - Echocardiogram 11/9/18 noted above.  Repeat pending.  - Carotid Duplex Scan pending  - No evidence of ischemia or CHF clinically, consider eventual ischemic evaluation (as outpatient)  - Agree with ASA, Plavix and statin  - Further work-up & testing per neurology  - BP elevated at times.  Continue Coreg 12.5mg BID, Imdur, and increase Hydralazine to 25mg TID; titrate prn  - DNR status is noted.
Interval/Overnight: No acute events. Chart reviewed. Pt seen/examined by Attending and PA. Pt is A & O X 1-2, pleasantly confused, answers some questions appropriately, mostly head nodding and one word answers.       Vital Signs Last 24 Hrs  T(C): 37.1 (23 Apr 2019 08:00), Max: 37.1 (23 Apr 2019 00:00)  T(F): 98.7 (23 Apr 2019 08:00), Max: 98.8 (23 Apr 2019 00:00)  HR: 70 (23 Apr 2019 08:36) (62 - 70)  BP: 161/65 (23 Apr 2019 08:36) (105/56 - 178/77)  BP(mean): 91 (23 Apr 2019 08:36) (72 - 96)  RR: 15 (23 Apr 2019 08:36) (15 - 23)  SpO2: 98% (23 Apr 2019 08:36) (97% - 98%)I&O's Summary    22 Apr 2019 07:01  -  23 Apr 2019 07:00  --------------------------------------------------------  IN: 100 mL / OUT: 0 mL / NET: 100 mL    23 Apr 2019 07:01  -  23 Apr 2019 11:26  --------------------------------------------------------  IN: 300 mL / OUT: 0 mL / NET: 300 mL    CAPILLARY BLOOD GLUCOSE    PHYSICAL EXAM:  GENERAL: NAD, well-groomed  HEENT: PERRL, +EOMI  CHEST/LUNG: Clear to auscultation bilaterally; No wheezing  HEART: S1S2+, Regular rate and rhythm; No murmurs  ABDOMEN: Soft, Nontender, Nondistended; Bowel sounds present  EXTREMITIES:  2+ Peripheral Pulses, No clubbing, cyanosis, or edema      LABS:                        8.4    6.9   )-----------( 207      ( 23 Apr 2019 05:23 )             26.5     04-23    146<H>  |  116<H>  |  52.0<H>  ----------------------------<  365<H>  4.3   |  17.0<L>  |  2.51<H>    Ca    8.6      23 Apr 2019 05:23  Mg     2.1     04-23          RADIOLOGY & ADDITIONAL TESTS:    CXR 4/20/19:   IMPRESSION:   No evidence of active chest disease.            CT head 4/21/19  There are no skull fractures.    IMPRESSION:  No interval change.    Moderate atrophy and chronic white matter microvascular ischemic changes   as described.   If acute stroke is of clinical concern, MRI with diffusion-weighted   images would be helpful for further characterization.                MEDICATIONS:  MEDICATIONS  (STANDING):  aspirin  chewable 81 milliGRAM(s) Oral daily  atorvastatin 40 milliGRAM(s) Oral at bedtime  carvedilol 12.5 milliGRAM(s) Oral every 12 hours  cefTRIAXone   IVPB      cefTRIAXone   IVPB 1 Gram(s) IV Intermittent every 24 hours  clopidogrel Tablet 75 milliGRAM(s) Oral daily  dextrose 5%. 1000 milliLiter(s) (50 mL/Hr) IV Continuous <Continuous>  dextrose 50% Injectable 12.5 Gram(s) IV Push once  dextrose 50% Injectable 25 Gram(s) IV Push once  dextrose 50% Injectable 25 Gram(s) IV Push once  doxycycline hyclate Capsule 100 milliGRAM(s) Oral every 12 hours  heparin  Injectable 5000 Unit(s) SubCutaneous every 12 hours  hydrALAZINE 25 milliGRAM(s) Oral three times a day  insulin lispro (HumaLOG) corrective regimen sliding scale   SubCutaneous three times a day before meals  isosorbide    mononitrate Tablet (ISMO) 20 milliGRAM(s) Oral daily  lactated ringers. 1000 milliLiter(s) (100 mL/Hr) IV Continuous <Continuous>  saccharomyces boulardii 250 milliGRAM(s) Oral two times a day  sertraline 100 milliGRAM(s) Oral daily  sodium chloride 0.45% 1000 milliLiter(s) (80 mL/Hr) IV Continuous <Continuous>    MEDICATIONS  (PRN):  dextrose 40% Gel 15 Gram(s) Oral once PRN Blood Glucose LESS THAN 70 milliGRAM(s)/deciliter  glucagon  Injectable 1 milliGRAM(s) IntraMuscular once PRN Glucose LESS THAN 70 milligrams/deciliter  hydrALAZINE Injectable 10 milliGRAM(s) IV Push every 6 hours PRN sbp > 220 or dbp > 120
[facility] DIVISION OF KIDNEY DISEASES AND HYPERTENSION -- FOLLOW UP NOTE  --------------------------------------------------------------------------------  Chief Complaint:  ALLISON on CKD IV    24 hour events/subjective:  Pt seen and examined  NAD  SCr. near baseline        PAST HISTORY  --------------------------------------------------------------------------------  No significant changes to PMH, PSH, FHx, SHx, unless otherwise noted    ALLERGIES & MEDICATIONS  --------------------------------------------------------------------------------  Allergies    No Known Allergies    Intolerances      Standing Inpatient Medications  aspirin  chewable 81 milliGRAM(s) Oral daily  atorvastatin 40 milliGRAM(s) Oral at bedtime  carvedilol 12.5 milliGRAM(s) Oral every 12 hours  cefTRIAXone   IVPB      cefTRIAXone   IVPB 1 Gram(s) IV Intermittent every 24 hours  chlorhexidine 2% Cloths 1 Application(s) Topical <User Schedule>  clopidogrel Tablet 75 milliGRAM(s) Oral daily  dextrose 5%. 1000 milliLiter(s) IV Continuous <Continuous>  dextrose 50% Injectable 12.5 Gram(s) IV Push once  dextrose 50% Injectable 25 Gram(s) IV Push once  dextrose 50% Injectable 25 Gram(s) IV Push once  doxycycline hyclate Capsule 100 milliGRAM(s) Oral every 12 hours  heparin  Injectable 5000 Unit(s) SubCutaneous every 12 hours  hydrALAZINE 75 milliGRAM(s) Oral every 8 hours  insulin lispro (HumaLOG) corrective regimen sliding scale   SubCutaneous three times a day before meals  isosorbide    mononitrate Tablet (ISMO) 20 milliGRAM(s) Oral daily  mupirocin 2% Ointment 1 Application(s) Topical two times a day  saccharomyces boulardii 250 milliGRAM(s) Oral two times a day  sertraline 100 milliGRAM(s) Oral daily  sodium chloride 0.45% 1000 milliLiter(s) IV Continuous <Continuous>    PRN Inpatient Medications  dextrose 40% Gel 15 Gram(s) Oral once PRN  glucagon  Injectable 1 milliGRAM(s) IntraMuscular once PRN  hydrALAZINE Injectable 10 milliGRAM(s) IV Push every 6 hours PRN      REVIEW OF SYSTEMS  --------------------------------------------------------------------------------  Gen: No weight changes, fatigue, fevers/chills, weakness  Skin: No rashes  Head/Eyes/Ears/Mouth: No headache; Normal hearing; Normal vision w/o blurriness; No sinus pain/discomfort, sore throat  Respiratory: No dyspnea, cough, wheezing, hemoptysis  CV: No chest pain, PND, orthopnea  GI: No abdominal pain, diarrhea, constipation, nausea, vomiting, melena, hematochezia  : No increased frequency, dysuria, hematuria, nocturia  MSK: No joint pain/swelling; no back pain; no edema  Neuro: No dizziness/lightheadedness, weakness, seizures, numbness, tingling  Heme: No easy bruising or bleeding  Endo: No heat/cold intolerance  Psych: No significant nervousness, anxiety, stress, depression    All other systems were reviewed and are negative, except as noted.    VITALS/PHYSICAL EXAM  --------------------------------------------------------------------------------  T(C): 36.4 (04-25-19 @ 04:05), Max: 36.9 (04-24-19 @ 15:15)  HR: 72 (04-25-19 @ 04:05) (67 - 88)  BP: 160/70 (04-25-19 @ 04:05) (152/60 - 162/67)  RR: 18 (04-25-19 @ 04:05) (18 - 19)  SpO2: 95% (04-25-19 @ 04:05) (95% - 97%)  Wt(kg): --        04-24-19 @ 07:01  -  04-25-19 @ 07:00  --------------------------------------------------------  IN: 960 mL / OUT: 0 mL / NET: 960 mL      Physical Exam:  	Gen: NAD, pale  	HEENT: PERRL, supple neck, clear oropharynx  	Pulm: CTA B/L  	CV: RRR, S1S2; no rub  	Back: No spinal or CVA tenderness; no sacral edema  	Abd: +BS, soft, nontender/nondistended  	: No suprapubic tenderness  	UE: Warm, FROM, no clubbing, intact strength; no edema; no asterixis  	LE: Warm, FROM, no clubbing, intact strength; no edema  	Neuro: No focal deficits, intact gait  	Psych: Normal affect and mood  	Skin: Warm, without rashes  	Vascular access: N/A    LABS/STUDIES  --------------------------------------------------------------------------------              8.5    5.2   >-----------<  186      [04-25-19 @ 07:47]              27.0     147  |  114  |  48.0  ----------------------------<  307      [04-25-19 @ 07:47]  4.7   |  21.0  |  2.10        Ca     8.5     [04-25-19 @ 07:47]      Mg     1.9     [04-25-19 @ 07:47]    TPro  5.8  /  Alb  2.5  /  TBili  0.2  /  DBili  x   /  AST  11  /  ALT  7   /  AlkPhos  77  [04-25-19 @ 07:47]          Creatinine Trend:  SCr 2.10 [04-25 @ 07:47]  SCr 2.13 [04-24 @ 07:24]  SCr 2.51 [04-23 @ 05:23]  SCr 2.43 [04-22 @ 05:46]  SCr 2.26 [04-21 @ 05:41]    Urinalysis - [04-23-19 @ 21:56]      Color Yellow / Appearance Clear / SG 1.015 / pH 6.0      Gluc 250 / Ketone Negative  / Bili Negative / Urobili Negative       Blood Large / Protein 100 / Leuk Est Small / Nitrite Negative      RBC 6-10 / WBC 11-25 / Hyaline  / Gran  / Sq Epi  / Non Sq Epi Occasional / Bacteria Occasional    Urine Creatinine 58      [04-23-19 @ 21:55]  Urine Protein 130.0      [04-23-19 @ 21:55]  Urine Sodium 92      [04-23-19 @ 21:55]  Urine Potassium 22      [04-23-19 @ 21:55]  Urine Chloride 80      [04-23-19 @ 21:55]  Urine Osmolality 453      [04-23-19 @ 21:56]    PTH -- (Ca 8.6)      [08-30-18 @ 18:22]   77  Vitamin D (25OH) 7.7      [04-24-19 @ 17:06]  HbA1c 7.2      [04-21-19 @ 07:43]  TSH 2.32      [04-24-19 @ 07:24]  Lipid: chol 137, , HDL 36, LDL 57      [09-07-18 @ 02:52]    HCV 0.11, Nonreact      [04-21-19 @ 14:21]
Patient is a 68y old  Male who presents with a chief complaint of facial droop (2019 18:42) dx with UTI and noted with elevated troponin       HEALTH ISSUES - PROBLEM Dx:  CVA  UTI  HTN       INTERVAL HPI/OVERNIGHT EVENTS:  Patient seen and examined at bedside. No acute events overnight. Patient states     Vital Signs Last 24 Hrs  T(C): 36.1 (2019 11:22), Max: 37.3 (2019 21:00)  T(F): 97 (2019 11:22), Max: 99.1 (2019 21:00)  HR: 64 (2019 11:22) (64 - 89)  BP: 160/72 (2019 11:22) (136/61 - 183/77)  BP(mean): --  RR: 20 (2019 11:22) (16 - 20)  SpO2: 97% (2019 11:22) (97% - 99%)      CONSTITUTIONAL: Well appearing, well nourished, awake, alert and in no apparent distress  CARDIAC: Normal rate, regular rhythm.  Heart sounds S1, S2.  No murmurs, rubs or gallops   RESPIRATORY: Breath sounds clear and equal bilaterally. No wheezes, rhales or rhonchi  GASTROENTEROLOGY: Soft nt nd bs + normoactive   EXTREMITIES: Left foot 1st digit healed amputation with dressing C/D/I   NEUROLOGICAL: Alert and awake, oriented to self   SKIN: No rash, skin turgor poor     MEDICATIONS  (STANDING):  aspirin  chewable 81 milliGRAM(s) Oral daily  cefTRIAXone   IVPB      doxycycline IVPB 100 milliGRAM(s) IV Intermittent every 12 hours  enoxaparin Injectable 40 milliGRAM(s) SubCutaneous daily  sodium chloride 0.9%. 1000 milliLiter(s) (83 mL/Hr) IV Continuous <Continuous>    MEDICATIONS  (PRN):  hydrALAZINE Injectable 10 milliGRAM(s) IV Push every 6 hours PRN sbp > 220 or dbp > 120      LABS:                        9.1    14.0  )-----------( 221      ( 2019 07:42 )             28.9     04-21    143  |  112<H>  |  44.0<H>  ----------------------------<  324<H>  4.7   |  18.0<L>  |  2.26<H>    Ca    8.9      2019 05:41  Mg     2.0     -20    TPro  6.8  /  Alb  3.1<L>  /  TBili  0.5  /  DBili  x   /  AST  11  /  ALT  7   /  AlkPhos  87  04-20    PT/INR - ( 2019 07:42 )   PT: 16.4 sec;   INR: 1.41 ratio         PTT - ( 2019 15:47 )  PTT:33.5 sec  Urinalysis Basic - ( 2019 06:53 )    Color: Yellow / Appearance: Clear / S.015 / pH: x  Gluc: x / Ketone: Trace  / Bili: Negative / Urobili: Negative mg/dL   Blood: x / Protein: 500 mg/dL / Nitrite: Positive   Leuk Esterase: Moderate / RBC: 11-25 /HPF / WBC >50   Sq Epi: x / Non Sq Epi: Few / Bacteria: Moderate      LIVER FUNCTIONS - ( 2019 15:47 )  Alb: 3.1 g/dL / Pro: 6.8 g/dL / ALK PHOS: 87 U/L / ALT: 7 U/L / AST: 11 U/L / GGT: x             RADIOLOGY & ADDITIONAL TESTS:

## 2019-04-25 NOTE — PROGRESS NOTE ADULT - PROVIDER SPECIALTY LIST ADULT
Cardiology
Hospitalist
Hospitalist
Internal Medicine
Internal Medicine
Nephrology
Neurology
Cardiology
Nephrology

## 2019-04-25 NOTE — DISCHARGE NOTE PROVIDER - NS AS DC PROVIDER CONTACT Y/N MULTI
[address] 
[facility] EMERGENCY DEPT 
[address] [facility] [facility] [address] [id number] [phone] Work/School Note Date: 2/2/2019 To Whom It May concern: 
 
Lyudmila Grady was seen and treated today in the emergency room by the following provider(s): 
Attending Provider: Sharon Coronado MD. Lyudmila Grady may return to work on 2/9/19. Sincerely, Gerardo Kelley MD 
 
 
 

Yes

## 2019-04-25 NOTE — DISCHARGE NOTE PROVIDER - HOSPITAL COURSE
68yoM w/ PMH CVA x7 (most recent 2017) w/ residual dysphagia + aphasia, CAD sp stent x12, (most recently w/ stent x5 05/2013) + 3v CABG, PAD sp ballooning to BL legs, PPM, CRYSTAL sp CEA, htn, dm2 on insulin, MRSA bacteremia w/ ppm lead infection, osteomyelitis s/p recent LT hallux amputation on 04/22/19 on doxy who presented with worsening weakness, transient left facial droop. Facial droop resolved on admission, weakness persists. CT head negative for any new infarction x 2, prior old infarcts noted. Noted with UA positive likely all sx attributed to UTI. Empirically placed on rocephin. Also noted with elevated troponin in the setting of acute on chronic renal failure likely secondary to ckd. Cardio consulted and Nephrology consulted for worsening renal failure.             1) Acute metabolic encephalopathy/ weakness 2/2 UTI     - sp 5 days of abx for UTI     - no evidence of CVA     - seems to be at baseline         2) UTI -     - UA gram neg rods    - s/p rocephin 1 G IVPB QD D#5    - c/w florastor 250mg po bid         3) Elevated troponin in the setting of CKD     -may be in setting of renal dysfunction    - c/w asa, atorvastatin and coreg     - TTE shows slighlty reduced ef 40-45%, per cardio Dr. Ahumada who I     spoke to today is clear and follow up as outpt    - c,w asa, coreg, lipitor    - resume lisinopril 4/26/19        4) Acute on chronic renal failure - CKD3    - baseline cr 1.8-2 , pt today cr 2.1 at baseline    - nephrology cleared pt and follow up as outpt    - pt follows with outpt nephrologist Dr. Barbour        5) HTN    - c/w coreg and imdur     - resume lisinopril 4/26/19        6) Hx CVA    - likely current episode of transient weakness is related to infection not a TIA or CVA     - c/w asa, plavix and atorvastatin     - ct head negative x 2; prior lacunar infarcts noted     - unable to do MRI given ICD     - carotid doppler w < 50% stenosis    - neurology consulted- recommending medical management for small vessel disease    - cleared by neurology for discharge        7) S/p left foot hallux amputation    - Recently performed at [facility]    - was scheduled for suture removal on 4/22/19, wife advised to reschedule for the end of this week    - c/w doxycycline for 4 more days     - c/w local wound care     - pt to f/u as an outpt with podiatrist Dr. Lofton         8) DM2    - c/w home insulin and regimen        PHYSICAL EXAM:    Vital Signs Last 24 Hrs    T(C): 36.4 (25 Apr 2019 04:05), Max: 36.9 (24 Apr 2019 15:15)    T(F): 97.6 (25 Apr 2019 04:05), Max: 98.5 (24 Apr 2019 15:15)    HR: 72 (25 Apr 2019 04:05) (67 - 88)    BP: 160/70 (25 Apr 2019 04:05) (152/60 - 162/67)    BP(mean): --    RR: 18 (25 Apr 2019 04:05) (18 - 19)    SpO2: 95% (25 Apr 2019 04:05) (95% - 97%)        GENERAL: NAD, well-groomed    HEENT: PERRL, +EOMI    CHEST/LUNG: Clear to auscultation bilaterally; No wheezing    HEART: S1S2+, Regular rate and rhythm; No murmurs    ABDOMEN: Soft, Nontender, Nondistended; Bowel sounds present    EXTREMITIES:  2+ Peripheral Pulses, No clubbing, cyanosis, or edema        Pt stable for discharge home with home care        total time spent for discharge: 35 minutes

## 2019-04-25 NOTE — DISCHARGE NOTE PROVIDER - CARE PROVIDERS DIRECT ADDRESSES
,[email or web address],DirectAddress_Unknown,DirectAddress_Unknown

## 2019-04-25 NOTE — PROGRESS NOTE ADULT - ASSESSMENT
1. ALLISON on CKD IV  2. NSTEMI  3. TIA r/o CVA  4. DM II    SCr improved to baseline today  Cardiology f/u as outpatient  resume Lisinopril 2.5mg in am  Plan for D/C in am as per Dr. Edwards  To f/u in office with Dr. Tinsley as outpatient  Will sign off at this time - stable from a renal standpoint

## 2019-04-26 NOTE — ED ADULT TRIAGE NOTE - CHIEF COMPLAINT QUOTE
pt arrive by ambulance with call back for ESBL + culture. pt reports vomiting this morning, denies fevers.

## 2019-04-26 NOTE — ED ADULT NURSE NOTE - INTERVENTIONS DEFINITIONS
Non-slip footwear when patient is off stretcher/Stretcher in lowest position, wheels locked, appropriate side rails in place

## 2019-04-26 NOTE — H&P ADULT - NSICDXPASTMEDICALHX_GEN_ALL_CORE_FT
PAST MEDICAL HISTORY:  AICD (automatic cardioverter/defibrillator) present     CVA (cerebral vascular accident) multiple CVAs, dysphagia and aphasia    Diabetic neuropathy     DM (diabetes mellitus)     HTN (hypertension)     Hyperlipidemia     Pacemaker     Stented coronary artery

## 2019-04-26 NOTE — ED PROVIDER NOTE - OBJECTIVE STATEMENT
68yoM w/ PMH CVA x7 (most recent 2017) w/ residual dysphagia + aphasia, CAD sp stent x12, (most recently w/ stent x5 05/2013) + 3v CABG, PAD sp ballooning to BL legs, PPM, CRYSTAL sp CEA, htn, dm2 on insulin, MRSA bacteremia w/ ppm lead infection, osteomyelitis s/p recent LT hallux amputation on 04/22/19 presenting for ESBL in urine. Pt admitted last week for several days after having weakness likely 2/2 UTI. Pt received Rocephin for UTI. called back when ucx grew esbl. Pt feeling well. No urinary complaints, no abd pain, flank pain, fevers, chills, sweats, ams, weakness. Not currently on abx. 68yoM w/ PMH CVA x7 (most recent 2017) w/ residual dysphagia + aphasia, CAD sp stent x12, (most recently w/ stent x5 05/2013) + 3v CABG, PAD sp ballooning to BL legs, PPM, CRYSTAL sp CEA, htn, dm2 on insulin, MRSA bacteremia w/ ppm lead infection, osteomyelitis s/p recent LT hallux amputation on 04/22/19 presenting for ESBL in urine. Pt admitted last week for several days after having weakness likely 2/2 UTI. Pt received Rocephin for UTI. called back when ucx grew esbl. Pt feeling well. No urinary complaints, no abd pain, flank pain, fevers, chills, sweats, ams, weakness. Currently on abx

## 2019-04-26 NOTE — H&P ADULT - HISTORY OF PRESENT ILLNESS
68 yr male with history of multiple CVAs , wheelchair bound , dementia, Hypertension, DM, stented CAD, CKD-3.  Was hospitalized for 6 days for mental status changes from UTI and discharged 4/25/19.  Patient was called back today because urine culture growing ESBL klebsiella pneumonia.  Wife providing history denied any fever or constitutional symptoms at home.  UA bacturia, pyuria, +Nitrites. Anemia H/H 8.8/28.5, Na 150.

## 2019-04-26 NOTE — ED ADULT NURSE NOTE - PMH
AICD (automatic cardioverter/defibrillator) present    CVA (cerebral vascular accident)    Diabetic neuropathy    DM (diabetes mellitus)    HTN (hypertension)    Hyperlipidemia    Pacemaker    Stented coronary artery AICD (automatic cardioverter/defibrillator) present    CVA (cerebral vascular accident)  multiple CVAs, dysphagia and aphasia  Diabetic neuropathy    DM (diabetes mellitus)    HTN (hypertension)    Hyperlipidemia    Pacemaker    Stented coronary artery

## 2019-04-26 NOTE — H&P ADULT - ASSESSMENT
68 yr male with history of multiple CVA, Dementia, Hypertension, DM , called back for ESBL klebesiella UTI

## 2019-04-26 NOTE — ED PROVIDER NOTE - PMH
AICD (automatic cardioverter/defibrillator) present    CVA (cerebral vascular accident)  multiple CVAs, dysphagia and aphasia  Diabetic neuropathy    DM (diabetes mellitus)    HTN (hypertension)    Hyperlipidemia    Pacemaker    Stented coronary artery

## 2019-04-26 NOTE — H&P ADULT - NSHPPHYSICALEXAM_GEN_ALL_CORE
Normocephalic  EOMI PERRL   Neck supple no JVD  S1 and S2 regular   CTA B  Abd soft + BS not tender   no pedal edema, no calf tenderness, no cyanosis  Confused, disorganized , no focal neurologic deficit  No skin rash no skin eruption  Depressed mood. Affect is inappropriate

## 2019-04-26 NOTE — ED ADULT NURSE REASSESSMENT NOTE - NS ED NURSE REASSESS COMMENT FT1
Urine collected for schneider bag via [place] catheter.  Catheter left in place as per patient's wife's request since pt is incontinent and more comfortable.

## 2019-04-26 NOTE — ED ADULT NURSE NOTE - OBJECTIVE STATEMENT
assumed pt care at 1155.  Pt is alert to person and place but not to time. Slow to answer questions with intermittent slurred speech.  pt states this is normal for him but cannot tell me why.  Moves all four extremities without difficulty and without drift. no facial droop present.  he denies alcohol use.   Pt does not know whey he is here. He is aware that he was here yesterday but states he doesn't know why.  Arrived via ambulance. Dr. silva at bedside aware of all findings.  As per Dr. silva, CVA was ruled out during recent admission. Pt called back for ESBL in urine culture. assumed pt care at 1155.  Pt is alert to person and place but not to time. Slow to answer questions with intermittent slurred speech and some loss of fluency. pt states this is normal for him but cannot tell me why.  Moves all four extremities without difficulty and without drift. no facial droop present.  he denies alcohol use.   Pt does not know whey he is here. Hx of multiple CVAs with dysphagia and aphasia.  Arrived via ambulance. Dr. silva at bedside aware of all findings.  As per Dr. silva, CVA was ruled out during recent admission. Pt called back for ESBL in urine culture. assumed pt care at 1155.  Pt is alert to person and place but not to time. Slow to answer questions with intermittent slurred speech and some loss of fluency. pt states this is normal for him but cannot tell me why.  Moves all four extremities without difficulty and without drift. no facial droop present.  he denies alcohol use.   Pt does not know whey he is here. Hx of multiple CVAs with dysphagia and aphasia.  Arrived via ambulance. Dr. silva at bedside aware of all findings.  As per Dr. silva, CVA was ruled out during recent admission. Pt called back for ESBL in urine culture. Pt has healing left great toe amputation with sutures in place, no signs of infection.

## 2019-04-27 NOTE — CONSULT NOTE ADULT - SUBJECTIVE AND OBJECTIVE BOX
[facility]  INFECTIOUS DISEASES AND INTERNAL MEDICINE at [place]  =======================================================  Frankie Garcia MD  Diplomates American Board of Internal Medicine and Infectious Diseases  Telephone [phone]  Fax            [phone]  =======================================================    [facility]-[id number]  AVNI GREEN   This 68 yr male with history of multiple CVAs , wheelchair bound, carries dx of dementia, Hypertension, DM, stented CAD, CKD-3.  Was hospitalized for 6 days for mental status changes from UTI and discharged 4/25/19.  Patient was called back today because urine culture growing ESBL klebsiella pneumonia.  Wife providing history denied any fever or constitutional symptoms at home.  UA bacturia, pyuria, +Nitrites. Anemia H/H 8.8/28.5, Na 150.     Wife reports that he is incontinent.  Urine has strong ammonia smell, but appears clear when he does urinate in the bowl.  No fevers at home.   Cultures reviewed.   Started on [place] by admitting team on 4/26/19.    pt knows place, person and year, but cannot contribute significant history    reports prior issues with PICC antibiotics and high cost.       =======================================================  Past Medical & Surgical Hx:  =====================  PAST MEDICAL & SURGICAL HISTORY:  CVA (cerebral vascular accident): multiple CVAs, dysphagia and aphasia  Diabetic neuropathy  DM (diabetes mellitus)  Hyperlipidemia  AICD (automatic cardioverter/defibrillator) present  Pacemaker  Stented coronary artery  HTN (hypertension)  History of amputation of toe  Cardiac pacemaker      Problem List:  ==========  HEALTH ISSUES - PROBLEM Dx:  Anemia, chronic disease: Anemia, chronic disease  CKD (chronic kidney disease) stage 3, GFR 30-59 ml/min: CKD (chronic kidney disease) stage 3, GFR 30-59 ml/min  Vascular dementia with behavior disturbance: Vascular dementia with behavior disturbance  Essential hypertension: Essential hypertension  Stented coronary artery: Stented coronary artery  Diabetic polyneuropathy associated with type 2 diabetes mellitus: Diabetic polyneuropathy associated with type 2 diabetes mellitus  Cerebrovascular accident (CVA) due to bilateral thrombosis of anterior cerebral arteries: Cerebrovascular accident (CVA) due to bilateral thrombosis of anterior cerebral arteries  ESBL (extended spectrum beta-lactamase) producing bacteria infection: ESBL (extended spectrum beta-lactamase) producing bacteria infection         Social Hx:  =======  no toxic habits currently    FAMILY HISTORY:  No pertinent family history in first degree relatives  no significant family history of immunosuppressive disorders in mother or father   =======================================================  REVIEW OF SYSTEMS:  as above  all other ROS negative  =======================================================  Allergies  No Known Allergies       Antibiotics:  ertapenem  IVPB 1000 milliGRAM(s) IV Intermittent every 24 hours    Other medications:  amLODIPine   Tablet 5 milliGRAM(s) Oral daily  aspirin  chewable 81 milliGRAM(s) Oral daily  carvedilol 12.5 milliGRAM(s) Oral every 12 hours  clopidogrel Tablet 75 milliGRAM(s) Oral daily  dextrose 5%. 1000 milliLiter(s) IV Continuous <Continuous>  dextrose 50% Injectable 12.5 Gram(s) IV Push once  dextrose 50% Injectable 25 Gram(s) IV Push once  dextrose 50% Injectable 25 Gram(s) IV Push once  ferrous    sulfate 325 milliGRAM(s) Oral daily  heparin  Injectable 5000 Unit(s) SubCutaneous every 12 hours  insulin glargine Injectable (LANTUS) 20 Unit(s) SubCutaneous at bedtime  insulin lispro (HumaLOG) corrective regimen sliding scale   SubCutaneous three times a day before meals  saccharomyces boulardii 250 milliGRAM(s) Oral two times a day  sertraline 100 milliGRAM(s) Oral daily          ertapenem  IVPB   120 mL/Hr IV Intermittent (04-27-19 @ 13:34)    meropenem  IVPB   100 mL/Hr IV Intermittent (04-26-19 @ 13:05)   100 mL/Hr IV Intermittent (04-26-19 @ 23:54)   100 mL/Hr IV Intermittent (04-27-19 @ 07:13)      ======================================================  Physical Exam:  ============  T(F): 98 (27 Apr 2019 14:15), Max: 98.2 (27 Apr 2019 08:36)  HR: 60 (27 Apr 2019 14:15)  BP: 193/84 (27 Apr 2019 14:15)  RR: 18 (27 Apr 2019 14:15)  SpO2: 98% (27 Apr 2019 14:15) (97% - 99%)    General:  No acute distress.  FRAIL older than stated age  Eye: Pupils are equal, round and reactive to light, Extraocular movements are intact, Normal conjunctiva.  HENT: Normocephalic, Oral mucosa is moist, No pharyngeal erythema, No sinus tenderness.  Neck: Supple, No lymphadenopathy.  Respiratory: Lungs are clear to auscultation, Respirations are non-labored.  Cardiovascular: Normal rate, Regular rhythm, No murmur, Good pulses equal in all extremities, No edema.  Gastrointestinal: Soft, Non-tender, Non-distended, Normal bowel sounds.  Genitourinary: No costovertebral angle tenderness.  CONDOM catheter with clear urine  Lymphatics: No lymphadenopathy neck,   Musculoskeletal: Normal range of motion, Normal strength.  Integumentary: No rash.  Neurologic: Alert, Oriented, No focal deficits, Cranial Nerves II-XII are grossly intact.  Psychiatric: Appropriate mood & affect.    =======================================================  Labs:                        8.9    4.3   )-----------( 234      ( 27 Apr 2019 08:59 )             28.6       WBC Count: 4.3 K/uL (04-27-19 @ 08:59)  WBC Count: 5.1 K/uL (04-26-19 @ 12:52)  WBC Count: 5.2 K/uL (04-25-19 @ 07:47)  WBC Count: 5.5 K/uL (04-24-19 @ 07:24)  WBC Count: 6.9 K/uL (04-23-19 @ 05:23)      04-27    146<H>  |  112<H>  |  37.0<H>  ----------------------------<  155<H>  4.3   |  22.0  |  1.79<H>    Ca    8.4<L>      27 Apr 2019 08:59    TPro  6.2<L>  /  Alb  2.7<L>  /  TBili  0.2<L>  /  DBili  x   /  AST  17  /  ALT  11  /  AlkPhos  78  04-26      Culture - Urine (collected 04-26-19 @ 14:18)  Source: .Urine    Culture - Urine (collected 04-23-19 @ 21:56)  Source: .Urine  Final Report (04-26-19 @ 09:11):    10,000 - 49,000 CFU/mL Klebsiella pneumoniae ESBL    .    TYPE: (C=Critical, N=Notification, A=Abnormal) C    TESTS:  _ ESBL    DATE/TIME CALLED: _ 04/26/2019 09:10    CALLED TO: _ Dr. Edwards    READ BACK (2 Patient Identifiers)(Y/N): _ Y    READ BACK VALUES (Y/N): _ Y    CALLED BY: Hector valadez    .    Copy to Infection Control, [employer]. 04/26/2019 09:10  Organism: Klebsiella pneumoniae ESBL (04-26-19 @ 09:11)  Organism: Klebsiella pneumoniae ESBL (04-26-19 @ 09:11)    Sensitivities:      -  Amikacin: S <=16      -  Ampicillin: R >16 These ampicillin results predict results for amoxicillin      -  Ampicillin/Sulbactam: R >16/8      -  Aztreonam: R >16      -  Cefazolin: R >16 For uncomplicated UTI with K. pneumoniae, E. coli, or P. mirablis: JIMBO <=16 is sensitive and JIMBO >=32 is resistant. This also predicts results for oral agents cefaclor, cefdinir, cefpodoxime, cefprozil, cefuroxime axetil, cephalexin and locarbef for uncomplicated UTI. Note that some isolates may be susceptible to these agents while testing resistant to cefazolin.      -  Cefepime: R <=4      -  Cefoxitin: S <=8      -  Ceftriaxone: R >32 Enterobacter, Citrobacter, and Serratia may develop resistance during prolonged therapy      -  Ciprofloxacin: R >2      -  Ertapenem: S <=1      -  Gentamicin: R >8      -  Imipenem: S <=1      -  Levofloxacin: R >4      -  Meropenem: S <=1      -  Nitrofurantoin: R >64 Should not be used to treat pyelonephritis      -  Piperacillin/Tazobactam: R <=16      -  Tigecycline: S <=2      -  Tobramycin: R >8      -  Trimethoprim/Sulfamethoxazole: R >2/38      Method Type: JIMBO    Culture - Blood (collected 04-22-19 @ 17:34)  Source: .Blood    Culture - Blood (collected 04-22-19 @ 17:34)  Source: .Blood

## 2019-04-27 NOTE — CONSULT NOTE ADULT - ASSESSMENT
This 68 y.o. Man   with urinary incontinence  dementia  recently treated with antibiotics for UTI with rocephin  sent home,   found with ESBL Klebsiella in urine cx    pt unreliable historian    should treat with 7 days effective therapy.   - Ertapenem 1 gram daily IV should end on 4/30/19.    family not comfortable with home IV antibiotics due to cost.     - defer Mid line.       please call back should discharge plans change.

## 2019-04-29 NOTE — PROGRESS NOTE ADULT - SUBJECTIVE AND OBJECTIVE BOX
CC: Klebsiella Pneumonia ESBL uti. Multiple CVAs with vascular dementia and severe mobility limitations.   HPI:  68 yr male with history of multiple CVAs , wheelchair bound , dementia, Hypertension, DM, stented CAD, CKD-3.  Was hospitalized for 6 days for mental status changes from UTI and discharged 19.  Patient was called back today because urine culture growing ESBL klebsiella pneumonia.  Wife providing history denied any fever or constitutional symptoms at home.  UA bacturia, pyuria, +Nitrites. Anemia H/H 8.8/28.5, Na 150. (2019 17:08)    REVIEW OF SYSTEMS:    Patient denied fever, chills, abdominal pain, nausea, vomiting, cough, shortness of breath, chest pain or palpitations    Vital Signs Last 24 Hrs  T(C): 36.8 (2019 08:36), Max: 36.8 (2019 08:36)  T(F): 98.2 (2019 08:36), Max: 98.2 (2019 08:36)  HR: 64 (2019 08:36) (56 - 79)  BP: 160/77 (2019 08:36) (140/58 - 182/71)  BP(mean): --  RR: 18 (2019 08:36) (18 - 18)  SpO2: 98% (2019 08:36) (97% - 99%)I&O's Summary    2019 07:01  -  2019 07:00  --------------------------------------------------------  IN: 0 mL / OUT: 1400 mL / NET: -1400 mL      PHYSICAL EXAM:  GENERAL: NAD,   HEENT: PERRL, +EOMI, anicteric, no [place]  NECK: Supple, No JVD   CHEST/LUNG: CTA bilaterally; Normal effort  HEART: S1S2 Normal intensity, no murmurs, gallops or rubs noted  ABDOMEN: Soft, BS Normoactive, NT, ND, no HSM noted  EXTREMITIES:  2+ radial and DP pulses noted, no clubbing, cyanosis, or edema noted, Limited mobility   SKIN: No rashes or lesions noted  NEURO: Confused, no focal deficits noted, CN II-XII intact  PSYCH: Depressed mood and affect; insight/judgement inappropriate  LABS:                        8.9    4.3   )-----------( 234      ( 2019 08:59 )             28.6     04-    146<H>  |  112<H>  |  37.0<H>  ----------------------------<  155<H>  4.3   |  22.0  |  1.79<H>    Ca    8.4<L>      2019 08:59    TPro  6.2<L>  /  Alb  2.7<L>  /  TBili  0.2<L>  /  DBili  x   /  AST  17  /  ALT  11  /  AlkPhos  78        Urinalysis Basic - ( 2019 14:17 )    Color: Yellow / Appearance: Clear / S.015 / pH: x  Gluc: x / Ketone: Negative  / Bili: Negative / Urobili: Negative mg/dL   Blood: x / Protein: 100 mg/dL / Nitrite: Positive   Leuk Esterase: Moderate / RBC: 6-10 /HPF / WBC >50   Sq Epi: x / Non Sq Epi: Few / Bacteria: Few      RADIOLOGY & ADDITIONAL TESTS:    MEDICATIONS:  MEDICATIONS  (STANDING):  aspirin  chewable 81 milliGRAM(s) Oral daily  carvedilol 12.5 milliGRAM(s) Oral every 12 hours  clopidogrel Tablet 75 milliGRAM(s) Oral daily  dextrose 5%. 1000 milliLiter(s) (50 mL/Hr) IV Continuous <Continuous>  dextrose 50% Injectable 12.5 Gram(s) IV Push once  dextrose 50% Injectable 25 Gram(s) IV Push once  dextrose 50% Injectable 25 Gram(s) IV Push once  ferrous    sulfate 325 milliGRAM(s) Oral daily  heparin  Injectable 5000 Unit(s) SubCutaneous every 12 hours  insulin glargine Injectable (LANTUS) 20 Unit(s) SubCutaneous at bedtime  insulin lispro (HumaLOG) corrective regimen sliding scale   SubCutaneous three times a day before meals  meropenem  IVPB 1000 milliGRAM(s) IV Intermittent every 8 hours  saccharomyces boulardii 250 milliGRAM(s) Oral two times a day  sertraline 100 milliGRAM(s) Oral daily  sodium chloride 0.45%. 1000 milliLiter(s) (100 mL/Hr) IV Continuous <Continuous>    MEDICATIONS  (PRN):  dextrose 40% Gel 15 Gram(s) Oral once PRN Blood Glucose LESS THAN 70 milliGRAM(s)/deciliter  glucagon  Injectable 1 milliGRAM(s) IntraMuscular once PRN Glucose LESS THAN 70 milligrams/deciliter
CC: UTI from ESBL klebsiella pneumonia.  CVAs , Vascular dementia.   HPI:  68 yr male with history of multiple CVAs , wheelchair bound , dementia, Hypertension, DM, stented CAD, CKD-3.  Was hospitalized for 6 days for mental status changes from UTI and discharged 19.  Patient was called back today because urine culture growing ESBL klebsiella pneumonia.  Wife providing history denied any fever or constitutional symptoms at home.  UA bacturia, pyuria, +Nitrites. Anemia H/H 8.8/28.5, Na 150. (2019 17:08)    REVIEW OF SYSTEMS:    Patient denied fever, chills, abdominal pain, nausea, vomiting, cough, shortness of breath, chest pain or palpitations    Vital Signs Last 24 Hrs  T(C): 36.8 (2019 07:42), Max: 37.1 (2019 23:17)  T(F): 98.3 (2019 07:42), Max: 98.8 (2019 23:17)  HR: 65 (2019 07:42) (60 - 65)  BP: 158/76 (2019 07:42) (141/72 - 193/84)  BP(mean): --  RR: 18 (2019 07:42) (18 - 18)  SpO2: 97% (2019 07:42) (95% - 98%)I&O's Summary    2019 07:01  -  2019 07:00  --------------------------------------------------------  IN: 0 mL / OUT: 400 mL / NET: -400 mL      PHYSICAL EXAM:  GENERAL: NAD,  HEENT: PERRL, +EOMI, anicteric, no [place]  NECK: Supple, No JVD   CHEST/LUNG: CTA bilaterally; Normal effort  HEART: S1S2 Normal intensity, no murmurs, gallops or rubs noted  ABDOMEN: Soft, BS Normoactive, NT, ND, no HSM noted  EXTREMITIES:  2+ radial and DP pulses noted, no clubbing, cyanosis, or edema noted, Limited mobility   SKIN: No rashes or lesions noted  NEURO: Awake ,  CN II-XII intact  PSYCH: Depressed mood and affect; insight/judgement appropriate  LABS:                        8.9    4.3   )-----------( 234      ( 2019 08:59 )             28.6     04-    146<H>  |  112<H>  |  37.0<H>  ----------------------------<  155<H>  4.3   |  22.0  |  1.79<H>    Ca    8.4<L>      2019 08:59        Urinalysis Basic - ( 2019 14:17 )    Color: Yellow / Appearance: Clear / S.015 / pH: x  Gluc: x / Ketone: Negative  / Bili: Negative / Urobili: Negative mg/dL   Blood: x / Protein: 100 mg/dL / Nitrite: Positive   Leuk Esterase: Moderate / RBC: 6-10 /HPF / WBC >50   Sq Epi: x / Non Sq Epi: Few / Bacteria: Few      RADIOLOGY & ADDITIONAL TESTS:    MEDICATIONS:  MEDICATIONS  (STANDING):  amLODIPine   Tablet 5 milliGRAM(s) Oral daily  aspirin  chewable 81 milliGRAM(s) Oral daily  carvedilol 12.5 milliGRAM(s) Oral every 12 hours  clopidogrel Tablet 75 milliGRAM(s) Oral daily  dextrose 5%. 1000 milliLiter(s) (50 mL/Hr) IV Continuous <Continuous>  dextrose 50% Injectable 12.5 Gram(s) IV Push once  dextrose 50% Injectable 25 Gram(s) IV Push once  dextrose 50% Injectable 25 Gram(s) IV Push once  ertapenem  IVPB 1000 milliGRAM(s) IV Intermittent every 24 hours  ferrous    sulfate 325 milliGRAM(s) Oral daily  heparin  Injectable 5000 Unit(s) SubCutaneous every 12 hours  insulin glargine Injectable (LANTUS) 20 Unit(s) SubCutaneous at bedtime  insulin lispro (HumaLOG) corrective regimen sliding scale   SubCutaneous three times a day before meals  saccharomyces boulardii 250 milliGRAM(s) Oral two times a day  sertraline 100 milliGRAM(s) Oral daily    MEDICATIONS  (PRN):  dextrose 40% Gel 15 Gram(s) Oral once PRN Blood Glucose LESS THAN 70 milliGRAM(s)/deciliter  glucagon  Injectable 1 milliGRAM(s) IntraMuscular once PRN Glucose LESS THAN 70 milligrams/deciliter
AVNI GREEN    [id number]    68y      Male    CC: UTI from ESBL klebsiella pneumonia.  CVAs , Vascular dementia.     Overnight events:  Was seen and examined at bedside. was aa0*2, denied active complains. spoke to patient family members and updated.      HPI:  68 yr male with history of multiple CVAs , wheelchair bound , dementia, Hypertension, DM, stented CAD, CKD-3.  Was hospitalized for 6 days for mental status changes from UTI and discharged 4/25/19.  Patient was called back today because urine culture growing ESBL klebsiella pneumonia.  Wife providing history denied any fever or constitutional symptoms at home.  UA bacturia, pyuria, +Nitrites. Anemia H/H 8.8/28.5, Na 150. (26 Apr 2019 17:08)    REVIEW OF SYSTEMS:    Patient denied fever, chills, abdominal pain, nausea, vomiting, cough, shortness of breath, chest pain or palpitations    Vital Signs Last 24 Hrs  Vital Signs Last 24 Hrs  T(C): 36.8 (29 Apr 2019 07:47), Max: 36.8 (28 Apr 2019 15:46)  T(F): 98.3 (29 Apr 2019 07:47), Max: 98.3 (28 Apr 2019 15:46)  HR: 64 (29 Apr 2019 07:47) (64 - 70)  BP: 140/78 (29 Apr 2019 07:47) (140/78 - 180/81)  BP(mean): --  RR: 19 (29 Apr 2019 07:47) (18 - 19)  SpO2: 97% (29 Apr 2019 00:17) (94% - 97%)        PHYSICAL EXAM:  GENERAL: NAD, [place] catheter  HEENT: PERRL, +EOMI, anicteric, no [place]  NECK: Supple, No JVD   CHEST/LUNG: CTA bilaterally; Normal effort  HEART: S1S2 Normal intensity, no murmurs, gallops or rubs noted  ABDOMEN: Soft, BS Normoactive, NT, ND, no HSM noted  EXTREMITIES:  2+ radial and DP pulses noted, no clubbing, cyanosis, or edema noted, Limited mobility   SKIN: No rashes or lesions noted  NEURO: Awake ,  PSYCH: Depressed mood and affect; insight/judgement appropriate                  LABS:                        9.2    5.4   )-----------( 258      ( 29 Apr 2019 11:30 )             28.9     04-29    143  |  107  |  30.0<H>  ----------------------------<  167<H>  5.2   |  27.0  |  1.98<H>    Ca    8.4<L>      29 Apr 2019 10:59              MEDICATIONS  (STANDING):  amLODIPine   Tablet 5 milliGRAM(s) Oral daily  aspirin  chewable 81 milliGRAM(s) Oral daily  carvedilol 12.5 milliGRAM(s) Oral every 12 hours  clopidogrel Tablet 75 milliGRAM(s) Oral daily  dextrose 5%. 1000 milliLiter(s) (50 mL/Hr) IV Continuous <Continuous>  dextrose 50% Injectable 12.5 Gram(s) IV Push once  dextrose 50% Injectable 25 Gram(s) IV Push once  dextrose 50% Injectable 25 Gram(s) IV Push once  ertapenem  IVPB 1000 milliGRAM(s) IV Intermittent every 24 hours  ferrous    sulfate 325 milliGRAM(s) Oral daily  heparin  Injectable 5000 Unit(s) SubCutaneous every 12 hours  insulin lispro (HumaLOG) corrective regimen sliding scale   SubCutaneous three times a day before meals  saccharomyces boulardii 250 milliGRAM(s) Oral two times a day  sertraline 100 milliGRAM(s) Oral daily    MEDICATIONS  (PRN):  dextrose 40% Gel 15 Gram(s) Oral once PRN Blood Glucose LESS THAN 70 milliGRAM(s)/deciliter  glucagon  Injectable 1 milliGRAM(s) IntraMuscular once PRN Glucose LESS THAN 70 milligrams/deciliter      RADIOLOGY & ADDITIONAL TESTS:

## 2019-04-29 NOTE — PHYSICAL THERAPY INITIAL EVALUATION ADULT - GAIT PATTERN USED, PT EVAL
decreased gait velocity and activity tolerance, unsteadiness c turns, verbal cues for sequencing, assist for safety

## 2019-04-29 NOTE — PHYSICAL THERAPY INITIAL EVALUATION ADULT - ASR WT BEARING STATUS EVAL
as per previous note: WBAT to left LE, discussion c wife and she confirms that is what they have been doing

## 2019-04-29 NOTE — PHYSICAL THERAPY INITIAL EVALUATION ADULT - CRITERIA FOR SKILLED THERAPEUTIC INTERVENTIONS
predicted duration of therapy intervention/rehab potential/therapy frequency/anticipated discharge recommendation/impairments found/functional limitations in following categories

## 2019-04-29 NOTE — PROGRESS NOTE ADULT - ASSESSMENT
Assessment and Plan:   · Assessment		  68 yr male with history of multiple CVA, Dementia, Hypertension, DM , called back for ESBL klebesiella UTI. Tomorrow will be last day of antibiotics. Needs PT and discharge planning likely tomorrow     Problem/Plan - 1:  ·  Problem: ESBL (extended spectrum beta-lactamase) producing bacteria infection.  Plan:   ID consult. Recommend Invanz 1g iv qd for total of 7 days till 4/30 . To be done in hospital as wife said they are not able to do infusion at home.      Problem/Plan - 2:  ·  Problem: Cerebrovascular accident (CVA) due to bilateral thrombosis of anterior cerebral arteries.  Plan: Aspirin 81mg po daily  Atorvastatin 40mg po daily.      Problem/Plan - 3:  ·  Problem: Diabetic polyneuropathy associated with type 2 diabetes mellitus.  Plan: Insulin sliding scale.      Problem/Plan - 4:  ·  Problem: Stented coronary artery.  Plan: Plavix 75mg po daily.      Problem/Plan - 5:  ·  Problem: Essential hypertension.  Plan: Coreg 12.5mg po bid   Lisinopril 2.5mg po daily.      Problem/Plan - 6:  Problem: Vascular dementia with behavior disturbance. Plan: Sertraline 100mg po daily.     Problem/Plan - 7:  ·  Problem: CKD (chronic kidney disease) stage 3, GFR 30-59 ml/min.  Plan: Gentle hydration.      Problem/Plan - 8:  ·  Problem: Anemia, chronic disease.  Plan: ferrous sulfate 325mg po daily.     PT evaluation  los;1-2 days, likely dc tomorrow
68 yr male with history of multiple CVA, Dementia, Hypertension, DM , called back for ESBL klebesiella UTI        Problem/Plan - 1:  ·  Problem: ESBL (extended spectrum beta-lactamase) producing bacteria infection.  Plan: Meropenem 1g iv q8  IVf  ID consult.      Problem/Plan - 2:  ·  Problem: Cerebrovascular accident (CVA) due to bilateral thrombosis of anterior cerebral arteries.  Plan: Aspirin 81mg po daily  Atorvastatin 40mg po daily.      Problem/Plan - 3:  ·  Problem: Diabetic polyneuropathy associated with type 2 diabetes mellitus.  Plan: Insulin sliding scale.      Problem/Plan - 4:  ·  Problem: Stented coronary artery.  Plan: Plavix 75mg po daily.      Problem/Plan - 5:  ·  Problem: Essential hypertension.  Plan: Coreg 12.5mg po bid   Lisinopril 2.5mg po daily.      Problem/Plan - 6:  Problem: Vascular dementia with behavior disturbance. Plan: Sertraline 100mg po daily.     Problem/Plan - 7:  ·  Problem: CKD (chronic kidney disease) stage 3, GFR 30-59 ml/min.  Plan: Gentle hydration.      Problem/Plan - 8:  ·  Problem: Anemia, chronic disease.  Plan: ferrous sulfate 325mg po daily.
68 yr male with history of multiple CVA, Dementia, Hypertension, DM , called back for ESBL klebesiella UTI        Problem/Plan - 1:  ·  Problem: ESBL (extended spectrum beta-lactamase) producing bacteria infection.  Plan:   ID consult. Recommend Invanz 1g iv qd for total of 7 days . To be done in hospital as wife said they are not able to do infusion at home.      Problem/Plan - 2:  ·  Problem: Cerebrovascular accident (CVA) due to bilateral thrombosis of anterior cerebral arteries.  Plan: Aspirin 81mg po daily  Atorvastatin 40mg po daily.      Problem/Plan - 3:  ·  Problem: Diabetic polyneuropathy associated with type 2 diabetes mellitus.  Plan: Insulin sliding scale.      Problem/Plan - 4:  ·  Problem: Stented coronary artery.  Plan: Plavix 75mg po daily.      Problem/Plan - 5:  ·  Problem: Essential hypertension.  Plan: Coreg 12.5mg po bid   Lisinopril 2.5mg po daily.      Problem/Plan - 6:  Problem: Vascular dementia with behavior disturbance. Plan: Sertraline 100mg po daily.     Problem/Plan - 7:  ·  Problem: CKD (chronic kidney disease) stage 3, GFR 30-59 ml/min.  Plan: Gentle hydration.      Problem/Plan - 8:  ·  Problem: Anemia, chronic disease.  Plan: ferrous sulfate 325mg po daily.

## 2019-04-29 NOTE — PROGRESS NOTE ADULT - REASON FOR ADMISSION
Called in for Positive urine  culture ESBL Klebsiella pneumonia

## 2019-04-29 NOTE — PHYSICAL THERAPY INITIAL EVALUATION ADULT - GENERAL OBSERVATIONS, REHAB EVAL
pt received supine in bed, NAD, agreeable to PT, RN and wife present, condom cath was already disconnected prior to session

## 2019-04-30 NOTE — DISCHARGE NOTE PROVIDER - NSDCCPCAREPLAN_GEN_ALL_CORE_FT
PRINCIPAL DISCHARGE DIAGNOSIS  Diagnosis: ESBL (extended spectrum beta-lactamase) producing bacteria infection  Assessment and Plan of Treatment: COMPLTED 5 DAYS TREATMENT  OUTPATIENT ID FOLLOW UP

## 2019-04-30 NOTE — DISCHARGE NOTE PROVIDER - HOSPITAL COURSE
68 yr male with history of multiple CVA, Dementia, Hypertension, DM , called back for ESBL klebesiella UTI. ID recommended antibiotics till 4/30 total 5 days. PT consulted and recommended home with home pt vs [facility]. SW spoke to wife and she prefers patient to go home with home PT. CM made aware. nEEDS TO FOLLOW UP WITH ID, NEPHROLOGY AND PCP A [facility] OUTPATIENT FOR DM AND HTN MEDICATIONS. DISCUSSED WITH PATIENT WIFE.             Problem/Plan - 1:    ·  Problem: ESBL (extended spectrum beta-lactamase) producing bacteria infection.  Plan:     ID consult. Recommend Invanz 1g iv qd for total of 5 days till 4/30 . completed treatment .Confirmed with ID.    To be done in hospital as wife said they are not able to do infusion at home.     f/u with ID as an outpatient         Problem/Plan - 2:    ·  Problem: Cerebrovascular accident (CVA) due to bilateral thrombosis of anterior cerebral arteries.  Plan: Aspirin 81mg po daily    Atorvastatin 40mg po daily.          Problem/Plan - 3:    ·  Problem: Diabetic polyneuropathy associated with type 2 diabetes mellitus.  Plan: Insulin sliding scale.          Problem/Plan - 4:    ·  Problem: Stented coronary artery.  Plan: Plavix 75mg po daily.          Problem/Plan - 5:    ·  Problem: Essential hypertension.  Plan: Coreg 12.5mg po bid     Lisinopril 2.5mg po daily. and continue home meds    f/u with pmd for htn medication management         Problem/Plan - 6:    Problem: Vascular dementia with behavior disturbance. Plan: Sertraline 100mg po daily.         Problem/Plan - 7:    ·  Problem: CKD (chronic kidney disease) stage 3, GFR 30-59 ml/min.      at baseline    f/u with pcp and nephrology as an outpatient         Problem/Plan - 8:    ·  Problem: Anemia, chronic disease.  Plan: ferrous sulfate 325mg po tid            Vital Signs Last 24 Hrs    Vital Signs Last 24 Hrs    T(C): 36.8 (29 Apr 2019 07:47), Max: 36.8 (28 Apr 2019 15:46)    T(F): 98.3 (29 Apr 2019 07:47), Max: 98.3 (28 Apr 2019 15:46)    HR: 64 (29 Apr 2019 07:47) (64 - 70)    BP: 140/78 (29 Apr 2019 07:47) (140/78 - 180/81)    BP(mean): --    RR: 19 (29 Apr 2019 07:47) (18 - 19)    SpO2: 97% (29 Apr 2019 00:17) (94% - 97%)                PHYSICAL EXAM:    GENERAL: NAD, [place] catheter    HEENT: PERRL, +EOMI, anicteric, no [place]    NECK: Supple, No JVD     CHEST/LUNG: CTA bilaterally; Normal effort    HEART: S1S2 Normal intensity, no murmurs, gallops or rubs noted    ABDOMEN: Soft, BS Normoactive, NT, ND, no HSM noted    EXTREMITIES:  2+ radial and DP pulses noted, no clubbing, cyanosis, or edema noted, Limited mobility     SKIN: No rashes or lesions noted    NEURO: Awake , ORIETNED *1                35 min spent in discharging the patient.

## 2019-04-30 NOTE — DISCHARGE NOTE NURSING/CASE MANAGEMENT/SOCIAL WORK - NSDCDPATPORTLINK_GEN_ALL_CORE
You can access the [facility] Patient Portal, offered by [facility], by registering with the following website: [email or web address]

## 2019-04-30 NOTE — DISCHARGE NOTE PROVIDER - CARE PROVIDER_API CALL
Desmond Canales)  Infectious Disease; Internal Medicine  [address] [place] [address], [place] [address]  Phone: [phone]  Fax: [phone]  Follow Up Time:

## 2019-04-30 NOTE — DISCHARGE NOTE PROVIDER - NSDCHC_MEDRECSTATUS_GEN_ALL_CORE
Admission Reconciliation is Completed  Discharge Reconciliation is Not Complete Admission Reconciliation is Not Complete  Discharge Reconciliation is Not Complete Admission Reconciliation is Not Complete  Discharge Reconciliation is Completed

## 2019-06-11 NOTE — ED PROVIDER NOTE - EYES, MLM
Medication was sent to the pharmacy based on request, please inform.  Thanks    
Patient stated he wanted atorvastatin (LIPITOR) 40 MG tablet prescription to go to [facility], not [place].  Please send to correct pharmacy.    Pharmacy:   [facility] [address] [place] [address] [address] [facility]  [address] [facility] [address]  Phone: [phone] Fax: [phone]        Patient Name: Félix REDMOND Madisyn  Caller Name: Patient  Callback Number:   Mobile [phone]     Best Availability: n/a  Can A Detailed Message Be left? n/a  Did you confirm the message with the caller?: yes      Thank you,  Giovanny Low    
Patient was informed .  
Clear bilaterally, pupils equal, round and reactive to light.

## 2019-07-18 NOTE — ED ADULT NURSE NOTE - CAS TRG GENERAL NORM CIRC DET
Patient seen post-HD. Tolerated treatment well with net UF of 997ml. Patient is hemodynamically stable and in no acute distress. Will continue to follow while in-patient.     LAURIE Garcia, AGNP-C  Nephrology  Pager: [phone]       Strong peripheral pulses

## 2019-10-01 NOTE — PHYSICAL THERAPY INITIAL EVALUATION ADULT - ADDITIONAL COMMENTS
pt presented to [place] ed secondary to abd pain fever weakness with recent dx hepatobiliary cancer . per family and OSH full code pt sleepy currently but arousable further history difficult to obtain secondary pt severely ill. arrive on bipap able to titrate off bipap pt a poor historian: as per wife states he ambulates very short distances within home c assistance(min-modA) with a RW, owns a W/C for long distances, 3 steps 1 rail to enter home, no stairs within home

## 2019-10-07 NOTE — ED ADULT TRIAGE NOTE - MODE OF ARRIVAL
Requested Prescriptions     Pending Prescriptions Disp Refills    fluticasone propionate (FLOVENT HFA) 220 mcg/actuation inhaler 2 Inhaler 11     Si puffs swallowed twice daily, npo x 30 min after dose     pharmacy #[id number] - [facility], [address] [place], [place] [address]X  [phone] EMS

## 2019-10-09 NOTE — ED PROVIDER NOTE - NS ED ROS FT

## 2019-10-09 NOTE — ED PROVIDER NOTE - CLINICAL SUMMARY MEDICAL DECISION MAKING FREE TEXT BOX
patient s/p fall with sob/hypoxia: plan of care includes ct, xr, labs, re-eval. signed out to incoming physician

## 2019-10-09 NOTE — ED PROVIDER NOTE - OBJECTIVE STATEMENT
69yoM; with pmh signif for CVA, HTN, DM, CAD (s/p AICD, s/p stent); now presenting s/p fall.  patient with sob and cough x2-3 days.  denies cp.  c/o generalized malaise. fall at nursing facility. 69yoM; with pmh signif for CVA, HTN, DM, CAD (s/p AICD, s/p stent); now presenting s/p fall.  patient with sob and cough x2-3 days.  denies cp.  c/o generalized malaise. fall at nursing facility.  facility states patient found to have rib fx and was hypoxic at facility.    PMH: CVA, HTN, DM, CAD (s/p AICD, s/p stent)  SOCiAL: No tobacco/illicit substance use/EtOH

## 2019-10-09 NOTE — ED PROVIDER NOTE - PHYSICAL EXAMINATION
General:     NAD  Head:     NC/AT, EOMI, oral mucosa moist  Neck:     trachea midline  Lungs:     CTA b/l, no w/r/r  CVS:     S1S2, RRR, no m/g/r  Abd:     +BS, s/nt/nd, no organomegaly  Ext:    2+ radial and pedal pulses, no c/c/e  Neuro: AAOx1, contracted (baseline)

## 2019-10-09 NOTE — ED ADULT TRIAGE NOTE - CHIEF COMPLAINT QUOTE
Pt. BIBA from [facility] s/p fall earlier today.  As per EMS, they did an xray at [facility] and found a right 10th rib fracture.  Pt. sent here for decreasing O2 sat to 92% on room air and increased pain on inspiration.

## 2019-10-09 NOTE — ED PROVIDER NOTE - CARE PLAN
Principal Discharge DX:	Fall Principal Discharge DX:	Fall  Secondary Diagnosis:	Multiple rib fractures involving four or more ribs  Secondary Diagnosis:	Pleural effusion

## 2019-10-10 NOTE — PHYSICAL THERAPY INITIAL EVALUATION ADULT - CRITERIA FOR SKILLED THERAPEUTIC INTERVENTIONS
anticipated discharge recommendation/functional limitations in following categories/impairments found/rehab potential/therapy frequency/predicted duration of therapy intervention/anticipated equipment needs at discharge/risk reduction/prevention

## 2019-10-10 NOTE — PHYSICAL THERAPY INITIAL EVALUATION ADULT - TRANSFER SAFETY CONCERNS NOTED: SIT/STAND, REHAB EVAL
in sagittal plane, unable to complete full standing/decreased safety awareness/decreased sequencing ability/decreased weight-shifting ability

## 2019-10-10 NOTE — H&P ADULT - ATTENDING COMMENTS
Agree with above assessment.  The patient was seen and examined by me.  The patient is s/p fall from wheelchair with uncertain LOC.  The patient is with complaints of right chest wall pain.  HEENT NC, small abrasion to scalp, PERRL EOMI trachea midline, chest bilateral air entry, abdomen is soft with no localizing tenderness, no guarding, no rebound, no gross deformity of the extremities, small abrasion the left pretibial area.  Head CT with no acute traumatic injury, chest CT scan with fractures of the right 5th thru 8th ribs and the 10 th rib with no pneumothorax.  Abd/pel CT scan negative for acute traumatic injury.  The patient is to be admitted to a monitored bed, IV hydration given the elevated BUN/CR consistent with ALLISON.  Pain control, deep breathing.

## 2019-10-10 NOTE — CONSULT NOTE ADULT - ATTENDING COMMENTS
Patient seen and examined and cased discussed with resident. Agree with recommendations.     Spoke to wife who notes ongoing challenges with patient. Has been at [facility] and making limited progress. Being admitted for workup.     Therapy evals pending. Recommend OOB throughout the day with mobilization by staff to maintain cardiopulmonary function and prevention of secondary complications related to debility.     Will sign off, please reconsult for additional rehab dispo needs if functional status changes.

## 2019-10-10 NOTE — ED ADULT NURSE REASSESSMENT NOTE - NS ED NURSE REASSESS COMMENT FT1
pt awake and alert, nods yes or no to answer questions, wife at bedside, pt denies any pain but wants to take pain meds. plan of care explained to pt and family, family verbalized understanding.

## 2019-10-10 NOTE — H&P ADULT - PROBLEM SELECTOR PLAN 5
Admit to monitored bed  pain control  monitor H/H given the chest hematoma and contusion  repeat labs in am  IV hydration

## 2019-10-10 NOTE — ED ADULT NURSE NOTE - OBJECTIVE STATEMENT
Pt sent from [facility] s/p fall. [facility] discovered a 10th rib fracture and pt was also found to have pain on inspiration and decreasing sats. Pt oriented to self but not time and situation. Pt found sleeping on assessment and denies any pain at this time. Pt PMHx of HTN, CVA, DM, AICD, CAD. Pt has compression boots on both lower extremeties. Large toe missing on left foot and blackened skin on 2nd digit on left foot.

## 2019-10-10 NOTE — PHYSICAL THERAPY INITIAL EVALUATION ADULT - DIAGNOSIS, PT EVAL
Impaired Functional Mobility at present time s/p fall (+) right anterior 5 th rib Fx, posterior 10 th rib Fx.

## 2019-10-10 NOTE — H&P ADULT - NSHPSOCIALHISTORY_GEN_ALL_CORE
PMH/PSH/SH/FM obtained from chart     Unable to fully assess due to patient's baseline cognitive status

## 2019-10-10 NOTE — PHYSICAL THERAPY INITIAL EVALUATION ADULT - ADDITIONAL COMMENTS
as per wife: pt. admitted from the rehab center wife and pt. unable to provide the information about current functional status; prior to rehab pt. was able to ambulate with RW , lives in the private house with wife 3 steps to enter (+) rail, wife available to assist pt. as needed

## 2019-10-10 NOTE — ED ADULT NURSE NOTE - CHIEF COMPLAINT QUOTE
Pt. BIBA from [facility] s/p fall earlier today.  As per EMS, they did an xray at [facility] and found a right 10th rib fracture.  Pt. sent here for decreasing O2 sat to 92% on room air and increased pain on inspiration.

## 2019-10-10 NOTE — ED ADULT NURSE REASSESSMENT NOTE - NS ED NURSE REASSESS COMMENT FT1
pt. received from night RN. pt. awake, oriented x1. pt. does not answer questions appropriately. awaiting bed. informed on plan of care. LR @ 100ML. pt. received from night RN. pt. awake, oriented x1. pt. does not answer questions appropriately. awaiting bed. informed on plan of care. LR @ 100ML. bilateral wraps and boots to lower extremities. redness noted to right foot. abrasion noted to the left leg. missing first digit to the left foot.

## 2019-10-10 NOTE — H&P ADULT - NSHPPHYSICALEXAM_GEN_ALL_CORE
Gen: mild distress due to pain, resting in bed  Head: NC, left parietal abrasion ?acute vs old  Eyes: pupil 4mm bilaterally equally reactive, EOMI  Neck: no cervical spine tenderness or step-off  Chest: CTAB, right chest wall tenderness, no echymosis,  no crepitus, healded midline sternotomy scar  Abd: S, ND, NT  Pelvis: stable  Ext: left elbow abrasion, left shin abrasion, left 1st toe amputation site healed, no ulcer to bilateral lower extremities  Back: no spinal tenderness, no step off, stage 1 sacral pressure sore  Neuro: following commands, baseline aphasia, no gross focal deficits   Psych: appropriate mood

## 2019-10-10 NOTE — PHYSICAL THERAPY INITIAL EVALUATION ADULT - IMPAIRMENTS FOUND, PT EVAL
gait, locomotion, and balance/muscle strength/aerobic capacity/endurance/cognitive impairment/neuromotor development and sensory integration

## 2019-10-10 NOTE — ED ADULT NURSE NOTE - NSIMPLEMENTINTERV_GEN_ALL_ED
Implemented All Universal Safety Interventions:  [place] to call system. Call bell, personal items and telephone within reach. Instruct patient to call for assistance. Room bathroom lighting operational. Non-slip footwear when patient is off stretcher. Physically safe environment: no spills, clutter or unnecessary equipment. Stretcher in lowest position, wheels locked, appropriate side rails in place.

## 2019-10-10 NOTE — CONSULT NOTE ADULT - SUBJECTIVE AND OBJECTIVE BOX
69yM was admitted on 10-09    In ED, GCS=15    Patient is a 69y old  Male who presents with a chief complaint of Rib fractures (10 Oct 2019 07:55)    HPI:  69M with PMHX CVA with residual aphasia, dysphagia, hemiparesis-wheelchair level at baseline, HTN, DM, CAD (s/p AICD, s/p stent) BIBA from [facility] s/p fall. Patient is a poor historian due to baseline cognitive impairment/aphasia. Unsure of LOC. CXR done at [facility] showed R 10th rib fx. Patient with complaints of R chest pain, hypoxic to 92% on RA. At the time of evaluation, he c/o right sided chest pain. Denies HA, neck pain, abdominal pain, pelvic pain or any other symptom.       Imaging showed:  CT HEAD/CSPINE - 1. No acute territorial infarct, hemorrhage, mass effect or calvarial fracture.  2. No cervical spine fracture or traumatic spondylolisthesis. 3. Left thyroid lobe nodule. A nonemergent thyroid ultrasound could be performed for further evaluation    CT CAP - Acute on chronic right lateral fifth, sixth, seventh, and eighth rib fractures are evident. Nondisplaced acute posterior right 10th rib fracture noted. Chronic right anterolateral third and fourth rib fractures with callus formation is present. Right chest wall hematoma is present. Small right pleural effusion. No pneumothorax.  2. No evidence of acute solid organ or hollow viscus injury.   3. Rectal fecal impaction with a moderate sized stool ball. Possible associated stercoral colitis.   4. Possible mild enteritis. No small bowel obstruction.   5. Cholelithiasis without evidence of acute cholecystitis.   6. Bilateral nonobstructive renal stones. No hydronephrosis.   7. Urinary bladder wall thickening likely from chronic outlet obstruction due to an enlarged prostate.      REVIEW OF SYSTEMS  Constitutional - No fever, No weight loss, No fatigue  HEENT - No eye pain, No visual disturbances, No difficulty hearing, No tinnitus, No vertigo, No neck pain  Respiratory - No cough, No wheezing, No shortness of breath  Cardiovascular - No chest pain, No palpitations  Gastrointestinal - No abdominal pain, No nausea, No vomiting, No diarrhea, No constipation  Genitourinary - No dysuria, No frequency, No hematuria, No incontinence  Neurological - No headaches, No memory loss, No loss of strength, No numbness, No tremors  Skin - No itching, No rashes, No lesions   Endocrine - No temperature intolerance  Musculoskeletal - No joint pain, No joint swelling, No muscle pain  Psychiatric - No depression, No anxiety    VITALS  T(C): 36.3 (10-10-19 @ 03:09), Max: 36.5 (10-09-19 @ 20:38)  HR: 61 (10-10-19 @ 07:22) (61 - 66)  BP: 151/72 (10-10-19 @ 07:22) (151/72 - 196/81)  RR: 18 (10-10-19 @ 07:22) (18 - 19)  SpO2: 92% (10-10-19 @ 07:22) (92% - 98%)  Wt(kg): --    PAST MEDICAL & SURGICAL HISTORY  CVA (cerebral vascular accident)  Diabetic neuropathy  DM (diabetes mellitus)  Hyperlipidemia  AICD (automatic cardioverter/defibrillator) present  Pacemaker  Stented coronary artery  HTN (hypertension)  Atherosclerosis of CABG w/o angina pectoris  AICD (automatic cardioverter/defibrillator) present  History of amputation of toe  Cardiac pacemaker      SOCIAL HISTORY  Smoking - Denied  EtOH - Denied   Drugs - Denied    FUNCTIONAL HISTORY  Lives   Independent    CURRENT FUNCTIONAL STATUS      FAMILY HISTORY   No pertinent family history in first degree relatives  No pertinent family history in first degree relatives      RECENT LABS/IMAGING  CBC Full  -  ( 09 Oct 2019 23:39 )  WBC Count : 8.44 K/uL  RBC Count : 5.03 M/uL  Hemoglobin : 12.8 g/dL  Hematocrit : 42.4 %  Platelet Count - Automated : 181 K/uL  Mean Cell Volume : 84.3 fl  Mean Cell Hemoglobin : 25.4 pg  Mean Cell Hemoglobin Concentration : 30.2 gm/dL  Auto Neutrophil # : 6.34 K/uL  Auto Lymphocyte # : 1.26 K/uL  Auto Monocyte # : 0.61 K/uL  Auto Eosinophil # : 0.16 K/uL  Auto Basophil # : 0.04 K/uL  Auto Neutrophil % : 75.1 %  Auto Lymphocyte % : 14.9 %  Auto Monocyte % : 7.2 %  Auto Eosinophil % : 1.9 %  Auto Basophil % : 0.5 %    10-10    141  |  110<H>  |  63.0<H>  ----------------------------<  256<H>  4.7   |  15.0<L>  |  2.92<H>    Ca    8.7      10 Oct 2019 06:19  Phos  4.5     10-10  Mg     1.9     10-10    TPro  7.3  /  Alb  3.6  /  TBili  0.3<L>  /  DBili  x   /  AST  42<H>  /  ALT  45<H>  /  AlkPhos  116  10-09        ALLERGIES  No Known Allergies      MEDICATIONS   acetaminophen   Tablet .. 650 milliGRAM(s) Oral every 6 hours  amLODIPine   Tablet 10 milliGRAM(s) Oral daily  aspirin  chewable 81 milliGRAM(s) Oral daily  atorvastatin 40 milliGRAM(s) Oral daily  carvedilol 12.5 milliGRAM(s) Oral every 12 hours  celecoxib 200 milliGRAM(s) Oral two times a day  dextrose 40% Gel 15 Gram(s) Oral once PRN  dextrose 5%. 1000 milliLiter(s) IV Continuous <Continuous>  dextrose 50% Injectable 25 Gram(s) IV Push once  dextrose 50% Injectable 25 Gram(s) IV Push once  dextrose 50% Injectable 12.5 Gram(s) IV Push once  docusate sodium 100 milliGRAM(s) Oral daily  ferrous sulfate Oral Tab/Cap - Peds 325 milliGRAM(s) Oral daily  gabapentin 300 milliGRAM(s) Oral three times a day  glucagon  Injectable 1 milliGRAM(s) IntraMuscular once PRN  heparin  Injectable 5000 Unit(s) SubCutaneous every 8 hours  insulin lispro (HumaLOG) corrective regimen sliding scale   SubCutaneous three times a day before meals  lactated ringers. 1000 milliLiter(s) IV Continuous <Continuous>  lidocaine   Patch 2 Patch Transdermal every 24 hours  methocarbamol 750 milliGRAM(s) Oral every 6 hours  ondansetron Injectable 4 milliGRAM(s) IV Push every 6 hours PRN  oxyCODONE    IR 5 milliGRAM(s) Oral every 4 hours PRN  saccharomyces boulardii 250 milliGRAM(s) Oral two times a day  senna 1 Tablet(s) Oral daily  sertraline 100 milliGRAM(s) Oral daily        ----------------------------------------------------------------------------------------  PHYSICAL EXAM  Constitutional - NAD, Comfortable  HEENT - NCAT, EOMI  Neck - Supple, No limited ROM  Chest - Breathing comfortably, No wheezing  Cardiovascular - S1S2   Abdomen - Soft   Extremities - Large toe missing on left foot and blackened skin on 2nd digit on left foot  Neurologic Exam -                    Cognitive - Awake, Alert, AAO to self. NOT to place, date, year, situation     Communication - Fluent, No dysarthria     Cranial Nerves - CN 2-12 intact     Motor -                     LEFT    UE - ShAB 5/5, EF 5/5, EE 5/5, WE 5/5,  5/5                    RIGHT UE - ShAB 5/5, EF 5/5, EE 5/5, WE 5/5,  5/5                    LEFT    LE - HF 5/5, KE 5/5, DF 5/5, PF 5/5                    RIGHT LE - HF 5/5, KE 5/5, DF 5/5, PF 5/5        Sensory - Intact to LT     Reflexes - DTR Intact, No primitive reflexive     Coordination - FTN intact     OculoVestibular - No saccades, No nystagmus, VOR         Balance - WNL Static  Psychiatric - Mood stable, Affect WNL  ----------------------------------------------------------------------------------------  ASSESSMENT/PLAN  69y Male with functional deficits after  Pain -   DVT PPX - 69yM was admitted on 10-09    In ED, GCS=15    Patient is a 69y old  Male who presents with a chief complaint of Rib fractures (10 Oct 2019 07:55)    HPI:  69M with PMHX CVAx7 (last 2017) with residual aphasia, dysphagia, hemiparesis-wheelchair level at baseline, HTN, T2DM, CKD 3, CAD s/p stents, 3v CABG, s/p AICD, s/p stent), PAD s/p BL LE ballooning, CRYSTAL s/p CEA, hx L 1st toe OM s/p amputation BIBA from [facility] s/p fall. Patient is a poor historian due to baseline cognitive impairment/aphasia. Unsure of LOC. CXR done at [facility] showed R 10th rib fx. Patient with complaints of R chest pain, hypoxic to 92% on RA. At the time of evaluation, he c/o right sided chest pain. Denies HA, neck pain, abdominal pain, pelvic pain or any other symptom. Wife at bedside states that pt was last admitted to [facility] in 4/2019 for sepsis 2/2 UTI, was home for 3 months, mainly at  level, dependent for ADLs. Patient then admitted to [facility] in July 2019 after a fall in the shower at home, found to be septic, discharged to [facility]. Patient with receptive and expressive aphasia at baseline, occasionally will say 1-2 words and intermittently follows commands.       Imaging showed:  CT HEAD/CSPINE - 1. No acute territorial infarct, hemorrhage, mass effect or calvarial fracture.  2. No cervical spine fracture or traumatic spondylolisthesis. 3. Left thyroid lobe nodule. A nonemergent thyroid ultrasound could be performed for further evaluation    CT CAP - Acute on chronic right lateral fifth, sixth, seventh, and eighth rib fractures are evident. Nondisplaced acute posterior right 10th rib fracture noted. Chronic right anterolateral third and fourth rib fractures with callus formation is present. Right chest wall hematoma is present. Small right pleural effusion. No pneumothorax.  2. No evidence of acute solid organ or hollow viscus injury.   3. Rectal fecal impaction with a moderate sized stool ball. Possible associated stercoral colitis.   4. Possible mild enteritis. No small bowel obstruction.   5. Cholelithiasis without evidence of acute cholecystitis.   6. Bilateral nonobstructive renal stones. No hydronephrosis.   7. Urinary bladder wall thickening likely from chronic outlet obstruction due to an enlarged prostate.      REVIEW OF SYSTEMS  Unable to assess. Pt with expressive and receptive aphasia at baseline.     VITALS  T(C): 36.3 (10-10-19 @ 03:09), Max: 36.5 (10-09-19 @ 20:38)  HR: 61 (10-10-19 @ 07:22) (61 - 66)  BP: 151/72 (10-10-19 @ 07:22) (151/72 - 196/81)  RR: 18 (10-10-19 @ 07:22) (18 - 19)  SpO2: 92% (10-10-19 @ 07:22) (92% - 98%)  Wt(kg): --    PAST MEDICAL & SURGICAL HISTORY  CVA (cerebral vascular accident)  Diabetic neuropathy  DM (diabetes mellitus)  Hyperlipidemia  AICD (automatic cardioverter/defibrillator) present  Pacemaker  Stented coronary artery  HTN (hypertension)  Atherosclerosis of CABG w/o angina pectoris  AICD (automatic cardioverter/defibrillator) present  History of amputation of toe  Cardiac pacemaker      SOCIAL HISTORY  Smoking - Denied  EtOH - Denied   Drugs - Denied    FUNCTIONAL HISTORY  Lives with wife in 1 story [address], HRx1.   Dependent with ADLs, WC level.   Presents from [facility]    CURRENT FUNCTIONAL STATUS  pending PT/OT eval.    FAMILY HISTORY   No pertinent family history in first degree relatives        RECENT LABS/IMAGING  CBC Full  -  ( 09 Oct 2019 23:39 )  WBC Count : 8.44 K/uL  RBC Count : 5.03 M/uL  Hemoglobin : 12.8 g/dL  Hematocrit : 42.4 %  Platelet Count - Automated : 181 K/uL  Mean Cell Volume : 84.3 fl  Mean Cell Hemoglobin : 25.4 pg  Mean Cell Hemoglobin Concentration : 30.2 gm/dL  Auto Neutrophil # : 6.34 K/uL  Auto Lymphocyte # : 1.26 K/uL  Auto Monocyte # : 0.61 K/uL  Auto Eosinophil # : 0.16 K/uL  Auto Basophil # : 0.04 K/uL  Auto Neutrophil % : 75.1 %  Auto Lymphocyte % : 14.9 %  Auto Monocyte % : 7.2 %  Auto Eosinophil % : 1.9 %  Auto Basophil % : 0.5 %    10-10    141  |  110<H>  |  63.0<H>  ----------------------------<  256<H>  4.7   |  15.0<L>  |  2.92<H>    Ca    8.7      10 Oct 2019 06:19  Phos  4.5     10-10  Mg     1.9     10-10    TPro  7.3  /  Alb  3.6  /  TBili  0.3<L>  /  DBili  x   /  AST  42<H>  /  ALT  45<H>  /  AlkPhos  116  10-09        ALLERGIES  No Known Allergies      MEDICATIONS   acetaminophen   Tablet .. 650 milliGRAM(s) Oral every 6 hours  amLODIPine   Tablet 10 milliGRAM(s) Oral daily  aspirin  chewable 81 milliGRAM(s) Oral daily  atorvastatin 40 milliGRAM(s) Oral daily  carvedilol 12.5 milliGRAM(s) Oral every 12 hours  celecoxib 200 milliGRAM(s) Oral two times a day  dextrose 40% Gel 15 Gram(s) Oral once PRN  dextrose 5%. 1000 milliLiter(s) IV Continuous <Continuous>  dextrose 50% Injectable 25 Gram(s) IV Push once  dextrose 50% Injectable 25 Gram(s) IV Push once  dextrose 50% Injectable 12.5 Gram(s) IV Push once  docusate sodium 100 milliGRAM(s) Oral daily  ferrous sulfate Oral Tab/Cap - Peds 325 milliGRAM(s) Oral daily  gabapentin 300 milliGRAM(s) Oral three times a day  glucagon  Injectable 1 milliGRAM(s) IntraMuscular once PRN  heparin  Injectable 5000 Unit(s) SubCutaneous every 8 hours  insulin lispro (HumaLOG) corrective regimen sliding scale   SubCutaneous three times a day before meals  lactated ringers. 1000 milliLiter(s) IV Continuous <Continuous>  lidocaine   Patch 2 Patch Transdermal every 24 hours  methocarbamol 750 milliGRAM(s) Oral every 6 hours  ondansetron Injectable 4 milliGRAM(s) IV Push every 6 hours PRN  oxyCODONE    IR 5 milliGRAM(s) Oral every 4 hours PRN  saccharomyces boulardii 250 milliGRAM(s) Oral two times a day  senna 1 Tablet(s) Oral daily  sertraline 100 milliGRAM(s) Oral daily        ----------------------------------------------------------------------------------------  PHYSICAL EXAM  Constitutional - NAD, Comfortable  HEENT - NCAT, PERRL  Neck - Supple, No limited ROM  Chest - Breathing comfortably, No wheezing  Cardiovascular - S1S2, bradycardia HR 50s   Abdomen - Soft   Extremities - s/p L 1st toe amputation, skin well healed. L 2nd toe with black scab.   Neurologic Exam -                    Cognitive - Drowsy, reacts to pain, answers to name, makes eye contact and tracks gaze.      Communication - +aphasia. per wife, will make needs known if uncomfortable or in pain with groans or 1-2 words.      Cranial Nerves - unable to assess     Motor - unable to assess     Sensory - unable to assess     Reflexes - DTR Intact, No primitive reflexive     Coordination - unable to assess     OculoVestibular - unable to assess      Balance - unable to assess  Psychiatric - Mood stable, Affect flat  ----------------------------------------------------------------------------------------  ASSESSMENT/PLAN  69y Male with functional deficits after fall of unknown mechanism resulting in R 10th rib fx    R 10th rib fx, Acute on chronic R rib 5, 6, 7, 8 fx - Monitor resp status, pain control. Management per primary  Pain - tylenol, oxycodone, celebrex, robaxin, lidoderm patch  ALLISON on CKD  - IV fluids  CVA, CAD - ASA, plavix  HTN - carvedilol, amlodipine  T2DM - SSI  PAD - atorvastatin  Mood - zoloft  DVT PPX - HSQ  Rehab - Patient presented from [facility]. Wife requesting dc back to [facility]. Pending PT/OT eval, likely at functional baseline. Once medically optimized recommend [facility], patient DOES NOT meet acute inpatient rehabilitation criteria    Will sign off, please reconsult if needed for rehab dispo recommendations.

## 2019-10-10 NOTE — H&P ADULT - ASSESSMENT
This is a This is a  68 yo M with PMHX CVA, HTN, DM, CAD (s/p AICD, s/p stent), wheelchair bound brought from [facility] s/p fall. Patient is a poor historian due to baseline cognitive impairment/aphasia  CT Head/Cspine with chronic changes, no acute traumatic injuries  CT chest Right lat 5th-8th rib fractures, Right posterior 10th rib fracture, small chest wall hematoma, no pneumothorax or hemothorax  ALLISON on CKD (baseline around 2.0) with base deficit of -5.7  Mild transaminitis  He also has mildly elevated troponins, improved from his prior hospitalization, likely due to CKD  -Admit to Step down under the care of Trauma  -Given 10th rib fx and mild transaminitis, will get CT Abd/P with IV contrast to evaluate for possible liver injury/laceration, although patient is currently HD well, no abdominal pain.  -PIC protocol: pain managment, PT, NC O2  -Plavix held for now  -EKG, repeat troponins in AM if stable or trending down will stop trending  -Gentle hydration for ALLISON  -Restart home meds  -AM labs  -DVT ppx    Patient seen and examined with Dr. Dela Cruz, who agrees with above plan. This is a This is a  68 yo M with PMHX CVA, HTN, DM, CAD (s/p AICD, s/p stent), wheelchair bound brought from [facility] s/p fall. Patient is a poor historian due to baseline cognitive impairment/aphasia  CT Head/Cspine with chronic changes, no acute traumatic injuries  CT chest Right lat 5th-8th rib fractures, Right posterior 10th rib fracture, small chest wall hematoma, no pneumothorax or hemothorax  ALLISON on CKD (baseline around 2.0) with base deficit of -5.7, BUN/Cr ratio 22, pre-renal likely 2/2 dehydration   Mild transaminitis  He also has mildly elevated troponins, improved from his prior hospitalization, likely due to CKD  -Admit to Step down under the care of Trauma  -Given 10th rib fx and mild transaminitis, will get CT Abd/P with IV contrast to evaluate for possible liver injury/laceration, although patient is currently HD well, no abdominal pain.  -PIC protocol: pain managment, PT, NC O2  -Plavix held for now  -EKG, repeat troponins in AM if stable or trending down will stop trending  -Gentle hydration for ALLISON  -Restart home meds  -AM labs  -DVT ppx    Patient seen and examined with Dr. Dela Cruz, who agrees with above plan.

## 2019-10-10 NOTE — PROGRESS NOTE ADULT - ASSESSMENT
70yo M s/p fall by unknown mechanism with right sided rib fractures with pain under control. PIC protocol. CT abdomen demonstrated no liver injury nor active bleeding.     -Restart plavix  -restart diet  -f/u troponin this AM  -PIC protocol  -Pain control  -IS  -Pulmonary toilet  -F/u AM labs

## 2019-10-10 NOTE — PROGRESS NOTE ADULT - SUBJECTIVE AND OBJECTIVE BOX
70yo M examined at bedside and found in no distress. No overnight events. Patient had a CT abdomen to rule out any liver injury which was negative. No complaints. Denies fever, chills, nausea, vomiting, chest pain, and sob.     Vital Signs Last 24 Hrs  T(C): 36.3 (10 Oct 2019 03:09), Max: 36.5 (09 Oct 2019 20:38)  T(F): 97.4 (10 Oct 2019 03:09), Max: 97.7 (09 Oct 2019 20:38)  HR: 61 (10 Oct 2019 07:22) (61 - 66)  BP: 151/72 (10 Oct 2019 07:22) (151/72 - 196/81)  BP(mean): --  RR: 18 (10 Oct 2019 07:22) (18 - 19)  SpO2: 92% (10 Oct 2019 07:22) (92% - 98%)    I&O's Detail      Constitutional: patient resting comfortably in bed, in no acute distress  HEENT: EOMI, PERRLA, MMM, left parietal scalp superficial abrasion.   Respiratory: Non labored breathing. IS: 1500  Cardiovascular: RRR   Chest: right sided chest pain.   Gastrointestinal: Abdomen soft, non-tender, non-distended, no rebound tenderness / guarding  Msk: left shin superficial abrasions. Left elbow superficial abrasion      LABS:                        12.8   8.44  )-----------( 181      ( 09 Oct 2019 23:39 )             42.4     10-10    141  |  110<H>  |  63.0<H>  ----------------------------<  256<H>  4.7   |  15.0<L>  |  2.92<H>    Ca    8.7      10 Oct 2019 06:19  Phos  4.5     10-10  Mg     1.9     10-10    TPro  7.3  /  Alb  3.6  /  TBili  0.3<L>  /  DBili  x   /  AST  42<H>  /  ALT  45<H>  /  AlkPhos  116  10-09    PT/INR - ( 09 Oct 2019 23:12 )   PT: 11.6 sec;   INR: 1.01 ratio         PTT - ( 09 Oct 2019 23:12 )  PTT:33.5 sec      MEDICATIONS  (STANDING):  acetaminophen   Tablet .. 650 milliGRAM(s) Oral every 6 hours  amLODIPine   Tablet 10 milliGRAM(s) Oral daily  aspirin  chewable 81 milliGRAM(s) Oral daily  atorvastatin 40 milliGRAM(s) Oral daily  carvedilol 12.5 milliGRAM(s) Oral every 12 hours  celecoxib 200 milliGRAM(s) Oral two times a day  dextrose 5%. 1000 milliLiter(s) (50 mL/Hr) IV Continuous <Continuous>  dextrose 50% Injectable 25 Gram(s) IV Push once  dextrose 50% Injectable 25 Gram(s) IV Push once  dextrose 50% Injectable 12.5 Gram(s) IV Push once  docusate sodium 100 milliGRAM(s) Oral daily  ferrous sulfate Oral Tab/Cap - Peds 325 milliGRAM(s) Oral daily  gabapentin 300 milliGRAM(s) Oral three times a day  heparin  Injectable 5000 Unit(s) SubCutaneous every 8 hours  insulin lispro (HumaLOG) corrective regimen sliding scale   SubCutaneous three times a day before meals  lactated ringers. 1000 milliLiter(s) (100 mL/Hr) IV Continuous <Continuous>  lidocaine   Patch 2 Patch Transdermal every 24 hours  methocarbamol 750 milliGRAM(s) Oral every 6 hours  saccharomyces boulardii 250 milliGRAM(s) Oral two times a day  senna 1 Tablet(s) Oral daily  sertraline 100 milliGRAM(s) Oral daily    MEDICATIONS  (PRN):  dextrose 40% Gel 15 Gram(s) Oral once PRN Blood Glucose LESS THAN 70 milliGRAM(s)/deciliter  glucagon  Injectable 1 milliGRAM(s) IntraMuscular once PRN Glucose LESS THAN 70 milligrams/deciliter  ondansetron Injectable 4 milliGRAM(s) IV Push every 6 hours PRN Nausea  oxyCODONE    IR 5 milliGRAM(s) Oral every 4 hours PRN Moderate Pain (4 - 6)      MICRO:   Cultures     STUDIES:   EKG, CXR, U/S, CT, MRI

## 2019-10-10 NOTE — ED ADULT NURSE REASSESSMENT NOTE - NS ED NURSE REASSESS COMMENT FT1
pt status unchanged, refer to flowsheet and chart, pt safety maintained, pt hemodynamically stable. Pt resting comfortably. Pt awaiting admit bed.

## 2019-10-10 NOTE — H&P ADULT - NSICDXPASTSURGICALHX_GEN_ALL_CORE_FT
PAST SURGICAL HISTORY:  AICD (automatic cardioverter/defibrillator) present     Atherosclerosis of CABG w/o angina pectoris     History of amputation of toe

## 2019-10-10 NOTE — H&P ADULT - HISTORY OF PRESENT ILLNESS
This is a  68 yo M with PMHX CVA, HTN, DM, CAD (s/p AICD, s/p stent), wheelchair bound brought from [facility] s/p fall. Patient is a poor historian due to baseline cognitive impairment/aphasia.  Unsure of LOC. Patient c/o right sided chest pain.  He was found on CXR to have R sided rib fx and was hypoxic at facility after his fall, prompting them to send him to the ED. At the time of evaluation, he c/o right sided chest pain. Denies HA, neck pain, abdominal pain, pelvic pain or any other symptom.     Airway: intact  Breathing: CTAB  Circulation: palpable central and peripheral pulses  Disability: GCS 15, baseline cognitive impairment per chart  Exposure: patient was exposed for examination

## 2019-10-10 NOTE — PROGRESS NOTE ADULT - ATTENDING COMMENTS
fall from wheelchair  pain controlled  labs reviewed  has tabitha on ckd on ivf @100cc/hour  f/u elbow xray  restart home meds  consult rehab eval  check labs tomorrow .

## 2019-10-11 NOTE — PROGRESS NOTE ADULT - ASSESSMENT
68 yo M with PMHX CVA, HTN, DM, CAD (s/p AICD, s/p stent), wheelchair bound brought from [facility] s/p fall. Patient is a poor historian due to baseline cognitive impairment/aphasia. CT chest Right lat 5th-8th rib fractures, Right posterior 10th rib fracture, small chest wall hematoma, no pneumothorax or hemothorax. ALLISON on CKD (baseline around 2.0) with base deficit of -5.7, BUN/Cr ratio 22, pre-renal likely 2/2 dehydration. Mild transaminitis.    -PIC protocol: pain management   -PT/OT  -Gentle hydration for ALLISON  -Restart home meds  -AM labs  -DVT ppx

## 2019-10-11 NOTE — CONSULT NOTE ADULT - ASSESSMENT
1) ALLISON on CKD  2) DM  3) HTN  4) HLD    SCr 2.9-3.1  US Renal  Jadon low; UCl <27 ; ?Prerenal  Gently hydrate  NaCl @ 75cc/hr  Will follow

## 2019-10-11 NOTE — PROGRESS NOTE ADULT - SUBJECTIVE AND OBJECTIVE BOX
HPI/OVERNIGHT EVENTS:  No acute overnight events. Vital signs stable.  Denies nausea, vomiting, fever, chills, chest pain, shortness of breath, or any new or concerning symptoms.     MEDICATIONS  (STANDING):  acetaminophen   Tablet .. 650 milliGRAM(s) Oral every 6 hours  amLODIPine   Tablet 10 milliGRAM(s) Oral daily  aspirin  chewable 81 milliGRAM(s) Oral daily  atorvastatin 40 milliGRAM(s) Oral daily  carvedilol 12.5 milliGRAM(s) Oral every 12 hours  celecoxib 200 milliGRAM(s) Oral two times a day  clopidogrel Tablet 75 milliGRAM(s) Oral daily  dextrose 5%. 1000 milliLiter(s) (50 mL/Hr) IV Continuous <Continuous>  dextrose 50% Injectable 25 Gram(s) IV Push once  dextrose 50% Injectable 25 Gram(s) IV Push once  dextrose 50% Injectable 12.5 Gram(s) IV Push once  docusate sodium 100 milliGRAM(s) Oral daily  ferrous sulfate Oral Tab/Cap - Peds 325 milliGRAM(s) Oral daily  gabapentin 300 milliGRAM(s) Oral three times a day  heparin  Injectable 5000 Unit(s) SubCutaneous every 8 hours  insulin lispro (HumaLOG) corrective regimen sliding scale   SubCutaneous three times a day before meals  lactated ringers. 1000 milliLiter(s) (100 mL/Hr) IV Continuous <Continuous>  lidocaine   Patch 2 Patch Transdermal every 24 hours  methocarbamol 750 milliGRAM(s) Oral every 6 hours  saccharomyces boulardii 250 milliGRAM(s) Oral two times a day  senna 1 Tablet(s) Oral daily  sertraline 100 milliGRAM(s) Oral daily    MEDICATIONS  (PRN):  dextrose 40% Gel 15 Gram(s) Oral once PRN Blood Glucose LESS THAN 70 milliGRAM(s)/deciliter  glucagon  Injectable 1 milliGRAM(s) IntraMuscular once PRN Glucose LESS THAN 70 milligrams/deciliter  ondansetron Injectable 4 milliGRAM(s) IV Push every 6 hours PRN Nausea  oxyCODONE    IR 5 milliGRAM(s) Oral every 4 hours PRN Moderate Pain (4 - 6)      Vital Signs Last 24 Hrs  T(C): 36.3 (11 Oct 2019 05:10), Max: 36.5 (10 Oct 2019 11:52)  T(F): 97.3 (11 Oct 2019 05:10), Max: 97.7 (10 Oct 2019 11:52)  HR: 53 (11 Oct 2019 05:10) (50 - 61)  BP: 147/71 (11 Oct 2019 05:10) (136/60 - 151/72)  BP(mean): --  RR: 20 (11 Oct 2019 05:10) (18 - 20)  SpO2: 96% (11 Oct 2019 05:10) (92% - 99%)    Gen: resting in bed in NAD  Head:  left parietal abrasion ?acute vs old  Eyes: EOMI  Chest: CTAB, right chest wall tenderness, no ecchymosis midline sternotomy scar  Abd: S, ND, NT  Ext: left elbow abrasion, left shin abrasion, left 1st toe amputation site healed, no ulcer to bilateral lower extremities  Back:  stage 1 sacral pressure sore  Neuro: following commands, baseline aphasia, no gross focal deficits         I&O's Detail      LABS:                        12.8   8.44  )-----------( 181      ( 09 Oct 2019 23:39 )             42.4     10-10    141  |  110<H>  |  63.0<H>  ----------------------------<  256<H>  4.7   |  15.0<L>  |  2.92<H>    Ca    8.7      10 Oct 2019 06:19  Phos  4.5     10-10  Mg     1.9     10-10    TPro  7.3  /  Alb  3.6  /  TBili  0.3<L>  /  DBili  x   /  AST  42<H>  /  ALT  45<H>  /  AlkPhos  116  10-09    PT/INR - ( 09 Oct 2019 23:12 )   PT: 11.6 sec;   INR: 1.01 ratio         PTT - ( 09 Oct 2019 23:12 )  PTT:33.5 sec  Urinalysis Basic - ( 10 Oct 2019 13:37 )    Color: Yellow / Appearance: Clear / S.015 / pH: x  Gluc: x / Ketone: Negative  / Bili: Negative / Urobili: Negative mg/dL   Blood: x / Protein: 500 mg/dL / Nitrite: Negative   Leuk Esterase: Negative / RBC: 3-5 /HPF / WBC 0-2   Sq Epi: x / Non Sq Epi: Occasional / Bacteria: Negative

## 2019-10-11 NOTE — CONSULT NOTE ADULT - PROVIDER SPECIALTY LIST ADULT
sister called today with regards to the following prescription request: New    Provider: Светлана Knutson MD  Medication: seroquel-norvasc-losartan  Strength: 25mg-5 mg-100 mg  Dosing Instructions:   Supply Requested:   Pharmacy:   [id number] - [place], [place] - [address] [facility] [address]  Phone: [phone] Fax: [phone]      For additional information, or if you need to contact the caller at the following number:    Contact Phone Number:   Mobile [phone]       sister states that it is okay to leave a detailed message.    Preferred time for callback: any    sister was instructed to call pharmacy directly for future refills.    Advised sister that refill processing may take 48-72 hours and that the pharmacy will contact them when their prescription is ready for pickup.  
Rehab Medicine
Nephrology

## 2019-10-11 NOTE — PROGRESS NOTE ADULT - ATTENDING COMMENTS
avss  rib pain much improved  labs reviewed  Cr increased, on iv hydration, will call renal to evaluate

## 2019-10-11 NOTE — CONSULT NOTE ADULT - SUBJECTIVE AND OBJECTIVE BOX
[facility] DIVISION OF KIDNEY DISEASES AND HYPERTENSION -- INITIAL CONSULT NOTE  --------------------------------------------------------------------------------  HPI:  68 yo M with PMHX CVA, HTN, DM, CAD (s/p AICD, s/p stent), wheelchair bound brought from [facility] s/p fall. Patient is a poor historian due to baseline cognitive impairment/aphasia.  Unsure of LOC. Patient c/o right sided chest pain.  He was found on CXR to have R sided rib fx and was hypoxic at facility after his fall, prompting them to send him to the ED. At the time of evaluation, he c/o right sided chest pain. Denies HA, neck pain, abdominal pain, pelvic pain or any other symptom.   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 650 milliGRAM(s) Oral every 6 hours  amLODIPine   Tablet 10 milliGRAM(s) Oral daily  aspirin  chewable 81 milliGRAM(s) Oral daily  atorvastatin 40 milliGRAM(s) Oral daily  carvedilol 12.5 milliGRAM(s) Oral every 12 hours  celecoxib 200 milliGRAM(s) Oral two times a day  clopidogrel Tablet 75 milliGRAM(s) Oral daily  dextrose 5%. 1000 milliLiter(s) IV Continuous <Continuous>  dextrose 50% Injectable 25 Gram(s) IV Push once  dextrose 50% Injectable 25 Gram(s) IV Push once  dextrose 50% Injectable 12.5 Gram(s) IV Push once  docusate sodium 100 milliGRAM(s) Oral daily  ferrous sulfate Oral Tab/Cap - Peds 325 milliGRAM(s) Oral daily  gabapentin 300 milliGRAM(s) Oral three times a day  heparin  Injectable 5000 Unit(s) SubCutaneous every 8 hours  insulin lispro (HumaLOG) corrective regimen sliding scale   SubCutaneous three times a day before meals  lactated ringers Bolus 500 milliLiter(s) IV Bolus once  lactated ringers. 1000 milliLiter(s) IV Continuous <Continuous>  lidocaine   Patch 2 Patch Transdermal every 24 hours  methocarbamol 750 milliGRAM(s) Oral every 6 hours  saccharomyces boulardii 250 milliGRAM(s) Oral two times a day  senna 1 Tablet(s) Oral daily  sertraline 100 milliGRAM(s) Oral daily    PRN Inpatient Medications  dextrose 40% Gel 15 Gram(s) Oral once PRN  glucagon  Injectable 1 milliGRAM(s) IntraMuscular once PRN  ondansetron Injectable 4 milliGRAM(s) IV Push every 6 hours PRN  oxyCODONE    IR 5 milliGRAM(s) Oral every 4 hours PRN      REVIEW OF SYSTEMS  --------------------------------------------------------------------------------  Unable to obtain    VITALS/PHYSICAL EXAM  --------------------------------------------------------------------------------  T(C): 36.3 (10-11-19 @ 11:07), Max: 36.4 (10-11-19 @ 00:06)  HR: 55 (10-11-19 @ 11:07) (47 - 55)  BP: 169/77 (10-11-19 @ 11:07) (140/66 - 169/77)  RR: 18 (10-11-19 @ 11:07) (18 - 20)  SpO2: 99% (10-11-19 @ 11:07) (91% - 99%)  Wt(kg): --  Height (cm): 193.04 (10-09-19 @ 20:38)  Weight (kg): 90.7 (10-09-19 @ 20:38)  BMI (kg/m2): 24.3 (10-09-19 @ 20:38)  BSA (m2): 2.22 (10-09-19 @ 20:38)      Physical Exam:   Gen: mild distress due to pain, resting in bed  	Head: NC, left parietal abrasion ?acute vs old  	Eyes: pupil 4mm bilaterally equally reactive, EOMI  	Neck: no cervical spine tenderness or step-off  	Chest: CTAB, right chest wall tenderness, no echymosis,  no crepitus, healded midline sternotomy scar  	Abd: S, ND, NT  	Pelvis: stable  	Ext: left elbow abrasion, left shin abrasion, left 1st toe amputation site healed, no ulcer to bilateral lower extremities  	Back: no spinal tenderness, no step off, stage 1 sacral pressure sore  	Neuro: following commands, baseline aphasia, no gross focal deficits   Psych: appropriate mood      LABS/STUDIES  --------------------------------------------------------------------------------              11.6   6.39  >-----------<  166      [10-11-19 @ 07:03]              39.2     139  |  108  |  68.0  ----------------------------<  171      [10-11-19 @ 07:03]  4.5   |  18.0  |  3.24        Ca     8.3     [10-11-19 @ 07:03]      Mg     1.9     [10-11-19 @ 07:03]      Phos  4.8     [10-11-19 @ 07:03]    TPro  7.3  /  Alb  3.6  /  TBili  0.3  /  DBili  x   /  AST  42  /  ALT  45  /  AlkPhos  116  [10-09-19 @ 23:12]    PT/INR: PT 11.6 , INR 1.01       [10-09-19 @ 23:12]  PTT: 33.5       [10-09-19 @ 23:12]    Troponin 0.16      [10-10-19 @ 06:19]  CK 97      [10-10-19 @ 06:19]    Creatinine Trend:  SCr 3.24 [10-11 @ 07:03]  SCr 2.92 [10-10 @ 06:19]  SCr 2.97 [10-10 @ 02:10]  SCr 2.94 [10-09 @ 23:12]    Urinalysis - [10-10-19 @ 13:37]      Color Yellow / Appearance Clear / SG 1.015 / pH 6.0      Gluc 100 / Ketone Negative  / Bili Negative / Urobili Negative       Blood Moderate / Protein 500 / Leuk Est Negative / Nitrite Negative      RBC 3-5 / WBC 0-2 / Hyaline  / Gran  / Sq Epi  / Non Sq Epi Occasional / Bacteria Negative    Urine Creatinine 107      [10-11-19 @ 11:25]  Urine Sodium 40      [10-11-19 @ 11:25]  Urine Chloride <27      [10-11-19 @ 11:25]  Urine Osmolality 382      [10-11-19 @ 11:25]    Vitamin D (25OH) 7.7      [04-24-19 @ 17:06]  HbA1c 7.1      [10-10-19 @ 06:19]  TSH 2.32      [04-24-19 @ 07:24]    HCV 0.11, Nonreact      [04-21-19 @ 14:21]

## 2019-10-12 NOTE — PROGRESS NOTE ADULT - ASSESSMENT
68 yo M with PMHX CVA, HTN, DM, CAD (s/p AICD, s/p stent), wheelchair bound brought from [facility] s/p fall. Initially placed under trauma service for multiple rib fractures complicated by chest wall hematoma and contusion. Patient is a poor historian and unsure of his baseline. Pt transferred from trauma service to medicine service. Currently without complaints but unable to fully state where he is and why as he is  AAOx2 only. Attempts made to contact his wife and son without success.     ALLISON on CKD  - Baseline CKD 3   - Suspected pre renal component  - Nephrology consulted and appreciated  - Renal sono completed, showing no hydronephrosis.   - avoid nephrotoxic agent.   - C/w to monitor labs  - Pending repeat CK levels to rule out rhabdo component.     Rib fracture s/p fall  - Pt originally on trauma service for multiple rib fractures complicated with chest wall hematoma and contusion  - Xray noted   - Currently no surgical intervention at this time  - Continue with pain control with pain meds PRN  - Supportive therapy  - Incentive spirometry  - c/w lidocaine patch, Robaxin, tylenol standing   - oxy IR 5 mg PRN for moderate pain    CAD s/p AICD placement  - c/w with ASA 81 and plavix  - Currently asymptomatic    HTN  - labile   - c/w with amlodipine, coreg with holding parameters    HLD:  - c/w with Lipitor    IDDM2 complicated with neuropathy  - current HgA1c 7.1  - hypoglycemia protocol  - accuchecks  - HISS     Depression  - c/w with sertraline     DVT prophylaxis  - Heparin q 12 sc    DISPO: Pt transferred to medicine service. Attempts to contact wife Any or son Mateo unsuccessful. Will continue to attempt 68 yo M with PMHX CVA, HTN, DM, CAD (s/p AICD, s/p stent), wheelchair bound brought from [facility] s/p fall. Initially placed under trauma service for multiple rib fractures complicated by chest wall hematoma and contusion. Patient is a poor historian and unsure of his baseline. Pt transferred from trauma service to medicine service. Currently without complaints but unable to fully state where he is and why as he is  AAOx2 only. Attempts made to contact his wife and son without success.     ALLISON on CKD  - Baseline CKD 3   - Suspected pre renal component  - Nephrology consulted and appreciated  - Renal sono completed, showing no hydronephrosis.   - avoid nephrotoxic agent.   - C/w to monitor labs  - Pending repeat CK levels to rule out rhabdo component.     Rib fracture s/p fall  - Pt originally on trauma service for multiple rib fractures complicated with chest wall hematoma and contusion  - Xray noted   - Currently no surgical intervention at this time  - Continue with pain control with pain meds PRN  - Supportive therapy  - Incentive spirometry  - c/w lidocaine patch, Robaxin, tylenol standing   - oxy IR 5 mg PRN for moderate pain    CAD s/p AICD placement  - c/w with ASA 81 and plavix  - Currently asymptomatic    HTN  - labile   - c/w with amlodipine, coreg with holding parameters    HLD:  - c/w with Lipitor    IDDM2 complicated with neuropathy  - current HgA1c 7.1  - hypoglycemia protocol  - accuchecks  - HISS   - c/w with gabapentin 300 mg TID    Depression  - c/w with sertraline     DVT prophylaxis  - Heparin q 12 sc    DISPO: Pt transferred to medicine service. Attempts to contact wife Any or son Mateo unsuccessful. Will continue to attempt 68 yo M with PMHX CVA, HTN, DM, CAD (s/p AICD, s/p stent), wheelchair bound brought from [facility] s/p fall. Initially placed under trauma service for multiple rib fractures complicated by chest wall hematoma and contusion. Patient is a poor historian and unsure of his baseline. Pt transferred from trauma service to medicine service. Currently without complaints but unable to fully state where he is and why as he is  AAOx2 only. Attempts made to contact his wife and son without success. PT consulted and appreciated, recommending rolling walker [facility]. Nephrology consulted and appreciated. Speech eval pending.     ALLISON on CKD  - Baseline CKD 3   - Suspected pre renal component  - Nephrology consulted and appreciated  - Renal sono completed, showing no hydronephrosis.   - avoid nephrotoxic agent.   - C/w to monitor labs  - Pending repeat CK levels to rule out rhabdo component.     Cognitive impairment with unknown baseline  - Pt is AAOX2 alert and oriented to name and date only  - Unsure of baseline. Will need to confirm with Mother, or Son  - Fall precautions.     Rib fracture s/p fall  - Pt originally on trauma service for multiple rib fractures complicated with chest wall hematoma and contusion  - Xray noted   - Currently no surgical intervention at this time  - Continue with pain control with pain meds PRN  - Supportive therapy  - Incentive spirometry  - c/w lidocaine patch, Robaxin, tylenol standing   - oxy IR 5 mg PRN for moderate pain    CAD s/p AICD placement  - c/w with ASA 81 and plavix  - Currently asymptomatic    HTN  - labile   - c/w with amlodipine, coreg with holding parameters    HLD:  - c/w with Lipitor    IDDM2 complicated with neuropathy  - current HgA1c 7.1  - hypoglycemia protocol  - accuchecks  - HISS   - c/w with gabapentin 300 mg TID    Depression  - c/w with sertraline     DVT prophylaxis  - Heparin q 12 sc    DISPO: Pt transferred to medicine service. Attempts to contact wife Any or son Mateo unsuccessful. Will continue to attempt. Currently FULL CODE

## 2019-10-12 NOTE — PROGRESS NOTE ADULT - SUBJECTIVE AND OBJECTIVE BOX
INTERVAL HPI/OVERNIGHT EVENTS:  NO acute events overnight. Patient evaluated at bedside and found hemodynamically stable and in no acute distress. Patient denies fevers chill, nausea, vomiting or generalized malaise.    SUBJECTIVE:    MEDICATIONS  (STANDING):  acetaminophen   Tablet .. 650 milliGRAM(s) Oral every 6 hours  amLODIPine   Tablet 10 milliGRAM(s) Oral daily  aspirin  chewable 81 milliGRAM(s) Oral daily  atorvastatin 40 milliGRAM(s) Oral daily  carvedilol 12.5 milliGRAM(s) Oral every 12 hours  celecoxib 200 milliGRAM(s) Oral two times a day  chlorhexidine 2% Cloths 1 Application(s) Topical daily  clopidogrel Tablet 75 milliGRAM(s) Oral daily  dextrose 5%. 1000 milliLiter(s) (50 mL/Hr) IV Continuous <Continuous>  dextrose 50% Injectable 25 Gram(s) IV Push once  dextrose 50% Injectable 25 Gram(s) IV Push once  dextrose 50% Injectable 12.5 Gram(s) IV Push once  docusate sodium 100 milliGRAM(s) Oral daily  ferrous sulfate Oral Tab/Cap - Peds 325 milliGRAM(s) Oral daily  gabapentin 300 milliGRAM(s) Oral three times a day  heparin  Injectable 5000 Unit(s) SubCutaneous every 8 hours  insulin lispro (HumaLOG) corrective regimen sliding scale   SubCutaneous three times a day before meals  lactated ringers. 1000 milliLiter(s) (100 mL/Hr) IV Continuous <Continuous>  lidocaine   Patch 2 Patch Transdermal every 24 hours  methocarbamol 750 milliGRAM(s) Oral every 6 hours  saccharomyces boulardii 250 milliGRAM(s) Oral two times a day  senna 1 Tablet(s) Oral daily  sertraline 100 milliGRAM(s) Oral daily    MEDICATIONS  (PRN):  dextrose 40% Gel 15 Gram(s) Oral once PRN Blood Glucose LESS THAN 70 milliGRAM(s)/deciliter  glucagon  Injectable 1 milliGRAM(s) IntraMuscular once PRN Glucose LESS THAN 70 milligrams/deciliter  ondansetron Injectable 4 milliGRAM(s) IV Push every 6 hours PRN Nausea  oxyCODONE    IR 5 milliGRAM(s) Oral every 4 hours PRN Moderate Pain (4 - 6)      Vital Signs Last 24 Hrs  T(C): 36.4 (11 Oct 2019 23:38), Max: 36.7 (11 Oct 2019 20:26)  T(F): 97.6 (11 Oct 2019 23:38), Max: 98.1 (11 Oct 2019 20:26)  HR: 51 (12 Oct 2019 04:42) (47 - 61)  BP: 135/66 (12 Oct 2019 04:42) (124/70 - 169/77)  BP(mean): --  RR: 18 (12 Oct 2019 04:42) (18 - 20)  SpO2: 98% (12 Oct 2019 04:42) (91% - 99%)    PHYSICAL EXAM:  Gen: resting in bed in NAD  Head:  left parietal abrasion ?acute vs old  Chest: CTAB, right chest wall tenderness, no ecchymosis midline sternotomy scar  Ext: left elbow abrasion, left shin abrasion, left 1st toe amputation site healed, no ulcer to bilateral lower extremities  Back:  stage 1 sacral pressure sore  Neuro: following commands, baseline aphasia, no gross focal deficits                 I&O's Detail    11 Oct 2019 07:01  -  12 Oct 2019 05:04  --------------------------------------------------------  IN:    lactated ringers.: 1000 mL  Total IN: 1000 mL    OUT:  Total OUT: 0 mL    Total NET: 1000 mL          LABS:                        11.6   6.39  )-----------( 166      ( 11 Oct 2019 07:03 )             39.2     10-11    139  |  108<H>  |  68.0<H>  ----------------------------<  171<H>  4.5   |  18.0<L>  |  3.24<H>    Ca    8.3<L>      11 Oct 2019 07:03  Phos  4.8     10-11  Mg     1.9     10-11        Urinalysis Basic - ( 10 Oct 2019 13:37 )    Color: Yellow / Appearance: Clear / S.015 / pH: x  Gluc: x / Ketone: Negative  / Bili: Negative / Urobili: Negative mg/dL   Blood: x / Protein: 500 mg/dL / Nitrite: Negative   Leuk Esterase: Negative / RBC: 3-5 /HPF / WBC 0-2   Sq Epi: x / Non Sq Epi: Occasional / Bacteria: Negative

## 2019-10-12 NOTE — PROGRESS NOTE ADULT - SUBJECTIVE AND OBJECTIVE BOX
Patient is a 69y old  Male who presents with a chief complaint of Rib fractures (12 Oct 2019 05:04)      Interval Overnight Events   Pt seen and examined at beside. No acute events over night. Pt reports feeling better/ worse/ same. Pt is tolerating _ diet w/o n/v/d/c. Pt is voiding actively and last BM was on ___.     ROS: Denies CP, HA, SOB, fever/chills, n/v/c/d, abdominal/back pain, vision changes, numbness/tingling, blood in urine or stool, calf tenderness/swelling. All other ROS negative       CARDIAC MONITOR  OXYGEN:   Mask / Cannula    L/min     O2 Sat:   %    I&O's Summary    11 Oct 2019 07:01  -  12 Oct 2019 07:00  --------------------------------------------------------  IN: 1100 mL / OUT: 0 mL / NET: 1100 mL        Daily     Daily     CAPILLARY BLOOD GLUCOSE      POCT Blood Glucose.: 253 mg/dL (12 Oct 2019 11:46)  POCT Blood Glucose.: 181 mg/dL (12 Oct 2019 07:39)  POCT Blood Glucose.: 203 mg/dL (11 Oct 2019 21:20)  POCT Blood Glucose.: 191 mg/dL (11 Oct 2019 17:47)      Physical Examination  VS:    GENERAL: NAD, well-groomed, well-developed, communicates well  HEENT: clear sclera and conjunctiva PERRLA, pupil constricted to light b/l   RESP: CTA b/l, B/L air entry, no crackles, no wheezing  CVS: Regular rate and rhythm; Normal S1 & S2, No murmurs, rubs, or gallops  GI: +BS, nontender, nondistended, no CVA tenderness  Extremities: no cyanosis, no edema, no clubbing, no calf tenderness  Skin: grossly intact, (if elderly include skin tenting findings)  Neuro: AAOX3, motor intact 5/5      LABS                          11.4   4.70  )-----------( 167      ( 12 Oct 2019 08:56 )             37.7         10-12    142  |  111<H>  |  73.0<H>  ----------------------------<  201<H>  4.3   |  18.0<L>  |  3.70<H>    Ca    7.9<L>      12 Oct 2019 08:56  Phos  4.9     10-12  Mg     1.9     10-12                        Culture - Urine (collected 10 Oct 2019 13:44)  Source: .Urine  Final Report (11 Oct 2019 10:36):    No growth    Culture - Blood (collected 09 Oct 2019 23:16)  Source: .Blood  Preliminary Report (12 Oct 2019 01:01):    No growth at 48 hours    Culture - Blood (collected 09 Oct 2019 23:16)  Source: .Blood  Preliminary Report (12 Oct 2019 01:01):    No growth at 48 hours        IMAGING      DIET:    MEDICATIONS  (STANDING):  acetaminophen   Tablet .. 650 milliGRAM(s) Oral every 6 hours  amLODIPine   Tablet 10 milliGRAM(s) Oral daily  aspirin  chewable 81 milliGRAM(s) Oral daily  atorvastatin 40 milliGRAM(s) Oral daily  carvedilol 12.5 milliGRAM(s) Oral every 12 hours  celecoxib 200 milliGRAM(s) Oral two times a day  chlorhexidine 2% Cloths 1 Application(s) Topical daily  clopidogrel Tablet 75 milliGRAM(s) Oral daily  dextrose 5%. 1000 milliLiter(s) (50 mL/Hr) IV Continuous <Continuous>  dextrose 50% Injectable 25 Gram(s) IV Push once  dextrose 50% Injectable 25 Gram(s) IV Push once  dextrose 50% Injectable 12.5 Gram(s) IV Push once  docusate sodium 100 milliGRAM(s) Oral daily  ferrous sulfate Oral Tab/Cap - Peds 325 milliGRAM(s) Oral daily  gabapentin 300 milliGRAM(s) Oral three times a day  heparin  Injectable 5000 Unit(s) SubCutaneous every 8 hours  insulin lispro (HumaLOG) corrective regimen sliding scale   SubCutaneous three times a day before meals  lactated ringers. 1000 milliLiter(s) (100 mL/Hr) IV Continuous <Continuous>  lidocaine   Patch 2 Patch Transdermal every 24 hours  methocarbamol 750 milliGRAM(s) Oral every 6 hours  saccharomyces boulardii 250 milliGRAM(s) Oral two times a day  senna 1 Tablet(s) Oral daily  sertraline 100 milliGRAM(s) Oral daily    MEDICATIONS  (PRN):  dextrose 40% Gel 15 Gram(s) Oral once PRN Blood Glucose LESS THAN 70 milliGRAM(s)/deciliter  glucagon  Injectable 1 milliGRAM(s) IntraMuscular once PRN Glucose LESS THAN 70 milligrams/deciliter  ondansetron Injectable 4 milliGRAM(s) IV Push every 6 hours PRN Nausea  oxyCODONE    IR 5 milliGRAM(s) Oral every 4 hours PRN Moderate Pain (4 - 6) Patient is a 69y old  Male who presents with a chief complaint of Rib fractures (12 Oct 2019 05:04)  PATIENT TRANSFER FROM TRAUMA TO MEDICINE SERVICE    Interval/Overnight Events   Pt seen and examined at beside. Pt is AAOX2 to name and birthdate only. Pt does not know where he is and for what reason he was admitted. Pt denies any current pain, on oxygen 2L NC unsure what his home oxygen status is. Pt is a poor historian. Voiding appropriately, last BM today, eating and tolerating PO.    ROS: Denies CP, HA, SOB, fever/chills, n/v/c/d, abdominal/back pain, vision changes, numbness/tingling, blood in urine or stool, calf tenderness/swelling. All other ROS negative       CARDIAC MONITOR: Sinus bradycardia, asymptomatic  OXYGEN:  NC 2 L/min O2 Sat:  95 %    I&O's Summary    11 Oct 2019 07:01  -  12 Oct 2019 07:00  --------------------------------------------------------  IN: 1100 mL / OUT: 0 mL / NET: 1100 mL    Glucose monitoring:  POCT Blood Glucose.: 253 mg/dL (12 Oct 2019 11:46)  POCT Blood Glucose.: 181 mg/dL (12 Oct 2019 07:39)  POCT Blood Glucose.: 203 mg/dL (11 Oct 2019 21:20)    Vital Signs Last 24 Hrs  T(C): 36.4 (12 Oct 2019 08:41), Max: 36.7 (11 Oct 2019 20:26)  T(F): 97.6 (12 Oct 2019 08:41), Max: 98.1 (11 Oct 2019 20:26)  HR: 50 (12 Oct 2019 08:41) (50 - 61)  BP: 158/78 (12 Oct 2019 08:41) (124/70 - 158/78)  RR: 18 (12 Oct 2019 08:41) (18 - 18)  SpO2: 95% (12 Oct 2019 08:41) (94% - 98%)    Physical exam:  GENERAL: NAD, confused. Well developed  HEENT: clear sclera and conjunctiva PERRLA, pupil constricted to light b/l   RESP: Tender on right chest wall.  Rhonchi appreciated on left greater than right upper and lower lobe. Fair air entry, no wheezing   CVS: Midline scar. Bradycardic; S1S2 present, No murmurs, rubs, or gallops. AICD on left upper chest wall.   GI: +BS present, nontender, nondistended on palpation.   Extremities: Lower extremities with b/l skin abrasions and bruising. Left great toe amputation. , no cyanosis, no edema, no clubbing, no calf tenderness  Skin: hypopigmented skin region on back, seborrheic dermatitis present on upper back.   Back: Skin erythema in sacral region, no ulcer present.   Neuro: AAOX2 to name and birthdate only., Grossly intact muscle and motor function    LABS                        11.4   4.70  )-----------( 167      ( 12 Oct 2019 08:56 )             37.7         10-12    142  |  111<H>  |  73.0<H>  ----------------------------<  201<H>  4.3   |  18.0<L>  |  3.70<H>    Ca    7.9<L>      12 Oct 2019 08:56  Phos  4.9     10-12  Mg     1.9     10-12      Culture - Urine (collected 10 Oct 2019 13:44)  Source: .Urine  Final Report (11 Oct 2019 10:36):    No growth    Culture - Blood (collected 09 Oct 2019 23:16)  Source: .Blood  Preliminary Report (12 Oct 2019 01:01):    No growth at 48 hours    Culture - Blood (collected 09 Oct 2019 23:16)  Source: .Blood  Preliminary Report (12 Oct 2019 01:01):    No growth at 48 hours      MEDICATIONS  (STANDING):  acetaminophen   Tablet .. 650 milliGRAM(s) Oral every 6 hours  amLODIPine   Tablet 10 milliGRAM(s) Oral daily  aspirin  chewable 81 milliGRAM(s) Oral daily  atorvastatin 40 milliGRAM(s) Oral daily  carvedilol 12.5 milliGRAM(s) Oral every 12 hours  celecoxib 200 milliGRAM(s) Oral two times a day  chlorhexidine 2% Cloths 1 Application(s) Topical daily  clopidogrel Tablet 75 milliGRAM(s) Oral daily  dextrose 5%. 1000 milliLiter(s) (50 mL/Hr) IV Continuous <Continuous>  dextrose 50% Injectable 25 Gram(s) IV Push once  dextrose 50% Injectable 25 Gram(s) IV Push once  dextrose 50% Injectable 12.5 Gram(s) IV Push once  docusate sodium 100 milliGRAM(s) Oral daily  ferrous sulfate Oral Tab/Cap - Peds 325 milliGRAM(s) Oral daily  gabapentin 300 milliGRAM(s) Oral three times a day  heparin  Injectable 5000 Unit(s) SubCutaneous every 8 hours  insulin lispro (HumaLOG) corrective regimen sliding scale   SubCutaneous three times a day before meals  lactated ringers. 1000 milliLiter(s) (100 mL/Hr) IV Continuous <Continuous>  lidocaine   Patch 2 Patch Transdermal every 24 hours  methocarbamol 750 milliGRAM(s) Oral every 6 hours  saccharomyces boulardii 250 milliGRAM(s) Oral two times a day  senna 1 Tablet(s) Oral daily  sertraline 100 milliGRAM(s) Oral daily    MEDICATIONS  (PRN):  dextrose 40% Gel 15 Gram(s) Oral once PRN Blood Glucose LESS THAN 70 milliGRAM(s)/deciliter  glucagon  Injectable 1 milliGRAM(s) IntraMuscular once PRN Glucose LESS THAN 70 milligrams/deciliter  ondansetron Injectable 4 milliGRAM(s) IV Push every 6 hours PRN Nausea  oxyCODONE    IR 5 milliGRAM(s) Oral every 4 hours PRN Moderate Pain (4 - 6) Patient is a 69y old  Male who presents with a chief complaint of Rib fractures (12 Oct 2019 05:04)  PATIENT TRANSFER FROM TRAUMA TO MEDICINE SERVICE    HPI: Pt is an ICU downgrade s/p fall with multiple rib fractures, on trauma service initially, without any surgical intervention. Pain control and incentive spirometry. Course complicated as Pt found to now have ALLISON on CKD. Possibly prenal due to dehydration but will order stat CPK to r/o rhabdo. Pt has baseline confusion, and was AAOX2 at bedside and unable to reach wife or son currently. PT consulted and recommending rolling walker and return to [facility] after discharge. Speech eval ordered. Nephrology consulted and appreciated.     Interval/Overnight Events   Pt seen and examined at beside. Pt is AAOX2 to name and birthdate only. Pt does not know where he is and for what reason he was admitted. Pt denies any current pain, on oxygen 2L NC unsure what his home oxygen status is. Pt is a poor historian. Voiding appropriately, last BM today, eating and tolerating PO.    ROS: Denies CP, HA, SOB, fever/chills, n/v/c/d, abdominal/back pain, vision changes, numbness/tingling, blood in urine or stool, calf tenderness/swelling. All other ROS negative       CARDIAC MONITOR: Sinus bradycardia, asymptomatic  OXYGEN:  NC 2 L/min O2 Sat:  95 %    I&O's Summary    11 Oct 2019 07:01  -  12 Oct 2019 07:00  --------------------------------------------------------  IN: 1100 mL / OUT: 0 mL / NET: 1100 mL    Glucose monitoring:  POCT Blood Glucose.: 253 mg/dL (12 Oct 2019 11:46)  POCT Blood Glucose.: 181 mg/dL (12 Oct 2019 07:39)  POCT Blood Glucose.: 203 mg/dL (11 Oct 2019 21:20)    Vital Signs Last 24 Hrs  T(C): 36.4 (12 Oct 2019 08:41), Max: 36.7 (11 Oct 2019 20:26)  T(F): 97.6 (12 Oct 2019 08:41), Max: 98.1 (11 Oct 2019 20:26)  HR: 50 (12 Oct 2019 08:41) (50 - 61)  BP: 158/78 (12 Oct 2019 08:41) (124/70 - 158/78)  RR: 18 (12 Oct 2019 08:41) (18 - 18)  SpO2: 95% (12 Oct 2019 08:41) (94% - 98%)    Physical exam:  GENERAL: NAD, confused. Well developed  HEENT: clear sclera and conjunctiva PERRLA, pupil constricted to light b/l   RESP: Tender on right chest wall.  Rhonchi appreciated on left greater than right upper and lower lobe. Fair air entry, no wheezing   CVS: Midline scar. Bradycardic; S1S2 present, No murmurs, rubs, or gallops. AICD on left upper chest wall.   GI: +BS present, nontender, nondistended on palpation.   Extremities: Lower extremities with b/l skin abrasions and bruising. Left great toe amputation. , no cyanosis, no edema, no clubbing, no calf tenderness  Skin: hypopigmented skin region on back, seborrheic dermatitis present on upper back.   Back: Skin erythema in sacral region, no ulcer present.   Neuro: AAOX2 to name and birthdate only., Grossly intact muscle and motor function    LABS                        11.4   4.70  )-----------( 167      ( 12 Oct 2019 08:56 )             37.7         10-12    142  |  111<H>  |  73.0<H>  ----------------------------<  201<H>  4.3   |  18.0<L>  |  3.70<H>    Ca    7.9<L>      12 Oct 2019 08:56  Phos  4.9     10-12  Mg     1.9     10-12      Culture - Urine (collected 10 Oct 2019 13:44)  Source: .Urine  Final Report (11 Oct 2019 10:36):    No growth    Culture - Blood (collected 09 Oct 2019 23:16)  Source: .Blood  Preliminary Report (12 Oct 2019 01:01):    No growth at 48 hours    Culture - Blood (collected 09 Oct 2019 23:16)  Source: .Blood  Preliminary Report (12 Oct 2019 01:01):    No growth at 48 hours      MEDICATIONS  (STANDING):  acetaminophen   Tablet .. 650 milliGRAM(s) Oral every 6 hours  amLODIPine   Tablet 10 milliGRAM(s) Oral daily  aspirin  chewable 81 milliGRAM(s) Oral daily  atorvastatin 40 milliGRAM(s) Oral daily  carvedilol 12.5 milliGRAM(s) Oral every 12 hours  celecoxib 200 milliGRAM(s) Oral two times a day  chlorhexidine 2% Cloths 1 Application(s) Topical daily  clopidogrel Tablet 75 milliGRAM(s) Oral daily  dextrose 5%. 1000 milliLiter(s) (50 mL/Hr) IV Continuous <Continuous>  dextrose 50% Injectable 25 Gram(s) IV Push once  dextrose 50% Injectable 25 Gram(s) IV Push once  dextrose 50% Injectable 12.5 Gram(s) IV Push once  docusate sodium 100 milliGRAM(s) Oral daily  ferrous sulfate Oral Tab/Cap - Peds 325 milliGRAM(s) Oral daily  gabapentin 300 milliGRAM(s) Oral three times a day  heparin  Injectable 5000 Unit(s) SubCutaneous every 8 hours  insulin lispro (HumaLOG) corrective regimen sliding scale   SubCutaneous three times a day before meals  lactated ringers. 1000 milliLiter(s) (100 mL/Hr) IV Continuous <Continuous>  lidocaine   Patch 2 Patch Transdermal every 24 hours  methocarbamol 750 milliGRAM(s) Oral every 6 hours  saccharomyces boulardii 250 milliGRAM(s) Oral two times a day  senna 1 Tablet(s) Oral daily  sertraline 100 milliGRAM(s) Oral daily    MEDICATIONS  (PRN):  dextrose 40% Gel 15 Gram(s) Oral once PRN Blood Glucose LESS THAN 70 milliGRAM(s)/deciliter  glucagon  Injectable 1 milliGRAM(s) IntraMuscular once PRN Glucose LESS THAN 70 milligrams/deciliter  ondansetron Injectable 4 milliGRAM(s) IV Push every 6 hours PRN Nausea  oxyCODONE    IR 5 milliGRAM(s) Oral every 4 hours PRN Moderate Pain (4 - 6)

## 2019-10-12 NOTE — PROGRESS NOTE ADULT - ASSESSMENT
70 yo M with PMHX CVA, HTN, DM, CAD (s/p AICD, s/p stent), wheelchair bound brought from [employer] s/p fall. Patient is a poor historian due to baseline cognitive impairment/aphasia. CT chest Right lat 5th-8th rib fractures, Right posterior 10th rib fracture, small chest wall hematoma, no pneumothorax or hemothorax. ALLISON on CKD (baseline around 2.0) with base deficit of -5.7, BUN/Cr ratio 22, pre-renal likely 2/2 dehydration. Mild transaminitis.    -PIC protocol: pain management   -PT/OT  -Gentle hydration for ALLISON  -Restart home meds  -AM labs  -DVT ppx

## 2019-10-12 NOTE — PROGRESS NOTE ADULT - ATTENDING COMMENTS
avss  b/l cta ,   rib pain better controlled , IS improved  SCr worsening on ivf  renal following   u/s preformed   review meds to ensure non are nephrotoxic.

## 2019-10-13 NOTE — PROGRESS NOTE ADULT - ASSESSMENT
70 yo M with PMHX CVA, HTN, DM, CAD (s/p AICD, s/p stent), wheelchair bound brought from [employer] s/p fall. Initially placed under trauma service for multiple rib fractures complicated by chest wall hematoma and contusion. Patient is a poor historian and unsure of his baseline. Pt transferred from trauma service to medicine service. Currently without complaints. AAOx3 but seems mildly confused, will call family to understand baseline mentation.  PT consulted and appreciated, recommending rolling walker on return to [facility]. Nephrology consulted and appreciated. Speech eval pending.     ALLISON on CKD (worsening)  - Baseline CKD 3   - Suspect pre renal component. CK levels normal.   - Nephrology consulted and appreciated  - Renal sono completed, showing no hydronephrosis.   - avoid nephrotoxic agent, discontinued celebrex and robaxin  -Cr uptrending from  3.77 >4.23  - s/w NS @ 75cc/hr  -corrected calcium 8.9  - C/w to monitor labs    Cognitive impairment with unknown baseline  - Pt is AAOX3.  - Mild confusion, unsure of baseline. Will need to confirm with Mother, or Son  - Fall precautions.     Rib fracture s/p fall  - Pt originally on trauma service for multiple rib fractures complicated with chest wall hematoma and contusion  - Xray noted   - Currently no surgical intervention at this time  - Continue with pain control with pain meds PRN  - Supportive therapy  - Incentive spirometry  -discontinued Robaxin (nephrotoxic)  - c/w lidocaine patch, tylenol standing   - oxy IR 5 mg PRN for moderate pain    CAD s/p AICD placement  - c/w with ASA 81 and plavix  - Currently asymptomatic    HTN  - labile   - c/w with amlodipine, coreg with holding parameters    HLD:  - c/w with Lipitor    IDDM2 complicated with neuropathy  - current HgA1c 7.1  - hypoglycemia protocol  - accuchecks  - HISS   - c/w with gabapentin 300 mg TID    Depression  - c/w with sertraline     DVT prophylaxis  - Heparin q 12 sc    DISPO: Pt transferred to medicine service. Attempts to contact wife Any or son Mateo unsuccessful. Will continue to attempt. Currently FULL CODE 68 yo M with PMHX CVA, HTN, DM, CAD (s/p AICD, s/p stent), wheelchair bound brought from [employer] s/p fall. Initially placed under trauma service for multiple rib fractures complicated by chest wall hematoma and contusion. Patient is a poor historian and unsure of his baseline. Pt transferred from trauma service to medicine service. Currently without complaints. AAOx3 but seems mildly confused, will call family to understand baseline mentation.  PT consulted and appreciated, recommending rolling walker on return to [facility]. Nephrology consulted and appreciated. Speech eval pending.     ALLISON on CKD (worsening)  - Baseline CKD 3   - Suspect pre renal component. CK levels normal.   - Nephrology consulted and appreciated  - Renal sono completed, showing no hydronephrosis.   - avoid nephrotoxic agent, discontinued celebrex and robaxin  -Cr uptrending from  3.77 >4.23  - s/w NS @ 75cc/hr  -corrected calcium 8.9  - C/w to monitor labs    Cognitive impairment with unknown baseline  - Pt is AAOX3.  - Mild confusion, unsure of baseline. Will need to confirm with Mother, or Son. Attempt made on 10/13.  - Fall precautions.     Rib fracture s/p fall  - Pt originally on trauma service for multiple rib fractures complicated with chest wall hematoma and contusion  - Xray noted   - Currently no surgical intervention at this time  - Continue with pain control with pain meds PRN  - Supportive therapy  - Incentive spirometry  -discontinued Robaxin (nephrotoxic)  - c/w lidocaine patch, tylenol standing   - oxy IR 5 mg PRN for moderate pain  -Trauma recs appreciated.  -PT recommends [facility]    CAD s/p AICD placement  - c/w with ASA 81 and plavix  - Currently asymptomatic    HTN  - labile   - c/w with amlodipine, coreg with holding parameters    HLD:  - c/w with Lipitor    IDDM2 complicated with neuropathy  - current HgA1c 7.1  - hypoglycemia protocol  - accuchecks  - HISS   - c/w with gabapentin 300 mg TID    Depression  - c/w with sertraline     DVT prophylaxis  - Heparin q 12 sc    DISPO: Pt transferred to medicine service. Additional ttempts to contact wife Any or son Mateo on 10/13 unsuccessful. Will continue to attempt. Currently FULL CODE

## 2019-10-13 NOTE — PROGRESS NOTE ADULT - ASSESSMENT
1) ATN  2) HTN  3) DM  4) CKD IV    ALLISON on CKD IV in the setting of NSAID use;  Holding celebrex;  Continue with IV hydration  Urine eosinophils + ; not very sensitive for AIN ; meds reviewed; not on PPI at current; would hold ; no role for biopsy as of yet;  Will follow  d/w Dr Lopez

## 2019-10-13 NOTE — PROGRESS NOTE ADULT - SUBJECTIVE AND OBJECTIVE BOX
Pt seen and examined at beside. Pt is AAOX3 to name, birthdate, and location. Remembers he fell at [employer]. Pt denies any current pain. Pt is a poor historian. Tolerating PO, voiding and having BMs at baseline.     Cardiac monitor: Roel to 50's (asymptomatic)    ROS: Denies CP, HA, SOB, fever/chills, n/v/c/d, abdominal/back pain, vision changes, numbness/tingling, blood in urine or stool, calf tenderness/swelling. All other ROS negative     Vital Signs Last 24 Hrs  T(C): 36.4 (13 Oct 2019 12:22), Max: 36.6 (12 Oct 2019 18:58)  T(F): 97.5 (13 Oct 2019 12:22), Max: 97.8 (12 Oct 2019 18:58)  HR: 55 (13 Oct 2019 12:22) (55 - 57)  BP: 179/79 (13 Oct 2019 12:22) (157/68 - 179/79)  RR: 18 (13 Oct 2019 12:22) (18 - 18)  SpO2: 94% (13 Oct 2019 12:22) (94% - 96%)    Physical exam:  GENERAL: NAD, confused. Well developed  HEENT: clear sclera and conjunctiva PERRLA, pupil constricted to light b/l   RESP: Tender on right chest wall.  Rhonchi appreciated on left greater than right upper and lower lobe. Fair air entry, no wheezing   CVS: Midline scar. Bradycardic; S1S2 present, No murmurs, rubs, or gallops. AICD on left upper chest wall.   GI: +BS present, nontender, nondistended on palpation.   Extremities: Lower extremities with b/l skin abrasions and bruising. Left great toe amputation. No cyanosis, no edema, no clubbing, no calf tenderness  Skin: hypopigmented skin region on back, seborrheic dermatitis present on upper back.   Back: Skin erythema in sacral region, no ulcer present.   Neuro: AAOX2 to name and birthdate only., Grossly intact muscle and motor function    LABS                             11.1   5.32  )-----------( 180      ( 13 Oct 2019 07:58 )             37.5     10-13    138  |  107  |  72.0<H>  ----------------------------<  229<H>  4.3   |  18.0<L>  |  4.23<H>    Ca    7.8<L>      13 Oct 2019 07:58  Phos  5.0     10-13  Mg     1.9     10-12    TPro  x   /  Alb  2.6<L>  /  TBili  x   /  DBili  x   /  AST  x   /  ALT  x   /  AlkPhos  x   10-13               Culture - Urine (collected 10 Oct 2019 13:44)  Source: .Urine  Final Report (11 Oct 2019 10:36):    No growth    Culture - Blood (collected 09 Oct 2019 23:16)  Source: .Blood  Preliminary Report (12 Oct 2019 01:01):    No growth at 48 hours    Culture - Blood (collected 09 Oct 2019 23:16)  Source: .Blood  Preliminary Report (12 Oct 2019 01:01):    No growth at 48 hours    MEDICATIONS  (STANDING):  acetaminophen   Tablet .. 650 milliGRAM(s) Oral every 6 hours  amLODIPine   Tablet 10 milliGRAM(s) Oral daily  aspirin  chewable 81 milliGRAM(s) Oral daily  atorvastatin 40 milliGRAM(s) Oral daily  carvedilol 12.5 milliGRAM(s) Oral every 12 hours  chlorhexidine 2% Cloths 1 Application(s) Topical daily  clopidogrel Tablet 75 milliGRAM(s) Oral daily  dextrose 5%. 1000 milliLiter(s) (50 mL/Hr) IV Continuous <Continuous>  dextrose 50% Injectable 12.5 Gram(s) IV Push once  dextrose 50% Injectable 25 Gram(s) IV Push once  dextrose 50% Injectable 25 Gram(s) IV Push once  docusate sodium 100 milliGRAM(s) Oral daily  ferrous sulfate Oral Tab/Cap - Peds 325 milliGRAM(s) Oral daily  gabapentin 300 milliGRAM(s) Oral three times a day  heparin  Injectable 5000 Unit(s) SubCutaneous every 8 hours  insulin lispro (HumaLOG) corrective regimen sliding scale   SubCutaneous three times a day before meals  lidocaine   Patch 2 Patch Transdermal every 24 hours  saccharomyces boulardii 250 milliGRAM(s) Oral two times a day  senna 1 Tablet(s) Oral daily  sertraline 100 milliGRAM(s) Oral daily  sodium chloride 0.9%. 1000 milliLiter(s) (75 mL/Hr) IV Continuous <Continuous>    MEDICATIONS  (PRN):  dextrose 40% Gel 15 Gram(s) Oral once PRN Blood Glucose LESS THAN 70 milliGRAM(s)/deciliter  glucagon  Injectable 1 milliGRAM(s) IntraMuscular once PRN Glucose LESS THAN 70 milligrams/deciliter  ondansetron Injectable 4 milliGRAM(s) IV Push every 6 hours PRN Nausea  oxyCODONE    IR 5 milliGRAM(s) Oral every 4 hours PRN Moderate Pain (4 - 6)

## 2019-10-13 NOTE — PROGRESS NOTE ADULT - SUBJECTIVE AND OBJECTIVE BOX
[facility] DIVISION OF KIDNEY DISEASES AND HYPERTENSION -- FOLLOW UP NOTE  --------------------------------------------------------------------------------  Chief Complaint:  ALLISON on CKD    24 hour events/subjective:  Seen/examined;  SCr worsening;  Was on NSAIDs;  Eating/drinking; feels better;      PAST HISTORY  --------------------------------------------------------------------------------  No significant changes to PMH, PSH, FHx, SHx, unless otherwise noted    ALLERGIES & MEDICATIONS  --------------------------------------------------------------------------------  Allergies    No Known Allergies    Intolerances      Standing Inpatient Medications  acetaminophen   Tablet .. 650 milliGRAM(s) Oral every 6 hours  amLODIPine   Tablet 10 milliGRAM(s) Oral daily  aspirin  chewable 81 milliGRAM(s) Oral daily  atorvastatin 40 milliGRAM(s) Oral daily  carvedilol 12.5 milliGRAM(s) Oral every 12 hours  chlorhexidine 2% Cloths 1 Application(s) Topical daily  clopidogrel Tablet 75 milliGRAM(s) Oral daily  dextrose 5%. 1000 milliLiter(s) IV Continuous <Continuous>  dextrose 50% Injectable 12.5 Gram(s) IV Push once  dextrose 50% Injectable 25 Gram(s) IV Push once  dextrose 50% Injectable 25 Gram(s) IV Push once  docusate sodium 100 milliGRAM(s) Oral daily  ferrous sulfate Oral Tab/Cap - Peds 325 milliGRAM(s) Oral daily  gabapentin 300 milliGRAM(s) Oral three times a day  heparin  Injectable 5000 Unit(s) SubCutaneous every 8 hours  insulin lispro (HumaLOG) corrective regimen sliding scale   SubCutaneous three times a day before meals  lidocaine   Patch 2 Patch Transdermal every 24 hours  saccharomyces boulardii 250 milliGRAM(s) Oral two times a day  senna 1 Tablet(s) Oral daily  sertraline 100 milliGRAM(s) Oral daily  sodium chloride 0.9%. 1000 milliLiter(s) IV Continuous <Continuous>    PRN Inpatient Medications  dextrose 40% Gel 15 Gram(s) Oral once PRN  glucagon  Injectable 1 milliGRAM(s) IntraMuscular once PRN  ondansetron Injectable 4 milliGRAM(s) IV Push every 6 hours PRN  oxyCODONE    IR 5 milliGRAM(s) Oral every 4 hours PRN      REVIEW OF SYSTEMS  --------------------------------------------------------------------------------  Gen: No weight changes, fatigue, fevers/chills, weakness  Skin: No rashes  Head/Eyes/Ears/Mouth: No headache; Normal hearing; Normal vision w/o blurriness; No sinus pain/discomfort, sore throat  Respiratory: No dyspnea, cough, wheezing, hemoptysis  CV: No chest pain, PND, orthopnea  GI: No abdominal pain, diarrhea, constipation, nausea, vomiting, melena, hematochezia  : No increased frequency, dysuria, hematuria, nocturia  MSK: No joint pain/swelling; no back pain; no edema  Neuro: No dizziness/lightheadedness, weakness, seizures, numbness, tingling  Heme: No easy bruising or bleeding  Endo: No heat/cold intolerance  Psych: No significant nervousness, anxiety, stress, depression    All other systems were reviewed and are negative, except as noted.    VITALS/PHYSICAL EXAM  --------------------------------------------------------------------------------  T(C): 36.4 (10-13-19 @ 12:22), Max: 36.6 (10-12-19 @ 18:58)  HR: 55 (10-13-19 @ 12:22) (55 - 57)  BP: 179/79 (10-13-19 @ 12:22) (157/68 - 179/79)  RR: 18 (10-13-19 @ 12:22) (18 - 18)  SpO2: 94% (10-13-19 @ 12:22) (94% - 96%)  Wt(kg): --        10-13-19 @ 07:01  -  10-13-19 @ 13:45  --------------------------------------------------------  IN: 375 mL / OUT: 0 mL / NET: 375 mL      Physical Exam:  	Gen: NAD,    	HEENT: PERRL, supple neck, clear oropharynx  	Pulm: CTA B/L  	CV: RRR, S1S2; no rub  	Back: No spinal or CVA tenderness; no sacral edema  	Abd: +BS, soft, nontender/nondistended  	: No suprapubic tenderness  	UE: Warm, FROM, no clubbing, intact strength; no edema; no asterixis  	LE: Warm, FROM, no clubbing, intact strength; no edema  	Neuro: No focal deficits, intact gait  	Psych: Normal affect and mood  	Skin: Warm, without rashes  	Vascular access:    LABS/STUDIES  --------------------------------------------------------------------------------              11.1   5.32  >-----------<  180      [10-13-19 @ 07:58]              37.5     138  |  107  |  72.0  ----------------------------<  229      [10-13-19 @ 07:58]  4.3   |  18.0  |  4.23        Ca     7.8     [10-13-19 @ 07:58]      Mg     1.9     [10-12-19 @ 08:56]      Phos  5.0     [10-13-19 @ 07:58]    TPro  x   /  Alb  2.6  /  TBili  x   /  DBili  x   /  AST  x   /  ALT  x   /  AlkPhos  x   [10-13-19 @ 07:58]        CK 50      [10-12-19 @ 18:01]    Creatinine Trend:  SCr 4.23 [10-13 @ 07:58]  SCr 3.77 [10-12 @ 18:01]  SCr 3.70 [10-12 @ 08:56]  SCr 3.24 [10-11 @ 07:03]  SCr 2.92 [10-10 @ 06:19]    Urinalysis - [10-10-19 @ 13:37]      Color Yellow / Appearance Clear / SG 1.015 / pH 6.0      Gluc 100 / Ketone Negative  / Bili Negative / Urobili Negative       Blood Moderate / Protein 500 / Leuk Est Negative / Nitrite Negative      RBC 3-5 / WBC 0-2 / Hyaline  / Gran  / Sq Epi  / Non Sq Epi Occasional / Bacteria Negative    Urine Creatinine 107      [10-11-19 @ 11:25]  Urine Sodium 40      [10-11-19 @ 11:25]  Urine Chloride <27      [10-11-19 @ 11:25]  Urine Osmolality 382      [10-11-19 @ 11:25]    Vitamin D (25OH) 7.7      [04-24-19 @ 17:06]  HbA1c 7.1      [10-10-19 @ 06:19]  TSH 1.91      [10-12-19 @ 18:01]

## 2019-10-13 NOTE — PROGRESS NOTE ADULT - ASSESSMENT
69 year old male s/p fall with multiple right sided rib fractures. new acute kidney injury that is not correcting with iv hydration    -pain control  -PIC protocol  -encourage IS use  -continue diet  -encourage ambulation  -chest physiotherapy  -PT/OT  -trend cr  -rest of care as per primary team

## 2019-10-13 NOTE — PROGRESS NOTE ADULT - SUBJECTIVE AND OBJECTIVE BOX
No acute events overnight. transferred level of care to hospitalist service due to worsening kidney function despite gentle hydration, pain is controlled for rib fractures. tolerating diet. denies fevers, chills, nausea, vomiting,c hest pain or difficulty breathing      MEDICATIONS  (STANDING):  acetaminophen   Tablet .. 650 milliGRAM(s) Oral every 6 hours  amLODIPine   Tablet 10 milliGRAM(s) Oral daily  aspirin  chewable 81 milliGRAM(s) Oral daily  atorvastatin 40 milliGRAM(s) Oral daily  carvedilol 12.5 milliGRAM(s) Oral every 12 hours  celecoxib 200 milliGRAM(s) Oral two times a day  chlorhexidine 2% Cloths 1 Application(s) Topical daily  clopidogrel Tablet 75 milliGRAM(s) Oral daily  dextrose 5%. 1000 milliLiter(s) (50 mL/Hr) IV Continuous <Continuous>  dextrose 50% Injectable 12.5 Gram(s) IV Push once  dextrose 50% Injectable 25 Gram(s) IV Push once  dextrose 50% Injectable 25 Gram(s) IV Push once  docusate sodium 100 milliGRAM(s) Oral daily  ferrous sulfate Oral Tab/Cap - Peds 325 milliGRAM(s) Oral daily  gabapentin 300 milliGRAM(s) Oral three times a day  heparin  Injectable 5000 Unit(s) SubCutaneous every 8 hours  insulin lispro (HumaLOG) corrective regimen sliding scale   SubCutaneous three times a day before meals  lactated ringers. 1000 milliLiter(s) (100 mL/Hr) IV Continuous <Continuous>  lidocaine   Patch 2 Patch Transdermal every 24 hours  methocarbamol 750 milliGRAM(s) Oral every 6 hours  saccharomyces boulardii 250 milliGRAM(s) Oral two times a day  senna 1 Tablet(s) Oral daily  sertraline 100 milliGRAM(s) Oral daily    MEDICATIONS  (PRN):  dextrose 40% Gel 15 Gram(s) Oral once PRN Blood Glucose LESS THAN 70 milliGRAM(s)/deciliter  glucagon  Injectable 1 milliGRAM(s) IntraMuscular once PRN Glucose LESS THAN 70 milligrams/deciliter  ondansetron Injectable 4 milliGRAM(s) IV Push every 6 hours PRN Nausea  oxyCODONE    IR 5 milliGRAM(s) Oral every 4 hours PRN Moderate Pain (4 - 6)      Vital Signs Last 24 Hrs  T(C): 36.3 (12 Oct 2019 23:40), Max: 36.6 (12 Oct 2019 18:58)  T(F): 97.4 (12 Oct 2019 23:40), Max: 97.8 (12 Oct 2019 18:58)  HR: 56 (12 Oct 2019 23:40) (50 - 57)  BP: 158/76 (12 Oct 2019 23:40) (157/68 - 158/78)  BP(mean): --  RR: 18 (12 Oct 2019 23:40) (18 - 18)  SpO2: 94% (12 Oct 2019 23:40) (94% - 98%)    Physical Exam:  general: no acute distress, AOx3  Respiratory: Breath Sounds equal & clear to auscultation, no accessory muscle use  Cardiovascular: Regular rate & rhythm, normal S1, S2; no murmurs, gallops or rubs  Gastrointestinal: Soft, non-tender, normal bowel sounds  Extremities: No peripheral edema, No cyanosis, clubbing   Vascular: Equal and normal pulses: 2+ peripheral pulses throughout    Musculoskeletal: No joint pain, swelling or deformity; no limitation of movement    Skin: No rashes      I&O's Detail    11 Oct 2019 07:01  -  12 Oct 2019 07:00  --------------------------------------------------------  IN:    lactated ringers.: 1100 mL  Total IN: 1100 mL    OUT:  Total OUT: 0 mL    Total NET: 1100 mL          LABS:                        11.4   4.70  )-----------( 167      ( 12 Oct 2019 08:56 )             37.7     10-12    142  |  110<H>  |  72.0<H>  ----------------------------<  171<H>  4.5   |  20.0<L>  |  3.77<H>    Ca    7.7<L>      12 Oct 2019 18:01  Phos  4.9     10-12  Mg     1.9     10-12            RADIOLOGY & ADDITIONAL STUDIES:

## 2019-10-14 NOTE — SPEECH LANGUAGE PATHOLOGY EVALUATION - SLP DIAGNOSIS
Expressive and receptive language skills judged to be WFL. Pts motor speech production is at baseline.

## 2019-10-14 NOTE — OCCUPATIONAL THERAPY INITIAL EVALUATION ADULT - PHYSICAL ASSIST/NONPHYSICAL ASSIST: SIT/STAND, REHAB EVAL
verbal cues/Pt required tactile and verbal cues for proper body positioning and hand placement on walker for safety./2 person assist/nonverbal cues (demo/gestures)

## 2019-10-14 NOTE — PROGRESS NOTE ADULT - SUBJECTIVE AND OBJECTIVE BOX
INTERVAL HPI/OVERNIGHT EVENTS:    SUBJECTIVE:  No overnight events. Pain well controlled. PIC score of 8. ALLISON on CKD managed by primary team    MEDICATIONS  (STANDING):  acetaminophen   Tablet .. 650 milliGRAM(s) Oral every 6 hours  amLODIPine   Tablet 10 milliGRAM(s) Oral daily  aspirin  chewable 81 milliGRAM(s) Oral daily  atorvastatin 40 milliGRAM(s) Oral daily  carvedilol 12.5 milliGRAM(s) Oral every 12 hours  chlorhexidine 2% Cloths 1 Application(s) Topical daily  clopidogrel Tablet 75 milliGRAM(s) Oral daily  dextrose 5%. 1000 milliLiter(s) (50 mL/Hr) IV Continuous <Continuous>  dextrose 50% Injectable 12.5 Gram(s) IV Push once  dextrose 50% Injectable 25 Gram(s) IV Push once  dextrose 50% Injectable 25 Gram(s) IV Push once  docusate sodium 100 milliGRAM(s) Oral daily  ferrous sulfate Oral Tab/Cap - Peds 325 milliGRAM(s) Oral daily  gabapentin 300 milliGRAM(s) Oral three times a day  heparin  Injectable 5000 Unit(s) SubCutaneous every 8 hours  hydrALAZINE 25 milliGRAM(s) Oral every 8 hours  insulin lispro (HumaLOG) corrective regimen sliding scale   SubCutaneous three times a day before meals  lidocaine   Patch 2 Patch Transdermal every 24 hours  saccharomyces boulardii 250 milliGRAM(s) Oral two times a day  senna 1 Tablet(s) Oral daily  sertraline 100 milliGRAM(s) Oral daily  sodium bicarbonate  Infusion 0.083 mEq/kG/Hr (100 mL/Hr) IV Continuous <Continuous>    MEDICATIONS  (PRN):  dextrose 40% Gel 15 Gram(s) Oral once PRN Blood Glucose LESS THAN 70 milliGRAM(s)/deciliter  glucagon  Injectable 1 milliGRAM(s) IntraMuscular once PRN Glucose LESS THAN 70 milligrams/deciliter  hydrALAZINE Injectable 5 milliGRAM(s) IV Push once PRN ifSBP>170 or DBP>110  ondansetron Injectable 4 milliGRAM(s) IV Push every 6 hours PRN Nausea  oxyCODONE    IR 5 milliGRAM(s) Oral every 4 hours PRN Moderate Pain (4 - 6)      Vital Signs Last 24 Hrs  T(C): 36.4 (14 Oct 2019 09:18), Max: 36.4 (13 Oct 2019 12:22)  T(F): 97.6 (14 Oct 2019 09:18), Max: 97.6 (14 Oct 2019 09:18)  HR: 58 (14 Oct 2019 09:18) (55 - 102)  BP: 135/71 (14 Oct 2019 09:18) (135/71 - 179/79)  BP(mean): --  RR: 95 (14 Oct 2019 09:18) (18 - 95)  SpO2: 94% (13 Oct 2019 22:51) (94% - 98%)    PE  Gen: NAD, appropriate  Pulm: Non labored breathing. IS >1250. Symmetrical chest expansion. CTAB  CV: RRR, s1 and s2  Abd: Soft, NT/ND.  Ext: Moving all 4 ext.    I&O's Detail    13 Oct 2019 07:01  -  14 Oct 2019 07:00  --------------------------------------------------------  IN:    lactated ringers.: 300 mL    sodium chloride 0.9%: 675 mL  Total IN: 975 mL    OUT:  Total OUT: 0 mL    Total NET: 975 mL          LABS:                        11.5   5.66  )-----------( 183      ( 14 Oct 2019 07:32 )             38.2     10-14    142  |  111<H>  |  75.0<H>  ----------------------------<  167<H>  4.6   |  18.0<L>  |  4.23<H>    Ca    7.9<L>      14 Oct 2019 07:32  Phos  4.9     10-14  Mg     2.0     10-14    TPro  5.6<L>  /  Alb  2.5<L>  /  TBili  <0.2<L>  /  DBili  x   /  AST  13  /  ALT  13  /  AlkPhos  104  10-14          RADIOLOGY & ADDITIONAL STUDIES:

## 2019-10-14 NOTE — PROGRESS NOTE ADULT - ASSESSMENT
70 yo M with PMHX CVA, HTN, DM, CAD (s/p AICD, s/p stent), wheelchair bound brought from [facility] s/p fall. Initially placed under trauma service for multiple rib fractures complicated by chest wall hematoma and contusion. Patient is a poor historian as per wife pt is confused at baseline and weans b/w AAOX2-3. Pt transferred from trauma service to medicine service. Currently without complaints.  PT consulted and appreciated, recommending rolling walker on return to [facility]. Nephrology consulted and appreciated due to pts ALLISON on CKD. Speech eval completed with functional limits     ALLISON on CKD   - Baseline CKD 4   - Suspect pre renal component. CK levels normal.   - Nephrology consulted and appreciated, recommending holding fluids in setting of possible overload   - Renal sono completed, showing no hydronephrosis.   - avoid nephrotoxic agent, discontinued celebrex and robaxin  - Cr currently @ 4.23   - corrected calcium 9.1  - C/w to monitor labs    Cognitive impairment with unknown baseline  - Pt is between AAOX2 and AAOX3  - Mild confusion, at baseline as per wife.   - Fall precautions.     Rib fracture s/p fall  - Pt originally on trauma service for multiple rib fractures complicated with chest wall hematoma and contusion  - Xray noted   - Currently no surgical intervention at this time  - Continue with pain control with pain meds PRN  - Supportive therapy  - Incentive spirometry  -discontinued Robaxin (nephrotoxic)  - c/w lidocaine patch, tylenol standing   - oxy IR 5 mg PRN for moderate pain  -Trauma recs appreciated.  -PT recommends [facility]    CAD s/p AICD placement  - c/w with ASA 81 and plavix  - Currently asymptomatic    HTN  - labile   - c/w with amlodipine, coreg with holding parameters    HLD:  - c/w with Lipitor    IDDM2 complicated with neuropathy  - current HgA1c 7.1  - hypoglycemia protocol  - accuchecks  - HISS   - c/w with gabapentin 300 mg TID    Depression  - c/w with sertraline     DVT prophylaxis  - Heparin q 12 sc    DISPO: Pt transferred to medicine service. Wife updated and given MOLST to discuss GOC with family. 70 yo M with PMHX CVA, HTN, DM, CAD (s/p AICD, s/p stent), wheelchair bound brought from [facility] s/p fall. Initially placed under trauma service for multiple rib fractures complicated by chest wall hematoma and contusion. Patient is a poor historian as per wife pt is confused at baseline and weans b/w AAOX2-3. Pt transferred from trauma service to medicine service. Currently without complaints.  PT consulted and appreciated, recommending rolling walker on return to [facility] [place]. Nephrology consulted and appreciated due to pts ALLISON on CKD. Speech eval completed with functional limits     ALLISON on CKD   - Baseline CKD 4   - Suspect pre renal component. CK levels normal.   - Nephrology consulted and appreciated, recommending holding fluids in setting of possible overload   - Renal sono completed, showing no hydronephrosis.   - avoid nephrotoxic agent, discontinued celebrex and robaxin  - Cr currently @ 4.23   - corrected calcium 9.1  - C/w to monitor labs    mod dementia   - Pt is between AAOX2 and AAOX3  - Mild confusion, at baseline as per wife.   - Fall precautions.     multiple Rib fracture s/p fall  - Pt originally on trauma service for multiple rib fractures complicated with chest wall hematoma and contusion  - Xray noted   - Currently no surgical intervention at this time  - Continue with pain control with pain meds PRN  - Supportive therapy  - Incentive spirometry  -discontinued Robaxin (nephrotoxic)  - c/w lidocaine patch, tylenol standing   - oxy IR 5 mg PRN for moderate pain  -Trauma recs appreciated.  -PT recommends [facility] [place]    CAD s/p AICD placement  - c/w with ASA 81 and plavix  - Currently asymptomatic    HTN  - labile   - c/w with amlodipine, coreg with holding parameters    HLD:  - c/w with Lipitor    IDDM2 complicated with neuropathy  - current HgA1c 7.1  - hypoglycemia protocol  - accuchecks  - HISS   - c/w with gabapentin 300 mg TID    Depression  - c/w with sertraline     DVT prophylaxis  - Heparin q 12 sc    DISPO: Pt transferred to medicine service. Wife updated and given MOLST to discuss GOC with family.

## 2019-10-14 NOTE — OCCUPATIONAL THERAPY INITIAL EVALUATION ADULT - LEVEL OF INDEPENDENCE: SIT/STAND, REHAB EVAL
minimum assist (75% patients effort)/Pt took 3-4 side shuffle steps to head of bed. Pt was unsteady on feet

## 2019-10-14 NOTE — OCCUPATIONAL THERAPY INITIAL EVALUATION ADULT - LEVEL OF INDEPENDENCE: DRESS LOWER BODY, OT EVAL
stand-by assist/to don/doff socks; pt required assistance and cueing to maintain upright posture seated at edge of bed

## 2019-10-14 NOTE — PROGRESS NOTE ADULT - ASSESSMENT
69yoM s/p fall c multiple rib fx and acute on chronic kidney disease  - Pain well controlled. Continue current pain mgmt scale  - Continue pulmonary toilet and encourage IS  - DVT ppx  - ALLISON management per Primary team  - No surgical intervention  - Pls reconsult with any further questions.

## 2019-10-14 NOTE — OCCUPATIONAL THERAPY INITIAL EVALUATION ADULT - ADDITIONAL COMMENTS
Pt has a tub shower with curtains and grab bars  Pt owns: RW, SAC, W/C, Grab bars for toilet, Commode, Shower Chair and detachable shower head.  Pt is right handed and does not drive.

## 2019-10-14 NOTE — SPEECH LANGUAGE PATHOLOGY EVALUATION - SLP PERTINENT HISTORY OF CURRENT PROBLEM
As per h&p: " 68 yo M with PMHX CVA, HTN, DM, CAD (s/p AICD, s/p stent), wheelchair bound brought from [facility] s/p fall. Patient is a poor historian due to baseline cognitive impairment/aphasia.  Unsure of LOC. Patient c/o right sided chest pain.  He was found on CXR to have R sided rib fx and was hypoxic at facility after his fall, prompting them to send him to the ED."

## 2019-10-14 NOTE — SPEECH LANGUAGE PATHOLOGY EVALUATION - COMMENTS
No further follow up Pt known to this service was followed multiple times on previous admissions for swallow evaluations. At this current time as per nursing pt tolerating current diet. slow processing however this is at baseline

## 2019-10-14 NOTE — PROGRESS NOTE ADULT - ATTENDING COMMENTS
I have seen and examined  patient.  pain under control.  continue multimodality analgesia.  call with questions

## 2019-10-14 NOTE — PROGRESS NOTE ADULT - SUBJECTIVE AND OBJECTIVE BOX
[facility] DIVISION OF KIDNEY DISEASES AND HYPERTENSION -- FOLLOW UP NOTE  --------------------------------------------------------------------------------  Chief Complaint:  ALLISON on CKD    24 hour events/subjective:  Seen/examined;  SCr plateau today;  Somewhat noncooperative this morning; states he feels a bit more short of breath;    PAST HISTORY  --------------------------------------------------------------------------------  No significant changes to PMH, PSH, FHx, SHx, unless otherwise noted    ALLERGIES & MEDICATIONS  --------------------------------------------------------------------------------  Allergies    No Known Allergies    Intolerances      Standing Inpatient Medications  acetaminophen   Tablet .. 650 milliGRAM(s) Oral every 6 hours  amLODIPine   Tablet 10 milliGRAM(s) Oral daily  aspirin  chewable 81 milliGRAM(s) Oral daily  atorvastatin 40 milliGRAM(s) Oral daily  carvedilol 12.5 milliGRAM(s) Oral every 12 hours  chlorhexidine 2% Cloths 1 Application(s) Topical daily  clopidogrel Tablet 75 milliGRAM(s) Oral daily  dextrose 5%. 1000 milliLiter(s) IV Continuous <Continuous>  dextrose 50% Injectable 12.5 Gram(s) IV Push once  dextrose 50% Injectable 25 Gram(s) IV Push once  dextrose 50% Injectable 25 Gram(s) IV Push once  docusate sodium 100 milliGRAM(s) Oral daily  ferrous sulfate Oral Tab/Cap - Peds 325 milliGRAM(s) Oral daily  gabapentin 300 milliGRAM(s) Oral three times a day  heparin  Injectable 5000 Unit(s) SubCutaneous every 8 hours  hydrALAZINE 25 milliGRAM(s) Oral every 8 hours  insulin lispro (HumaLOG) corrective regimen sliding scale   SubCutaneous three times a day before meals  lidocaine   Patch 2 Patch Transdermal every 24 hours  saccharomyces boulardii 250 milliGRAM(s) Oral two times a day  senna 1 Tablet(s) Oral daily  sertraline 100 milliGRAM(s) Oral daily    PRN Inpatient Medications  dextrose 40% Gel 15 Gram(s) Oral once PRN  glucagon  Injectable 1 milliGRAM(s) IntraMuscular once PRN  hydrALAZINE Injectable 5 milliGRAM(s) IV Push once PRN  ondansetron Injectable 4 milliGRAM(s) IV Push every 6 hours PRN  oxyCODONE    IR 5 milliGRAM(s) Oral every 4 hours PRN      REVIEW OF SYSTEMS  --------------------------------------------------------------------------------  Gen: No weight changes, fatigue, fevers/chills, weakness  Skin: No rashes  Head/Eyes/Ears/Mouth: No headache; Normal hearing; Normal vision w/o blurriness; No sinus pain/discomfort, sore throat  Respiratory: No dyspnea, cough, wheezing, hemoptysis  CV: No chest pain, PND, orthopnea  GI: No abdominal pain, diarrhea, constipation, nausea, vomiting, melena, hematochezia  : No increased frequency, dysuria, hematuria, nocturia  MSK: No joint pain/swelling; no back pain; no edema  Neuro: No dizziness/lightheadedness, weakness, seizures, numbness, tingling  Heme: No easy bruising or bleeding  Endo: No heat/cold intolerance  Psych: No significant nervousness, anxiety, stress, depression    All other systems were reviewed and are negative, except as noted.    VITALS/PHYSICAL EXAM  --------------------------------------------------------------------------------  T(C): 36.4 (10-14-19 @ 09:18), Max: 36.4 (10-13-19 @ 12:22)  HR: 58 (10-14-19 @ 09:18) (55 - 102)  BP: 135/71 (10-14-19 @ 09:18) (135/71 - 179/79)  RR: 95 (10-14-19 @ 09:18) (18 - 95)  SpO2: 94% (10-13-19 @ 22:51) (94% - 98%)  Wt(kg): --        10-13-19 @ 07:01  -  10-14-19 @ 07:00  --------------------------------------------------------  IN: 975 mL / OUT: 0 mL / NET: 975 mL      Physical Exam:              Gen: NAD,    	HEENT: PERRL, supple neck, clear oropharynx  	Pulm: dec BS @ bases  	CV: RRR, S1S2; no rub  	Back: No spinal or CVA tenderness; no sacral edema  	Abd: +BS, soft, nontender/nondistended  	: No suprapubic tenderness  	UE: Warm, FROM, no clubbing, intact strength; no edema; no asterixis  	LE: Warm, FROM, no clubbing, intact strength; no edema  	Neuro: No focal deficits, intact gait  	Psych: Normal affect and mood  	Skin: Warm, without rashes    LABS/STUDIES  --------------------------------------------------------------------------------              11.5   5.66  >-----------<  183      [10-14-19 @ 07:32]              38.2     142  |  111  |  75.0  ----------------------------<  167      [10-14-19 @ 07:32]  4.6   |  18.0  |  4.23        Ca     7.9     [10-14-19 @ 07:32]      Mg     2.0     [10-14-19 @ 07:32]      Phos  4.9     [10-14-19 @ 07:32]    TPro  5.6  /  Alb  2.5  /  TBili  <0.2  /  DBili  x   /  AST  13  /  ALT  13  /  AlkPhos  104  [10-14-19 @ 07:32]        CK 50      [10-12-19 @ 18:01]    Creatinine Trend:  SCr 4.23 [10-14 @ 07:32]  SCr 4.23 [10-13 @ 07:58]  SCr 3.77 [10-12 @ 18:01]  SCr 3.70 [10-12 @ 08:56]  SCr 3.24 [10-11 @ 07:03]    Urinalysis - [10-10-19 @ 13:37]      Color Yellow / Appearance Clear / SG 1.015 / pH 6.0      Gluc 100 / Ketone Negative  / Bili Negative / Urobili Negative       Blood Moderate / Protein 500 / Leuk Est Negative / Nitrite Negative      RBC 3-5 / WBC 0-2 / Hyaline  / Gran  / Sq Epi  / Non Sq Epi Occasional / Bacteria Negative    Urine Creatinine 107      [10-11-19 @ 11:25]  Urine Sodium 40      [10-11-19 @ 11:25]  Urine Chloride <27      [10-11-19 @ 11:25]  Urine Osmolality 382      [10-11-19 @ 11:25]    Vitamin D (25OH) 7.7      [04-24-19 @ 17:06]  HbA1c 7.1      [10-10-19 @ 06:19]  TSH 1.91      [10-12-19 @ 18:01]

## 2019-10-14 NOTE — PROGRESS NOTE ADULT - ASSESSMENT
1) ATN  2) HTN  3) DM  4) CKD IV    SCr plateau today; stable @ 4.23;   ALLISON on CKD IV in the setting of NSAID use;  Holding celebrex;  HOLD IV hydration for now; check CXR; short of breath with crackles @ bases;  Urine eosinophils + ; not very sensitive for AIN ; meds reviewed; not on PPI at current; would hold ; no role for biopsy as of yet;  Will follow  d/w Dr Lopez

## 2019-10-14 NOTE — OCCUPATIONAL THERAPY INITIAL EVALUATION ADULT - LIVES WITH, PROFILE
Pt lives in private home with wife who is also retired and is available to provide assistance as needed. 3STE with 1 hand rail, no steps inside the home./spouse

## 2019-10-14 NOTE — OCCUPATIONAL THERAPY INITIAL EVALUATION ADULT - PLANNED THERAPY INTERVENTIONS, OT EVAL
ADL retraining/balance training/fine motor coordination training/cognitive, visual perceptual/transfer training

## 2019-10-14 NOTE — PROGRESS NOTE ADULT - SUBJECTIVE AND OBJECTIVE BOX
Pt seen and examined at bedside. Pt was sleepy and lethargic at first but apon questioning pt was able to answer and is AAOX3. Pt denies any pain today.   States he has no CP, HA, SOB, fever/chills, n/v/c/d, abdominal/back pain, vision changes, numbness/tingling, blood in urine or stool, calf tenderness/swelling. All other ROS negative     ***Wife arrived at lunchtime stating pt was previously at [facility] for an infection and sent to [facility] for 3 months for MIKE, pt was to be discharged this month on the 22nd but fell and brought to [facility]. Wife states pt has cognitive impairment and is some what confused at baseline. States pt likely fell out of chair at [facility] as she did not witness the fall and recalls pt leaning forward in wheel chair at times and advised staff to monitor him closely. Given MOLST form and requested wife review GOC with son and pt.     Vital Signs Last 24 Hrs  T(C): 36.4 (14 Oct 2019 09:18), Max: 36.4 (13 Oct 2019 22:51)  T(F): 97.6 (14 Oct 2019 09:18), Max: 97.6 (14 Oct 2019 09:18)  HR: 58 (14 Oct 2019 09:18) (58 - 102)  BP: 135/71 (14 Oct 2019 09:18) (135/71 - 178/81)  RR: 95 (14 Oct 2019 09:18) (19 - 95)  SpO2: 94% (13 Oct 2019 22:51) (94% - 98%)    Physical exam:  GENERAL: NAD, confused. Well developed  HEENT: clear sclera and conjunctiva PERRLA, pupil constricted to light b/l   RESP: Tender on right chest wall.  Rhonchi appreciated on left greater than right upper and lower lobe  CVS: Midline scar. Bradycardic; S1S2 present, No murmurs, rubs, or gallops. AICD on left upper chest wall.   GI: +BS present, nontender, nondistended on palpation.   Extremities: Lower extremities with b/l skin abrasions and bruising. Left great toe amputation. No cyanosis, no edema, no clubbing, no calf tenderness  Skin: hypopigmented skin region on back, seborrheic dermatitis present on upper back.   Back: Skin erythema in sacral region, no ulcer present.   Neuro: AAOX2 to name and birthdate only., Grossly intact muscle and motor function    LABS                     11.5   5.66  )-----------( 183      ( 14 Oct 2019 07:32 )             38.2   10-14    142  |  111<H>  |  75.0<H>  ----------------------------<  167<H>  4.6   |  18.0<L>  |  4.23<H>    Ca    7.9<L>      14 Oct 2019 07:32  Phos  4.9     10-14  Mg     2.0     10-14    TPro  5.6<L>  /  Alb  2.5<L>  /  TBili  <0.2<L>  /  DBili  x   /  AST  13  /  ALT  13  /  AlkPhos  104  10-14          Culture - Urine (collected 10 Oct 2019 13:44)  Source: .Urine  Final Report (11 Oct 2019 10:36):    No growth    Culture - Blood (collected 09 Oct 2019 23:16)  Source: .Blood  Preliminary Report (12 Oct 2019 01:01):    No growth at 48 hours    Culture - Blood (collected 09 Oct 2019 23:16)  Source: .Blood  Preliminary Report (12 Oct 2019 01:01):    No growth at 48 hours    MEDICATIONS  (STANDING):  acetaminophen   Tablet .. 650 milliGRAM(s) Oral every 6 hours  amLODIPine   Tablet 10 milliGRAM(s) Oral daily  aspirin  chewable 81 milliGRAM(s) Oral daily  atorvastatin 40 milliGRAM(s) Oral daily  carvedilol 12.5 milliGRAM(s) Oral every 12 hours  chlorhexidine 2% Cloths 1 Application(s) Topical daily  clopidogrel Tablet 75 milliGRAM(s) Oral daily  dextrose 5%. 1000 milliLiter(s) (50 mL/Hr) IV Continuous <Continuous>  dextrose 50% Injectable 12.5 Gram(s) IV Push once  dextrose 50% Injectable 25 Gram(s) IV Push once  dextrose 50% Injectable 25 Gram(s) IV Push once  docusate sodium 100 milliGRAM(s) Oral daily  ferrous sulfate Oral Tab/Cap - Peds 325 milliGRAM(s) Oral daily  gabapentin 300 milliGRAM(s) Oral three times a day  heparin  Injectable 5000 Unit(s) SubCutaneous every 8 hours  insulin lispro (HumaLOG) corrective regimen sliding scale   SubCutaneous three times a day before meals  lidocaine   Patch 2 Patch Transdermal every 24 hours  saccharomyces boulardii 250 milliGRAM(s) Oral two times a day  senna 1 Tablet(s) Oral daily  sertraline 100 milliGRAM(s) Oral daily  sodium chloride 0.9%. 1000 milliLiter(s) (75 mL/Hr) IV Continuous <Continuous>    MEDICATIONS  (PRN):  dextrose 40% Gel 15 Gram(s) Oral once PRN Blood Glucose LESS THAN 70 milliGRAM(s)/deciliter  glucagon  Injectable 1 milliGRAM(s) IntraMuscular once PRN Glucose LESS THAN 70 milligrams/deciliter  ondansetron Injectable 4 milliGRAM(s) IV Push every 6 hours PRN Nausea  oxyCODONE    IR 5 milliGRAM(s) Oral every 4 hours PRN Moderate Pain (4 - 6) Pt seen and examined at bedside. Pt was sleepy and lethargic at first but apon questioning pt was able to answer and is AAOX3. Pt denies any pain today.   States he has no CP, HA, SOB, fever/chills, n/v/c/d, abdominal/back pain, vision changes, numbness/tingling, blood in urine or stool, calf tenderness/swelling. All other ROS negative     ***Wife arrived at lunchtime stating pt was previously at [facility] for an infection and sent to [facility] for 3 months for MIKE, pt was to be discharged this month on the 22nd but fell and brought to [facility]. Wife states pt has cognitive impairment and is some what confused at baseline. States pt likely fell out of chair at [facility] as she did not witness the fall and recalls pt leaning forward in wheel chair at times and advised staff to monitor him closely. Given MOLST form and requested wife review GOC with son and pt.     Vital Signs Last 24 Hrs  T(C): 36.4 (14 Oct 2019 09:18), Max: 36.4 (13 Oct 2019 22:51)  T(F): 97.6 (14 Oct 2019 09:18), Max: 97.6 (14 Oct 2019 09:18)  HR: 58 (14 Oct 2019 09:18) (58 - 102)  BP: 135/71 (14 Oct 2019 09:18) (135/71 - 178/81)  RR: 95 (14 Oct 2019 09:18) (19 - 95)  SpO2: 94% (13 Oct 2019 22:51) (94% - 98%)    Physical exam:  GENERAL: NAD, confused. Well developed  HEENT: clear sclera and conjunctiva PERRLA, pupil constricted to light b/l   RESP: Clear on auscultation b/l.   CVS: Midline scar. s1s2 present No murmurs, rubs, or gallops. AICD on left upper chest wall.   GI: +BS present, nontender, nondistended on palpation.   Extremities: Lower extremities with b/l skin abrasions and bruising. Left great toe amputation. No cyanosis, no edema, no clubbing, no calf tenderness  Skin: hypopigmented skin region on back, seborrheic dermatitis present on upper back.   Back: Skin erythema in sacral region, no ulcer present.   Neuro: AAOX2 to name and birthdate only., Grossly intact muscle and motor function    LABS                     11.5   5.66  )-----------( 183      ( 14 Oct 2019 07:32 )             38.2   10-14    142  |  111<H>  |  75.0<H>  ----------------------------<  167<H>  4.6   |  18.0<L>  |  4.23<H>    Ca    7.9<L>      14 Oct 2019 07:32  Phos  4.9     10-14  Mg     2.0     10-14    TPro  5.6<L>  /  Alb  2.5<L>  /  TBili  <0.2<L>  /  DBili  x   /  AST  13  /  ALT  13  /  AlkPhos  104  10-14          Culture - Urine (collected 10 Oct 2019 13:44)  Source: .Urine  Final Report (11 Oct 2019 10:36):    No growth    Culture - Blood (collected 09 Oct 2019 23:16)  Source: .Blood  Preliminary Report (12 Oct 2019 01:01):    No growth at 48 hours    Culture - Blood (collected 09 Oct 2019 23:16)  Source: .Blood  Preliminary Report (12 Oct 2019 01:01):    No growth at 48 hours    MEDICATIONS  (STANDING):  acetaminophen   Tablet .. 650 milliGRAM(s) Oral every 6 hours  amLODIPine   Tablet 10 milliGRAM(s) Oral daily  aspirin  chewable 81 milliGRAM(s) Oral daily  atorvastatin 40 milliGRAM(s) Oral daily  carvedilol 12.5 milliGRAM(s) Oral every 12 hours  chlorhexidine 2% Cloths 1 Application(s) Topical daily  clopidogrel Tablet 75 milliGRAM(s) Oral daily  dextrose 5%. 1000 milliLiter(s) (50 mL/Hr) IV Continuous <Continuous>  dextrose 50% Injectable 12.5 Gram(s) IV Push once  dextrose 50% Injectable 25 Gram(s) IV Push once  dextrose 50% Injectable 25 Gram(s) IV Push once  docusate sodium 100 milliGRAM(s) Oral daily  ferrous sulfate Oral Tab/Cap - Peds 325 milliGRAM(s) Oral daily  gabapentin 300 milliGRAM(s) Oral three times a day  heparin  Injectable 5000 Unit(s) SubCutaneous every 8 hours  insulin lispro (HumaLOG) corrective regimen sliding scale   SubCutaneous three times a day before meals  lidocaine   Patch 2 Patch Transdermal every 24 hours  saccharomyces boulardii 250 milliGRAM(s) Oral two times a day  senna 1 Tablet(s) Oral daily  sertraline 100 milliGRAM(s) Oral daily  sodium chloride 0.9%. 1000 milliLiter(s) (75 mL/Hr) IV Continuous <Continuous>    MEDICATIONS  (PRN):  dextrose 40% Gel 15 Gram(s) Oral once PRN Blood Glucose LESS THAN 70 milliGRAM(s)/deciliter  glucagon  Injectable 1 milliGRAM(s) IntraMuscular once PRN Glucose LESS THAN 70 milligrams/deciliter  ondansetron Injectable 4 milliGRAM(s) IV Push every 6 hours PRN Nausea  oxyCODONE    IR 5 milliGRAM(s) Oral every 4 hours PRN Moderate Pain (4 - 6) cc fall , rib fx, tabitha/ckd     Pt seen and examined at bedside. Pt was sleepy and lethargic at first but apon questioning pt was able to answer and is AAOX3. Pt denies any pain today.   States he has no CP, HA, SOB, fever/chills, n/v/c/d, abdominal/back pain, vision changes, numbness/tingling, blood in urine or stool, calf tenderness/swelling. All other ROS negative     ***Wife arrived at lunchtime stating pt was previously at [facility] for an infection and sent to [facility] for 3 months for MIKE, pt was to be discharged this month on the 22nd but fell and brought to [facility]. Wife states pt has cognitive impairment and is some what confused at baseline. States pt likely fell out of chair at [facility] as she did not witness the fall and recalls pt leaning forward in wheel chair at times and advised staff to monitor him closely. Given MOLST form and requested wife review GOC with son and pt.     Vital Signs Last 24 Hrs  T(C): 36.4 (14 Oct 2019 09:18), Max: 36.4 (13 Oct 2019 22:51)  T(F): 97.6 (14 Oct 2019 09:18), Max: 97.6 (14 Oct 2019 09:18)  HR: 58 (14 Oct 2019 09:18) (58 - 102)  BP: 135/71 (14 Oct 2019 09:18) (135/71 - 178/81)  RR: 95 (14 Oct 2019 09:18) (19 - 95)  SpO2: 94% (13 Oct 2019 22:51) (94% - 98%)    Physical exam:  GENERAL: NAD, mildly confused. Well developed  HEENT: clear sclera and conjunctiva PERRLA, pupil constricted to light b/l   RESP: Clear on auscultation b/l.   CVS: Midline scar. s1s2 present No murmurs, rubs, or gallops. AICD on left upper chest wall.   GI: +BS present, nontender, nondistended on palpation.   Extremities: Lower extremities with b/l skin abrasions and bruising. Left great toe amputation. No cyanosis, no edema, no clubbing, no calf tenderness  Skin: hypopigmented skin region on back, seborrheic dermatitis present on upper back.   Back: Skin erythema in sacral region, no ulcer present.   Neuro: AAOX2 to name and birthdate only., Grossly intact muscle and motor function    LABS                     11.5   5.66  )-----------( 183      ( 14 Oct 2019 07:32 )             38.2   10-14    142  |  111<H>  |  75.0<H>  ----------------------------<  167<H>  4.6   |  18.0<L>  |  4.23<H>    Ca    7.9<L>      14 Oct 2019 07:32  Phos  4.9     10-14  Mg     2.0     10-14    TPro  5.6<L>  /  Alb  2.5<L>  /  TBili  <0.2<L>  /  DBili  x   /  AST  13  /  ALT  13  /  AlkPhos  104  10-14          Culture - Urine (collected 10 Oct 2019 13:44)  Source: .Urine  Final Report (11 Oct 2019 10:36):    No growth    Culture - Blood (collected 09 Oct 2019 23:16)  Source: .Blood  Preliminary Report (12 Oct 2019 01:01):    No growth at 48 hours    Culture - Blood (collected 09 Oct 2019 23:16)  Source: .Blood  Preliminary Report (12 Oct 2019 01:01):    No growth at 48 hours    MEDICATIONS  (STANDING):  acetaminophen   Tablet .. 650 milliGRAM(s) Oral every 6 hours  amLODIPine   Tablet 10 milliGRAM(s) Oral daily  aspirin  chewable 81 milliGRAM(s) Oral daily  atorvastatin 40 milliGRAM(s) Oral daily  carvedilol 12.5 milliGRAM(s) Oral every 12 hours  chlorhexidine 2% Cloths 1 Application(s) Topical daily  clopidogrel Tablet 75 milliGRAM(s) Oral daily  dextrose 5%. 1000 milliLiter(s) (50 mL/Hr) IV Continuous <Continuous>  dextrose 50% Injectable 12.5 Gram(s) IV Push once  dextrose 50% Injectable 25 Gram(s) IV Push once  dextrose 50% Injectable 25 Gram(s) IV Push once  docusate sodium 100 milliGRAM(s) Oral daily  ferrous sulfate Oral Tab/Cap - Peds 325 milliGRAM(s) Oral daily  gabapentin 300 milliGRAM(s) Oral three times a day  heparin  Injectable 5000 Unit(s) SubCutaneous every 8 hours  insulin lispro (HumaLOG) corrective regimen sliding scale   SubCutaneous three times a day before meals  lidocaine   Patch 2 Patch Transdermal every 24 hours  saccharomyces boulardii 250 milliGRAM(s) Oral two times a day  senna 1 Tablet(s) Oral daily  sertraline 100 milliGRAM(s) Oral daily  sodium chloride 0.9%. 1000 milliLiter(s) (75 mL/Hr) IV Continuous <Continuous>    MEDICATIONS  (PRN):  dextrose 40% Gel 15 Gram(s) Oral once PRN Blood Glucose LESS THAN 70 milliGRAM(s)/deciliter  glucagon  Injectable 1 milliGRAM(s) IntraMuscular once PRN Glucose LESS THAN 70 milligrams/deciliter  ondansetron Injectable 4 milliGRAM(s) IV Push every 6 hours PRN Nausea  oxyCODONE    IR 5 milliGRAM(s) Oral every 4 hours PRN Moderate Pain (4 - 6)

## 2019-10-14 NOTE — OCCUPATIONAL THERAPY INITIAL EVALUATION ADULT - TOILETING, PREVIOUS LEVEL OF FUNCTION, OT EVAL
needs device/Pt is incontinent, wears diapers-at one point was able to use toilet with grab bars/needed assist

## 2019-10-15 NOTE — SWALLOW BEDSIDE ASSESSMENT ADULT - SWALLOW EVAL: RECOMMENDED DIET
Puree with HONEY thick liquids; Puree utilizing chin tuck posture & honey thick liquids via 1/2 teaspoon presentation

## 2019-10-15 NOTE — DISCHARGE NOTE PROVIDER - HOSPITAL COURSE
68 yo M with PMHX CVA, HTN, DM, CAD (s/p AICD, s/p stent), wheelchair bound brought from [facility] s/p fall. Initially placed under trauma service for multiple rib fractures complicated by chest wall hematoma and contusion. Patient is a poor historian as per wife pt is confused at baseline and weans b/w AAOX2-3. Pt transferred from trauma service to medicine service due to ALLISON on CKD. Nephrology consulted and appreciated. All nephrotoxic agents held. Pt initially thought to be due to fluid overload but pts renal functions platue'd and thought to be his new baseline with possibly interstitial nephritis given hx of NSAID use. PT consulted and appreciated, recommending rolling walker on return to [facility]. Of note pt had difficulty swallowing     pills and speech and swallow eval stated pt has chronic dysphagia and previously recommended pureed diet which wife does not follow despite recs.         Patient seen and examined @ the bedside today. Patient clinically stable at this time. No longer requires acute in hospital level of care. Can be continually followed/managed as an outpatient. Please see discharge summary for further information including today's vitals and physical exam.     All electrolyte abnormalities were monitored carefully and repleted as necessary during this hospitalization. At the time of discharge patient was hemodynamically stable and amenable to all terms of discharge. The patient has received verbal instructions from myself regarding discharge plans.         Length of Discharge: 45MIN 68 YO M with PMHX CVA, HTN, DM, CAD (s/p AICD, s/p stent), wheelchair bound brought from [facility] s/p fall. Initially placed under trauma service for multiple rib fractures complicated by chest wall hematoma, contusion and pneumothorax (now resolved). Patient is a poor historian as per wife, pt is confused at baseline and weans b/w AAOX2-3. Pt transferred from trauma service to medicine service due to ALLISON on CKD. Nephrology consult noted and appreciated. All nephrotoxic agents held. Pt initially thought to be due to fluid overload but pts renal function with some improvement (s/p lasix and bicarb drip) and is thought to be his new baseline with possibly interstitial nephritis given hx of chronic NSAID use. Pt to f/u with Nephro in 1 week and to repeat BMP in 3 days. PT consult noted and appreciated, recommends rolling walker on return to [facility]. Of note pt had difficulty swallowing pills, speech and swallow consulted, stated pt has h/o chronic dysphagia and recommended pureed diet. Wife does not agree with recommendations and prefers to c/w regular diet. Pt to continue with meds for chronic conditions.         Patient seen and examined at the bedside today. Patient clinically stable at this time. No longer requires acute in hospital level of care. Can be continually followed/managed as an outpatient.        All electrolyte abnormalities were monitored carefully and repleted as necessary during this hospitalization. At the time of discharge patient was hemodynamically stable and amenable to all terms of discharge. The patient has received verbal instructions from myself regarding discharge plans.         Length of Discharge: 45MIN        Vital Signs Last 24 Hrs    T(C): 36.8 (16 Oct 2019 08:36), Max: 36.8 (16 Oct 2019 08:36)    T(F): 98.3 (16 Oct 2019 08:36), Max: 98.3 (16 Oct 2019 08:36)    HR: 80 (16 Oct 2019 09:00) (60 - 80)    BP: 147/65 (16 Oct 2019 09:00) (147/65 - 172/90)    RR: 18 (16 Oct 2019 08:36) (16 - 18)    SpO2: 94% (15 Oct 2019 21:45) (92% - 94%)        Physical Examination     GENERAL: NAD, mildly confused. Well developed    HEENT: clear sclera and conjunctiva PERRLA, pupil constricted to light b/l     RESP: Clear on auscultation b/l. Mild rhonchi on b/l bases     CVS: Midline scar. s1s2 present No murmurs, rubs, or gallops. AICD on left upper chest wall.     GI: +BS present, nontender, nondistended on palpation.     Extremities: Lower extremities with b/l skin abrasions and bruising. Left great toe amputation. No cyanosis, no edema, no clubbing, no calf tenderness    Skin: hypopigmented skin region on back, seborrheic dermatitis present on upper back.     Back: Skin erythema in sacral region, no ulcer present.     Neuro: AAOX3 today 68 YO M with PMHX CVA, HTN, DM, CAD (s/p AICD, s/p stent), wheelchair bound brought from [facility] s/p fall. Initially placed under trauma service for multiple rib fractures complicated by chest wall hematoma, contusion and pneumothorax (now resolved). Patient is a poor historian as per wife, pt is confused at baseline and weans b/w AAOX2-3. Pt transferred from trauma service to medicine service due to ALLISON on CKD. Nephrology consult noted and appreciated. All nephrotoxic agents held. Pt initially thought to be due to fluid overload but pts renal function with some improvement (s/p lasix and bicarb drip) and is thought to be his new baseline with possibly interstitial nephritis given hx of chronic NSAID use. Pt to f/u with Nephro in 1 week and to repeat BMP in 3 days. PT consult noted and appreciated, recommends rolling walker on return to [facility]. Of note pt had difficulty swallowing pills, speech and swallow consulted, stated pt has h/o chronic dysphagia and recommended pureed diet. Wife does not agree with recommendations and prefers to c/w regular diet. Pt to continue with meds for chronic conditions.         Patient seen and examined at the bedside today. Patient clinically stable at this time. No longer requires acute in hospital level of care. Can be continually followed/managed as an outpatient.        All electrolyte abnormalities were monitored carefully and repleted as necessary during this hospitalization. At the time of discharge patient was hemodynamically stable and amenable to all terms of discharge. The patient has received verbal instructions from myself regarding discharge plans.             Vital Signs Last 24 Hrs    T(C): 36.8 (16 Oct 2019 08:36), Max: 36.8 (16 Oct 2019 08:36)    T(F): 98.3 (16 Oct 2019 08:36), Max: 98.3 (16 Oct 2019 08:36)    HR: 80 (16 Oct 2019 09:00) (60 - 80)    BP: 147/65 (16 Oct 2019 09:00) (147/65 - 172/90)    RR: 18 (16 Oct 2019 08:36) (16 - 18)    SpO2: 94% (15 Oct 2019 21:45) (92% - 94%)        Physical Examination     GENERAL: NAD, mildly confused. Well developed    HEENT: clear sclera and conjunctiva PERRLA, pupil constricted to light b/l     RESP: Clear on auscultation b/l. Mild rhonchi on b/l bases     CVS: Midline scar. s1s2 present No murmurs, rubs, or gallops. AICD on left upper chest wall.     GI: +BS present, nontender, nondistended on palpation.     Extremities: Lower extremities with b/l skin abrasions and bruising. Left great toe amputation. No cyanosis, no edema, no clubbing, no calf tenderness    Skin: hypopigmented skin region on back, seborrheic dermatitis present on upper back.     Back: Skin erythema in sacral region, no ulcer present.     Neuro: AAOX3 today         Length of Discharge: 45MIN 68 YO M with PMHX CVA, HTN, DM, CAD (s/p AICD, s/p stent), wheelchair bound brought from [facility] s/p fall. Initially placed under trauma service for multiple rib fractures complicated by chest wall hematoma, contusion and pneumothorax (now resolved). Patient is a poor historian as per wife, pt is confused at baseline and weans b/w AAOX2-3. Pt transferred from trauma service to medicine service due to ALLISON on CKD. Nephrology consult noted and appreciated. All nephrotoxic agents held. Pt initially thought to be due to fluid overload but pts renal function with some improvement (s/p lasix and bicarb drip) and is thought to be his new baseline with possibly interstitial nephritis given hx of chronic NSAID use. Pt to f/u with Nephro in 1 week and to repeat BMP in 3 days. PT consult noted and appreciated, recommends rolling walker on return to [facility]. Of note pt had difficulty swallowing pills, speech and swallow consulted, stated pt has h/o chronic dysphagia and recommended puree w/ honey thick liquids. Wife does not agree with recommendations and prefers to c/w regular diet; she is aware of the risks (such as aspiration) associated w/ continuing a regular diet. Pt to continue with meds for chronic conditions.         Patient seen and examined at the bedside today. Patient clinically stable at this time. No longer requires acute in hospital level of care. Can be continually followed/managed as an outpatient.        All electrolyte abnormalities were monitored carefully and repleted as necessary during this hospitalization. At the time of discharge patient was hemodynamically stable and amenable to all terms of discharge. The patient has received verbal instructions from myself regarding discharge plans.             Vital Signs Last 24 Hrs    T(C): 36.8 (16 Oct 2019 08:36), Max: 36.8 (16 Oct 2019 08:36)    T(F): 98.3 (16 Oct 2019 08:36), Max: 98.3 (16 Oct 2019 08:36)    HR: 80 (16 Oct 2019 09:00) (60 - 80)    BP: 147/65 (16 Oct 2019 09:00) (147/65 - 172/90)    RR: 18 (16 Oct 2019 08:36) (16 - 18)    SpO2: 94% (15 Oct 2019 21:45) (92% - 94%)        Physical Examination     GENERAL: NAD, mildly confused. Well developed    HEENT: clear sclera and conjunctiva PERRLA, pupil constricted to light b/l     RESP: Clear on auscultation b/l. Mild rhonchi on b/l bases     CVS: Midline scar. s1s2 present No murmurs, rubs, or gallops. AICD on left upper chest wall.     GI: +BS present, nontender, nondistended on palpation.     Extremities: Lower extremities with b/l skin abrasions and bruising. Left great toe amputation. No cyanosis, no edema, no clubbing, no calf tenderness    Skin: hypopigmented skin region on back, seborrheic dermatitis present on upper back.     Back: Skin erythema in sacral region, no ulcer present.     Neuro: AAOX3 today         Length of Discharge: 45MIN 70 YO M with PMHX CVA, HTN, DM, CAD (s/p AICD, s/p stent), wheelchair bound brought from [facility] s/p fall. Initially placed under trauma service for multiple rib fractures complicated by chest wall hematoma, contusion and pneumothorax (now resolved). Patient is a poor historian as per wife, pt is confused at baseline and weans b/w AAOX2-3. Pt transferred from trauma service to medicine service due to ALLISON on CKD. Nephrology consult noted and appreciated. All nephrotoxic agents held. Pt initially thought to be due to fluid overload but pts renal function with some improvement (s/p lasix and bicarb drip) and is thought to be his new baseline with possibly interstitial nephritis given hx of chronic NSAID use. Pt placed on oral bicarb and oral steroids and Pt to f/u with Nephro in 1 week and to repeat BMP in 3 days. PT consult noted and appreciated, recommends rolling walker on return to [facility]. Of note pt had difficulty swallowing pills, speech and swallow consulted, stated pt has h/o chronic dysphagia and recommended puree w/ honey thick liquids. Wife does not agree with recommendations and prefers to c/w regular diet; she is aware of the risks (such as aspiration) associated w/ continuing a regular diet. Pt to continue with meds for chronic conditions.         Patient seen and examined at the bedside today. Patient clinically stable at this time. No longer requires acute in hospital level of care. Can be continually followed/managed as an outpatient.        All electrolyte abnormalities were monitored carefully and repleted as necessary during this hospitalization. At the time of discharge patient was hemodynamically stable and amenable to all terms of discharge. The patient has received verbal instructions from myself regarding discharge plans.             Vital Signs Last 24 Hrs    T(C): 36.6 (17 Oct 2019 08:11), Max: 36.9 (16 Oct 2019 14:30)    T(F): 97.8 (17 Oct 2019 08:11), Max: 98.4 (16 Oct 2019 14:30)    HR: 63 (17 Oct 2019 08:11) (58 - 79)    BP: 158/65 (17 Oct 2019 08:11) (156/68 - 169/69)    RR: 18 (17 Oct 2019 08:11) (18 - 18)    SpO2: 96% (17 Oct 2019 08:11) (91% - 96%)        Physical Examination     GENERAL: NAD, mildly confused. Well developed    HEENT: clear sclera and conjunctiva PERRLA, pupil constricted to light b/l     RESP: Mild rhonchi on b/l bases     CVS: Midline scar. s1s2 present No murmurs, rubs, or gallops. AICD on left upper chest wall.     GI: +BS present, nontender, nondistended on palpation.     Extremities: Lower extremities with b/l skin abrasions and bruising. Left great toe amputation. No cyanosis, no edema, no clubbing, no calf tenderness    Skin: hypopigmented skin region on back, seborrheic dermatitis present on upper back.     Back: Skin erythema in sacral region, no ulcer present.     Neuro: AAOX3 today         Length of Discharge: 45MIN

## 2019-10-15 NOTE — SWALLOW BEDSIDE ASSESSMENT ADULT - COMMENTS
Pt known to speech tx from previous admissions & outpatient MBS, last MBS completed 1/30/19: "Mild oral dysphagia.  Moderate pharyngeal dysphagia for puree & honey thick fluids.  Severe pharyngeal dysphagia with mech soft, nectar thick & thin fluids.  Pt is at risk for silent aspiration".  Rx Puree with chin tuck & honey thick fluids via 1/2 teaspoon.    Per MD note: "70 yo M with PMHX CVA, HTN, DM, CAD (s/p AICD, s/p stent), wheelchair bound brought from [facility] s/p fall. Initially placed under trauma service for multiple rib fractures complicated by chest wall hematoma and contusion. Patient is a poor historian as per wife pt is confused at baseline and weans b/w AAOX2-3. Pt transferred from trauma service to medicine service. Currently without complaints.  PT consulted and appreciated, recommending rolling walker on return to [facility]. Nephrology consulted and appreciated due to pts ALLISON on CKD. Speech eval completed with functional limits".

## 2019-10-15 NOTE — SWALLOW BEDSIDE ASSESSMENT ADULT - ASR SWALLOW ASPIRATION MONITOR
pneumonia/upper respiratory infection/change of breathing pattern/position upright (90Y)/cough/oral hygiene/gurgly voice/fever/throat clearing

## 2019-10-15 NOTE — DISCHARGE NOTE PROVIDER - PROVIDER TOKENS
FREE:[LAST:[[facility]],PHONE:[phone]],FAX:[(   [phone]    -],ADDRESS:[[address] [place] [address]],FOLLOWUP:[1-3 days]],PROVIDER:[[id number]],FOLLOWUP:[1 week]]

## 2019-10-15 NOTE — SWALLOW BEDSIDE ASSESSMENT ADULT - SPECIFY REASON(S)
Per MD order: "Patient had difficulty swallowing pills.  Please assess which diet is appropriate. Thank you"

## 2019-10-15 NOTE — DISCHARGE NOTE PROVIDER - NSDCCPCAREPLAN_GEN_ALL_CORE_FT
PRINCIPAL DISCHARGE DIAGNOSIS  Diagnosis: Multiple rib fractures involving four or more ribs  Assessment and Plan of Treatment: You fell out of your wheelchair and broke multiple ribs. You were given pain medication to control the pain and no surgical intervention was recommended by the trauma team. Please follow up with your primary care doctor as an outpt.      SECONDARY DISCHARGE DIAGNOSES  Diagnosis: Hypertension  Assessment and Plan of Treatment: Continue to take your medications as prescribed by your doctor and follow up for routine monitoring.    Diagnosis: Diabetes mellitus  Assessment and Plan of Treatment: Continue to take your medications as prescribed by your doctor and follow up for further monitoring.    Diagnosis: ALLISON (acute kidney injury)  Assessment and Plan of Treatment:     Diagnosis: Pleural effusion  Assessment and Plan of Treatment:     Diagnosis: Multiple rib fractures involving four or more ribs  Assessment and Plan of Treatment: PRINCIPAL DISCHARGE DIAGNOSIS  Diagnosis: Fall  Assessment and Plan of Treatment: You fell from your wheelchair. Imaging of your head was negative for a bleed.   -Follow up with your Primary Care Doctor after discharge.      SECONDARY DISCHARGE DIAGNOSES  Diagnosis: ALLISON (acute kidney injury)  Assessment and Plan of Treatment: You were noted to have an abnormal kidney function. This was likely due to your chronic pain medication use. You were seen by the Nephrologist (kidney doctor) and was given medications which caused an improvement in your kidney function.  -Follow up with the Nephrologist in 1 week.  -Repeat BMP in 3-5 days to monitor your kidney function after discharge.    Diagnosis: Multiple rib fractures involving four or more ribs  Assessment and Plan of Treatment: You from your wheelchair. On imaging of your chest you were noted to have multiple rib fractures, chest wall hematoma and contussion. You were given pain medication to control the pain and no surgical intervention was recommended by the trauma team.   -Follow up with your primary care doctor after discharge.    Diagnosis: Pneumothorax  Assessment and Plan of Treatment: You were noed to have air the space between your lung. Repeat imaging of your chest showed that this has resolved.   -Continue using the spirometer at least 20 times per day.  -Follow up with your Primary Care Doctot after discharge.    Diagnosis: Hypertension  Assessment and Plan of Treatment: Continue to take your medications as prescribed. Check your blood pressure regularly and keep a log.    Diagnosis: Diabetes mellitus  Assessment and Plan of Treatment: Continue to take your medications as prescribed.  Follow up with your Primary Care Doctor as scheduled. PRINCIPAL DISCHARGE DIAGNOSIS  Diagnosis: Fall  Assessment and Plan of Treatment: You fell from your wheelchair. Imaging of your head was negative for a bleed.   -Follow up with your Primary Care Doctor after discharge.      SECONDARY DISCHARGE DIAGNOSES  Diagnosis: Multiple rib fractures involving four or more ribs  Assessment and Plan of Treatment: You from your wheelchair. On imaging of your chest you were noted to have multiple rib fractures, chest wall hematoma and contussion. You were given pain medication to control the pain and no surgical intervention was recommended by the trauma team.   -Follow up with your primary care doctor after discharge.    Diagnosis: ALLISON (acute kidney injury)  Assessment and Plan of Treatment: You were noted to have an abnormal kidney function. This was likely due to your chronic pain medication use. You were seen by the Nephrologist (kidney doctor) and was given medications which caused an improvement in your kidney function.  -Follow up with the Nephrologist in 1 week.  -Repeat BMP in 3-5 days to monitor your kidney function after discharge.    Diagnosis: Pneumothorax  Assessment and Plan of Treatment: You were noed to have air the space between your lung. Repeat imaging of your chest showed that this has resolved.   -Continue using the spirometer at least 20 times per day.  -Follow up with your Primary Care Doctot after discharge.    Diagnosis: CAD (coronary artery disease)  Assessment and Plan of Treatment: Continue with your home medications and follow up with your doctor as scheduled.    Diagnosis: Insulin dependent diabetes mellitus  Assessment and Plan of Treatment: Continue with your home medications and follow up with your doctor as scheduled.    Diagnosis: Anemia  Assessment and Plan of Treatment: Continue with your home medications and follow up with your doctor as scheduled.    Diagnosis: Hyperlipidemia  Assessment and Plan of Treatment: Continue with your home medications and follow up with your doctor as scheduled.    Diagnosis: Depression  Assessment and Plan of Treatment: Continue with your home medications and follow up with your doctor as scheduled.    Diagnosis: Hypertension  Assessment and Plan of Treatment: Continue to take your medications as prescribed. Check your blood pressure regularly and keep a log.    Diagnosis: Diabetes mellitus  Assessment and Plan of Treatment: Continue to take your medications as prescribed.  Follow up with your Primary Care Doctor as scheduled. PRINCIPAL DISCHARGE DIAGNOSIS  Diagnosis: Fall  Assessment and Plan of Treatment: You fell from your wheelchair. Imaging of your head was negative for a bleed.   -Follow up with your Primary Care Doctor after discharge.      SECONDARY DISCHARGE DIAGNOSES  Diagnosis: Multiple rib fractures involving four or more ribs  Assessment and Plan of Treatment: You from your wheelchair. On imaging of your chest you were noted to have multiple rib fractures, chest wall hematoma and contussion. You were given pain medication to control the pain and no surgical intervention was recommended by the trauma team.   -Continue with Tylenol for pain. Apply Lidocaine patch to right chest wall.  -Follow up with your primary care doctor after discharge.    Diagnosis: ALLISON (acute kidney injury)  Assessment and Plan of Treatment: You were noted to have an abnormal kidney function. This was likely due to your chronic pain medication use. You were seen by the Nephrologist (kidney doctor) and was given medications which caused an improvement in your kidney function.  -You were started on Prednisone 20mg, continue taking this medication.  -Follow up with the Nephrologist in 1 week.  -Repeat BMP in 3-5 days to monitor your kidney function after discharge.    Diagnosis: Pneumothorax  Assessment and Plan of Treatment: You were noed to have air the space between your lung. Repeat imaging of your chest showed that this has resolved.   -Continue using the spirometer at least 20 times per day.  -Follow up with your Primary Care Doctot after discharge.    Diagnosis: CAD (coronary artery disease)  Assessment and Plan of Treatment: Continue with your home medications and follow up with your doctor as scheduled.    Diagnosis: Insulin dependent diabetes mellitus  Assessment and Plan of Treatment: Continue with your home medications and follow up with your doctor as scheduled.    Diagnosis: Anemia  Assessment and Plan of Treatment: Continue with your home medications and follow up with your doctor as scheduled.    Diagnosis: Hyperlipidemia  Assessment and Plan of Treatment: Continue with your home medications and follow up with your doctor as scheduled.    Diagnosis: Depression  Assessment and Plan of Treatment: Continue with your home medications and follow up with your doctor as scheduled.    Diagnosis: Hypertension  Assessment and Plan of Treatment: Continue to take your medications as prescribed. Check your blood pressure regularly and keep a log.    Diagnosis: Diabetes mellitus  Assessment and Plan of Treatment: Continue to take your medications as prescribed.  Follow up with your Primary Care Doctor as scheduled. PRINCIPAL DISCHARGE DIAGNOSIS  Diagnosis: Fall  Assessment and Plan of Treatment: You fell from your wheelchair. Imaging of your head was negative for a bleed.   -Follow up with your Primary Care Doctor after discharge.      SECONDARY DISCHARGE DIAGNOSES  Diagnosis: Multiple rib fractures involving four or more ribs  Assessment and Plan of Treatment: You from your wheelchair. On imaging of your chest you were noted to have multiple rib fractures, chest wall hematoma and contussion. You were given pain medication to control the pain and no surgical intervention was recommended by the trauma team.   -Continue with Tylenol for pain. Apply Lidocaine patch to right chest wall.  -Follow up with your primary care doctor after discharge.    Diagnosis: ALLISON (acute kidney injury)  Assessment and Plan of Treatment: You were noted to have an abnormal kidney function. This was likely due to your chronic pain medication use. You were seen by the Nephrologist (kidney doctor) and was given medications which caused an improvement in your kidney function.  -You were started on Prednisone 20mg, continue taking this medication.  -Follow up with the Nephrologist in 1 week.  -Repeat BMP in 3-5 days to monitor your kidney function after discharge.    Diagnosis: Dysphagia  Assessment and Plan of Treatment: You had a swallow asssesment.   -The following diet is recommended: Puree with HONEY thick liquids;   crush medication (when feasible); maintain upright posture during/after eating for 30 mins; oral hygiene; position upright (90 degrees); small sips/bites.  1:1 supervision with all PO to cue to utilize chin tuck with puree prn, provide 1/2 teaspoon presentations of honey & ensure aspiration precautions.  These recommendations were discussed with your family and they are aware of the risks associated with continuing a regular diet.       Diagnosis: Pneumothorax  Assessment and Plan of Treatment: You were noed to have air the space between your lung. Repeat imaging of your chest showed that this has resolved.   -Continue using the spirometer at least 20 times per day.  -Follow up with your Primary Care Doctot after discharge.    Diagnosis: CAD (coronary artery disease)  Assessment and Plan of Treatment: Continue with your home medications and follow up with your doctor as scheduled.    Diagnosis: Insulin dependent diabetes mellitus  Assessment and Plan of Treatment: Continue with your home medications and follow up with your doctor as scheduled.    Diagnosis: Anemia  Assessment and Plan of Treatment: Continue with your home medications and follow up with your doctor as scheduled.    Diagnosis: Hyperlipidemia  Assessment and Plan of Treatment: Continue with your home medications and follow up with your doctor as scheduled.    Diagnosis: Depression  Assessment and Plan of Treatment: Continue with your home medications and follow up with your doctor as scheduled.    Diagnosis: Hypertension  Assessment and Plan of Treatment: Continue to take your medications as prescribed. Check your blood pressure regularly and keep a log.    Diagnosis: Diabetes mellitus  Assessment and Plan of Treatment: Continue to take your medications as prescribed.  Follow up with your Primary Care Doctor as scheduled. PRINCIPAL DISCHARGE DIAGNOSIS  Diagnosis: Fall  Assessment and Plan of Treatment: You fell from your wheelchair. Imaging of your head was negative for a bleed.   -Follow up with your Primary Care Doctor after discharge.      SECONDARY DISCHARGE DIAGNOSES  Diagnosis: Hypertension  Assessment and Plan of Treatment: Continue to take your medications as prescribed. Check your blood pressure regularly and keep a log.    Diagnosis: Diabetes mellitus  Assessment and Plan of Treatment: Continue to take your medications as prescribed.  Follow up with your Primary Care Doctor as scheduled.    Diagnosis: ALLISON (acute kidney injury)  Assessment and Plan of Treatment: You were noted to have an abnormal kidney function. This was likely due to your chronic pain medication use. You were seen by the Nephrologist (kidney doctor) and was given medications which caused an improvement in your kidney function.  - Your nephrotoxic agents were held such as your ACE inhibitor, gabapentin, celebrex, robaxin.   -You were started on Prednisone 20mg, continue taking this medication.  - You were started on oral bicarbonate 3 x a day.   -Follow up with the Nephrologist in 1 week to discuss medical management and regimin  -Repeat BMP in 3-5 days to monitor your kidney function after discharge.    Diagnosis: Dysphagia  Assessment and Plan of Treatment: You had a swallow asssesment.   -The following diet is recommended: Puree with HONEY thick liquids;   crush medication (when feasible); maintain upright posture during/after eating for 30 mins; oral hygiene; position upright (90 degrees); small sips/bites.  1:1 supervision with all PO to cue to utilize chin tuck with puree prn, provide 1/2 teaspoon presentations of honey & ensure aspiration precautions.  These recommendations were discussed with your family and they are aware of the risks associated with continuing a regular diet.       Diagnosis: CAD (coronary artery disease)  Assessment and Plan of Treatment: Continue with your home medications and follow up with your doctor as scheduled.    Diagnosis: Insulin dependent diabetes mellitus  Assessment and Plan of Treatment: Continue with your home medications and follow up with your doctor as scheduled.    Diagnosis: Anemia  Assessment and Plan of Treatment: Continue with your home medications and follow up with your doctor as scheduled.    Diagnosis: Hyperlipidemia  Assessment and Plan of Treatment: Continue with your home medications and follow up with your doctor as scheduled.    Diagnosis: Depression  Assessment and Plan of Treatment: Continue with your home medications and follow up with your doctor as scheduled.    Diagnosis: Pneumothorax  Assessment and Plan of Treatment: You were noed to have air the space between your lung. Repeat imaging of your chest showed that this has resolved.   -Continue using the spirometer at least 20 times per day.  -Follow up with your Primary Care Doctot after discharge.    Diagnosis: Multiple rib fractures involving four or more ribs  Assessment and Plan of Treatment: You fell from your wheelchair. On imaging of your chest you were noted to have multiple rib fractures, chest wall hematoma and contussion. You were given pain medication to control the pain and no surgical intervention was recommended by the trauma team.   -Continue with Tylenol for pain. Apply Lidocaine patch to right chest wall.  -Follow up with your primary care doctor after discharge. PRINCIPAL DISCHARGE DIAGNOSIS  Diagnosis: Fall  Assessment and Plan of Treatment: You fell from your wheelchair. Imaging of your head was negative for a bleed.   -Follow up with your Primary Care Doctor after discharge.      SECONDARY DISCHARGE DIAGNOSES  Diagnosis: Hypertension  Assessment and Plan of Treatment: Continue to take your medications as prescribed. Check your blood pressure regularly and keep a log.    Diagnosis: Diabetes mellitus  Assessment and Plan of Treatment: Continue to take your medications as prescribed.  Follow up with your Primary Care Doctor as scheduled.    Diagnosis: ALLISON (acute kidney injury)  Assessment and Plan of Treatment: You were noted to have an abnormal kidney function. This was likely due to your chronic pain medication use. You were seen by the Nephrologist (kidney doctor) and was given medications which caused an improvement in your kidney function. Your new baseline is CKD stage IV  - Your nephrotoxic agents were held such as your ACE inhibitor, gabapentin, celebrex, robaxin.   -You were started on Prednisone 20mg, continue taking this medication.  - You were started on oral bicarbonate 3 x a day.   -Follow up with the Nephrologist in 1 week to discuss medical management and regimin  -Repeat BMP in 3-5 days to monitor your kidney function after discharge.    Diagnosis: Dysphagia  Assessment and Plan of Treatment: You had a swallow asssesment.   -The following diet is recommended: Puree with HONEY thick liquids;   crush medication (when feasible); maintain upright posture during/after eating for 30 mins; oral hygiene; position upright (90 degrees); small sips/bites.  1:1 supervision with all PO to cue to utilize chin tuck with puree prn, provide 1/2 teaspoon presentations of honey & ensure aspiration precautions.  These recommendations were discussed with your family and they are aware of the risks associated with continuing a regular diet.       Diagnosis: CAD (coronary artery disease)  Assessment and Plan of Treatment: Continue with your home medications and follow up with your doctor as scheduled.    Diagnosis: Insulin dependent diabetes mellitus  Assessment and Plan of Treatment: Continue with your home medications and follow up with your doctor as scheduled.    Diagnosis: Anemia  Assessment and Plan of Treatment: Continue with your home medications and follow up with your doctor as scheduled.    Diagnosis: Hyperlipidemia  Assessment and Plan of Treatment: Continue with your home medications and follow up with your doctor as scheduled.    Diagnosis: Depression  Assessment and Plan of Treatment: Continue with your home medications and follow up with your doctor as scheduled.    Diagnosis: Pneumothorax  Assessment and Plan of Treatment: You were noed to have air the space between your lung. Repeat imaging of your chest showed that this has resolved.   -Continue using the spirometer at least 20 times per day.  -Follow up with your Primary Care Doctot after discharge.    Diagnosis: Multiple rib fractures involving four or more ribs  Assessment and Plan of Treatment: You fell from your wheelchair. On imaging of your chest you were noted to have multiple rib fractures, chest wall hematoma and contussion. You were given pain medication to control the pain and no surgical intervention was recommended by the trauma team.   -Continue with Tylenol for pain. Apply Lidocaine patch to right chest wall.  -Follow up with your primary care doctor after discharge.

## 2019-10-15 NOTE — SWALLOW BEDSIDE ASSESSMENT ADULT - SWALLOW EVAL: DIAGNOSIS
Mild oral dysphagia marked by reduced lingual ROM/strength/coordination.  No overt s/s penetration/aspiration for puree with chin tuck and honey via 1/2 teaspoon presentation.

## 2019-10-15 NOTE — PROGRESS NOTE ADULT - SUBJECTIVE AND OBJECTIVE BOX
69 year old M    CC:Fall, rib fractures, ALLISON on CKD    Subjective: Pt seen and examined at bedside. Pt lethargic but responsive. Pt denies any pain, without complaints. As per nurse pt had difficulty swallowing and speech and swallow consulted. Pts wife at bedside stating she has never agreed with their previous recommendations and would like her  to receive a regular diet with thickened liquids despite any recommendations given. Pt denies chest pain, shortness of breath, no palpitations. ROS otherwise negative     Vital Signs Last 24 Hrs  T(C): 36.6 (15 Oct 2019 07:54), Max: 37.3 (14 Oct 2019 21:57)  T(F): 97.9 (15 Oct 2019 07:54), Max: 99.2 (14 Oct 2019 21:57)  HR: 61 (15 Oct 2019 07:54) (61 - 64)  BP: 143/76 (15 Oct 2019 07:54) (142/69 - 176/70)  RR: 18 (15 Oct 2019 07:54) (18 - 18)  SpO2: 93% (15 Oct 2019 07:54) (91% - 93%)    Physical exam:  GENERAL: NAD, mildly confused. Well developed  HEENT: clear sclera and conjunctiva PERRLA, pupil constricted to light b/l   RESP: Clear on auscultation b/l. Mild rhonchi on bases improving   CVS: Midline scar. s1s2 present No murmurs, rubs, or gallops. AICD on left upper chest wall.   GI: +BS present, nontender, nondistended on palpation.   Extremities: Lower extremities with b/l skin abrasions and bruising. Left great toe amputation. No cyanosis, no edema, no clubbing, no calf tenderness  Skin: hypopigmented skin region on back, seborrheic dermatitis present on upper back.   Back: Skin erythema in sacral region, no ulcer present.   Neuro: AAOX3 today     LABS                     10-15    141  |  112<H>  |  74.0<H>  ----------------------------<  210<H>  4.5   |  16.0<L>  |  4.58<H>    Ca    8.1<L>      15 Oct 2019 07:37  Phos  4.5     10-15  Mg     2.0     10-15    TPro  5.6<L>  /  Alb  2.5<L>  /  TBili  <0.2<L>  /  DBili  x   /  AST  13  /  ALT  13  /  AlkPhos  104  10-14              Culture - Urine (collected 10 Oct 2019 13:44)  Source: .Urine  Final Report (11 Oct 2019 10:36):    No growth    Culture - Blood (collected 09 Oct 2019 23:16)  Source: .Blood  Preliminary Report (12 Oct 2019 01:01):    No growth at 48 hours    Culture - Blood (collected 09 Oct 2019 23:16)  Source: .Blood  Preliminary Report (12 Oct 2019 01:01):    No growth at 48 hours  MEDICATIONS  (STANDING):  acetaminophen   Tablet .. 650 milliGRAM(s) Oral every 6 hours  amLODIPine   Tablet 10 milliGRAM(s) Oral daily  aspirin  chewable 81 milliGRAM(s) Oral daily  atorvastatin 40 milliGRAM(s) Oral daily  carvedilol 12.5 milliGRAM(s) Oral every 12 hours  chlorhexidine 2% Cloths 1 Application(s) Topical daily  clopidogrel Tablet 75 milliGRAM(s) Oral daily  collagenase Ointment 1 Application(s) Topical daily  dextrose 5%. 1000 milliLiter(s) (50 mL/Hr) IV Continuous <Continuous>  dextrose 50% Injectable 12.5 Gram(s) IV Push once  dextrose 50% Injectable 25 Gram(s) IV Push once  dextrose 50% Injectable 25 Gram(s) IV Push once  docusate sodium 100 milliGRAM(s) Oral daily  ferrous sulfate Oral Tab/Cap - Peds 325 milliGRAM(s) Oral daily  furosemide   Injectable 40 milliGRAM(s) IV Push once  gabapentin 300 milliGRAM(s) Oral three times a day  heparin  Injectable 5000 Unit(s) SubCutaneous every 8 hours  hydrALAZINE 25 milliGRAM(s) Oral every 8 hours  insulin detemir injectable (LEVEMIR) 5 Unit(s) SubCutaneous daily  insulin lispro (HumaLOG) corrective regimen sliding scale   SubCutaneous three times a day before meals  lidocaine   Patch 2 Patch Transdermal every 24 hours  predniSONE   Tablet 20 milliGRAM(s) Oral three times a day  saccharomyces boulardii 250 milliGRAM(s) Oral two times a day  senna 1 Tablet(s) Oral daily  sertraline 100 milliGRAM(s) Oral daily  sodium bicarbonate  Infusion 0.149 mEq/kG/Hr (90 mL/Hr) IV Continuous <Continuous>    MEDICATIONS  (PRN):  dextrose 40% Gel 15 Gram(s) Oral once PRN Blood Glucose LESS THAN 70 milliGRAM(s)/deciliter  glucagon  Injectable 1 milliGRAM(s) IntraMuscular once PRN Glucose LESS THAN 70 milligrams/deciliter  hydrALAZINE Injectable 5 milliGRAM(s) IV Push once PRN ifSBP>170 or DBP>110  ondansetron Injectable 4 milliGRAM(s) IV Push every 6 hours PRN Nausea  oxyCODONE    IR 5 milliGRAM(s) Oral every 4 hours PRN Moderate Pain (4 - 6)

## 2019-10-15 NOTE — DISCHARGE NOTE PROVIDER - INSTRUCTIONS
-Low salt  -Low carbohydrates (sugars, rice, pasta, potatoes) -Low salt  -Low carbohydrates (sugars, rice, pasta, potatoes)  -Puree with HONEY thick liquids;   crush medication (when feasible); maintain upright posture during/after eating for 30 mins; oral hygiene; position upright (90 degrees); small sips/bites.  -1:1 supervision with all PO to cue to utilize chin tuck with puree prn, provide 1/2 teaspoon presentations of honey & ensure aspiration precautions.

## 2019-10-15 NOTE — SWALLOW BEDSIDE ASSESSMENT ADULT - DIET PRIOR TO ADMI
Puree with Honey per transfer paperwork from [facility];  Per outpatient [facility] from 1/30/19 Rx Puree with Honey utilizing chin tuck with puree & 1/2 teaspoon presentations with honey thick liquids.

## 2019-10-15 NOTE — SWALLOW BEDSIDE ASSESSMENT ADULT - SWALLOW EVAL: RECOMMENDED FEEDING/EATING TECHNIQUES
crush medication (when feasible)/position upright (90 degrees)/oral hygiene/small sips/bites/maintain upright posture during/after eating for 30 mins

## 2019-10-15 NOTE — DISCHARGE NOTE PROVIDER - NSDCFUADDINST_GEN_ALL_CORE_FT
Activity as tolerated. Use wheelchair for ambulation. Activity as tolerated. Use wheelchair for ambulation.                          12.2   7.63  )-----------( 239      ( 17 Oct 2019 09:16 )             40.2   10-17    139  |  106  |  72.0<H>  ----------------------------<  398<H>  5.3   |  17.0<L>  |  4.18<H>    Ca    8.7      17 Oct 2019 09:16  Phos  4.5     10-15    TPro  x   /  Alb  2.8<L>  /  TBili  x   /  DBili  x   /  AST  x   /  ALT  x   /  AlkPhos  x   10-15    IMPRESSION:   mild periventricular white matter ischemia with multiple   old lacunar infarctions in the BILATERAL basal ganglia and LEFT corona   radiata. Multiple infarctions are seen in the BILATERAL cerebellum.

## 2019-10-15 NOTE — DISCHARGE NOTE PROVIDER - CARE PROVIDER_API CALL
[facility],   [address] [place] [address]  Phone: [phone]  Fax: (   )    -  Follow Up Time: 1-3 days    Kvng Tinsley)  Internal Medicine; Nephrology  [address] [place] [address]  Phone: [phone]  Fax: [phone]  Follow Up Time: 1 week

## 2019-10-15 NOTE — SWALLOW BEDSIDE ASSESSMENT ADULT - SWALLOW EVAL: FEEDING ASSISTANCE
1:1 supervision with all PO to cue to utilize chin tuck with puree prn, provide 1/2 teaspoon presentations of honey & ensure aspiration precautions

## 2019-10-15 NOTE — DISCHARGE NOTE PROVIDER - NSDCFUADDAPPT_GEN_ALL_CORE_FT
-Follow up with your Primary Care Doctor in 3-5 days.  -Follow up with your Nephrologist in 1 week.  -Have a repeat BMP in 3-5 days.

## 2019-10-15 NOTE — PROGRESS NOTE ADULT - ASSESSMENT
70 yo M with PMHX CVA, HTN, DM, CAD (s/p AICD, s/p stent), wheelchair bound brought from [facility] s/p fall. Initially placed under trauma service for multiple rib fractures complicated by chest wall hematoma and contusion. Patient is a poor historian as per wife pt is confused at baseline and weans b/w AAOX2-3. Pt transferred from trauma service to medicine service. Currently without complaints.  PT consulted and appreciated, recommending rolling walker on return to [facility]. Nephrology consulted and appreciated due to pts ALLISON on CKD. Speech eval completed with functional limits     ALLISON on CKD   - Baseline CKD 4   - Suspect pre renal component. CK levels normal.   - Nephrology consulted and appreciated, recommending holding fluids in setting of possible overload. Possible interstitiual nephritis in setting of chronic NSAID  - Initiate PO steroids 20 mg TID   - Initiate bicarb drip  - lasix 40 mg IV X 1  - Renal sono completed, showing no hydronephrosis.   - avoid nephrotoxic agent, discontinued celebrex and robaxin  - Cr currently @ 4.58  - C/w to monitor labs    Chronic Dysphagia  - Pt had difficulty swallowing pills this am  - Speech and swallow eval appreciated who follows pt in [facility]  - Pt has failed multiple dysphagia screenings and modified barium swallows completed  - Recommending pureed diet with honey consistent liquids and supervised feed.   - As per wife she disagrees and would like to have pt on a regular diet with thickened liquids despite any recommendations given  - C/w with current diet and supervised feeds       MOD dementia   - Pt is between AAOX2 and AAOX3  - Mild confusion, at baseline as per wife.   - Fall precautions.     multiple Rib fracture s/p fall  - Pt originally on trauma service for multiple rib fractures complicated with chest wall hematoma and contusion  - Xray noted   - Currently no surgical intervention at this time  - Continue with pain control with pain meds PRN  - Supportive therapy  - Incentive spirometry  -discontinued Robaxin (nephrotoxic)  - c/w lidocaine patch, tylenol standing   - oxy IR 5 mg PRN for moderate pain  -Trauma recs appreciated.  -PT recommends [facility]    CAD s/p AICD placement  - c/w with ASA 81 and plavix  - Currently asymptomatic    HTN  - labile   - c/w with amlodipine, coreg with holding parameters    HLD:  - c/w with Lipitor    IDDM2 complicated with neuropathy  - current HgA1c 7.1  - hypoglycemia protocol  - accuchecks  - HISS   - c/w with gabapentin 300 mg TID  - Levemir reinstated from home dose. Will adjust according    Depression  - c/w with sertraline     DVT prophylaxis  - Heparin q 12 sc    DISPO: LOS within 24 hours pending nephro recs for dispo planning 70 yo M with PMHX CVA, HTN, DM, CAD (s/p AICD, s/p stent), wheelchair bound brought from [facility] s/p fall. Initially placed under trauma service for multiple rib fractures complicated by chest wall hematoma and contusion. Patient is a poor historian as per wife pt is confused at baseline and weans b/w AAOX2-3. Pt transferred from trauma service to medicine service. Currently without complaints.  PT consulted and appreciated, recommending rolling walker on return to [facility]. Nephrology consulted and appreciated due to pts ALLISON on CKD. Speech eval completed with functional limits     ALLISON on CKD/ possible AIN   - Baseline CKD 4   - Suspect pre renal component. CK levels normal.   - Nephrology consulted and appreciated, recommending holding fluids in setting of possible overload. Possible interstitiual nephritis in setting of chronic NSAID  - Initiate PO steroids 20 mg TID   - started on bicarb   - lasix 40 mg IV X 1  - Renal sono completed, showing no hydronephrosis.   - avoid nephrotoxic agent, discontinued celebrex and robaxin  - Cr currently @ 4.58  - C/w to monitor labs  -started on po steroids for possible AIN     Chronic Dysphagia  - Pt had difficulty swallowing pills this am  - Speech and swallow eval appreciated who follows pt in [facility]  - Pt has failed multiple dysphagia screenings and modified barium swallows completed  - Recommending pureed diet with honey consistent liquids and supervised feed.   - As per wife she disagrees and would like to have pt on a regular diet with thickened liquids despite any recommendations given  - C/w with current diet and supervised feeds       MOD dementia   - Pt is between AAOX2 and AAOX3  - Mild confusion, at baseline as per wife.   - Fall precautions.     multiple Rib fracture s/p fall  - Pt originally on trauma service for multiple rib fractures complicated with chest wall hematoma and contusion  - Xray noted   - Currently no surgical intervention at this time  - Continue with pain control with pain meds PRN  - Supportive therapy  - Incentive spirometry  -discontinued Robaxin (nephrotoxic)  - c/w lidocaine patch, tylenol standing   - oxy IR 5 mg PRN for moderate pain  -Trauma recs appreciated.  -PT recommends [facility]    CAD s/p AICD placement  - c/w with ASA 81 and plavix  - Currently asymptomatic    HTN  - labile   - c/w with amlodipine, coreg with holding parameters    HLD:  - c/w with Lipitor    IDDM2 complicated with neuropathy  - current HgA1c 7.1  - hypoglycemia protocol  - accuchecks  - HISS   - c/w with gabapentin 300 mg TID  - Levemir reinstated from home dose. Will adjust according    Depression  - c/w with sertraline     DVT prophylaxis  - Heparin q 12 sc    DISPO: LOS within 24 hours pending nephro recs for dispo planning

## 2019-10-16 NOTE — PROGRESS NOTE ADULT - SUBJECTIVE AND OBJECTIVE BOX
Vital Signs Last 24 Hrs,    T(C): 36.8 (16 Oct 2019 08:36), Max: 36.8 (16 Oct 2019 08:36)  T(F): 98.3 (16 Oct 2019 08:36), Max: 98.3 (16 Oct 2019 08:36)  HR: 80 (16 Oct 2019 09:00) (60 - 80)  BP: 147/65 (16 Oct 2019 09:00) (147/65 - 172/90)  RR: 18 (16 Oct 2019 08:36) (16 - 18)  SpO2: 94% (15 Oct 2019 21:45) (92% - 94%)    136    |  102    |  71.0<H>  ----------------------------<  409<H>  Ca:8.5<L> (16 Oct 2019 08:05)  5.2     |  17.0<L>  |  4.00<H>    eGFR if Non : 14 <L>    TPro  x      /  Alb  2.8<L>  /  TBili  x      /  DBili  x      /  AST  x      /  ALT  x      /  AlkPhos  x      15 Oct 2019 16:55    Phos:4.5 mg/dL Mg:-- PTH:-- Uric acid:-- Serum Osm:--    UCr:107 mg/dL   Jadon:40 mmol/L UOsm:382 mosm/kg UVol:-- UCl:<27 mmol/L .,     YOMI Lopez,    Cleared for discharge, On Oral steroids ( Duration :  4-6 weeks )     OP F.U X 1 week,

## 2019-10-16 NOTE — PROGRESS NOTE ADULT - SUBJECTIVE AND OBJECTIVE BOX
CC:Fall, rib fractures, ALLISON on CKD    Overnight/Interval events:  Pt seen and examined at bedside. Pt is responsive but with very minimal conversation, oriented to self only. Pt has no complaints. Tolerating PO diet, voiding, last BM was this morning and was normal. Pt noted to have coughing spell after eating lunch, at bedside gargling sound could be heard. CXR done to r/o aspiration PNA, Wife at bedside, had concern that pt seems to be more confused. CT head done, pending report. Of noted, swallow eval done on 10/14 recommends puree w/ honey thick liquids. Wife declined this recommendation. However, in light of today episode of coughing after swallowing, diet will be changed to puree w/ honey thick liquids.     Vital Signs Last 24 Hrs  T(C): 36.6 (16 Oct 2019 18:04), Max: 36.9 (16 Oct 2019 14:30)  T(F): 97.9 (16 Oct 2019 18:04), Max: 98.4 (16 Oct 2019 14:30)  HR: 72 (16 Oct 2019 18:04) (60 - 80)  BP: 169/69 (16 Oct 2019 18:04) (147/65 - 172/90)  RR: 18 (16 Oct 2019 18:04) (18 - 18)  SpO2: 96% (16 Oct 2019 18:04) (94% - 96%)    Physical Examination   GENERAL: NAD, mildly confused, Well developed, minimal communication   HEENT: clear sclera and conjunctiva PERRLA, pupil constricted to light b/l   RESP: Clear on auscultation b/l. Mild rhonchi b/l lung bases   CVS: Midline scar, normal S1/S2, No murmurs, rubs, or gallops, AICD on left upper chest wall.   GI: +BS, soft, nontender, nondistended on palpation.   Extremities: Lower extremities with b/l skin abrasions and bruising. Left great toe amputation. No cyanosis, no edema, no clubbing, no calf tenderness  Skin: hypopigmented skin region on back, seborrheic dermatitis present on upper back.   Back: Skin erythema in sacral region, no ulcer present.   Neuro: AAOX3 today     LABS                     10-15    141  |  112<H>  |  74.0<H>  ----------------------------<  210<H>  4.5   |  16.0<L>  |  4.58<H>    Ca    8.1<L>      15 Oct 2019 07:37  Phos  4.5     10-15  Mg     2.0     10-15    TPro  5.6<L>  /  Alb  2.5<L>  /  TBili  <0.2<L>  /  DBili  x   /  AST  13  /  ALT  13  /  AlkPhos  104  10-14    Culture - Urine (collected 10 Oct 2019 13:44)  no growth    Culture - blood. Neg X 2 for 48hrs     MEDICATIONS  (STANDING):  acetaminophen   Tablet .. 650 milliGRAM(s) Oral every 6 hours  amLODIPine   Tablet 10 milliGRAM(s) Oral daily  aspirin  chewable 81 milliGRAM(s) Oral daily  atorvastatin 40 milliGRAM(s) Oral daily  carvedilol 12.5 milliGRAM(s) Oral every 12 hours  chlorhexidine 2% Cloths 1 Application(s) Topical daily  clopidogrel Tablet 75 milliGRAM(s) Oral daily  collagenase Ointment 1 Application(s) Topical daily  dextrose 5%. 1000 milliLiter(s) (50 mL/Hr) IV Continuous <Continuous>  dextrose 50% Injectable 12.5 Gram(s) IV Push once  dextrose 50% Injectable 25 Gram(s) IV Push once  dextrose 50% Injectable 25 Gram(s) IV Push once  docusate sodium 100 milliGRAM(s) Oral daily  ferrous sulfate Oral Tab/Cap - Peds 325 milliGRAM(s) Oral daily  gabapentin 300 milliGRAM(s) Oral three times a day  heparin  Injectable 5000 Unit(s) SubCutaneous every 8 hours  hydrALAZINE 25 milliGRAM(s) Oral every 8 hours  insulin detemir injectable (LEVEMIR) 10 Unit(s) SubCutaneous daily  insulin lispro (HumaLOG) corrective regimen sliding scale   SubCutaneous three times a day before meals  lidocaine   Patch 2 Patch Transdermal every 24 hours  predniSONE   Tablet 20 milliGRAM(s) Oral three times a day  saccharomyces boulardii 250 milliGRAM(s) Oral two times a day  senna 1 Tablet(s) Oral daily  sertraline 100 milliGRAM(s) Oral daily    MEDICATIONS  (PRN):  dextrose 40% Gel 15 Gram(s) Oral once PRN Blood Glucose LESS THAN 70 milliGRAM(s)/deciliter  glucagon  Injectable 1 milliGRAM(s) IntraMuscular once PRN Glucose LESS THAN 70 milligrams/deciliter  hydrALAZINE Injectable 5 milliGRAM(s) IV Push once PRN ifSBP>170 or DBP>110  ondansetron Injectable 4 milliGRAM(s) IV Push every 6 hours PRN Nausea  oxyCODONE    IR 5 milliGRAM(s) Oral every 4 hours PRN Moderate Pain (4 - 6) CC:Fall, rib fractures, ALLISON on CKD    Overnight/Interval events:  Pt seen and examined at bedside. Pt is responsive but with very minimal conversation, oriented to self only. Pt has no complaints. Tolerating PO diet, voiding, last BM was this morning and was normal. Pt noted to have coughing spell after eating lunch, at bedside gargling sound could be heard. CXR done to r/o aspiration PNA, Wife at bedside, had concern that pt seems to be more confused. CT head done, pending report. Of noted, swallow eval done on 10/14 recommends puree w/ honey thick liquids. Wife declined this recommendation. However, in light of today episode of coughing after swallowing, diet will be changed to puree w/ honey thick liquids.     Vital Signs Last 24 Hrs  T(C): 36.6 (16 Oct 2019 18:04), Max: 36.9 (16 Oct 2019 14:30)  T(F): 97.9 (16 Oct 2019 18:04), Max: 98.4 (16 Oct 2019 14:30)  HR: 72 (16 Oct 2019 18:04) (60 - 80)  BP: 169/69 (16 Oct 2019 18:04) (147/65 - 172/90)  RR: 18 (16 Oct 2019 18:04) (18 - 18)  SpO2: 96% (16 Oct 2019 18:04) (94% - 96%)    Physical Examination   GENERAL: NAD, mildly confused, Well developed, minimal communication   HEENT: clear sclera and conjunctiva PERRLA, pupil constricted to light b/l   RESP: Clear on auscultation b/l. Mild rhonchi b/l lung bases   CVS: Midline scar, normal S1/S2, No murmurs, rubs, or gallops, AICD on left upper chest wall.   GI: +BS, soft, nontender, nondistended on palpation.   Extremities: Lower extremities with b/l skin abrasions and bruising. Left great toe amputation. No cyanosis, no edema, no clubbing, no calf tenderness  Skin: hypopigmented skin region on back, seborrheic dermatitis present on upper back.   Back: Skin erythema in sacral region, no ulcer present.   Neuro: AAOX2 today     LABS                     10-15    141  |  112<H>  |  74.0<H>  ----------------------------<  210<H>  4.5   |  16.0<L>  |  4.58<H>    Ca    8.1<L>      15 Oct 2019 07:37  Phos  4.5     10-15  Mg     2.0     10-15    TPro  5.6<L>  /  Alb  2.5<L>  /  TBili  <0.2<L>  /  DBili  x   /  AST  13  /  ALT  13  /  AlkPhos  104  10-14    Culture - Urine (collected 10 Oct 2019 13:44)  no growth    Culture - blood. Neg X 2 for 48hrs     MEDICATIONS  (STANDING):  acetaminophen   Tablet .. 650 milliGRAM(s) Oral every 6 hours  amLODIPine   Tablet 10 milliGRAM(s) Oral daily  aspirin  chewable 81 milliGRAM(s) Oral daily  atorvastatin 40 milliGRAM(s) Oral daily  carvedilol 12.5 milliGRAM(s) Oral every 12 hours  chlorhexidine 2% Cloths 1 Application(s) Topical daily  clopidogrel Tablet 75 milliGRAM(s) Oral daily  collagenase Ointment 1 Application(s) Topical daily  dextrose 5%. 1000 milliLiter(s) (50 mL/Hr) IV Continuous <Continuous>  dextrose 50% Injectable 12.5 Gram(s) IV Push once  dextrose 50% Injectable 25 Gram(s) IV Push once  dextrose 50% Injectable 25 Gram(s) IV Push once  docusate sodium 100 milliGRAM(s) Oral daily  ferrous sulfate Oral Tab/Cap - Peds 325 milliGRAM(s) Oral daily  gabapentin 300 milliGRAM(s) Oral three times a day  heparin  Injectable 5000 Unit(s) SubCutaneous every 8 hours  hydrALAZINE 25 milliGRAM(s) Oral every 8 hours  insulin detemir injectable (LEVEMIR) 10 Unit(s) SubCutaneous daily  insulin lispro (HumaLOG) corrective regimen sliding scale   SubCutaneous three times a day before meals  lidocaine   Patch 2 Patch Transdermal every 24 hours  predniSONE   Tablet 20 milliGRAM(s) Oral three times a day  saccharomyces boulardii 250 milliGRAM(s) Oral two times a day  senna 1 Tablet(s) Oral daily  sertraline 100 milliGRAM(s) Oral daily    MEDICATIONS  (PRN):  dextrose 40% Gel 15 Gram(s) Oral once PRN Blood Glucose LESS THAN 70 milliGRAM(s)/deciliter  glucagon  Injectable 1 milliGRAM(s) IntraMuscular once PRN Glucose LESS THAN 70 milligrams/deciliter  hydrALAZINE Injectable 5 milliGRAM(s) IV Push once PRN ifSBP>170 or DBP>110  ondansetron Injectable 4 milliGRAM(s) IV Push every 6 hours PRN Nausea  oxyCODONE    IR 5 milliGRAM(s) Oral every 4 hours PRN Moderate Pain (4 - 6)

## 2019-10-16 NOTE — DIETITIAN INITIAL EVALUATION ADULT. - ADD RECOMMEND
Rec change diet to puree with honey as per speech tx, consistent CHO, Dash with one can Glucerna daily Rec change diet to puree with honey as per speech tx, consistent CHO, Dash with one can Glucerna daily. Noted family refusing diet to be down graded.

## 2019-10-16 NOTE — DIETITIAN INITIAL EVALUATION ADULT. - OBTAIN DAILY WEIGHT
Met with Pt regarding plans for treatment.  Pt has been had an assessment nor been referred to [facility].  In need of Rule 25 funding.  Coached Pt to complete paperwork for assessment and referral.  PT acknowledged.   yes

## 2019-10-16 NOTE — DISCHARGE NOTE NURSING/CASE MANAGEMENT/SOCIAL WORK - NSDCPEEMAIL_GEN_ALL_CORE
[facility] email [email or web address]

## 2019-10-16 NOTE — DIETITIAN INITIAL EVALUATION ADULT. - PERTINENT LABORATORY DATA
10-16 Na136 mmol/L Glu 409 mg/dL<H> K+ 5.2 mmol/L Cr  4.00 mg/dL<H> BUN 71.0 mg/dL<H> Phos n/a   Alb n/a   PAB n/a

## 2019-10-16 NOTE — DISCHARGE NOTE NURSING/CASE MANAGEMENT/SOCIAL WORK - NSDCVIVACCINE_GEN_ALL_CORE_FT
Influenza , 2018/9/10 13:50 , Kim Nixon (RN)  Tdap , 2018/1/20 12:50 , Ayush Nelson (ALLEN) Influenza , 2018/9/10 13:50 , Kim Nixon (RN)  Tdap , 2018/1/20 12:50 , Ayush Nelson (ALELN)

## 2019-10-16 NOTE — DIETITIAN INITIAL EVALUATION ADULT. - OTHER INFO
68 yo M with PMHX CVA, HTN, DM, CAD (s/p AICD, s/p stent), wheelchair bound brought from [facility] s/p fall. Initially placed under trauma service for multiple rib fractures complicated by chest wall hematoma and contusion. Patient is a poor historian as per wife pt is confused at baseline.  Plan is MIKE. Pt reports no weight changes, pt is aphasic. Speech recommending puree with honey thick liquids. 68 yo M with PMHX CVA, HTN, DM, CAD (s/p AICD, s/p stent), wheelchair bound brought from [facility] s/p fall. Initially placed under trauma service for multiple rib fractures complicated by chest wall hematoma and contusion. Patient is a poor historian as per wife pt is confused at baseline.  Plan is MIKE. Pt reports no weight changes, pt is aphasic. Speech recommending puree with honey thick liquids. Family refusing down grade of diet.

## 2019-10-16 NOTE — DISCHARGE NOTE NURSING/CASE MANAGEMENT/SOCIAL WORK - PATIENT PORTAL LINK FT
You can access the FollowMyHealth Patient Portal offered by [facility] by registering at the following website: [email or web address]. By joining [facility]’s FollowMyHealth portal, you will also be able to view your health information using other applications (apps) compatible with our system.

## 2019-10-16 NOTE — PROGRESS NOTE ADULT - ASSESSMENT
70 yo M with PMHX CVA, HTN, DM, CAD (s/p AICD, s/p stent), wheelchair bound brought from [facility] s/p fall. Initially placed under trauma service for multiple rib fractures complicated by chest wall hematoma and contusion and pneumothorax (now resolved). Patient is a poor historian as per wife pt is confused at baseline and weans b/w AAOX2-3. Pt transferred from trauma service to medicine service. PT consult appreciated, recommends rolling walker on return to [facility]. Nephrology and Speech consults noted. Pt had episode of coughing after meal. Wife at bedside noted that pt is more confused. Pending official report for CXR and CT head. D/c in 24hrs to [facility].     ALLISON on CKD-4 w/ possible AIN from chronic NSAID use   - Slow clinical improvement. Cr downtrended from 4 to 4.39.  - Suspect pre renal component. CK levels normal.   - Nephrology consulted and appreciated. S/p Lasix and sodium bicarb.  - Possible interstitial nephritis in setting of chronic NSAID  - C/w Prednisone 20 mg TID. Pt to f/u w/ nephro in 1 week for steroid taper.   - Renal US, no hydronephrosis.   - Avoid nephrotoxic drugs, d/c Celebrex and Robaxin  - Cr currently @ 4.58    Chronic Dysphagia  - Cough noted after eating. CXR done to r/o aspiration.  - Pt had difficulty swallowing pills yesterday  - Speech and swallow eval appreciated. Recommending pureed diet with honey consistent liquids and supervised feed.  - As per wife she disagrees and would like to have pt on a regular diet with thickened liquids despite any recommendations given. She was informed of the associated risks such as aspiration.   - C/w with current diet and w/ supervised feeds     MOD dementia   - Pt is between AAOX2 and AAOX3  - Mild confusion, at baseline as per wife.   - Fall precautions.   - F/u CT head report 10/16    Multiple Rib fracture s/p fall  - Pt originally on trauma service for multiple rib fractures complicated with chest wall hematoma and contusion. No surgical intervention at this time.  - Xray noted. Pneumothorax resolved.   - C/w pain regimen: Lidocaine patch, Tylenol, oxy IR 5 mg PRN for moderate pain  - C/w Incentive spirometry  - Robaxin d/c (nephrotoxic)  - PT consult noted, recommends [facility]    CAD s/p AICD placement  - Stable, pt is asx   - c/w with ASA 81 and Plavix    HTN  - Labile   - c/w with amlodipine, coreg with holding parameters    HLD:  - c/w with Lipitor    IDDM2 complicated with neuropathy  - current HgA1c 7.1  - hypoglycemia protocol  - accuchecks  - HISS   - c/w with gabapentin 300 mg TID  - c/w Levemir 10U     Depression  - c/w with sertraline     DVT prophylaxis  - Heparin q 12 sc    DISPO: D/c to [facility] in 24hrs pending CT and CXR report.

## 2019-10-16 NOTE — DIETITIAN INITIAL EVALUATION ADULT. - DIET TYPE
consistent carbohydrate (no snacks)/Glucerna once day/DASH/TLC (sodium and cholesterol restricted diet)/dysphagia 1, pureed, honey consistency fluid/supplement (specify)

## 2019-10-17 NOTE — PROGRESS NOTE ADULT - REASON FOR ADMISSION
Rib fractures

## 2019-10-17 NOTE — PROVIDER CONTACT NOTE (CRITICAL VALUE NOTIFICATION) - SITUATION
pt alert x3, NAD. unable to reach Dr Lopez and blue team residents. HOA Rosado on unit and made aware. as per Ashlee give ordered insulin and continue to reach out to Dr Lopez
pt received 12 units insulin for blood sugar of 459  as ordered pt NAD

## 2019-10-17 NOTE — PROGRESS NOTE ADULT - ASSESSMENT
68 yo M with PMHX CVA, HTN, DM, CAD (s/p AICD, s/p stent), wheelchair bound brought from [facility] s/p fall. Initially placed under trauma service for multiple rib fractures complicated by chest wall hematoma and contusion and pneumothorax (now resolved). Patient is a poor historian as per wife pt is confused at baseline and weans b/w AAOX2-3. Pt transferred from trauma service to medicine service. PT consult appreciated, recommends rolling walker on return to [facility]. Nephrology and Speech consults noted. Pt had episode of coughing after meal. Wife at bedside noted that pt is more confused. Pending official report for CXR and CT head. D/c in 24hrs to [facility].     AIN- Superimposed  on CKD-4 - NSAID use ( On Steroids )     Multiple Rib fracture s/p fall  - Pt originally on trauma service for multiple rib fractures complicated with chest wall hematoma and contusion.  - Pneumothorax resolved.     CAD s/p AICD placement    HTN    IDDM2 complicated with neuropathy    Multi Infarct Dementia, New Hospital Acquired Pneumonia ?     Rec : Hold Gabapentin, Add Oral [id number],     D/W son,

## 2019-10-17 NOTE — PROGRESS NOTE ADULT - SUBJECTIVE AND OBJECTIVE BOX
[facility] DIVISION OF KIDNEY DISEASES AND HYPERTENSION -- FOLLOW UP NOTE  --------------------------------------------------------------------------------  Chief Complaint: Son @ Bedside,  Lethargic,  Altered Sensorium,     24 hour events/subjective:    PAST HISTORY  --------------------------------------------------------------------------------  No significant changes to PMH, PSH, FHx, SHx, unless otherwise noted    ALLERGIES & MEDICATIONS  --------------------------------------------------------------------------------  Allergies    No Known Allergies    Standing Inpatient Medications  acetaminophen   Tablet .. 650 milliGRAM(s) Oral every 6 hours  amLODIPine   Tablet 10 milliGRAM(s) Oral daily  aspirin  chewable 81 milliGRAM(s) Oral daily  atorvastatin 40 milliGRAM(s) Oral daily  carvedilol 12.5 milliGRAM(s) Oral every 12 hours  chlorhexidine 2% Cloths 1 Application(s) Topical daily  clopidogrel Tablet 75 milliGRAM(s) Oral daily  collagenase Ointment 1 Application(s) Topical daily  dextrose 5%. 1000 milliLiter(s) IV Continuous <Continuous>  dextrose 50% Injectable 12.5 Gram(s) IV Push once  dextrose 50% Injectable 25 Gram(s) IV Push once  dextrose 50% Injectable 25 Gram(s) IV Push once  docusate sodium 100 milliGRAM(s) Oral daily  ferrous sulfate Oral Tab/Cap - Peds 325 milliGRAM(s) Oral daily  gabapentin 300 milliGRAM(s) Oral three times a day  heparin  Injectable 5000 Unit(s) SubCutaneous every 8 hours  hydrALAZINE 25 milliGRAM(s) Oral every 8 hours  insulin detemir injectable (LEVEMIR) 10 Unit(s) SubCutaneous daily  insulin lispro (HumaLOG) corrective regimen sliding scale   SubCutaneous Before meals and at bedtime  lidocaine   Patch 2 Patch Transdermal every 24 hours  predniSONE   Tablet 20 milliGRAM(s) Oral three times a day  saccharomyces boulardii 250 milliGRAM(s) Oral two times a day  senna 1 Tablet(s) Oral daily  sertraline 100 milliGRAM(s) Oral daily  sodium bicarbonate 650 milliGRAM(s) Oral three times a day    PRN Inpatient Medications  dextrose 40% Gel 15 Gram(s) Oral once PRN  glucagon  Injectable 1 milliGRAM(s) IntraMuscular once PRN  hydrALAZINE Injectable 5 milliGRAM(s) IV Push once PRN  ondansetron Injectable 4 milliGRAM(s) IV Push every 6 hours PRN  oxyCODONE    IR 5 milliGRAM(s) Oral every 4 hours PRN    REVIEW OF SYSTEMS  --------------------------------------------------------------------------------  Gen: No weight changes, fatigue, fevers/chills, weakness  Skin: No rashes  Head/Eyes/Ears/Mouth: No headache; Normal hearing; Normal vision w/o blurriness; No sinus pain/discomfort, sore throat  Respiratory: No dyspnea, cough, wheezing, hemoptysis  CV: No chest pain, PND, orthopnea  GI: No abdominal pain, diarrhea, constipation, nausea, vomiting, melena, hematochezia  : No increased frequency, dysuria, hematuria, nocturia  MSK: No joint pain/swelling; no back pain; no edema  Neuro: No dizziness/lightheadedness, weakness, seizures, numbness, tingling  Heme: No easy bruising or bleeding  Endo: No heat/cold intolerance  Psych: No significant nervousness, anxiety, stress, depression    All other systems were reviewed and are negative, except as noted.    VITALS/PHYSICAL EXAM  --------------------------------------------------------------------------------  T(C): 36.6 (10-17-19 @ 08:11), Max: 36.9 (10-16-19 @ 14:30)  HR: 63 (10-17-19 @ 08:11) (58 - 79)  BP: 158/65 (10-17-19 @ 08:11) (156/68 - 169/69)  RR: 18 (10-17-19 @ 08:11) (18 - 18)  SpO2: 96% (10-17-19 @ 08:11) (91% - 96%)    10-16-19 @ 07:01  -  10-17-19 @ 07:00  --------------------------------------------------------  IN: 150 mL / OUT: 0 mL / NET: 150 mL    Physical Exam:  	Gen: NAD, well-appearing  	HEENT: PERRL, supple neck, clear oropharynx  	Pulm: CTA B/L  	CV: RRR, S1S2; no rub  	Back: No spinal or CVA tenderness; no sacral edema  	Abd: +BS, soft, nontender/nondistended  	: No suprapubic tenderness  	UE: Warm, FROM, no clubbing, intact strength; no edema; no asterixis  	LE: Warm, FROM, no clubbing, intact strength; no edema  	Neuro: No focal deficits, intact gait  	Psych: Normal affect and mood  	Skin: Warm, without rashes  	Vascular access:    LABS/STUDIES  --------------------------------------------------------------------------------              12.2   7.63  >-----------<  239      [10-17-19 @ 09:16]              40.2     139  |  106  |  72.0  ----------------------------<  398      [10-17-19 @ 09:16]  5.3   |  17.0  |  4.18        Ca     8.7     [10-17-19 @ 09:16]      Phos  4.5     [10-15-19 @ 16:55]    TPro  x   /  Alb  2.8  /  TBili  x   /  DBili  x   /  AST  x   /  ALT  x   /  AlkPhos  x   [10-15-19 @ 16:55]    Creatinine Trend:  SCr 4.18 [10-17 @ 09:16]  SCr 4.00 [10-16 @ 08:05]  SCr 4.39 [10-15 @ 16:55]  SCr 4.58 [10-15 @ 07:37]  SCr 4.23 [10-14 @ 07:32]    Urinalysis - [10-10-19 @ 13:37]      Color Yellow / Appearance Clear / SG 1.015 / pH 6.0      Gluc 100 / Ketone Negative  / Bili Negative / Urobili Negative       Blood Moderate / Protein 500 / Leuk Est Negative / Nitrite Negative      RBC 3-5 / WBC 0-2 / Hyaline  / Gran  / Sq Epi  / Non Sq Epi Occasional / Bacteria Negative    Urine Creatinine 107      [10-11-19 @ 11:25]  Urine Sodium 40      [10-11-19 @ 11:25]  Urine Chloride <27      [10-11-19 @ 11:25]  Urine Osmolality 382      [10-11-19 @ 11:25]    Vitamin D (25OH) 7.7      [04-24-19 @ 17:06]  HbA1c 7.1      [10-10-19 @ 06:19]  TSH 1.91      [10-12-19 @ 18:01]    EXAM:  CT BRAIN                          PROCEDURE DATE:  10/16/2019      INTERPRETATION:      CT head without IV contrast        CLINICAL INFORMATION: Altered mental status    TECHNIQUE: Contiguous axial 5 mm sections were obtained through the head.   Sagittal and coronal 2-D reformatted images were also obtained.   This   scan was performed using automatic exposure control (radiation dose   reduction software) to obtain a diagnostic image quality scan with   patient dose as low as reasonably achievable.     FINDINGS:   CT dated 10/09/2019 available for review.    The brain demonstrates mild periventricular white matter ischemia with   multiple old lacunar infarctions in the BILATERAL basal ganglia and LEFT   corona radiata. Multiple infarctions are seen in the BILATERAL   cerebellum.   No acute cerebral cortical infarct is seen.  No   intracranial hemorrhage is found.  No mass effect is found in the brain.      The ventricles, sulci and basal cisterns show global atrophy.    The orbits are unremarkable.  The paranasal sinuses are clear.  The nasal   cavity appears intact.  The nasopharynx is symmetric.  The central skull   base, petrous temporal bones and calvarium remain intact.    IMPRESSION:   mild periventricular white matter ischemia with multiple   old lacunar infarctions in the BILATERAL basal ganglia and LEFT corona   radiata. Multiple infarctions are seen in the BILATERAL cerebellum.    SYDNEE BOYKIN M.D., ATTENDING RADIOLOGIST  This document hasbeen electronically signed. Oct 16 2019  5:47PM      EXAM:  XR CHEST PORTABLE URGENT 1V                          PROCEDURE DATE:  10/16/2019      INTERPRETATION:  Clinical history: Recent rib fractures.    Comparison is made to prior study of 2 days prior    A single frontal view of the chest again demonstrates left-sided cardiac   pacemaker in place and median sternotomy wires and mediastinal sutures.   Again seen is increased alveolar opacity in the right lower lung which   could represent pneumonia. No effusion or pneumothorax is seen.    Impression:    Increased alveolar opacity in right lower thorax could represent   pneumonia.    ZENY THOMAS M.D. ATTENDING RADIOLOGIST  This document has been electronically signed. Oct 16 2019  6:08PM

## 2019-10-17 NOTE — PROGRESS NOTE ADULT - PROVIDER SPECIALTY LIST ADULT
Family Medicine
Nephrology
Trauma Surgery
Vascular Surgery
Nephrology

## 2019-10-28 NOTE — OCCUPATIONAL THERAPY INITIAL EVALUATION ADULT - LEVEL OF INDEPENDENCE, OT EVAL
-- Message is from the [facility]--    Reason for Call: PATIENT WOULD LIKE A CALL BACK IN REGARDS TO HER REFERRAL FOR  A CT LIMITED SCAN W/WO CONTRAST REF# [id number],STATING THAT INSURANCE IS NEEDING MORE DIAGNOSIS NOTES AND CAN CALL PATIENTS INSURANCE -[phone] TO DISCUSS FURTHER    Caller Information       Type Contact Phone    10/28/2019 10:15 AM Phone (Incoming) Alice Forman (Self) [phone] (H)          Alternative phone number: N/A    Turnaround time given to caller:   \"This message will be sent to [state Provider's name]. The clinical team will fulfill your request as soon as they review your message.\"     moderate assist (50% patients effort)

## 2020-01-23 NOTE — OCCUPATIONAL THERAPY INITIAL EVALUATION ADULT - LEVEL OF INDEPENDENCE: SIT/SUPINE, REHAB EVAL
[facility] Hospitalist Team  Progress Note    Darien Leatha Patient Status:  Inpatient    1942 MRN [id number]   Location [facility] 3W/SW Attending Rodrigue Granda MD   Hosp Day # 3 PCP Toña Shane MD     CC: Follow Up  PCP: Landen Jenkins am 5.4, recheck 4.7-->4     Recurrent UTI  Bladder Calculi  -recent proteus and e coli UTI  -straight cath's at home, consider cowan placement  -1/23 planned cystolitholapaxy with stone extraction with Dr Main Monk notified to cancel     Chronic diastolic irregular   ABD: soft, + ostomy  : cowan  EXTREMITIES: no edema, no calf tenderness, SCD in place    DIAGNOSTIC DATA:   Labs:     Recent Labs   Lab 01/20/20  0452 01/20/20  0903 01/22/20  0542 01/23/20  0610   WBC 9.4 9.0 4.7 6.9   HGB 14.3 12.8 11.3* 11 spray 1 spray, 1 spray, Each Nare, BID  cetirizine (ZYRTEC) tab 10 mg, 10 mg, Oral, Daily  Metoprolol Succinate ER (Toprol XL) 24 hr tab 50 mg, 50 mg, Oral, Daily  traMADol HCl (ULTRAM) tab 25 mg, 25 mg, Oral, Q12H PRN  Venlafaxine HCl ER (EFFEXOR-XR) 24 h minimum assist (75% patients effort)

## 2020-06-21 NOTE — ED ADULT TRIAGE NOTE - TEMPERATURE IN FAHRENHEIT (DEGREES F)
IN TO COMPLETE ASSESSMENT, PT IS SLEEPING UPON ARRIVAL AND WAKENS EASILY. VERY
LETHARGIC AND TEARFUL. PT IS GIVEN WATER TO DRINK AT THIS TIME. SHE IS TEARFUL
AND COMPLAINING OF ARM PAIN. MUMBLED SPEECH. CALL LIGHT WITHIN REACH, WILL
CONTINUE TO MONITOR 100.4

## 2021-01-13 NOTE — ED ADULT NURSE NOTE - CAS TRG GEN SKIN COLOR
Continue current meds.  Keep a log of the twice a day dosing.  Follow up with primary care doctor in 1 week.    Normal for race

## 2021-05-17 NOTE — CONSULT NOTE ADULT - PROBLEM/RECOMMENDATION-1
Group Topic: BH Activity Group    Date: 5/17/2021  Start Time: 10:00 AM  End Time: 11:00 AM  Facilitators: PHOEBE Devine    Focus: Problem solving  Number in attendance: 10  Written work, experiential learning activity, discussion.    Method: Group   Attendance: Present  Participation: Active  Patient Response: Attentive and Demonstrated insight  Mood: Normal  Behavior/Socialization: Cooperative  Thought Process: Demonstrated insight and Focused  Task Performance: Follows directions  Patient Evaluation: Independent - full participation   Acclimating to the group process.  In her work she identified \"anxiety, sleep, my thoughts and worries\" as her top 3 problems.  She worked well with her partner during the experiential learning activity. She was able to identify 3 resources to help with change.  
DISPLAY PLAN FREE TEXT

## 2022-09-21 NOTE — OCCUPATIONAL THERAPY INITIAL EVALUATION ADULT - PHYSICAL ASSIST/NONPHYSICAL ASSIST: TOILET, REHAB EVAL
1 person assist
No. GAETANO screening performed.  STOP BANG Legend: 0-2 = LOW Risk; 3-4 = INTERMEDIATE Risk; 5-8 = HIGH Risk

## 2023-03-20 NOTE — H&P ADULT - MECHANISM OF INJURY
Blunt Otezla Pregnancy And Lactation Text: This medication is Pregnancy Category C and it isn't known if it is safe during pregnancy. It is unknown if it is excreted in breast milk. Itraconazole Pregnancy And Lactation Text: This medication is Pregnancy Category C and it isn't know if it is safe during pregnancy. It is also excreted in breast milk. Carac Counseling:  I discussed with the patient the risks of Carac including but not limited to erythema, scaling, itching, weeping, crusting, and pain. Sarecycline Pregnancy And Lactation Text: This medication is Pregnancy Category D and not consider safe during pregnancy. It is also excreted in breast milk. Isotretinoin Counseling: Patient should get monthly blood tests, not donate blood, not drive at night if vision affected, not share medication, and not undergo elective surgery for 6 months after tx completed. Side effects reviewed, pt to contact office should one occur. Methotrexate Counseling:  Patient counseled regarding adverse effects of methotrexate including but not limited to nausea, vomiting, abnormalities in liver function tests. Patients may develop mouth sores, rash, diarrhea, and abnormalities in blood counts. The patient understands that monitoring is required including LFT's and blood counts.  There is a rare possibility of scarring of the liver and lung problems that can occur when taking methotrexate. Persistent nausea, loss of appetite, pale stools, dark urine, cough, and shortness of breath should be reported immediately. Patient advised to discontinue methotrexate treatment at least three months before attempting to become pregnant.  I discussed the need for folate supplements while taking methotrexate.  These supplements can decrease side effects during methotrexate treatment. The patient verbalized understanding of the proper use and possible adverse effects of methotrexate.  All of the patient's questions and concerns were addressed. Doxepin Pregnancy And Lactation Text: This medication is Pregnancy Category C and it isn't known if it is safe during pregnancy. It is also excreted in breast milk and breast feeding isn't recommended. Minoxidil Pregnancy And Lactation Text: This medication has not been assigned a Pregnancy Risk Category but animal studies failed to show danger with the topical medication. It is unknown if the medication is excreted in breast milk. Humira Pregnancy And Lactation Text: This medication is Pregnancy Category B and is considered safe during pregnancy. It is unknown if this medication is excreted in breast milk. Niacinamide Counseling: I recommended taking niacin or niacinamide, also know as vitamin B3, twice daily. Recent evidence suggests that taking vitamin B3 (500 mg twice daily) can reduce the risk of actinic keratoses and non-melanoma skin cancers. Side effects of vitamin B3 include flushing and headache. Opioid Counseling: I discussed with the patient the potential side effects of opioids including but not limited to addiction, altered mental status, and depression. I stressed avoiding alcohol, benzodiazepines, muscle relaxants and sleep aids unless specifically okayed by a physician. The patient verbalized understanding of the proper use and possible adverse effects of opioids. All of the patient's questions and concerns were addressed. They were instructed to flush the remaining pills down the toilet if they did not need them for pain. Topical Sulfur Applications Counseling: Topical Sulfur Counseling: Patient counseled that this medication may cause skin irritation or allergic reactions.  In the event of skin irritation, the patient was advised to reduce the amount of the drug applied or use it less frequently.   The patient verbalized understanding of the proper use and possible adverse effects of topical sulfur application.  All of the patient's questions and concerns were addressed. Spironolactone Counseling: Patient advised regarding risks of diarrhea, abdominal pain, hyperkalemia, birth defects (for female patients), liver toxicity and renal toxicity. The patient may need blood work to monitor liver and kidney function and potassium levels while on therapy. The patient verbalized understanding of the proper use and possible adverse effects of spironolactone.  All of the patient's questions and concerns were addressed. Topical Retinoid counseling:  Patient advised to apply a pea-sized amount only at bedtime and wait 30 minutes after washing their face before applying.  If too drying, patient may add a non-comedogenic moisturizer. The patient verbalized understanding of the proper use and possible adverse effects of retinoids.  All of the patient's questions and concerns were addressed. Gabapentin Counseling: I discussed with the patient the risks of gabapentin including but not limited to dizziness, somnolence, fatigue and ataxia. Metronidazole Pregnancy And Lactation Text: This medication is Pregnancy Category B and considered safe during pregnancy.  It is also excreted in breast milk. Cephalexin Counseling: I counseled the patient regarding use of cephalexin as an antibiotic for prophylactic and/or therapeutic purposes. Cephalexin (commonly prescribed under brand name Keflex) is a cephalosporin antibiotic which is active against numerous classes of bacteria, including most skin bacteria. Side effects may include nausea, diarrhea, gastrointestinal upset, rash, hives, yeast infections, and in rare cases, hepatitis, kidney disease, seizures, fever, confusion, neurologic symptoms, and others. Patients with severe allergies to penicillin medications are cautioned that there is about a 10% incidence of cross-reactivity with cephalosporins. When possible, patients with penicillin allergies should use alternatives to cephalosporins for antibiotic therapy. Elidel Pregnancy And Lactation Text: This medication is Pregnancy Category C. It is unknown if this medication is excreted in breast milk. Colchicine Pregnancy And Lactation Text: This medication is Pregnancy Category C and isn't considered safe during pregnancy. It is excreted in breast milk. Azathioprine Counseling:  I discussed with the patient the risks of azathioprine including but not limited to myelosuppression, immunosuppression, hepatotoxicity, lymphoma, and infections.  The patient understands that monitoring is required including baseline LFTs, Creatinine, possible TPMP genotyping and weekly CBCs for the first month and then every 2 weeks thereafter.  The patient verbalized understanding of the proper use and possible adverse effects of azathioprine.  All of the patient's questions and concerns were addressed. Taltz Counseling: I discussed with the patient the risks of ixekizumab including but not limited to immunosuppression, serious infections, worsening of inflammatory bowel disease and drug reactions.  The patient understands that monitoring is required including a PPD at baseline and must alert us or the primary physician if symptoms of infection or other concerning signs are noted. Carac Pregnancy And Lactation Text: This medication is Pregnancy Category X and contraindicated in pregnancy and in women who may become pregnant. It is unknown if this medication is excreted in breast milk. Isotretinoin Pregnancy And Lactation Text: This medication is Pregnancy Category X and is considered extremely dangerous during pregnancy. It is unknown if it is excreted in breast milk. Opioid Pregnancy And Lactation Text: These medications can lead to premature delivery and should be avoided during pregnancy. These medications are also present in breast milk in small amounts. Oxybutynin Counseling:  I discussed with the patient the risks of oxybutynin including but not limited to skin rash, drowsiness, dry mouth, difficulty urinating, and blurred vision. Tetracycline Counseling: Patient counseled regarding possible photosensitivity and increased risk for sunburn.  Patient instructed to avoid sunlight, if possible.  When exposed to sunlight, patients should wear protective clothing, sunglasses, and sunscreen.  The patient was instructed to call the office immediately if the following severe adverse effects occur:  hearing changes, easy bruising/bleeding, severe headache, or vision changes.  The patient verbalized understanding of the proper use and possible adverse effects of tetracycline.  All of the patient's questions and concerns were addressed. Patient understands to avoid pregnancy while on therapy due to potential birth defects. Cephalexin Pregnancy And Lactation Text: This medication is Pregnancy Category B and considered safe during pregnancy.  It is also excreted in breast milk but can be used safely for shorter doses. Taltz Pregnancy And Lactation Text: The risk during pregnancy and breastfeeding is uncertain with this medication. Ketoconazole Counseling:   Patient counseled regarding improving absorption with orange juice.  Adverse effects include but are not limited to breast enlargement, headache, diarrhea, nausea, upset stomach, liver function test abnormalities, taste disturbance, and stomach pain.  There is a rare possibility of liver failure that can occur when taking ketoconazole. The patient understands that monitoring of LFTs may be required, especially at baseline. The patient verbalized understanding of the proper use and possible adverse effects of ketoconazole.  All of the patient's questions and concerns were addressed. Hydroxyzine Counseling: Patient advised that the medication is sedating and not to drive a car after taking this medication.  Patient informed of potential adverse effects including but not limited to dry mouth, urinary retention, and blurry vision.  The patient verbalized understanding of the proper use and possible adverse effects of hydroxyzine.  All of the patient's questions and concerns were addressed. Dapsone Counseling: I discussed with the patient the risks of dapsone including but not limited to hemolytic anemia, agranulocytosis, rashes, methemoglobinemia, kidney failure, peripheral neuropathy, headaches, GI upset, and liver toxicity.  Patients who start dapsone require monitoring including baseline LFTs and weekly CBCs for the first month, then every month thereafter.  The patient verbalized understanding of the proper use and possible adverse effects of dapsone.  All of the patient's questions and concerns were addressed. Ilumya Counseling: I discussed with the patient the risks of tildrakizumab including but not limited to immunosuppression, malignancy, posterior leukoencephalopathy syndrome, and serious infections.  The patient understands that monitoring is required including a PPD at baseline and must alert us or the primary physician if symptoms of infection or other concerning signs are noted. Mirvaso Counseling: Mirvaso is a topical medication which can decrease superficial blood flow where applied. Side effects are uncommon and include stinging, redness and allergic reactions. Methotrexate Pregnancy And Lactation Text: This medication is Pregnancy Category X and is known to cause fetal harm. This medication is excreted in breast milk. Niacinamide Pregnancy And Lactation Text: These medications are considered safe during pregnancy. Cimzia Pregnancy And Lactation Text: This medication crosses the placenta but can be considered safe in certain situations. Cimzia may be excreted in breast milk. Valtrex Pregnancy And Lactation Text: this medication is Pregnancy Category B and is considered safe during pregnancy. This medication is not directly found in breast milk but it's metabolite acyclovir is present. Siliq Counseling:  I discussed with the patient the risks of Siliq including but not limited to new or worsening depression, suicidal thoughts and behavior, immunosuppression, malignancy, posterior leukoencephalopathy syndrome, and serious infections.  The patient understands that monitoring is required including a PPD at baseline and must alert us or the primary physician if symptoms of infection or other concerning signs are noted. There is also a special program designed to monitor depression which is required with Siliq. Topical Sulfur Applications Pregnancy And Lactation Text: This medication is considered safe during pregnancy and breast feeding secondary to limited systemic absorption. Cosentyx Counseling:  I discussed with the patient the risks of Cosentyx including but not limited to worsening of Crohn's disease, immunosuppression, allergic reactions and infections.  The patient understands that monitoring is required including a PPD at baseline and must alert us or the primary physician if symptoms of infection or other concerning signs are noted. Minocycline Counseling: Patient advised regarding possible photosensitivity and discoloration of the teeth, skin, lips, tongue and gums.  Patient instructed to avoid sunlight, if possible.  When exposed to sunlight, patients should wear protective clothing, sunglasses, and sunscreen.  The patient was instructed to call the office immediately if the following severe adverse effects occur:  hearing changes, easy bruising/bleeding, severe headache, or vision changes.  The patient verbalized understanding of the proper use and possible adverse effects of minocycline.  All of the patient's questions and concerns were addressed. Eucrisa Counseling: Patient may experience a mild burning sensation during topical application. Eucrisa is not approved in children less than 2 years of age. Nsaids Counseling: NSAID Counseling: I discussed with the patient that NSAIDs should be taken with food. Prolonged use of NSAIDs can result in the development of stomach ulcers.  Patient advised to stop taking NSAIDs if abdominal pain occurs.  The patient verbalized understanding of the proper use and possible adverse effects of NSAIDs.  All of the patient's questions and concerns were addressed. Spironolactone Pregnancy And Lactation Text: This medication can cause feminization of the male fetus and should be avoided during pregnancy. The active metabolite is also found in breast milk. Azathioprine Pregnancy And Lactation Text: This medication is Pregnancy Category D and isn't considered safe during pregnancy. It is unknown if this medication is excreted in breast milk. Clindamycin Counseling: I counseled the patient regarding use of clindamycin as an antibiotic for prophylactic and/or therapeutic purposes. Clindamycin is active against numerous classes of bacteria, including skin bacteria. Side effects may include nausea, diarrhea, gastrointestinal upset, rash, hives, yeast infections, and in rare cases, colitis. Ketoconazole Pregnancy And Lactation Text: This medication is Pregnancy Category C and it isn't know if it is safe during pregnancy. It is also excreted in breast milk and breast feeding isn't recommended. Use Enhanced Medication Counseling?: No Dapsone Pregnancy And Lactation Text: This medication is Pregnancy Category C and is not considered safe during pregnancy or breast feeding. Mirvaso Pregnancy And Lactation Text: This medication has not been assigned a Pregnancy Risk Category. It is unknown if the medication is excreted in breast milk. Wartpeel Counseling:  I discussed with the patient the risks of Wartpeel including but not limited to erythema, scaling, itching, weeping, crusting, and pain. Hydroxyzine Pregnancy And Lactation Text: This medication is not safe during pregnancy and should not be taken. It is also excreted in breast milk and breast feeding isn't recommended. Calcipotriene Counseling:  I discussed with the patient the risks of calcipotriene including but not limited to erythema, scaling, itching, and irritation. High Dose Vitamin A Counseling: Side effects reviewed, pt to contact office should one occur. Oxybutynin Pregnancy And Lactation Text: This medication is Pregnancy Category B and is considered safe during pregnancy. It is unknown if it is excreted in breast milk. Prednisone Counseling:  I discussed with the patient the risks of prolonged use of prednisone including but not limited to weight gain, insomnia, osteoporosis, mood changes, diabetes, susceptibility to infection, glaucoma and high blood pressure.  In cases where prednisone use is prolonged, patients should be monitored with blood pressure checks, serum glucose levels and an eye exam.  Additionally, the patient may need to be placed on GI prophylaxis, PCP prophylaxis, and calcium and vitamin D supplementation and/or a bisphosphonate.  The patient verbalized understanding of the proper use and the possible adverse effects of prednisone.  All of the patient's questions and concerns were addressed. Nsaids Pregnancy And Lactation Text: These medications are considered safe up to 30 weeks gestation. It is excreted in breast milk. Simponi Counseling:  I discussed with the patient the risks of golimumab including but not limited to myelosuppression, immunosuppression, autoimmune hepatitis, demyelinating diseases, lymphoma, and serious infections.  The patient understands that monitoring is required including a PPD at baseline and must alert us or the primary physician if symptoms of infection or other concerning signs are noted. Tremfya Counseling: I discussed with the patient the risks of guselkumab including but not limited to immunosuppression, serious infections, and drug reactions.  The patient understands that monitoring is required including a PPD at baseline and must alert us or the primary physician if symptoms of infection or other concerning signs are noted. Cellcept Counseling:  I discussed with the patient the risks of mycophenolate mofetil including but not limited to infection/immunosuppression, GI upset, hypokalemia, hypercholesterolemia, bone marrow suppression, lymphoproliferative disorders, malignancy, GI ulceration/bleed/perforation, colitis, interstitial lung disease, kidney failure, progressive multifocal leukoencephalopathy, and birth defects.  The patient understands that monitoring is required including a baseline creatinine and regular CBC testing. In addition, patient must alert us immediately if symptoms of infection or other concerning signs are noted. Tazorac Counseling:  Patient advised that medication is irritating and drying.  Patient may need to apply sparingly and wash off after an hour before eventually leaving it on overnight.  The patient verbalized understanding of the proper use and possible adverse effects of tazorac.  All of the patient's questions and concerns were addressed. Glycopyrrolate Counseling:  I discussed with the patient the risks of glycopyrrolate including but not limited to skin rash, drowsiness, dry mouth, difficulty urinating, and blurred vision. SSKI Counseling:  I discussed with the patient the risks of SSKI including but not limited to thyroid abnormalities, metallic taste, GI upset, fever, headache, acne, arthralgias, paraesthesias, lymphadenopathy, easy bleeding, arrhythmias, and allergic reaction. Dutasteride Male Counseling: Dustasteride Counseling:  I discussed with the patient the risks of use of dutasteride including but not limited to decreased libido, decreased ejaculate volume, and gynecomastia. Women who can become pregnant should not handle medication.  All of the patient's questions and concerns were addressed. Infliximab Counseling:  I discussed with the patient the risks of infliximab including but not limited to myelosuppression, immunosuppression, autoimmune hepatitis, demyelinating diseases, lymphoma, and serious infections.  The patient understands that monitoring is required including a PPD at baseline and must alert us or the primary physician if symptoms of infection or other concerning signs are noted. Sski Pregnancy And Lactation Text: This medication is Pregnancy Category D and isn't considered safe during pregnancy. It is excreted in breast milk. Tazorac Pregnancy And Lactation Text: This medication is not safe during pregnancy. It is unknown if this medication is excreted in breast milk. Propranolol Counseling:  I discussed with the patient the risks of propranolol including but not limited to low heart rate, low blood pressure, low blood sugar, restlessness and increased cold sensitivity. They should call the office if they experience any of these side effects. Picato Counseling:  I discussed with the patient the risks of Picato including but not limited to erythema, scaling, itching, weeping, crusting, and pain. Protopic Counseling: Patient may experience a mild burning sensation during topical application. Protopic is not approved in children less than 2 years of age. There have been case reports of hematologic and skin malignancies in patients using topical calcineurin inhibitors although causality is questionable. Calcipotriene Pregnancy And Lactation Text: This medication has not been proven safe during pregnancy. It is unknown if this medication is excreted in breast milk. Terbinafine Counseling: Patient counseling regarding adverse effects of terbinafine including but not limited to headache, diarrhea, rash, upset stomach, liver function test abnormalities, itching, taste/smell disturbance, nausea, abdominal pain, and flatulence.  There is a rare possibility of liver failure that can occur when taking terbinafine.  The patient understands that a baseline LFT and kidney function test may be required. The patient verbalized understanding of the proper use and possible adverse effects of terbinafine.  All of the patient's questions and concerns were addressed. High Dose Vitamin A Pregnancy And Lactation Text: High dose vitamin A therapy is contraindicated during pregnancy and breast feeding. Clindamycin Pregnancy And Lactation Text: This medication can be used in pregnancy if certain situations. Clindamycin is also present in breast milk. Prednisone Pregnancy And Lactation Text: This medication is Pregnancy Category C and it isn't know if it is safe during pregnancy. This medication is excreted in breast milk. Odomzo Counseling- I discussed with the patient the risks of Odomzo including but not limited to nausea, vomiting, diarrhea, constipation, weight loss, changes in the sense of taste, decreased appetite, muscle spasms, and hair loss.  The patient verbalized understanding of the proper use and possible adverse effects of Odomzo.  All of the patient's questions and concerns were addressed. Quinolones Counseling:  I discussed with the patient the risks of fluoroquinolones including but not limited to GI upset, allergic reaction, drug rash, diarrhea, dizziness, photosensitivity, yeast infections, liver function test abnormalities, tendonitis/tendon rupture. Glycopyrrolate Pregnancy And Lactation Text: This medication is Pregnancy Category B and is considered safe during pregnancy. It is unknown if it is excreted breast milk. Fluconazole Counseling:  Patient counseled regarding adverse effects of fluconazole including but not limited to headache, diarrhea, nausea, upset stomach, liver function test abnormalities, taste disturbance, and stomach pain.  There is a rare possibility of liver failure that can occur when taking fluconazole.  The patient understands that monitoring of LFTs and kidney function test may be required, especially at baseline. The patient verbalized understanding of the proper use and possible adverse effects of fluconazole.  All of the patient's questions and concerns were addressed. Albendazole Counseling:  I discussed with the patient the risks of albendazole including but not limited to cytopenia, kidney damage, nausea/vomiting and severe allergy.  The patient understands that this medication is being used in an off-label manner. Dupixent Counseling: I discussed with the patient the risks of dupilumab including but not limited to eye infection and irritation, cold sores, injection site reactions, worsening of asthma, allergic reactions and increased risk of parasitic infection.  Live vaccines should be avoided while taking dupilumab. Dupilumab will also interact with certain medications such as warfarin and cyclosporine. The patient understands that monitoring is required and they must alert us or the primary physician if symptoms of infection or other concerning signs are noted. Hydroquinone Counseling:  Patient advised that medication may result in skin irritation, lightening (hypopigmentation), dryness, and burning.  In the event of skin irritation, the patient was advised to reduce the amount of the drug applied or use it less frequently.  Rarely, spots that are treated with hydroquinone can become darker (pseudoochronosis).  Should this occur, patient instructed to stop medication and call the office. The patient verbalized understanding of the proper use and possible adverse effects of hydroquinone.  All of the patient's questions and concerns were addressed. Xeljanz Counseling: I discussed with the patient the risks of Xeljanz therapy including increased risk of infection, liver issues, headache, diarrhea, or cold symptoms. Live vaccines should be avoided. They were instructed to call if they have any problems. Dutasteride Pregnancy And Lactation Text: This medication is absolutely contraindicated in women, especially during pregnancy and breast feeding. Feminization of male fetuses is possible if taking while pregnant. Terbinafine Pregnancy And Lactation Text: This medication is Pregnancy Category B and is considered safe during pregnancy. It is also excreted in breast milk and breast feeding isn't recommended. Thalidomide Counseling: I discussed with the patient the risks of thalidomide including but not limited to birth defects, anxiety, weakness, chest pain, dizziness, cough and severe allergy. Hydroxychloroquine Counseling:  I discussed with the patient that a baseline ophthalmologic exam is needed at the start of therapy and every year thereafter while on therapy. A CBC may also be warranted for monitoring.  The side effects of this medication were discussed with the patient, including but not limited to agranulocytosis, aplastic anemia, seizures, rashes, retinopathy, and liver toxicity. Patient instructed to call the office should any adverse effect occur.  The patient verbalized understanding of the proper use and possible adverse effects of Plaquenil.  All the patient's questions and concerns were addressed. Cyclophosphamide Counseling:  I discussed with the patient the risks of cyclophosphamide including but not limited to hair loss, hormonal abnormalities, decreased fertility, abdominal pain, diarrhea, nausea and vomiting, bone marrow suppression and infection. The patient understands that monitoring is required while taking this medication. Winlevi Counseling:  I discussed with the patient the risks of topical clascoterone including but not limited to erythema, scaling, itching, and stinging. Patient voiced their understanding. Arava Counseling:  Patient counseled regarding adverse effects of Arava including but not limited to nausea, vomiting, abnormalities in liver function tests. Patients may develop mouth sores, rash, diarrhea, and abnormalities in blood counts. The patient understands that monitoring is required including LFTs and blood counts.  There is a rare possibility of scarring of the liver and lung problems that can occur when taking methotrexate. Persistent nausea, loss of appetite, pale stools, dark urine, cough, and shortness of breath should be reported immediately. Patient advised to discontinue Arava treatment and consult with a physician prior to attempting conception. The patient will have to undergo a treatment to eliminate Arava from the body prior to conception. Propranolol Pregnancy And Lactation Text: This medication is Pregnancy Category C and it isn't known if it is safe during pregnancy. It is excreted in breast milk. 5-Fu Counseling: 5-Fluorouracil Counseling:  I discussed with the patient the risks of 5-fluorouracil including but not limited to erythema, scaling, itching, weeping, crusting, and pain. Protopic Pregnancy And Lactation Text: This medication is Pregnancy Category C. It is unknown if this medication is excreted in breast milk when applied topically. Doxycycline Counseling:  Patient counseled regarding possible photosensitivity and increased risk for sunburn.  Patient instructed to avoid sunlight, if possible.  When exposed to sunlight, patients should wear protective clothing, sunglasses, and sunscreen.  The patient was instructed to call the office immediately if the following severe adverse effects occur:  hearing changes, easy bruising/bleeding, severe headache, or vision changes.  The patient verbalized understanding of the proper use and possible adverse effects of doxycycline.  All of the patient's questions and concerns were addressed. Birth Control Pills Counseling: Birth Control Pill Counseling: I discussed with the patient the potential side effects of OCPs including but not limited to increased risk of stroke, heart attack, thrombophlebitis, deep venous thrombosis, hepatic adenomas, breast changes, GI upset, headaches, and depression.  The patient verbalized understanding of the proper use and possible adverse effects of OCPs. All of the patient's questions and concerns were addressed. Skyrizi Counseling: I discussed with the patient the risks of risankizumab-rzaa including but not limited to immunosuppression, and serious infections.  The patient understands that monitoring is required including a PPD at baseline and must alert us or the primary physician if symptoms of infection or other concerning signs are noted. Rhofade Counseling: Rhofade is a topical medication which can decrease superficial blood flow where applied. Side effects are uncommon and include stinging, redness and allergic reactions. Winlevi Pregnancy And Lactation Text: This medication is considered safe during pregnancy and breastfeeding. Arava Pregnancy And Lactation Text: This medication is Pregnancy Category X and is absolutely contraindicated during pregnancy. It is unknown if it is excreted in breast milk. Azelaic Acid Counseling: Patient counseled that medicine may cause skin irritation and to avoid applying near the eyes.  In the event of skin irritation, the patient was advised to reduce the amount of the drug applied or use it less frequently.   The patient verbalized understanding of the proper use and possible adverse effects of azelaic acid.  All of the patient's questions and concerns were addressed. Acitretin Counseling:  I discussed with the patient the risks of acitretin including but not limited to hair loss, dry lips/skin/eyes, liver damage, hyperlipidemia, depression/suicidal ideation, photosensitivity.  Serious rare side effects can include but are not limited to pancreatitis, pseudotumor cerebri, bony changes, clot formation/stroke/heart attack.  Patient understands that alcohol is contraindicated since it can result in liver toxicity and significantly prolong the elimination of the drug by many years. Topical Clindamycin Counseling: Patient counseled that this medication may cause skin irritation or allergic reactions.  In the event of skin irritation, the patient was advised to reduce the amount of the drug applied or use it less frequently.   The patient verbalized understanding of the proper use and possible adverse effects of clindamycin.  All of the patient's questions and concerns were addressed. Detail Level: Detailed Azithromycin Counseling:  I discussed with the patient the risks of azithromycin including but not limited to GI upset, allergic reaction, drug rash, diarrhea, and yeast infections. Albendazole Pregnancy And Lactation Text: This medication is Pregnancy Category C and it isn't known if it is safe during pregnancy. It is also excreted in breast milk. Dupixent Pregnancy And Lactation Text: This medication likely crosses the placenta but the risk for the fetus is uncertain. This medication is excreted in breast milk. Imiquimod Counseling:  I discussed with the patient the risks of imiquimod including but not limited to erythema, scaling, itching, weeping, crusting, and pain.  Patient understands that the inflammatory response to imiquimod is variable from person to person and was educated regarded proper titration schedule.  If flu-like symptoms develop, patient knows to discontinue the medication and contact us. Acitretin Pregnancy And Lactation Text: This medication is Pregnancy Category X and should not be given to women who are pregnant or may become pregnant in the future. This medication is excreted in breast milk. Azelaic Acid Pregnancy And Lactation Text: This medication is considered safe during pregnancy and breast feeding. Xeldeonz Pregnancy And Lactation Text: This medication is Pregnancy Category D and is not considered safe during pregnancy.  The risk during breast feeding is also uncertain. Hydroxychloroquine Pregnancy And Lactation Text: This medication has been shown to cause fetal harm but it isn't assigned a Pregnancy Risk Category. There are small amounts excreted in breast milk. Cyclophosphamide Pregnancy And Lactation Text: This medication is Pregnancy Category D and it isn't considered safe during pregnancy. This medication is excreted in breast milk. Doxycycline Pregnancy And Lactation Text: This medication is Pregnancy Category D and not consider safe during pregnancy. It is also excreted in breast milk but is considered safe for shorter treatment courses. Rituxan Counseling:  I discussed with the patient the risks of Rituxan infusions. Side effects can include infusion reactions, severe drug rashes including mucocutaneous reactions, reactivation of latent hepatitis and other infections and rarely progressive multifocal leukoencephalopathy.  All of the patient's questions and concerns were addressed. Erivedge Counseling- I discussed with the patient the risks of Erivedge including but not limited to nausea, vomiting, diarrhea, constipation, weight loss, changes in the sense of taste, decreased appetite, muscle spasms, and hair loss.  The patient verbalized understanding of the proper use and possible adverse effects of Erivedge.  All of the patient's questions and concerns were addressed. Zyclara Counseling:  I discussed with the patient the risks of imiquimod including but not limited to erythema, scaling, itching, weeping, crusting, and pain.  Patient understands that the inflammatory response to imiquimod is variable from person to person and was educated regarded proper titration schedule.  If flu-like symptoms develop, patient knows to discontinue the medication and contact us. Birth Control Pills Pregnancy And Lactation Text: This medication should be avoided if pregnant and for the first 30 days post-partum. Clofazimine Counseling:  I discussed with the patient the risks of clofazimine including but not limited to skin and eye pigmentation, liver damage, nausea/vomiting, gastrointestinal bleeding and allergy. Cimetidine Counseling:  I discussed with the patient the risks of Cimetidine including but not limited to gynecomastia, headache, diarrhea, nausea, drowsiness, arrhythmias, pancreatitis, skin rashes, psychosis, bone marrow suppression and kidney toxicity. Ivermectin Counseling:  Patient instructed to take medication on an empty stomach with a full glass of water.  Patient informed of potential adverse effects including but not limited to nausea, diarrhea, dizziness, itching, and swelling of the extremities or lymph nodes.  The patient verbalized understanding of the proper use and possible adverse effects of ivermectin.  All of the patient's questions and concerns were addressed. Enbrel Counseling:  I discussed with the patient the risks of etanercept including but not limited to myelosuppression, immunosuppression, autoimmune hepatitis, demyelinating diseases, lymphoma, and infections.  The patient understands that monitoring is required including a PPD at baseline and must alert us or the primary physician if symptoms of infection or other concerning signs are noted. Griseofulvin Counseling:  I discussed with the patient the risks of griseofulvin including but not limited to photosensitivity, cytopenia, liver damage, nausea/vomiting and severe allergy.  The patient understands that this medication is best absorbed when taken with a fatty meal (e.g., ice cream or french fries). Rifampin Counseling: I discussed with the patient the risks of rifampin including but not limited to liver damage, kidney damage, red-orange body fluids, nausea/vomiting and severe allergy. Azithromycin Pregnancy And Lactation Text: This medication is considered safe during pregnancy and is also secreted in breast milk. Oral Minoxidil Counseling- I discussed with the patient the risks of oral minoxidil including but not limited to shortness of breath, swelling of the feet or ankles, dizziness, lightheadedness, unwanted hair growth and allergic reaction.  The patient verbalized understanding of the proper use and possible adverse effects of oral minoxidil.  All of the patient's questions and concerns were addressed. Tranexamic Acid Counseling:  Patient advised of the small risk of bleeding problems with tranexamic acid. They were also instructed to call if they developed any nausea, vomiting or diarrhea. All of the patient's questions and concerns were addressed. Topical Ketoconazole Counseling: Patient counseled that this medication may cause skin irritation or allergic reactions.  In the event of skin irritation, the patient was advised to reduce the amount of the drug applied or use it less frequently.   The patient verbalized understanding of the proper use and possible adverse effects of ketoconazole.  All of the patient's questions and concerns were addressed. Benzoyl Peroxide Counseling: Patient counseled that medicine may cause skin irritation and bleach clothing.  In the event of skin irritation, the patient was advised to reduce the amount of the drug applied or use it less frequently.   The patient verbalized understanding of the proper use and possible adverse effects of benzoyl peroxide.  All of the patient's questions and concerns were addressed. Bexarotene Counseling:  I discussed with the patient the risks of bexarotene including but not limited to hair loss, dry lips/skin/eyes, liver abnormalities, hyperlipidemia, pancreatitis, depression/suicidal ideation, photosensitivity, drug rash/allergic reactions, hypothyroidism, anemia, leukopenia, infection, cataracts, and teratogenicity.  Patient understands that they will need regular blood tests to check lipid profile, liver function tests, white blood cell count, thyroid function tests and pregnancy test if applicable. Oral Minoxidil Pregnancy And Lactation Text: This medication should only be used when clearly needed if you are pregnant, attempting to become pregnant or breast feeding. Rifampin Pregnancy And Lactation Text: This medication is Pregnancy Category C and it isn't know if it is safe during pregnancy. It is also excreted in breast milk and should not be used if you are breast feeding. Bactrim Counseling:  I discussed with the patient the risks of sulfa antibiotics including but not limited to GI upset, allergic reaction, drug rash, diarrhea, dizziness, photosensitivity, and yeast infections.  Rarely, more serious reactions can occur including but not limited to aplastic anemia, agranulocytosis, methemoglobinemia, blood dyscrasias, liver or kidney failure, lung infiltrates or desquamative/blistering drug rashes. Drysol Counseling:  I discussed with the patient the risks of drysol/aluminum chloride including but not limited to skin rash, itching, irritation, burning. Erythromycin Counseling:  I discussed with the patient the risks of erythromycin including but not limited to GI upset, allergic reaction, drug rash, diarrhea, increase in liver enzymes, and yeast infections. Cyclosporine Counseling:  I discussed with the patient the risks of cyclosporine including but not limited to hypertension, gingival hyperplasia,myelosuppression, immunosuppression, liver damage, kidney damage, neurotoxicity, lymphoma, and serious infections. The patient understands that monitoring is required including baseline blood pressure, CBC, CMP, lipid panel and uric acid, and then 1-2 times monthly CMP and blood pressure. Xolair Counseling:  Patient informed of potential adverse effects including but not limited to fever, muscle aches, rash and allergic reactions.  The patient verbalized understanding of the proper use and possible adverse effects of Xolair.  All of the patient's questions and concerns were addressed. Libtayo Counseling- I discussed with the patient the risks of Libtayo including but not limited to nausea, vomiting, diarrhea, and bone or muscle pain.  The patient verbalized understanding of the proper use and possible adverse effects of Libtayo.  All of the patient's questions and concerns were addressed. Rituxan Pregnancy And Lactation Text: This medication is Pregnancy Category C and it isn't know if it is safe during pregnancy. It is unknown if this medication is excreted in breast milk but similar antibodies are known to be excreted. Solaraze Counseling:  I discussed with the patient the risks of Solaraze including but not limited to erythema, scaling, itching, weeping, crusting, and pain. Stelara Counseling:  I discussed with the patient the risks of ustekinumab including but not limited to immunosuppression, malignancy, posterior leukoencephalopathy syndrome, and serious infections.  The patient understands that monitoring is required including a PPD at baseline and must alert us or the primary physician if symptoms of infection or other concerning signs are noted. Griseofulvin Pregnancy And Lactation Text: This medication is Pregnancy Category X and is known to cause serious birth defects. It is unknown if this medication is excreted in breast milk but breast feeding should be avoided. Libtayo Pregnancy And Lactation Text: This medication is contraindicated in pregnancy and when breast feeding. Benzoyl Peroxide Pregnancy And Lactation Text: This medication is Pregnancy Category C. It is unknown if benzoyl peroxide is excreted in breast milk. Bexarotene Pregnancy And Lactation Text: This medication is Pregnancy Category X and should not be given to women who are pregnant or may become pregnant. This medication should not be used if you are breast feeding. Otezla Counseling: The side effects of Otezla were discussed with the patient, including but not limited to worsening or new depression, weight loss, diarrhea, nausea, upper respiratory tract infection, and headache. Patient instructed to call the office should any adverse effect occur.  The patient verbalized understanding of the proper use and possible adverse effects of Otezla.  All the patient's questions and concerns were addressed. Minoxidil Counseling: Minoxidil is a topical medication which can increase blood flow where it is applied. It is uncertain how this medication increases hair growth. Side effects are uncommon and include stinging and allergic reactions. Bactrim Pregnancy And Lactation Text: This medication is Pregnancy Category D and is known to cause fetal risk.  It is also excreted in breast milk. Finasteride Male Counseling: Finasteride Counseling:  I discussed with the patient the risks of use of finasteride including but not limited to decreased libido, decreased ejaculate volume, gynecomastia, and depression. Women should not handle medication.  All of the patient's questions and concerns were addressed. Erythromycin Pregnancy And Lactation Text: This medication is Pregnancy Category B and is considered safe during pregnancy. It is also excreted in breast milk. Xolair Pregnancy And Lactation Text: This medication is Pregnancy Category B and is considered safe during pregnancy. This medication is excreted in breast milk. Tranexamic Acid Pregnancy And Lactation Text: It is unknown if this medication is safe during pregnancy or breast feeding. Solaraze Pregnancy And Lactation Text: This medication is Pregnancy Category B and is considered safe. There is some data to suggest avoiding during the third trimester. It is unknown if this medication is excreted in breast milk. Finasteride Pregnancy And Lactation Text: This medication is absolutely contraindicated during pregnancy. It is unknown if it is excreted in breast milk. Colchicine Counseling:  Patient counseled regarding adverse effects including but not limited to stomach upset (nausea, vomiting, stomach pain, or diarrhea).  Patient instructed to limit alcohol consumption while taking this medication.  Colchicine may reduce blood counts especially with prolonged use.  The patient understands that monitoring of kidney function and blood counts may be required, especially at baseline. The patient verbalized understanding of the proper use and possible adverse effects of colchicine.  All of the patient's questions and concerns were addressed. Metronidazole Counseling:  I discussed with the patient the risks of metronidazole including but not limited to seizures, nausea/vomiting, a metallic taste in the mouth, nausea/vomiting and severe allergy. Cimzia Counseling:  I discussed with the patient the risks of Cimzia including but not limited to immunosuppression, allergic reactions and infections.  The patient understands that monitoring is required including a PPD at baseline and must alert us or the primary physician if symptoms of infection or other concerning signs are noted. Valtrex Counseling: I discussed with the patient the risks of valacyclovir including but not limited to kidney damage, nausea, vomiting and severe allergy.  The patient understands that if the infection seems to be worsening or is not improving, they are to call. Elidel Counseling: Patient may experience a mild burning sensation during topical application. Elidel is not approved in children less than 2 years of age. There have been case reports of hematologic and skin malignancies in patients using topical calcineurin inhibitors although causality is questionable. Itraconazole Counseling:  I discussed with the patient the risks of itraconazole including but not limited to liver damage, nausea/vomiting, neuropathy, and severe allergy.  The patient understands that this medication is best absorbed when taken with acidic beverages such as non-diet cola or ginger ale.  The patient understands that monitoring is required including baseline LFTs and repeat LFTs at intervals.  The patient understands that they are to contact us or the primary physician if concerning signs are noted. Humira Counseling:  I discussed with the patient the risks of adalimumab including but not limited to myelosuppression, immunosuppression, autoimmune hepatitis, demyelinating diseases, lymphoma, and serious infections.  The patient understands that monitoring is required including a PPD at baseline and must alert us or the primary physician if symptoms of infection or other concerning signs are noted. Sarecycline Counseling: Patient advised regarding possible photosensitivity and discoloration of the teeth, skin, lips, tongue and gums.  Patient instructed to avoid sunlight, if possible.  When exposed to sunlight, patients should wear protective clothing, sunglasses, and sunscreen.  The patient was instructed to call the office immediately if the following severe adverse effects occur:  hearing changes, easy bruising/bleeding, severe headache, or vision changes.  The patient verbalized understanding of the proper use and possible adverse effects of sarecycline.  All of the patient's questions and concerns were addressed. Doxepin Counseling:  Patient advised that the medication is sedating and not to drive a car after taking this medication. Patient informed of potential adverse effects including but not limited to dry mouth, urinary retention, and blurry vision.  The patient verbalized understanding of the proper use and possible adverse effects of doxepin.  All of the patient's questions and concerns were addressed. Cibinqo Counseling: I discussed with the patient the risks of Cibinqo therapy including but not limited to common cold, nausea, headache, cold sores, increased blood CPK levels, dizziness, UTIs, fatigue, acne, and vomitting. Live vaccines should be avoided.  This medication has been linked to serious infections; higher rate of mortality; malignancy and lymphoproliferative disorders; major adverse cardiovascular events; thrombosis; thrombocytopenia and lymphopenia; lipid elevations; and retinal detachment. Olanzapine Counseling- I discussed with the patient the common side effects of olanzapine including but are not limited to: lack of energy, dry mouth, increased appetite, sleepiness, tremor, constipation, dizziness, changes in behavior, or restlessness.  Explained that teenagers are more likely to experience headaches, abdominal pain, pain in the arms or legs, tiredness, and sleepiness.  Serious side effects include but are not limited: increased risk of death in elderly patients who are confused, have memory loss, or dementia-related psychosis; hyperglycemia; increased cholesterol and triglycerides; and weight gain. Olanzapine Pregnancy And Lactation Text: This medication is pregnancy category C.   There are no adequate and well controlled trials with olanzapine in pregnant females.  Olanzapine should be used during pregnancy only if the potential benefit justifies the potential risk to the fetus.   In a study in lactating healthy women, olanzapine was excreted in breast milk.  It is recommended that women taking olanzapine should not breast feed. Sotyktu Pregnancy And Lactation Text: There is insufficient data to evaluate whether or not Sotyktu is safe to use during pregnancy.? ?It is not known if Sotyktu passes into breast milk and whether or not it is safe to use when breastfeeding.?? Adbry Pregnancy And Lactation Text: It is unknown if this medication will adversely affect pregnancy or breast feeding. Opzelura Pregnancy And Lactation Text: There is insufficient data to evaluate drug-associated risk for major birth defects, miscarriage, or other adverse maternal or fetal outcomes.  There is a pregnancy registry that monitors pregnancy outcomes in pregnant persons exposed to the medication during pregnancy.  It is unknown if this medication is excreted in breast milk.  Do not breastfeed during treatment and for about 4 weeks after the last dose. Sotyktu Counseling:  I discussed the most common side effects of Sotyktu including: common cold, sore throat, sinus infections, cold sores, canker sores, folliculitis, and acne.? I also discussed more serious side effects of Sotyktu including but not limited to: serious allergic reactions; increased risk for infections such as TB; cancers such as lymphomas; rhabdomyolysis and elevated CPK; and elevated triglycerides and liver enzymes.? Adbry Counseling: I discussed with the patient the risks of tralokinumab including but not limited to eye infection and irritation, cold sores, injection site reactions, worsening of asthma, allergic reactions and increased risk of parasitic infection.  Live vaccines should be avoided while taking tralokinumab. The patient understands that monitoring is required and they must alert us or the primary physician if symptoms of infection or other concerning signs are noted. Rinvoq Pregnancy And Lactation Text: Based on animal studies, Rinvoq may cause embryo-fetal harm when administered to pregnant women.  The medication should not be used in pregnancy.  Breastfeeding is not recommended during treatment and for 6 days after the last dose. Rinvoq Counseling: I discussed with the patient the risks of Rinvoq therapy including but not limited to upper respiratory tract infections, shingles, cold sores, bronchitis, nausea, cough, fever, acne, and headache. Live vaccines should be avoided.  This medication has been linked to serious infections; higher rate of mortality; malignancy and lymphoproliferative disorders; major adverse cardiovascular events; thrombosis; thrombocytopenia, anemia, and neutropenia; lipid elevations; liver enzyme elevations; and gastrointestinal perforations. Low Dose Naltrexone Counseling- I discussed with the patient the potential risks and side effects of low dose naltrexone including but not limited to: more vivid dreams, headaches, nausea, vomiting, abdominal pain, fatigue, dizziness, and anxiety. Olumiant Pregnancy And Lactation Text: Based on animal studies, Olumiant may cause embryo-fetal harm when administered to pregnant women.  The medication should not be used in pregnancy.  Breastfeeding is not recommended during treatment. Opzelura Counseling:  I discussed with the patient the risks of Opzelura including but not limited to nasopharngitis, bronchitis, ear infection, eosinophila, hives, diarrhea, folliculitis, tonsillitis, and rhinorrhea.  Taken orally, this medication has been linked to serious infections; higher rate of mortality; malignancy and lymphoproliferative disorders; major adverse cardiovascular events; thrombosis; thrombocytopenia, anemia, and neutropenia; and lipid elevations. Low Dose Naltrexone Pregnancy And Lactation Text: Naltrexone is pregnancy category C.  There have been no adequate and well-controlled studies in pregnant women.  It should be used in pregnancy only if the potential benefit justifies the potential risk to the fetus.   Limited data indicates that naltrexone is minimally excreted into breastmilk. Olumiant Counseling: I discussed with the patient the risks of Olumiant therapy including but not limited to upper respiratory tract infections, shingles, cold sores, and nausea. Live vaccines should be avoided.  This medication has been linked to serious infections; higher rate of mortality; malignancy and lymphoproliferative disorders; major adverse cardiovascular events; thrombosis; gastrointestinal perforations; neutropenia; lymphopenia; anemia; liver enzyme elevations; and lipid elevations. Cibinqo Pregnancy And Lactation Text: It is unknown if this medication will adversely affect pregnancy or breast feeding.  You should not take this medication if you are currently pregnant or planning a pregnancy or while breastfeeding.

## 2023-03-27 NOTE — ED ADULT TRIAGE NOTE - ADDITIONAL SAFETY/BANDS...
Additional Safety/Bands: Nsaids Pregnancy And Lactation Text: These medications are considered safe up to 30 weeks gestation. It is excreted in breast milk.

## 2023-04-20 NOTE — PROGRESS NOTE ADULT - PROBLEM SELECTOR PROBLEM 9
Occupational Therapy    Visit Type: treatment  Co-treat with: rehab aidoswald Adair.  Precautions:  Medical precautions: ; standard precautions. Hmong speaking-family interpreted throughout session  Lines:     Basic: telemetry and capped IV      Lines in chart and on patient reviewed, precautions maintained throughout session.  Hearing: no hearing deficits  Safety Measures: sitter  SUBJECTIVE  Patient agreed to participate in therapy this date.  Patient verbally agrees to allow the following to be present during session: son and student (granddaughter )  Patient sitting up in recliner. Noted to be slightly lethargic   Patient / Family Goal: return to previous functional status    Pain     Location: none reported when asked   RN informed on pain level     OBJECTIVE     Cognitive Status   Level of Consciousness   - drowsy  Arousal Alertness   - inconsistent responses to stimuli  Orientation    - Oriented to: person and place  Functional Communication   - Forms of Communication: expressive deficits and receptive deficits  Attention Span    - Attention: - attends with cues to redirect  Following Direction   - follows one step commands with increased time and follows one step commands with repetition  Safety Awareness/Insight   - impaired    Patient Activity Tolerance: 1 to 2 activity to rest    Posture:  - Seated: fair  - Standing: fair      Range of Motion (ROM)   (degrees unless noted; active unless noted; norms in ( ); negative=lacking to 0, positive=beyond 0)  WNL: ABRAHAM SIMS  Comments: Unable to formally assess due to cognition    Strength  (out of 5 unless noted, standard test position unless noted)   WFL: ABRAHAM SIMS  Comments / Details: Unable to formally assess due to cognition. Left sided weakness at baseline from prior CVA      Sitting Balance  (LENI = base of support)  Static      - Trial 1 details: stand by assist  Dynamic      - Trial 1 details: moderate assist (for anterior weight shift in chair)    Standing  Balance  (LENI = base of support)  Firm Surface: Double Leg      - Static, Eyes Open       - Trial 1 details: minimal assist and with double UE support     - Dynamic, Eyes Open       - Trial 1 details: with double UE support and moderate assist  Posterior lean noted in stand         Transfers  Assistive devices: 2-wheeled walker, gait belt  - Sit to stand: moderate assist, with tactile cues, with verbal cues  - Stand to sit: moderate assist, with tactile cues, with verbal cues  Verbal and tactile cues for hand placement. Assist for upward force generation  and eccentric descent to chair. Posterior lean initially, able to stand with fair balance with cues and walker repositioning.         Functional Ambulation  - Assistance: with verbal cues, with tactile cues and total assist - non-dependent (mod assist x 1, min assist x 1)  - Assistive device: 2-wheeled walker and gait belt  - Distance (ft):15  Use of wheeled walker with manual assist to advance walker. Small shuffling steps, required consistent verbal cues to take larger steps.  Granddaughter present and assisted with translation and orientation. Declined further ambulation distance due to fatigue  Activities of Daily Living (ADLs)  Grooming/Oral Hygiene:   - Grooming assist: set up, supervision and with verbal cues       - Oral hygiene assist: set up, supervision and with verbal cues  - Position: chair  - Assist needed for: wash/dry face and teeth care  Lower Body Dressing:   - Assist: total assist - dependent   - Position: chair and standing  - Assist needed for: thread left lower extremity into underwear, thread right lower extremity into underwear, steadying and pull up over hips  Toothbrush and toothpaste set in in front of patient. Unable to identify object initially despite cues. When placed in hand, patient able to bring to mouth and brush teeth without cues. Washcloth placed in hands and patient able to wash face with cues to initiate. No neglect of UE  weakness/coordination deficits noted.   Interventions    Training provided: ADL training, activity tolerance, balance retraining, bed mobility training, positioning, transfer training and functional ambulation  Skilled input: verbal instruction/cues, tactile instruction/cues and posture correction  Verbal Consent: Writer verbally educated and received verbal consent for hand placement, positioning of patient, and techniques to be performed today from patient (patient's family ) for clothing adjustments for techniques, therapist position for techniques and hand placement and palpation for techniques as described above and how they are pertinent to the patient's plan of care.         ASSESSMENT  Impairments: activity tolerance, balance, bed mobility, pain, range of motion, edema, strength and skin integrity  Functional Limitations: bed mobility, functional mobility, grooming, bathing, toileting, functional transfers, IADLs, showering and dressing  Patient progressing slowly toward goals. Decreased ambulation distance this session due to fatigue, however, slight improvement with direction following to complete seated grooming. Currently requires assist x 1-2 using walker due to weakness, decreased balance, and decreased cognition. Patient may benefit from NIEDA to gain strength, balance, and mobility to maximize independence with ADLs prior to return home with family support.          Discharge Recommendations:  Recommendation for Discharge Location: OT WI: Home without therapy needs  Recommendation for Discharge Support: OT [place]: 24 Hour assist  PT/OT Mobility Equipment for Discharge: will issue 2ww at d/c    Progress: progressing toward goals    • Skilled therapy is required to address these limitations in attempt to maximize the patient's independence.     • Personal Occupations Profile Affected: bathing/showering, functional mobility/transfers, personal hygiene/grooming, toileting/toilet hygiene, upper body dressing,  lower body dressing     • Clinical decision making: Moderate - Patient has several limitations (3-5), comorbidities and/or complexities, as noted in detailed assessment above, that impact their occupational profile.  Resulting in several treatment options and minimal to moderate task modification consistent with moderate clinical decision making complexity.    Education:   - Present and ready to learn: patient and patient's family  Education provided during session:  - Results of above outlined education: Needs reinforcement and Demonstrates understanding  Pain at End of Session: RN informed on pain level, location: none noted    Patient at End of Session:   Location: in chair  Safety measures: call light within reach and sitter present  Handoff to: nurse    PLAN  Suggestions for next session as indicated: bring aide for mobility, progress mobility to bathroom for toileting/ADLs-retained due to appropriateness   Genesis score daily      OT Frequency: 3-5 x per week  Frequency Comments: 4/20 - 4 (EBENEZER)            Interventions: activity tolerance training, ADL retraining, balance, bed mobility training, patient/family training, transfer training, therapeutic exercise, patient education, functional transfer training, caregiver training, therapeutic activity, IADL, positioning, upper extremity strengthening/ROM, equipment eval/education, HEP training and continued evaluation  Agreement to plan and goals: patient agrees with goals and treatment plan      GOALS  Review Date: 4/26/2023  Long Term Goals: (to be met by time of discharge from hospital)  Grooming: Patient will complete grooming tasks modified independent. Status: progressing/ongoing  Upper body dressing: Patient will complete upper body dressing modified independent. Status: progressing/ongoing  Lower body dressing: Patient will complete lower body dressing modified independent. Status: progressing/ongoing  Toileting: Patient will complete toileting modified  independent.  Status: progressing/ongoing  Bathing: Patient will complete bathingmodified independent  Status: progressing/ongoingToilet transfer: Patient will complete toilet transfer with modified independent. Status: progressing/ongoing  Tub/shower transfer: Patient will complete tub/shower transfer with modified independent. Status: progressing/ongoing        Documented in the chart in the following areas: Assessment. Plan.      Therapy procedure time and total treatment time can be found documented on the Time Entry flowsheet   Stented coronary artery

## 2023-04-28 NOTE — PROGRESS NOTE ADULT - SUBJECTIVE AND OBJECTIVE BOX
INTERVAL HISTORY: denies CP,SOB  	  MEDICATIONS:  hydrALAZINE 50 milliGRAM(s) Oral every 8 hours  metoprolol tartrate 50 milliGRAM(s) Oral two times a day  DAPTOmycin IVPB 550 milliGRAM(s) IV Intermittent every 24 hours  sertraline 100 milliGRAM(s) Oral daily  docusate sodium 100 milliGRAM(s) Oral three times a day  polyethylene glycol 3350 17 Gram(s) Oral daily PRN  atorvastatin 20 milliGRAM(s) Oral at bedtime  dextrose 40% Gel 15 Gram(s) Oral once PRN  dextrose 50% Injectable 12.5 Gram(s) IV Push once  dextrose 50% Injectable 25 Gram(s) IV Push once  dextrose 50% Injectable 25 Gram(s) IV Push once  glucagon  Injectable 1 milliGRAM(s) IntraMuscular once PRN  insulin glargine Injectable (LANTUS) 28 Unit(s) SubCutaneous at bedtime  insulin lispro (HumaLOG) corrective regimen sliding scale   SubCutaneous three times a day before meals  aspirin  chewable 81 milliGRAM(s) Oral daily  clopidogrel Tablet 75 milliGRAM(s) Oral daily  dextrose 5%. 1000 milliLiter(s) IV Continuous <Continuous>  heparin  Injectable 5000 Unit(s) SubCutaneous every 12 hours  sodium chloride 0.45%. 1000 milliLiter(s) IV Continuous <Continuous>        PHYSICAL EXAM:  T(C): 36.3 (11-13-18 @ 04:53), Max: 36.6 (11-12-18 @ 11:17)  HR: 63 (11-13-18 @ 04:53) (54 - 66)  BP: 142/61 (11-13-18 @ 04:53) (117/66 - 168/74)  RR: 18 (11-13-18 @ 00:37) (18 - 18)  SpO2: 96% (11-13-18 @ 00:37) (96% - 96%)  Wt(kg): --  I&O's Summary    12 Nov 2018 07:01  -  13 Nov 2018 07:00  --------------------------------------------------------  IN: 900 mL / OUT: 0 mL / NET: 900 mL          Appearance: Normal	  Respiratory: Lungs clear to auscultation	  Psychiatry: A & O x 3, Mood & affect appropriate  Gastrointestinal:  Soft, Non-tender, + BS	  Skin: No rashes, No ecchymoses, No cyanosis  Neurologic: Non-focal  Extremities: Normal range of motion, No clubbing, cyanosis or edema  Vascular: Peripheral pulses palpable 2+ bilaterally	      LABS:	                         11.8   8.3   )-----------( 339      ( 13 Nov 2018 07:32 )             37.8     11-13    150<H>  |  116<H>  |  48.0<H>  ----------------------------<  279<H>  3.9   |  22.0  |  2.03<H>    Ca    8.5<L>      13 Nov 2018 07:32  Phos  3.3     11-12  Mg     2.3     11-12    TPro  5.9<L>  /  Alb  3.0<L>  /  TBili  0.4  /  DBili  x   /  AST  18  /  ALT  15  /  AlkPhos  87  11-13 Protopic Pregnancy And Lactation Text: This medication is Pregnancy Category C. It is unknown if this medication is excreted in breast milk when applied topically.

## 2023-07-10 NOTE — CONSULT NOTE ADULT - CONSULT REASON
70 year old year old male here for upper endoscopy for dysphagia.  Patient had an episode recently when he thought rice was stuck in his esophagus.  It did pass spontaneously.  Denies smoking.  Occasionally drinks alcohol.  No known liver disease.        Patient Active Problem List   Diagnosis     Benign prostatic hypertrophy     CARDIOVASCULAR SCREENING; LDL GOAL LESS THAN 160     GERD (gastroesophageal reflux disease)     Advanced directives, counseling/discussion     Hypertension, goal below 140/90     Appendicitis     Diverticulosis of large intestine without hemorrhage     Diverticulitis of colon     Acute diverticulitis     ETOH abuse     Generalized abdominal pain     Anemia, iron deficiency     Hiatal hernia     Dyspnea on exertion     Status post coronary angiogram     Coronary artery disease of native heart with stable angina pectoris, unspecified vessel or lesion type (H)     Prediabetes     Dysphagia, unspecified type       Past Medical History:   Diagnosis Date     A-fib (H)      BPH (benign prostatic hyperplasia)      Complication of anesthesia      Diverticulitis      GERD (gastroesophageal reflux disease)      Heart disease      HLD (hyperlipidemia)      Hypertension        Past Surgical History:   Procedure Laterality Date     APPENDECTOMY       ARTHROSCOPY SHOULDER ROTATOR CUFF REPAIR Left      ARTHROSCOPY SHOULDER RT/LT Right 01/2018     CARDIAC SURGERY  November, 2022    One stent placed in my heart.     COLONOSCOPY  03/05/2008     COLONOSCOPY  10/09/2013    Procedure: COLONOSCOPY;;  Surgeon: Nicolas Lizama MD;  Location: [facility]     COLONOSCOPY N/A 08/17/2022    Procedure: COLONOSCOPY, FLEXIBLE, WITH LESION REMOVAL USING SNARE;  Surgeon: Vimal Stanley DO;  Location: [facility]     CV CORONARY ANGIOGRAM N/A 09/07/2022    Procedure: Coronary Angiogram;  Surgeon: Morris Sky MD;  Location: [facility] CARDIAC CATH LAB     CV INSTANTANEOUS WAVE-FREE RATIO N/A 09/07/2022     Procedure: Instantaneous Wave-Free Ratio;  Surgeon: Morris Sky MD;  Location:  HEART CARDIAC CATH LAB     CV LEFT HEART CATH N/A 09/07/2022    Procedure: Left Heart Catheterization;  Surgeon: Morris Sky MD;  Location:  HEART CARDIAC CATH LAB     CV LEFT VENTRICULOGRAM N/A 09/07/2022    Procedure: Left Ventriculogram;  Surgeon: Morris Sky MD;  Location:  HEART CARDIAC CATH LAB     CV PCI STENT DRUG ELUTING N/A 09/07/2022    Procedure: Percutaneous Coronary Intervention Stent;  Surgeon: Morris Sky MD;  Location:  HEART CARDIAC CATH LAB     ESOPHAGOSCOPY, GASTROSCOPY, DUODENOSCOPY (EGD), COMBINED  10/09/2013    Procedure: COMBINED ESOPHAGOSCOPY, GASTROSCOPY, DUODENOSCOPY (EGD), BIOPSY SINGLE OR MULTIPLE;;  Surgeon: Nicolas Lizama MD;  Location: [facility]     ESOPHAGOSCOPY, GASTROSCOPY, DUODENOSCOPY (EGD), COMBINED N/A 04/06/2022    Procedure: ESOPHAGOGASTRODUODENOSCOPY, WITH BIOPSY;  Surgeon: Vimal Stanley DO;  Location: [facility]     HERNIA REPAIR       LAPAROSCOPIC APPENDECTOMY N/A 01/07/2016    Procedure: LAPAROSCOPIC APPENDECTOMY;  Surgeon: Ab Guzman MD;  Location: [facility]     LAPAROSCOPIC HERNIORRHAPHY INGUINAL BILATERAL Bilateral 12/13/2016    Procedure: LAPAROSCOPIC HERNIORRHAPHY INGUINAL BILATERAL;  Surgeon: Ab Guzman MD;  Location: [facility]       Family History   Problem Relation Age of Onset     Diabetes Mother      Cerebrovascular Disease Mother      Neurologic Disorder Father         parkinson's     Cancer - colorectal Maternal Grandfather      Prostate Cancer Paternal Grandmother      Hypertension Brother      Cerebrovascular Disease Brother         stroke     Alcohol/Drug Brother      Unknown/Adopted Brother      Diabetes Sister      Obesity Sister        No current outpatient medications on file.       Allergies   Allergen Reactions     Nka [No Known Allergies]        Pt reports that he has never smoked. He has never used smokeless tobacco.  ALLISON He reports current alcohol use. He reports that he does not use drugs.    Exam:    Awake, Alert OX3  Lungs - CTA bilaterally  CV - RRR, no murmurs, distal pulses intact  Abd - soft, non-distended, non-tender, +BS  Extr - No cyanosis or edema    A/P 70 year old year old male in need of upper endoscopy and possible dilation for dysphagia. Risks, benefits, alternatives, and complications were discussed including the possibility of perforation and the patient agreed to proceed.    Vimal Stanley, DO on 7/10/2023 at 10:03 AM

## 2023-09-21 NOTE — ED ADULT NURSE NOTE - PRO INTERPRETER NEED 2
Addended by: MERLENE ECSALANTE on: 9/21/2023 12:03 PM     Modules accepted: Orders    
Addended by: SANJIV MONROE on: 9/21/2023 11:37 AM     Modules accepted: Orders    
English

## 2024-04-04 NOTE — CHART NOTE - NSCHARTNOTESELECT_GEN_ALL_CORE
Mupirocin dry dressing twice a day. Patient may follow up as needed.  
Event Note
Event Note/CTS PA
Event Note/Electrophysiology
Nutrition Services

## 2024-08-13 NOTE — DISCHARGE NOTE ADULT - LAUNCH MEDICATION RECONCILIATION
What Type Of Note Output Would You Prefer (Optional)?: Standard Output How Severe Are Your Spot(S)?: mild Have Your Spot(S) Been Treated In The Past?: has not been treated Hpi Title: Evaluation of Skin Lesions <<-----Click here for Discharge Medication Review Additional History: Patient presents today for a total body skin exam.

## 2024-12-03 NOTE — DISCHARGE NOTE ADULT - NS AS DC STROKE DX
PHYSICAL / OCCUPATIONAL THERAPY - DAILY TREATMENT NOTE (updated )    Patient Name: Renea Artis    Date: 12/3/2024    : 1972  Insurance: Payor: [facility] MEDICAID / Plan: [facility] CARDINAL CARE / Product Type: *No Product type* /      Patient  verified yes     Visit #   Current / Total 5 10 Total Time   Time   In / Out 11:03 11:53 50   Pain   In / Out 4 6    Subjective Functional Status/Changes: I am in a mood today but not too bad.     TREATMENT AREA =  Other low back pain [M54.59]  Left knee pain [M25.562]    OBJECTIVE    Modality rationale: decrease pain to improve the patient’s ability to perform ADLs following therapy session without increased pain.   Min Type Additional Details   10 [x]  Ice     []  heat   Position: supine w/ wedge  Location: low back   [x] Skin assessment post-treatment:  [x]intact []redness- no adverse reaction    []redness - adverse reaction:     Therapeutic Procedures:  40  Total  [facility] Totals Reminder: bill using total billable min of TIMED therapeutic procedures (example: do not include dry needle or estim unattended, both untimed codes, in totals to left)  8-22 min = 1 unit; 23-37 min = 2 units; 38-52 min = 3 units; 53-67 min = 4 units; 68-82 min = 5 units   Tx Min  Procedure, Rationale, Specifics   14  74116 Therapeutic Exercise (timed):  increase ROM, strength, coordination, balance, and proprioception to improve patient's ability to progress to PLOF and address remaining functional goals. (see flow sheet as applicable)   Details if applicable:       13  19966 Therapeutic Activity (timed):  use of dynamic activities replicating functional movements to increase ROM, strength, coordination, balance, and proprioception in order to improve patient's ability to progress to PLOF and address remaining functional goals.  (see flow sheet as applicable)   Details if applicable:       13  92052 Neuromuscular Re-Education (timed):  improve balance, coordination,  Stroke of uncertain type/Ischemic Stroke...

## 2025-01-27 NOTE — PATIENT PROFILE ADULT - HAS THE PATIENT EXPERIENCED ANY OF THE FOLLOWING WITHIN THE WEEK PRIOR TO ADMISSION?
Contacted patient in regards to Routine Referral in attempts to verify patient's needs of services and add patient to proper wait list. LVM for patient to contact intake dept  in regards to routine referral at [phone]. 2nd attempt. Closing referral.    no/cerebrovascular accident

## 2025-05-30 NOTE — DISCHARGE NOTE NURSING/CASE MANAGEMENT/SOCIAL WORK - NSDCPEWEB_GEN_ALL_CORE
[facility] website --- [email or web address] website --- [email or web address] Detail Level: Detailed Render Post-Care Instructions In Note?: no Consent: The patient's consent was obtained including but not limited to risks of crusting, scabbing, blistering, scarring, darker or lighter pigmentary change, recurrence, incomplete removal and infection. Post-Care Instructions: I reviewed with the patient in detail post-care instructions. Patient is to wear sunprotection, and avoid picking at any of the treated lesions. Pt may apply Vaseline to crusted or scabbing areas. Number Of Freeze-Thaw Cycles: 1 freeze-thaw cycle Duration Of Freeze Thaw-Cycle (Seconds): 5 warm